# Patient Record
Sex: MALE | URBAN - METROPOLITAN AREA
[De-identification: names, ages, dates, MRNs, and addresses within clinical notes are randomized per-mention and may not be internally consistent; named-entity substitution may affect disease eponyms.]

---

## 2018-05-29 ENCOUNTER — APPOINTMENT (RX ONLY)
Dept: URBAN - METROPOLITAN AREA CLINIC 4 | Facility: CLINIC | Age: 73
Setting detail: DERMATOLOGY
End: 2018-05-29

## 2018-05-29 DIAGNOSIS — L57.0 ACTINIC KERATOSIS: ICD-10-CM

## 2018-05-29 DIAGNOSIS — L72.0 EPIDERMAL CYST: ICD-10-CM

## 2018-05-29 PROBLEM — E13.9 OTHER SPECIFIED DIABETES MELLITUS WITHOUT COMPLICATIONS: Status: ACTIVE | Noted: 2018-05-29

## 2018-05-29 PROBLEM — D48.5 NEOPLASM OF UNCERTAIN BEHAVIOR OF SKIN: Status: ACTIVE | Noted: 2018-05-29

## 2018-05-29 PROBLEM — I10 ESSENTIAL (PRIMARY) HYPERTENSION: Status: ACTIVE | Noted: 2018-05-29

## 2018-05-29 PROCEDURE — 17004 DESTROY PREMAL LESIONS 15/>: CPT

## 2018-05-29 PROCEDURE — ? LIQUID NITROGEN

## 2018-05-29 PROCEDURE — ? OBSERVATION AND MEASURE

## 2018-05-29 PROCEDURE — ? COUNSELING

## 2018-05-29 PROCEDURE — ? BIOPSY BY SHAVE METHOD

## 2018-05-29 PROCEDURE — 99202 OFFICE O/P NEW SF 15 MIN: CPT | Mod: 25

## 2018-05-29 PROCEDURE — 11100: CPT | Mod: 59

## 2018-05-29 ASSESSMENT — LOCATION ZONE DERM
LOCATION ZONE: EAR
LOCATION ZONE: NOSE
LOCATION ZONE: HAND
LOCATION ZONE: SCALP
LOCATION ZONE: ARM
LOCATION ZONE: FACE
LOCATION ZONE: NECK

## 2018-05-29 ASSESSMENT — LOCATION SIMPLE DESCRIPTION DERM
LOCATION SIMPLE: NECK
LOCATION SIMPLE: RIGHT EAR
LOCATION SIMPLE: RIGHT TEMPLE
LOCATION SIMPLE: SCALP
LOCATION SIMPLE: RIGHT UPPER ARM
LOCATION SIMPLE: LEFT HAND
LOCATION SIMPLE: RIGHT FOREHEAD
LOCATION SIMPLE: LEFT TEMPLE
LOCATION SIMPLE: LEFT FOREHEAD
LOCATION SIMPLE: LEFT EYEBROW
LOCATION SIMPLE: RIGHT FOREARM
LOCATION SIMPLE: LEFT FOREARM
LOCATION SIMPLE: RIGHT CHEEK
LOCATION SIMPLE: RIGHT EYEBROW
LOCATION SIMPLE: RIGHT HAND
LOCATION SIMPLE: NOSE

## 2018-05-29 ASSESSMENT — LOCATION DETAILED DESCRIPTION DERM
LOCATION DETAILED: LEFT LATERAL FOREHEAD
LOCATION DETAILED: RIGHT DISTAL RADIAL DORSAL FOREARM
LOCATION DETAILED: RIGHT CENTRAL LATERAL NECK
LOCATION DETAILED: LEFT FOREHEAD
LOCATION DETAILED: LEFT DISTAL DORSAL FOREARM
LOCATION DETAILED: RIGHT INFERIOR CENTRAL MALAR CHEEK
LOCATION DETAILED: RIGHT ULNAR DORSAL HAND
LOCATION DETAILED: RIGHT CENTRAL EYEBROW
LOCATION DETAILED: RIGHT SUPERIOR FRONTAL SCALP
LOCATION DETAILED: RIGHT RADIAL DORSAL HAND
LOCATION DETAILED: LEFT LATERAL EYEBROW
LOCATION DETAILED: RIGHT ANTITRAGUS
LOCATION DETAILED: LEFT SUPERIOR FOREHEAD
LOCATION DETAILED: LEFT RADIAL DORSAL HAND
LOCATION DETAILED: RIGHT SUPERIOR HELIX
LOCATION DETAILED: RIGHT SCAPHA
LOCATION DETAILED: RIGHT DISTAL LATERAL POSTERIOR UPPER ARM
LOCATION DETAILED: RIGHT MEDIAL TEMPLE
LOCATION DETAILED: NASAL DORSUM
LOCATION DETAILED: LEFT ULNAR DORSAL HAND
LOCATION DETAILED: RIGHT INFERIOR FOREHEAD
LOCATION DETAILED: LEFT CENTRAL EYEBROW
LOCATION DETAILED: LEFT CENTRAL TEMPLE
LOCATION DETAILED: RIGHT DISTAL DORSAL FOREARM
LOCATION DETAILED: RIGHT PROXIMAL DORSAL FOREARM
LOCATION DETAILED: RIGHT LATERAL EYEBROW
LOCATION DETAILED: RIGHT SUPERIOR LATERAL FOREHEAD
LOCATION DETAILED: LEFT PROXIMAL DORSAL FOREARM
LOCATION DETAILED: RIGHT INFERIOR MEDIAL FOREHEAD

## 2018-05-29 NOTE — PROCEDURE: LIQUID NITROGEN
Number Of Freeze-Thaw Cycles: 1 freeze-thaw cycle
Post-Care Instructions: I reviewed with the patient in detail post-care instructions. Patient is to wear sunprotection, and avoid picking at any of the treated lesions. Pt may apply Vaseline to crusted or scabbing areas.
Detail Level: Detailed
Duration Of Freeze Thaw-Cycle (Seconds): 5
Consent: The patient's consent was obtained including but not limited to risks of crusting, scabbing, blistering, scarring, darker or lighter pigmentary change, recurrence, incomplete removal and infection.
Render Post-Care Instructions In Note?: yes

## 2018-05-29 NOTE — PROCEDURE: BIOPSY BY SHAVE METHOD
Billing Type: Third-Party Bill
Body Location Override (Optional - Billing Will Still Be Based On Selected Body Map Location If Applicable): right nasal tip
Cryotherapy Text: The wound bed was treated with cryotherapy after the biopsy was performed.
Wound Care: Petrolatum
Biopsy Method: Personna blade
Additional Anesthesia Volume In Cc (Will Not Render If 0): 0
Anesthesia Type: 1% lidocaine with epinephrine and a 1:10 solution of 8.4% sodium bicarbonate
Electrodesiccation And Curettage Text: The wound bed was treated with electrodesiccation and curettage after the biopsy was performed.
X Size Of Lesion In Cm: 0.7
Consent: Written consent was obtained and risks were reviewed including but not limited to scarring, infection, bleeding, scabbing, incomplete removal, nerve damage and allergy to anesthesia.
Electrodesiccation Text: The wound bed was treated with electrodesiccation after the biopsy was performed.
Silver Nitrate Text: The wound bed was treated with silver nitrate after the biopsy was performed.
Detail Level: Detailed
Lab: [id number]
Bill [id number] For Specimen Handling/Conveyance To Laboratory?: no
Anesthesia Volume In Cc: 0.5
Lab Facility: 
Biopsy Type: H and E
Post-Care Instructions: I reviewed with the patient in detail post-care instructions. Patient is to keep the biopsy site dry overnight, and then apply bacitracin twice daily until healed. Patient may apply hydrogen peroxide soaks to remove any crusting.
Type Of Destruction Used: Curettage
Was A Bandage Applied: Yes
Notification Instructions: Patient will be notified of biopsy results. However, patient instructed to call the office if not contacted within 2 weeks.
Curettage Text: The wound bed was treated with curettage after the biopsy was performed.
Hemostasis: Aluminum Chloride
Dressing: Band-Aid

## 2018-07-27 ENCOUNTER — APPOINTMENT (RX ONLY)
Dept: URBAN - METROPOLITAN AREA CLINIC 4 | Facility: CLINIC | Age: 73
Setting detail: DERMATOLOGY
End: 2018-07-27

## 2018-07-27 ENCOUNTER — APPOINTMENT (RX ONLY)
Dept: URBAN - METROPOLITAN AREA CLINIC 36 | Facility: CLINIC | Age: 73
Setting detail: DERMATOLOGY
End: 2018-07-27

## 2018-07-27 PROBLEM — C44.91 BASAL CELL CARCINOMA OF SKIN, UNSPECIFIED: Status: ACTIVE | Noted: 2018-07-27

## 2018-07-27 PROCEDURE — ? MOHS SURGERY PHONE CONSULTATION

## 2018-07-27 NOTE — PROCEDURE: MOHS SURGERY PHONE CONSULTATION
Patient Reported Location: [place]
Detail Level: Detailed
Has The Pathology Report Been Received?: Yes
Does The Patient Have An Artificial Heart Valve?: No
Which Antibiotic Do They Take For Surgical Prophylaxis?: Amoxicillin (2 grams)
If Yes- What Blood Thinners Do They Take?: eliquis

## 2018-08-10 ENCOUNTER — APPOINTMENT (RX ONLY)
Dept: URBAN - METROPOLITAN AREA CLINIC 36 | Facility: CLINIC | Age: 73
Setting detail: DERMATOLOGY
End: 2018-08-10

## 2018-08-16 ENCOUNTER — APPOINTMENT (RX ONLY)
Dept: URBAN - METROPOLITAN AREA CLINIC 4 | Facility: CLINIC | Age: 73
Setting detail: DERMATOLOGY
End: 2018-08-16

## 2018-08-16 PROBLEM — C44.311 BASAL CELL CARCINOMA OF SKIN OF NOSE: Status: ACTIVE | Noted: 2018-08-16

## 2018-08-16 PROBLEM — Z85.828 PERSONAL HISTORY OF OTHER MALIGNANT NEOPLASM OF SKIN: Status: ACTIVE | Noted: 2018-08-16

## 2018-08-16 PROCEDURE — 17312 MOHS ADDL STAGE: CPT

## 2018-08-16 PROCEDURE — ? PRESCRIPTION

## 2018-08-16 PROCEDURE — ? MOHS SURGERY

## 2018-08-16 PROCEDURE — 17311 MOHS 1 STAGE H/N/HF/G: CPT

## 2018-08-16 PROCEDURE — 14060 TIS TRNFR E/N/E/L 10 SQ CM/<: CPT

## 2018-08-16 PROCEDURE — ? DIAGNOSIS COMMENT

## 2018-08-16 NOTE — PROCEDURE: MOHS SURGERY
Quadrant Reporting?: no
Quadrants Reporting?: 0
Mauc Instructions: By selecting yes to the question below the MAUC number will be added into the note.  This will be calculated automatically based on the diagnosis chosen, the size entered, the body zone selected (H,M,L) and the specific indications you chose. You will also have the option to override the Mohs AUC if you disagree with the automatically calculated number and this option is found in the Case Summary tab.
Bilateral Helical Rim Advancement Flap Text: The defect edges were debeveled with a #15 blade scalpel.  Given the location of the defect and the proximity to free margins (helical rim) a bilateral helical rim advancement flap was deemed most appropriate.  Using a sterile surgical marker, the appropriate advancement flaps were drawn incorporating the defect and placing the expected incisions between the helical rim and antihelix where possible.  The area thus outlined was incised through and through with a #15 scalpel blade.  With a skin hook and iris scissors, the flaps were gently and sharply undermined and freed up.
Consent (Marginal Mandibular)/Introductory Paragraph: The rationale for Mohs was explained to the patient and consent was obtained. The risks, benefits and alternatives to therapy were discussed in detail. Specifically, the risks of damage to the marginal mandibular branch of the facial nerve, infection, scarring, bleeding, prolonged wound healing, incomplete removal, allergy to anesthesia, and recurrence were addressed. Prior to the procedure, the treatment site was clearly identified and confirmed by the patient. All components of Universal Protocol/PAUSE Rule completed.
Referred To Plastics For Closure Text (Leave Blank If You Do Not Want): After obtaining clear surgical margins the patient was sent to plastics for surgical repair.  The patient understands they will receive post-surgical care and follow-up from the referring physician's office.
Mercedes Flap Text: The defect edges were debeveled with a #15 scalpel blade.  Given the location of the defect, shape of the defect and the proximity to free margins a Mercedes flap was deemed most appropriate.  Using a sterile surgical marker, an appropriate advancement flap was drawn incorporating the defect and placing the expected incisions within the relaxed skin tension lines where possible. The area thus outlined was incised deep to adipose tissue with a #15 scalpel blade.  The skin margins were undermined to an appropriate distance in all directions utilizing iris scissors.
Alternatives Discussed Intro (Do Not Add Period): I discussed alternative treatments to Mohs surgery and specifically discussed the risks and benefits of
W Plasty Text: The lesion was extirpated to the level of the fat with a #15 scalpel blade.  Given the location of the defect, shape of the defect and the proximity to free margins a W-plasty was deemed most appropriate for repair.  Using a sterile surgical marker, the appropriate transposition arms of the W-plasty were drawn incorporating the defect and placing the expected incisions within the relaxed skin tension lines where possible.    The area thus outlined was incised deep to adipose tissue with a #15 scalpel blade.  The skin margins were undermined to an appropriate distance in all directions utilizing iris scissors.  The opposing transposition arms were then transposed into place in opposite direction and anchored with interrupted buried subcutaneous sutures.
Consent (Nose)/Introductory Paragraph: The rationale for Mohs was explained to the patient and consent was obtained. The risks, benefits and alternatives to therapy were discussed in detail. Specifically, the risks of nasal deformity, changes in the flow of air through the nose, infection, scarring, bleeding, prolonged wound healing, incomplete removal, allergy to anesthesia, nerve injury and recurrence were addressed. Prior to the procedure, the treatment site was clearly identified and confirmed by the patient. All components of Universal Protocol/PAUSE Rule completed.
Donor Site Anesthesia Type: same as repair anesthesia
No Residual Tumor Seen Histology Text: There were no malignant cells seen in the sections examined.
Trilobed Flap Text: The defect edges were debeveled with a #15 scalpel blade.  Given the location of the defect and the proximity to free margins a trilobed flap was deemed most appropriate.  Using a sterile surgical marker, an appropriate trilobed flap drawn around the defect.    The area thus outlined was incised deep to adipose tissue with a #15 scalpel blade.  The skin margins were undermined to an appropriate distance in all directions utilizing iris scissors.
Alar Island Pedicle Flap Text: The defect edges were debeveled with a #15 scalpel blade.  Given the location of the defect, shape of the defect and the proximity to the alar rim an island pedicle advancement flap was deemed most appropriate.  Using a sterile surgical marker, an appropriate advancement flap was drawn incorporating the defect, outlining the appropriate donor tissue and placing the expected incisions within the nasal ala running parallel to the alar rim. The area thus outlined was incised with a #15 scalpel blade.  The skin margins were undermined minimally to an appropriate distance in all directions around the primary defect and laterally outward around the island pedicle utilizing iris scissors.  There was minimal undermining beneath the pedicle flap.
Show Surgical Defect Variables In The Stage Tabs: Yes
Posterior Auricular Interpolation Flap Text: A decision was made to reconstruct the defect utilizing an interpolation axial flap and a staged reconstruction.  A telfa template was made of the defect.  This telfa template was then used to outline the posterior auricular interpolation flap.  The donor area for the pedicle flap was then injected with anesthesia.  The flap was excised through the skin and subcutaneous tissue down to the layer of the underlying musculature.  The pedicle flap was carefully excised within this deep plane to maintain its blood supply.  The edges of the donor site were undermined.   The donor site was closed in a primary fashion.  The pedicle was then rotated into position and sutured.  Once the tube was sutured into place, adequate blood supply was confirmed with blanching and refill.  The pedicle was then wrapped with xeroform gauze and dressed appropriately with a telfa and gauze bandage to ensure continued blood supply and protect the attached pedicle.
Repair Type: Flap
Stage 4: Additional Anesthesia Type: 1% lidocaine with epinephrine
Partial Purse String (Intermediate) Text: Given the location of the defect and the characteristics of the surrounding skin an intermediate purse string closure was deemed most appropriate.  Undermining was performed circumfirentially around the surgical defect.  A purse string suture was then placed and tightened. Wound tension only allowed a partial closure of the circular defect.
Mohs Case Number: [id number]
Unna Boot Text: An Unna boot was placed to help immobilize the limb and facilitate more rapid healing.
Mohs Method Verbiage: An incision at a 45 degree angle following the standard Mohs approach was done and the specimen was harvested as a microscopic controlled layer.
Cheiloplasty (Complex) Text: A decision was made to reconstruct the defect with a  cheiloplasty.  The defect was undermined extensively.  Additional obicularis oris muscle was excised with a 15 blade scalpel.  The defect was converted into a full thickness wedge to facilite a better cosmetic result.  Small vessels were then tied off with 5-0 monocyrl. The obicularis oris, superficial fascia, adipose and dermis were then reapproximated.  After the deeper layers were approximated the epidermis was reapproximated with particular care given to realign the vermilion border.
Surgical Defect Length In Cm (Optional): 2.1
Referred To Otolaryngology For Closure Text (Leave Blank If You Do Not Want): After obtaining clear surgical margins the patient was sent to otolaryngology for surgical repair.  The patient understands they will receive post-surgical care and follow-up from the referring physician's office.
Graft Cartilage Fenestration Text: The cartilage was fenestrated with a 2mm punch biopsy to help facilitate graft survival and healing.
Crescentic Complex Repair Preamble Text (Leave Blank If You Do Not Want): Extensive wide undermining was performed.
H Plasty Text: Given the location of the defect, shape of the defect and the proximity to free margins a H-plasty was deemed most appropriate for repair.  Using a sterile surgical marker, the appropriate advancement arms of the H-plasty were drawn incorporating the defect and placing the expected incisions within the relaxed skin tension lines where possible. The area thus outlined was incised deep to adipose tissue with a #15 scalpel blade. The skin margins were undermined to an appropriate distance in all directions utilizing iris scissors.  The opposing advancement arms were then advanced into place in opposite direction and anchored with interrupted buried subcutaneous sutures.
Previous Accession (Optional): [id number]
A-T Advancement Flap Text: The defect edges were debeveled with a #15 scalpel blade.  Given the location of the defect, shape of the defect and the proximity to free margins an A-T advancement flap was deemed most appropriate.  Using a sterile surgical marker, an appropriate advancement flap was drawn incorporating the defect and placing the expected incisions within the relaxed skin tension lines where possible.    The area thus outlined was incised deep to adipose tissue with a #15 scalpel blade.  The skin margins were undermined to an appropriate distance in all directions utilizing iris scissors.
Mohs Histo Method Verbiage: Each section was then chromacoded and processed in the Mohs lab using the Mohs protocol and submitted for frozen section.
Ftsg Text: The defect edges were debeveled with a #15 scalpel blade.  Given the location of the defect, shape of the defect and the proximity to free margins a full thickness skin graft was deemed most appropriate.  Using a sterile surgical marker, the primary defect shape was transferred to the donor site. The area thus outlined was incised deep to adipose tissue with a #15 scalpel blade.  The harvested graft was then trimmed of adipose tissue until only dermis and epidermis was left.  The skin margins of the secondary defect were undermined to an appropriate distance in all directions utilizing iris scissors.  The secondary defect was closed with interrupted buried subcutaneous sutures.  The skin edges were then re-apposed with running  sutures.  The skin graft was then placed in the primary defect and oriented appropriately.
Bi-Rhombic Flap Text: The defect edges were debeveled with a #15 scalpel blade.  Given the location of the defect and the proximity to free margins a bi-rhombic flap was deemed most appropriate.  Using a sterile surgical marker, an appropriate rhombic flap was drawn incorporating the defect. The area thus outlined was incised deep to adipose tissue with a #15 scalpel blade.  The skin margins were undermined to an appropriate distance in all directions utilizing iris scissors.
Wound Check: 7 days
Cartilage Graft Text: The defect edges were debeveled with a #15 scalpel blade.  Given the location of the defect, shape of the defect, the fact the defect involved a full thickness cartilage defect a cartilage graft was deemed most appropriate.  An appropriate donor site was identified, cleansed, and anesthetized. The cartilage graft was then harvested and transferred to the recipient site, oriented appropriately and then sutured into place.  The secondary defect was then repaired using a primary closure.
Number Of Stages: 3
Hatchet Flap Text: The defect edges were debeveled with a #15 scalpel blade.  Given the location of the defect, shape of the defect and the proximity to free margins a hatchet flap was deemed most appropriate.  Using a sterile surgical marker, an appropriate hatchet flap was drawn incorporating the defect and placing the expected incisions within the relaxed skin tension lines where possible.    The area thus outlined was incised deep to adipose tissue with a #15 scalpel blade.  The skin margins were undermined to an appropriate distance in all directions utilizing iris scissors.
Consent (Near Eyelid Margin)/Introductory Paragraph: The rationale for Mohs was explained to the patient and consent was obtained. The risks, benefits and alternatives to therapy were discussed in detail. Specifically, the risks of ectropion or eyelid deformity, infection, scarring, bleeding, prolonged wound healing, incomplete removal, allergy to anesthesia, nerve injury and recurrence were addressed. Prior to the procedure, the treatment site was clearly identified and confirmed by the patient. All components of Universal Protocol/PAUSE Rule completed.
Surgical Defect Width In Cm (Optional): 1.4
Anesthesia Volume In Cc: 14.5
Epidermal Sutures: 5-0 Surgipro
Anesthesia Volume In Cc: 2.5
Secondary Defect Width In Cm (Required For Flaps): 1.1
Advancement Flap (Double) Text: The defect edges were debeveled with a #15 scalpel blade.  Given the location of the defect and the proximity to free margins a double advancement flap was deemed most appropriate.  Using a sterile surgical marker, the appropriate advancement flaps were drawn incorporating the defect and placing the expected incisions within the relaxed skin tension lines where possible.    The area thus outlined was incised deep to adipose tissue with a #15 scalpel blade.  The skin margins were undermined to an appropriate distance in all directions utilizing iris scissors.
Cheek Interpolation Flap Text: A decision was made to reconstruct the defect utilizing an interpolation axial flap and a staged reconstruction.  A telfa template was made of the defect.  This telfa template was then used to outline the Cheek Interpolation flap.  The donor area for the pedicle flap was then injected with anesthesia.  The flap was excised through the skin and subcutaneous tissue down to the layer of the underlying musculature.  The interpolation flap was carefully excised within this deep plane to maintain its blood supply.  The edges of the donor site were undermined.   The donor site was closed in a primary fashion.  The pedicle was then rotated into position and sutured.  Once the tube was sutured into place, adequate blood supply was confirmed with blanching and refill.  The pedicle was then wrapped with xeroform gauze and dressed appropriately with a telfa and gauze bandage to ensure continued blood supply and protect the attached pedicle.
Island Pedicle Flap Text: The defect edges were debeveled with a #15 scalpel blade.  Given the location of the defect, shape of the defect and the proximity to free margins an island pedicle advancement flap was deemed most appropriate.  Using a sterile surgical marker, an appropriate advancement flap was drawn incorporating the defect, outlining the appropriate donor tissue and placing the expected incisions within the relaxed skin tension lines where possible.    The area thus outlined was incised deep to adipose tissue with a #15 scalpel blade.  The skin margins were undermined to an appropriate distance in all directions around the primary defect and laterally outward around the island pedicle utilizing iris scissors.  There was minimal undermining beneath the pedicle flap.
Anesthesia Type: 1% lidocaine with epinephrine and a 1:10 solution of 8.4% sodium bicarbonate
No Repair - Repaired With Adjacent Surgical Defect Text (Leave Blank If You Do Not Want): After obtaining clear surgical margins the defect was repaired concurrently with another surgical defect which was in close approximation.
Melolabial Interpolation Flap Text: A decision was made to reconstruct the defect utilizing an interpolation axial flap and a staged reconstruction.  A telfa template was made of the defect.  This telfa template was then used to outline the melolabial interpolation flap.  The donor area for the pedicle flap was then injected with anesthesia.  The flap was excised through the skin and subcutaneous tissue down to the layer of the underlying musculature.  The pedicle flap was carefully excised within this deep plane to maintain its blood supply.  The edges of the donor site were undermined.   The donor site was closed in a primary fashion.  The pedicle was then rotated into position and sutured.  Once the tube was sutured into place, adequate blood supply was confirmed with blanching and refill.  The pedicle was then wrapped with xeroform gauze and dressed appropriately with a telfa and gauze bandage to ensure continued blood supply and protect the attached pedicle.
O-Z Plasty Text: The defect edges were debeveled with a #15 scalpel blade.  Given the location of the defect, shape of the defect and the proximity to free margins an O-Z plasty (double transposition flap) was deemed most appropriate.  Using a sterile surgical marker, the appropriate transposition flaps were drawn incorporating the defect and placing the expected incisions within the relaxed skin tension lines where possible.    The area thus outlined was incised deep to adipose tissue with a #15 scalpel blade.  The skin margins were undermined to an appropriate distance in all directions utilizing iris scissors.  Hemostasis was achieved with electrocautery.  The flaps were then transposed into place, one clockwise and the other counterclockwise, and anchored with interrupted buried subcutaneous sutures.
Tissue Cultured Epidermal Autograft Text: The defect edges were debeveled with a #15 scalpel blade.  Given the location of the defect, shape of the defect and the proximity to free margins a tissue cultured epidermal autograft was deemed most appropriate.  The graft was then trimmed to fit the size of the defect.  The graft was then placed in the primary defect and oriented appropriately.
Post-Care Instructions: I reviewed with the patient in detail post-care instructions. Patient is not to engage in any heavy lifting, exercise, or swimming for the next 14 days. Should the patient develop any fevers, chills, bleeding, severe pain patient will contact the office immediately.
Repair Anesthesia Method: local infiltration
Bilobed Transposition Flap Text: The defect edges were debeveled with a #15 scalpel blade.  Given the location of the defect and the proximity to free margins a bilobed transposition flap was deemed most appropriate.  Using a sterile surgical marker, an appropriate bilobe flap drawn around the defect.    The area thus outlined was incised deep to adipose tissue with a #15 scalpel blade.  The skin margins were undermined to an appropriate distance in all directions utilizing iris scissors.
Stage 3: Additional Anesthesia Volume In Cc: 6
Advancement Flap (Single) Text: The defect edges were debeveled with a #15 scalpel blade.  Given the location of the defect and the proximity to free margins a single advancement flap was deemed most appropriate.  Using a sterile surgical marker, an appropriate advancement flap was drawn incorporating the defect and placing the expected incisions within the relaxed skin tension lines where possible.    The area thus outlined was incised deep to adipose tissue with a #15 scalpel blade.  The skin margins were undermined to an appropriate distance in all directions utilizing iris scissors.
Stage 2: Number Of Blocks?: 1
Banner Transposition Flap Text: The defect edges were debeveled with a #15 scalpel blade.  Given the location of the defect and the proximity to free margins a Banner transposition flap was deemed most appropriate.  Using a sterile surgical marker, an appropriate flap drawn around the defect. The area thus outlined was incised deep to adipose tissue with a #15 scalpel blade.  The skin margins were undermined to an appropriate distance in all directions utilizing iris scissors.
Consent (Temporal Branch)/Introductory Paragraph: The rationale for Mohs was explained to the patient and consent was obtained. The risks, benefits and alternatives to therapy were discussed in detail. Specifically, the risks of damage to the temporal branch of the facial nerve, infection, scarring, bleeding, prolonged wound healing, incomplete removal, allergy to anesthesia, and recurrence were addressed. Prior to the procedure, the treatment site was clearly identified and confirmed by the patient. All components of Universal Protocol/PAUSE Rule completed.
Consent 2/Introductory Paragraph: Mohs surgery was explained to the patient and consent was obtained. The risks, benefits and alternatives to therapy were discussed in detail. Specifically, the risks of infection, scarring, bleeding, prolonged wound healing, incomplete removal, allergy to anesthesia, nerve injury and recurrence were addressed. Prior to the procedure, the treatment site was clearly identified and confirmed by the patient. All components of Universal Protocol/PAUSE Rule completed.
Mohs Rapid Report Verbiage: The area of clinically evident tumor was marked with skin marking ink and appropriately hatched.  The initial incision was made following the Mohs approach through the skin.  The specimen was taken to the lab, divided into the necessary number of pieces, chromacoded and processed according to the Mohs protocol.  This was repeated in successive stages until a tumor free defect was achieved.
Cheek-To-Nose Interpolation Flap Text: A decision was made to reconstruct the defect utilizing an interpolation axial flap and a staged reconstruction.  A telfa template was made of the defect.  This telfa template was then used to outline the Cheek-To-Nose Interpolation flap.  The donor area for the pedicle flap was then injected with anesthesia.  The flap was excised through the skin and subcutaneous tissue down to the layer of the underlying musculature.  The interpolation flap was carefully excised within this deep plane to maintain its blood supply.  The edges of the donor site were undermined.   The donor site was closed in a primary fashion.  The pedicle was then rotated into position and sutured.  Once the tube was sutured into place, adequate blood supply was confirmed with blanching and refill.  The pedicle was then wrapped with xeroform gauze and dressed appropriately with a telfa and gauze bandage to ensure continued blood supply and protect the attached pedicle.
Melolabial Transposition Flap Text: The defect edges were debeveled with a #15 scalpel blade.  Given the location of the defect and the proximity to free margins a melolabial flap was deemed most appropriate.  Using a sterile surgical marker, an appropriate melolabial transposition flap was drawn incorporating the defect.    The area thus outlined was incised deep to adipose tissue with a #15 scalpel blade.  The skin margins were undermined to an appropriate distance in all directions utilizing iris scissors.
Crescentic Intermediate Repair Preamble Text (Leave Blank If You Do Not Want): Undermining was performed with blunt dissection.
Keystone Flap Text: The defect edges were debeveled with a #15 scalpel blade.  Given the location of the defect, shape of the defect a keystone flap was deemed most appropriate.  Using a sterile surgical marker, an appropriate keystone flap was drawn incorporating the defect, outlining the appropriate donor tissue and placing the expected incisions within the relaxed skin tension lines where possible. The area thus outlined was incised deep to adipose tissue with a #15 scalpel blade.  The skin margins were undermined to an appropriate distance in all directions around the primary defect and laterally outward around the flap utilizing iris scissors.
Manual Repair Warning Statement: We plan on removing the manually selected variable below in favor of our much easier automatic structured text blocks found in the previous tab. We decided to do this to help make the flow better and give you the full power of structured data. Manual selection is never going to be ideal in our platform and I would encourage you to avoid using manual selection from this point on, especially since I will be sunsetting this feature. It is important that you do one of two things with the customized text below. First, you can save all of the text in a word file so you can have it for future reference. Second, transfer the text to the appropriate area in the Library tab. Lastly, if there is a flap or graft type which we do not have you need to let us know right away so I can add it in before the variable is hidden. No need to panic, we plan to give you roughly 6 months to make the change.
Referring Physician (Optional): Dr. Moya
Paramedian Forehead Flap Text: A decision was made to reconstruct the defect utilizing an interpolation axial flap and a staged reconstruction.  A telfa template was made of the defect.  This telfa template was then used to outline the paramedian forehead pedicle flap.  The donor area for the pedicle flap was then injected with anesthesia.  The flap was excised through the skin and subcutaneous tissue down to the layer of the underlying musculature.  The pedicle flap was carefully excised within this deep plane to maintain its blood supply.  The edges of the donor site were undermined.   The donor site was closed in a primary fashion.  The pedicle was then rotated into position and sutured.  Once the tube was sutured into place, adequate blood supply was confirmed with blanching and refill.  The pedicle was then wrapped with xeroform gauze and dressed appropriately with a telfa and gauze bandage to ensure continued blood supply and protect the attached pedicle.
Subsequent Stages Histo Method Verbiage: Using a similar technique to that described above, a thin layer of tissue was removed from all areas where tumor was visible on the previous stage.  The tissue was again oriented, mapped, dyed, and processed as above.
Detail Level: Detailed
Body Location Override (Optional - Billing Will Still Be Based On Selected Body Map Location If Applicable): right nasal tip
Full Thickness Lip Wedge Repair (Flap) Text: Given the location of the defect and the proximity to free margins a full thickness wedge repair was deemed most appropriate.  Using a sterile surgical marker, the appropriate repair was drawn incorporating the defect and placing the expected incisions perpendicular to the vermilion border.  The vermilion border was also meticulously outlined to ensure appropriate reapproximation during the repair.  The area thus outlined was incised through and through with a #15 scalpel blade.  The muscularis and dermis were reaproximated with deep sutures following hemostasis. Care was taken to realign the vermilion border before proceeding with the superficial closure.  Once the vermilion was realigned the superfical and mucosal closure was finished.
Epidermal Autograft Text: The defect edges were debeveled with a #15 scalpel blade.  Given the location of the defect, shape of the defect and the proximity to free margins an epidermal autograft was deemed most appropriate.  Using a sterile surgical marker, the primary defect shape was transferred to the donor site. The epidermal graft was then harvested.  The skin graft was then placed in the primary defect and oriented appropriately.
Area L Indication Text: Tumors in this location are included in Area L (trunk and extremities).  Mohs surgery is indicated for larger tumors, or tumors with aggressive histologic features, in these anatomic locations.
Surgeon Performing Repair (Optional): Colby Moya M.D.
Referred To Mid-Level For Closure Text (Leave Blank If You Do Not Want): After obtaining clear surgical margins the patient was sent to a mid-level provider for surgical repair.  The patient understands they will receive post-surgical care and follow-up from the mid-level provider.
Closure 2 Information: This tab is for additional flaps and grafts, including complex repair and grafts and complex repair and flaps. You can also specify a different location for the additional defect, if the location is the same you do not need to select a new one. We will insert the automated text for the repair you select below just as we do for solitary flaps and grafts. Please note that at this time if you select a location with a different insurance zone you will need to override the ICD10 and CPT if appropriate.
Dressing (No Sutures): dry sterile dressing
Spiral Flap Text: The defect edges were debeveled with a #15 scalpel blade.  Given the location of the defect, shape of the defect and the proximity to free margins a spiral flap was deemed most appropriate.  Using a sterile surgical marker, an appropriate rotation flap was drawn incorporating the defect and placing the expected incisions within the relaxed skin tension lines where possible. The area thus outlined was incised deep to adipose tissue with a #15 scalpel blade.  The skin margins were undermined to an appropriate distance in all directions utilizing iris scissors.
Transposition Flap Text: The defect edges were debeveled with a #15 scalpel blade.  Given the location of the defect and the proximity to free margins a transposition flap was deemed most appropriate.  Using a sterile surgical marker, an appropriate transposition flap was drawn incorporating the defect.    The area thus outlined was incised deep to adipose tissue with a #15 scalpel blade.  The skin margins were undermined to an appropriate distance in all directions utilizing iris scissors.
V-Y Flap Text: The defect edges were debeveled with a #15 scalpel blade.  Given the location of the defect, shape of the defect and the proximity to free margins a V-Y flap was deemed most appropriate.  Using a sterile surgical marker, an appropriate advancement flap was drawn incorporating the defect and placing the expected incisions within the relaxed skin tension lines where possible.    The area thus outlined was incised deep to adipose tissue with a #15 scalpel blade.  The skin margins were undermined to an appropriate distance in all directions utilizing iris scissors.
Double Island Pedicle Flap Text: The defect edges were debeveled with a #15 scalpel blade.  Given the location of the defect, shape of the defect and the proximity to free margins a double island pedicle advancement flap was deemed most appropriate.  Using a sterile surgical marker, an appropriate advancement flap was drawn incorporating the defect, outlining the appropriate donor tissue and placing the expected incisions within the relaxed skin tension lines where possible.    The area thus outlined was incised deep to adipose tissue with a #15 scalpel blade.  The skin margins were undermined to an appropriate distance in all directions around the primary defect and laterally outward around the island pedicle utilizing iris scissors.  There was minimal undermining beneath the pedicle flap.
Purse String (Simple) Text: Given the location of the defect and the characteristics of the surrounding skin a purse string closure was deemed most appropriate.  Undermining was performed circumfirentially around the surgical defect.  A purse string suture was then placed and tightened.
Burow's Advancement Flap Text: The defect edges were debeveled with a #15 scalpel blade.  Given the location of the defect and the proximity to free margins a Burow's advancement flap was deemed most appropriate.  Using a sterile surgical marker, the appropriate advancement flap was drawn incorporating the defect and placing the expected incisions within the relaxed skin tension lines where possible.    The area thus outlined was incised deep to adipose tissue with a #15 scalpel blade.  The skin margins were undermined to an appropriate distance in all directions utilizing iris scissors.
Medical Necessity Statement: Based on my medical judgement, Mohs surgery is the most appropriate treatment for this cancer compared to other treatments.
Additional Epidermal Closure (Optional): central half buried horizontal mattress
Helical Rim Advancement Flap Text: The defect edges were debeveled with a #15 blade scalpel.  Given the location of the defect and the proximity to free margins (helical rim) a double helical rim advancement flap was deemed most appropriate.  Using a sterile surgical marker, the appropriate advancement flaps were drawn incorporating the defect and placing the expected incisions between the helical rim and antihelix where possible.  The area thus outlined was incised through and through with a #15 scalpel blade.  With a skin hook and iris scissors, the flaps were gently and sharply undermined and freed up.
Surgical Defect Length In Cm (Optional): 1.8
Consent (Lip)/Introductory Paragraph: The rationale for Mohs was explained to the patient and consent was obtained. The risks, benefits and alternatives to therapy were discussed in detail. Specifically, the risks of lip deformity, changes in the oral aperture, infection, scarring, bleeding, prolonged wound healing, incomplete removal, allergy to anesthesia, nerve injury and recurrence were addressed. Prior to the procedure, the treatment site was clearly identified and confirmed by the patient. All components of Universal Protocol/PAUSE Rule completed.
Wound Care: Petrolatum
Epidermal Closure: running cuticular
Tarsorrhaphy Text: A tarsorrhaphy was performed using Frost sutures.
Surgical Defect Width In Cm (Optional): 1.7
Hemostasis: Electrocautery
Rotation Flap Text: The defect edges were debeveled with a #15 scalpel blade.  Given the location of the defect, shape of the defect and the proximity to free margins a rotation flap was deemed most appropriate.  Using a sterile surgical marker, an appropriate rotation flap was drawn incorporating the defect and placing the expected incisions within the relaxed skin tension lines where possible.    The area thus outlined was incised deep to adipose tissue with a #15 scalpel blade.  The skin margins were undermined to an appropriate distance in all directions utilizing iris scissors.
Bcc Infiltrative Histology Text: There were numerous aggregates of basaloid cells demonstrating an infiltrative pattern.
Referred To Asc For Closure Text (Leave Blank If You Do Not Want): After obtaining clear surgical margins the patient was sent to an ASC for surgical repair.  The patient understands they will receive post-surgical care and follow-up from the ASC physician.
Surgical Defect Width In Cm (Optional): 1.9
Split-Thickness Skin Graft Text: The defect edges were debeveled with a #15 scalpel blade.  Given the location of the defect, shape of the defect and the proximity to free margins a split thickness skin graft was deemed most appropriate.  Using a sterile surgical marker, the primary defect shape was transferred to the donor site. The split thickness graft was then harvested.  The skin graft was then placed in the primary defect and oriented appropriately.
Non-Graft Cartilage Fenestration Text: The cartilage was fenestrated with a 2mm punch biopsy to help facilitate healing.
Cheiloplasty (Less Than 50%) Text: A decision was made to reconstruct the defect with a  cheiloplasty.  The defect was undermined extensively.  Additional obicularis oris muscle was excised with a 15 blade scalpel.  The defect was converted into a full thickness wedge, of less than 50% of the vertical height of the lip, to facilite a better cosmetic result.  Small vessels were then tied off with 5-0 monocyrl. The obicularis oris, superficial fascia, adipose and dermis were then reapproximated.  After the deeper layers were approximated the epidermis was reapproximated with particular care given to realign the vermilion border.
Secondary Defect Length In Cm (Required For Flaps): 2
Epidermal Closure Graft Donor Site (Optional): simple interrupted
Referred To Oculoplastics For Closure Text (Leave Blank If You Do Not Want): After obtaining clear surgical margins the patient was sent to oculoplastics for surgical repair.  The patient understands they will receive post-surgical care and follow-up from the referring physician's office.
Skin Substitute Text: The defect edges were debeveled with a #15 scalpel blade.  Given the location of the defect, shape of the defect and the proximity to free margins a skin substitute graft was deemed most appropriate.  The graft material was trimmed to fit the size of the defect. The graft was then placed in the primary defect and oriented appropriately.
Inflammation Suggestive Of Cancer Camouflage Histology Text: There was a dense lymphocytic infiltrate which prevented adequate histologic evaluation of adjacent structures.
Dressing: pressure dressing with telfa
O-T Plasty Text: The defect edges were debeveled with a #15 scalpel blade.  Given the location of the defect, shape of the defect and the proximity to free margins an O-T plasty was deemed most appropriate.  Using a sterile surgical marker, an appropriate O-T plasty was drawn incorporating the defect and placing the expected incisions within the relaxed skin tension lines where possible.    The area thus outlined was incised deep to adipose tissue with a #15 scalpel blade.  The skin margins were undermined to an appropriate distance in all directions utilizing iris scissors.
Surgical Defect Length In Cm (Optional): 1.6
Crescentic Advancement Flap Text: The defect edges were debeveled with a #15 scalpel blade.  Given the location of the defect and the proximity to free margins a crescentic advancement flap was deemed most appropriate.  Using a sterile surgical marker, the appropriate advancement flap was drawn incorporating the defect and placing the expected incisions within the relaxed skin tension lines where possible.    The area thus outlined was incised deep to adipose tissue with a #15 scalpel blade.  The skin margins were undermined to an appropriate distance in all directions utilizing iris scissors.
Area H Indication Text: Tumors in this location are included in Area H (eyelids, eyebrows, nose, lips, chin, ear, pre-auricular, post-auricular, temple, genitalia, hands, feet, ankles and areola).  Tissue conservation is critical in these anatomic locations.
Partial Purse String (Simple) Text: Given the location of the defect and the characteristics of the surrounding skin a simple purse string closure was deemed most appropriate.  Undermining was performed circumfirentially around the surgical defect.  A purse string suture was then placed and tightened. Wound tension only allowed a partial closure of the circular defect.
Ear Star Wedge Flap Text: The defect edges were debeveled with a #15 blade scalpel.  Given the location of the defect and the proximity to free margins (helical rim) an ear star wedge flap was deemed most appropriate.  Using a sterile surgical marker, the appropriate flap was drawn incorporating the defect and placing the expected incisions between the helical rim and antihelix where possible.  The area thus outlined was incised through and through with a #15 scalpel blade.
S Plasty Text: Given the location and shape of the defect, and the orientation of relaxed skin tension lines, an S-plasty was deemed most appropriate for repair.  Using a sterile surgical marker, the appropriate outline of the S-plasty was drawn, incorporating the defect and placing the expected incisions within the relaxed skin tension lines where possible.  The area thus outlined was incised deep to adipose tissue with a #15 scalpel blade.  The skin margins were undermined to an appropriate distance in all directions utilizing iris scissors. The skin flaps were advanced over the defect.  The opposing margins were then approximated with interrupted buried subcutaneous sutures.
Postop Diagnosis: same
Same Histology In Subsequent Stages Text: The pattern and morphology of the tumor is as described in the first stage.
Mastoid Interpolation Flap Text: A decision was made to reconstruct the defect utilizing an interpolation axial flap and a staged reconstruction.  A telfa template was made of the defect.  This telfa template was then used to outline the mastoid interpolation flap.  The donor area for the pedicle flap was then injected with anesthesia.  The flap was excised through the skin and subcutaneous tissue down to the layer of the underlying musculature.  The pedicle flap was carefully excised within this deep plane to maintain its blood supply.  The edges of the donor site were undermined.   The donor site was closed in a primary fashion.  The pedicle was then rotated into position and sutured.  Once the tube was sutured into place, adequate blood supply was confirmed with blanching and refill.  The pedicle was then wrapped with xeroform gauze and dressed appropriately with a telfa and gauze bandage to ensure continued blood supply and protect the attached pedicle.
Z Plasty Text: The lesion was extirpated to the level of the fat with a #15 scalpel blade.  Given the location of the defect, shape of the defect and the proximity to free margins a Z-plasty was deemed most appropriate for repair.  Using a sterile surgical marker, the appropriate transposition arms of the Z-plasty were drawn incorporating the defect and placing the expected incisions within the relaxed skin tension lines where possible.    The area thus outlined was incised deep to adipose tissue with a #15 scalpel blade.  The skin margins were undermined to an appropriate distance in all directions utilizing iris scissors.  The opposing transposition arms were then transposed into place in opposite direction and anchored with interrupted buried subcutaneous sutures.
V-Y Plasty Text: The defect edges were debeveled with a #15 scalpel blade.  Given the location of the defect, shape of the defect and the proximity to free margins an V-Y advancement flap was deemed most appropriate.  Using a sterile surgical marker, an appropriate advancement flap was drawn incorporating the defect and placing the expected incisions within the relaxed skin tension lines where possible.    The area thus outlined was incised deep to adipose tissue with a #15 scalpel blade.  The skin margins were undermined to an appropriate distance in all directions utilizing iris scissors.
Complex Repair And Flap Additional Text (Will Appearing After The Standard Complex Repair Text): The complex repair was not sufficient to completely close the primary defect. The remaining additional defect was repaired with the flap mentioned below.
Consent 1/Introductory Paragraph: The rationale for Mohs was explained to the patient and consent was obtained. The risks, benefits and alternatives to therapy were discussed in detail. Specifically, the risks of infection, scarring, bleeding, prolonged wound healing, incomplete removal, allergy to anesthesia, nerve injury and recurrence were addressed. Prior to the procedure, the treatment site was clearly identified and confirmed by the patient. All components of Universal Protocol/PAUSE Rule completed.
Consent (Spinal Accessory)/Introductory Paragraph: The rationale for Mohs was explained to the patient and consent was obtained. The risks, benefits and alternatives to therapy were discussed in detail. Specifically, the risks of damage to the spinal accessory nerve, infection, scarring, bleeding, prolonged wound healing, incomplete removal, allergy to anesthesia, and recurrence were addressed. Prior to the procedure, the treatment site was clearly identified and confirmed by the patient. All components of Universal Protocol/PAUSE Rule completed.
Modified Advancement Flap Text: The defect edges were debeveled with a #15 scalpel blade.  Given the location of the defect, shape of the defect and the proximity to free margins a modified advancement flap was deemed most appropriate.  Using a sterile surgical marker, an appropriate advancement flap was drawn incorporating the defect and placing the expected incisions within the relaxed skin tension lines where possible.    The area thus outlined was incised deep to adipose tissue with a #15 scalpel blade.  The skin margins were undermined to an appropriate distance in all directions utilizing iris scissors.
Island Pedicle Flap With Canthal Suspension Text: The defect edges were debeveled with a #15 scalpel blade.  Given the location of the defect, shape of the defect and the proximity to free margins an island pedicle advancement flap was deemed most appropriate.  Using a sterile surgical marker, an appropriate advancement flap was drawn incorporating the defect, outlining the appropriate donor tissue and placing the expected incisions within the relaxed skin tension lines where possible. The area thus outlined was incised deep to adipose tissue with a #15 scalpel blade.  The skin margins were undermined to an appropriate distance in all directions around the primary defect and laterally outward around the island pedicle utilizing iris scissors.  There was minimal undermining beneath the pedicle flap. A suspension suture was placed in the canthal tendon to prevent tension and prevent ectropion.
Dorsal Nasal Flap Text: The defect edges were debeveled with a #15 scalpel blade.  Given the location of the defect and the proximity to free margins a dorsal nasal flap was deemed most appropriate.  Using a sterile surgical marker, an appropriate dorsal nasal flap was drawn around the defect.    The area thus outlined was incised deep to adipose tissue with a #15 scalpel blade.  The skin margins were undermined to an appropriate distance in all directions utilizing iris scissors.
Surgeon/Pathologist Verbiage (Will Incorporate Name Of Surgeon From Intro If Not Blank): operated in two distinct and integrated capacities as the surgeon and pathologist.
Mucosal Advancement Flap Text: Given the location of the defect, shape of the defect and the proximity to free margins a mucosal advancement flap was deemed most appropriate. Incisions were made with a 15 blade scalpel in the appropriate fashion along the cutaneous vermilion border and the mucosal lip. The remaining actinically damaged mucosal tissue was excised.  The mucosal advancement flap was then elevated to the gingival sulcus with care taken to preserve the neurovascular structures and advanced into the primary defect. Care was taken to ensure that precise realignment of the vermilion border was achieved.
O-T Advancement Flap Text: The defect edges were debeveled with a #15 scalpel blade.  Given the location of the defect, shape of the defect and the proximity to free margins an O-T advancement flap was deemed most appropriate.  Using a sterile surgical marker, an appropriate advancement flap was drawn incorporating the defect and placing the expected incisions within the relaxed skin tension lines where possible.    The area thus outlined was incised deep to adipose tissue with a #15 scalpel blade.  The skin margins were undermined to an appropriate distance in all directions utilizing iris scissors.
Home Suture Removal Text: Patient was provided instructions on removing sutures and will remove their sutures at home.  If they have any questions or difficulties they will call the office.
Rhombic Flap Text: The defect edges were debeveled with a #15 scalpel blade.  Given the location of the defect and the proximity to free margins a rhombic flap was deemed most appropriate.  Using a sterile surgical marker, an appropriate rhombic flap was drawn incorporating the defect.    The area thus outlined was incised deep to adipose tissue with a #15 scalpel blade.  The skin margins were undermined to an appropriate distance in all directions utilizing iris scissors.
Consent (Ear)/Introductory Paragraph: The rationale for Mohs was explained to the patient and consent was obtained. The risks, benefits and alternatives to therapy were discussed in detail. Specifically, the risks of ear deformity, infection, scarring, bleeding, prolonged wound healing, incomplete removal, allergy to anesthesia, nerve injury and recurrence were addressed. Prior to the procedure, the treatment site was clearly identified and confirmed by the patient. All components of Universal Protocol/PAUSE Rule completed.
Area M Indication Text: Tumors in this location are included in Area M (cheek, forehead, scalp, neck, jawline and pretibial skin).  Mohs surgery is indicated for tumors in these anatomic locations.
Repair Hemostasis (Optional): Portable Heat Cautery
Tumor Debulked?: curette
Closure 3 Information: This tab is for additional flaps and grafts above and beyond our usual structured repairs.  Please note if you enter information here it will not currently bill and you will need to add the billing information manually.
Consent Type: Consent 1 (Standard)
Estimated Blood Loss (Cc): minimal
Undermining Location (Optional): below the muscle
Star Wedge Flap Text: The defect edges were debeveled with a #15 scalpel blade.  Given the location of the defect, shape of the defect and the proximity to free margins a star wedge flap was deemed most appropriate.  Using a sterile surgical marker, an appropriate rotation flap was drawn incorporating the defect and placing the expected incisions within the relaxed skin tension lines where possible. The area thus outlined was incised deep to adipose tissue with a #15 scalpel blade.  The skin margins were undermined to an appropriate distance in all directions utilizing iris scissors.
Composite Graft Text: The defect edges were debeveled with a #15 scalpel blade.  Given the location of the defect, shape of the defect, the proximity to free margins and the fact the defect was full thickness a composite graft was deemed most appropriate.  The defect was outline and then transferred to the donor site.  A full thickness graft was then excised from the donor site. The graft was then placed in the primary defect, oriented appropriately and then sutured into place.  The secondary defect was then repaired using a primary closure.
Secondary Intention Text (Leave Blank If You Do Not Want): The defect will heal with secondary intention.
Interpolation Flap Text: A decision was made to reconstruct the defect utilizing an interpolation axial flap and a staged reconstruction.  A telfa template was made of the defect.  This telfa template was then used to outline the interpolation flap.  The donor area for the pedicle flap was then injected with anesthesia.  The flap was excised through the skin and subcutaneous tissue down to the layer of the underlying musculature.  The interpolation flap was carefully excised within this deep plane to maintain its blood supply.  The edges of the donor site were undermined.   The donor site was closed in a primary fashion.  The pedicle was then rotated into position and sutured.  Once the tube was sutured into place, adequate blood supply was confirmed with blanching and refill.  The pedicle was then wrapped with xeroform gauze and dressed appropriately with a telfa and gauze bandage to ensure continued blood supply and protect the attached pedicle.
Xenograft Text: The defect edges were debeveled with a #15 scalpel blade.  Given the location of the defect, shape of the defect and the proximity to free margins a xenograft was deemed most appropriate.  The graft was then trimmed to fit the size of the defect.  The graft was then placed in the primary defect and oriented appropriately.
Consent 3/Introductory Paragraph: I gave the patient a chance to ask questions they had about the procedure.  Following this I explained the Mohs procedure and consent was obtained. The risks, benefits and alternatives to therapy were discussed in detail. Specifically, the risks of infection, scarring, bleeding, prolonged wound healing, incomplete removal, allergy to anesthesia, nerve injury and recurrence were addressed. Prior to the procedure, the treatment site was clearly identified and confirmed by the patient. All components of Universal Protocol/PAUSE Rule completed.
Complex Repair And Graft Additional Text (Will Appearing After The Standard Complex Repair Text): The complex repair was not sufficient to completely close the primary defect. The remaining additional defect was repaired with the graft mentioned below.
Repair Performed By Another Provider Text (Leave Blank If You Do Not Want): After obtaining clear surgical margins the defect was repaired by another provider.
Bcc Histology Text: There were numerous aggregates of basaloid cells.
Bilobed Flap Text: The defect edges were debeveled with a #15 scalpel blade.  Given the location of the defect and the proximity to free margins a bilobe flap was deemed most appropriate.  Using a sterile surgical marker, an appropriate bilobe flap drawn around the defect.    The area thus outlined was incised deep to adipose tissue with a #15 scalpel blade.  The skin margins were undermined to an appropriate distance in all directions utilizing iris scissors.
Localized Dermabrasion With Wire Brush Text: The patient was draped in routine manner.  Localized dermabrasion using 3 x 17 mm wire brush was performed in routine manner to papillary dermis. This spot dermabrasion is being performed to complete skin cancer reconstruction. It also will eliminate the other sun damaged precancerous cells that are known to be part of the regional effect of a lifetime's worth of sun exposure. This localized dermabrasion is therapeutic and should not be considered cosmetic in any regard.
Purse String (Intermediate) Text: Given the location of the defect and the characteristics of the surrounding skin a purse string intermediate closure was deemed most appropriate.  Undermining was performed circumfirentially around the surgical defect.  A purse string suture was then placed and tightened.
Island Pedicle Flap-Requiring Vessel Identification Text: The defect edges were debeveled with a #15 scalpel blade.  Given the location of the defect, shape of the defect and the proximity to free margins an island pedicle advancement flap was deemed most appropriate.  Using a sterile surgical marker, an appropriate advancement flap was drawn, based on the axial vessel mentioned above, incorporating the defect, outlining the appropriate donor tissue and placing the expected incisions within the relaxed skin tension lines where possible.    The area thus outlined was incised deep to adipose tissue with a #15 scalpel blade.  The skin margins were undermined to an appropriate distance in all directions around the primary defect and laterally outward around the island pedicle utilizing iris scissors.  There was minimal undermining beneath the pedicle flap.
Eye Protection Verbiage: Before proceeding with the stage, a plastic scleral shield was inserted. The globe was anesthetized with a few drops of 1% lidocaine with 1:100,000 epinephrine. Then, an appropriate sized scleral shield was chosen and coated with lacrilube ointment. The shield was gently inserted and left in place for the duration of each stage. After the stage was completed, the shield was gently removed.
Dermal Autograft Text: The defect edges were debeveled with a #15 scalpel blade.  Given the location of the defect, shape of the defect and the proximity to free margins a dermal autograft was deemed most appropriate.  Using a sterile surgical marker, the primary defect shape was transferred to the donor site. The area thus outlined was incised deep to adipose tissue with a #15 scalpel blade.  The harvested graft was then trimmed of adipose and epidermal tissue until only dermis was left.  The skin graft was then placed in the primary defect and oriented appropriately.
Muscle Hinge Flap Text: The defect edges were debeveled with a #15 scalpel blade.  Given the size, depth and location of the defect and the proximity to free margins a muscle hinge flap was deemed most appropriate.  Using a sterile surgical marker, an appropriate hinge flap was drawn incorporating the defect. The area thus outlined was incised with a #15 scalpel blade.  The skin margins were undermined to an appropriate distance in all directions utilizing iris scissors.
Location Indication Override (Is Already Calculated Based On Selected Body Location): Area H
Ear Wedge Repair Text: A wedge excision was completed by carrying down an excision through the full thickness of the ear and cartilage with an inward facing Burow's triangle. The wound was then closed in a layered fashion.
Consent (Scalp)/Introductory Paragraph: The rationale for Mohs was explained to the patient and consent was obtained. The risks, benefits and alternatives to therapy were discussed in detail. Specifically, the risks of changes in hair growth pattern secondary to repair, infection, scarring, bleeding, prolonged wound healing, incomplete removal, allergy to anesthesia, nerve injury and recurrence were addressed. Prior to the procedure, the treatment site was clearly identified and confirmed by the patient. All components of Universal Protocol/PAUSE Rule completed.
O-L Flap Text: The defect edges were debeveled with a #15 scalpel blade.  Given the location of the defect, shape of the defect and the proximity to free margins an O-L flap was deemed most appropriate.  Using a sterile surgical marker, an appropriate advancement flap was drawn incorporating the defect and placing the expected incisions within the relaxed skin tension lines where possible.    The area thus outlined was incised deep to adipose tissue with a #15 scalpel blade.  The skin margins were undermined to an appropriate distance in all directions utilizing iris scissors.
Graft Donor Site Bandage (Optional-Leave Blank If You Don't Want In Note): Steri-strips and a pressure bandage were applied to the donor site.
Advancement-Rotation Flap Text: The defect edges were debeveled with a #15 scalpel blade.  Given the location of the defect, shape of the defect and the proximity to free margins an advancement-rotation flap was deemed most appropriate.  Using a sterile surgical marker, an appropriate flap was drawn incorporating the defect and placing the expected incisions within the relaxed skin tension lines where possible. The area thus outlined was incised deep to adipose tissue with a #15 scalpel blade.  The skin margins were undermined to an appropriate distance in all directions utilizing iris scissors.
Deep Sutures: 4-0 Maxon
Flap Type: Bilobed Transposition Flap

## 2018-08-17 RX ORDER — DOXYCYCLINE HYCLATE 100 MG/1
CAPSULE, GELATIN COATED ORAL BID
Qty: 14 | Refills: 0 | COMMUNITY
Start: 2018-08-17

## 2018-08-17 RX ADMIN — DOXYCYCLINE HYCLATE: 100 CAPSULE, GELATIN COATED ORAL at 00:00

## 2018-08-23 ENCOUNTER — APPOINTMENT (RX ONLY)
Dept: URBAN - METROPOLITAN AREA CLINIC 36 | Facility: CLINIC | Age: 73
Setting detail: DERMATOLOGY
End: 2018-08-23

## 2018-08-23 DIAGNOSIS — Z48.02 ENCOUNTER FOR REMOVAL OF SUTURES: ICD-10-CM

## 2018-08-23 DIAGNOSIS — Z48.817 ENCOUNTER FOR SURGICAL AFTERCARE FOLLOWING SURGERY ON THE SKIN AND SUBCUTANEOUS TISSUE: ICD-10-CM

## 2018-08-23 PROCEDURE — ? SUTURE REMOVAL (GLOBAL PERIOD)

## 2018-08-23 PROCEDURE — 99024 POSTOP FOLLOW-UP VISIT: CPT

## 2018-08-23 PROCEDURE — ? POST-OP WOUND EVALUATION

## 2018-08-23 ASSESSMENT — LOCATION ZONE DERM: LOCATION ZONE: NOSE

## 2018-08-23 ASSESSMENT — LOCATION SIMPLE DESCRIPTION DERM: LOCATION SIMPLE: NOSE

## 2018-08-23 ASSESSMENT — LOCATION DETAILED DESCRIPTION DERM
LOCATION DETAILED: NASAL DORSUM
LOCATION DETAILED: NASAL SUPRATIP

## 2018-08-23 NOTE — PROCEDURE: SUTURE REMOVAL (GLOBAL PERIOD)
Detail Level: Detailed
Add 21494 Cpt? (Important Note: In 2017 The Use Of 39154 Is Being Tracked By Cms To Determine Future Global Period Reimbursement For Global Periods): yes

## 2018-08-23 NOTE — PROCEDURE: POST-OP WOUND EVALUATION
Wound Evaluated By (Optional): Colby Moya MD
Wound Drainage?: serous drainage
Detail Level: Detailed
Sutures?: intact
Wound Crusting?: crusted
Add 94482 Cpt? (Important Note: In 2017 The Use Of 19412 Is Being Tracked By Cms To Determine Future Global Period Reimbursement For Global Periods): yes
Wound Color?: pink
Wound Bruising?: mild
Wound Discharge?: minimal discharge
Wound Diameter In Cm(Optional): 0

## 2018-08-28 ENCOUNTER — APPOINTMENT (RX ONLY)
Dept: URBAN - METROPOLITAN AREA CLINIC 4 | Facility: CLINIC | Age: 73
Setting detail: DERMATOLOGY
End: 2018-08-28

## 2018-08-28 DIAGNOSIS — Z48.02 ENCOUNTER FOR REMOVAL OF SUTURES: ICD-10-CM

## 2018-08-28 DIAGNOSIS — Z48.817 ENCOUNTER FOR SURGICAL AFTERCARE FOLLOWING SURGERY ON THE SKIN AND SUBCUTANEOUS TISSUE: ICD-10-CM

## 2018-08-28 PROCEDURE — 99024 POSTOP FOLLOW-UP VISIT: CPT

## 2018-08-28 PROCEDURE — ? SUTURE REMOVAL (GLOBAL PERIOD)

## 2018-08-28 PROCEDURE — ? POST-OP WOUND EVALUATION

## 2018-08-28 ASSESSMENT — LOCATION DETAILED DESCRIPTION DERM
LOCATION DETAILED: NASAL DORSUM
LOCATION DETAILED: NASAL SUPRATIP

## 2018-08-28 ASSESSMENT — LOCATION ZONE DERM: LOCATION ZONE: NOSE

## 2018-08-28 ASSESSMENT — LOCATION SIMPLE DESCRIPTION DERM: LOCATION SIMPLE: NOSE

## 2018-08-28 NOTE — PROCEDURE: SUTURE REMOVAL (GLOBAL PERIOD)
Add 87919 Cpt? (Important Note: In 2017 The Use Of 32951 Is Being Tracked By Cms To Determine Future Global Period Reimbursement For Global Periods): yes
Detail Level: Detailed

## 2018-08-28 NOTE — PROCEDURE: POST-OP WOUND EVALUATION
Wound Color?: pink
Wound Discharge?: minimal discharge
Wound Crusting?: crusted
Wound Drainage?: serous drainage
Body Location Override (Optional): right nasal dorsum
Patient To Follow-Up With?: our clinic
Sutures?: intact
Wound Diameter In Cm(Optional): 0
Wound Edema?: minimal
Detail Level: Detailed
Add 72979 Cpt? (Important Note: In 2017 The Use Of 48775 Is Being Tracked By Cms To Determine Future Global Period Reimbursement For Global Periods): yes
Wound Bruising?: mild
Follow Up Provider (Optional): Dr. Moya in one week
Wound Evaluated By (Optional): Colby Moya MD

## 2018-09-05 ENCOUNTER — APPOINTMENT (RX ONLY)
Dept: URBAN - METROPOLITAN AREA CLINIC 4 | Facility: CLINIC | Age: 73
Setting detail: DERMATOLOGY
End: 2018-09-05

## 2018-09-05 DIAGNOSIS — Z48.817 ENCOUNTER FOR SURGICAL AFTERCARE FOLLOWING SURGERY ON THE SKIN AND SUBCUTANEOUS TISSUE: ICD-10-CM

## 2018-09-05 DIAGNOSIS — L57.0 ACTINIC KERATOSIS: ICD-10-CM

## 2018-09-05 PROCEDURE — ? COUNSELING

## 2018-09-05 PROCEDURE — 99024 POSTOP FOLLOW-UP VISIT: CPT

## 2018-09-05 PROCEDURE — 17003 DESTRUCT PREMALG LES 2-14: CPT | Mod: 79

## 2018-09-05 PROCEDURE — ? POST-OP WOUND EVALUATION

## 2018-09-05 PROCEDURE — 17000 DESTRUCT PREMALG LESION: CPT | Mod: 79

## 2018-09-05 PROCEDURE — ? LIQUID NITROGEN

## 2018-09-05 ASSESSMENT — LOCATION SIMPLE DESCRIPTION DERM
LOCATION SIMPLE: LEFT FOREHEAD
LOCATION SIMPLE: NOSE

## 2018-09-05 ASSESSMENT — LOCATION DETAILED DESCRIPTION DERM
LOCATION DETAILED: LEFT SUPERIOR FOREHEAD
LOCATION DETAILED: NASAL SUPRATIP

## 2018-09-05 ASSESSMENT — LOCATION ZONE DERM
LOCATION ZONE: FACE
LOCATION ZONE: NOSE

## 2018-09-05 NOTE — PROCEDURE: LIQUID NITROGEN
Duration Of Freeze Thaw-Cycle (Seconds): 5
Number Of Freeze-Thaw Cycles: 1 freeze-thaw cycle
Consent: The patient's consent was obtained including but not limited to risks of crusting, scabbing, blistering, scarring, darker or lighter pigmentary change, recurrence, incomplete removal and infection.
Detail Level: Detailed
Post-Care Instructions: I reviewed with the patient in detail post-care instructions. Patient is to wear sunprotection, and avoid picking at any of the treated lesions. Pt may apply Vaseline to crusted or scabbing areas.
Render Post-Care Instructions In Note?: yes

## 2018-09-05 NOTE — PROCEDURE: POST-OP WOUND EVALUATION
Add 40497 Cpt? (Important Note: In 2017 The Use Of 53496 Is Being Tracked By Cms To Determine Future Global Period Reimbursement For Global Periods): yes
Follow Up Provider (Optional): Dr. Moya
Patient To Follow-Up With?: our clinic
Wound Diameter In Cm(Optional): 0
Wound Color?: pink
Follow Up Units (Optional): 2
Follow Up Time Frame (Optional): weeks
Wound Dehiscence?: dehiscence
Wound Evaluated By (Optional): Colby Moya MD
Lymphadenopathy?: absent
Wound Crusting?: crusted
Detail Level: Detailed
Wound Edema?: mild
Wound Granulation?: early
Body Location Override (Optional): right nasal tip

## 2018-09-27 ENCOUNTER — APPOINTMENT (RX ONLY)
Dept: URBAN - METROPOLITAN AREA CLINIC 36 | Facility: CLINIC | Age: 73
Setting detail: DERMATOLOGY
End: 2018-09-27

## 2018-09-27 DIAGNOSIS — L72.0 EPIDERMAL CYST: ICD-10-CM

## 2018-09-27 DIAGNOSIS — Z48.817 ENCOUNTER FOR SURGICAL AFTERCARE FOLLOWING SURGERY ON THE SKIN AND SUBCUTANEOUS TISSUE: ICD-10-CM

## 2018-09-27 PROBLEM — D48.5 NEOPLASM OF UNCERTAIN BEHAVIOR OF SKIN: Status: ACTIVE | Noted: 2018-09-27

## 2018-09-27 PROCEDURE — 11424 EXC H-F-NK-SP B9+MARG 3.1-4: CPT | Mod: 59,79

## 2018-09-27 PROCEDURE — ? POST-OP WOUND EVALUATION

## 2018-09-27 PROCEDURE — ? EXCISION

## 2018-09-27 PROCEDURE — 99024 POSTOP FOLLOW-UP VISIT: CPT

## 2018-09-27 PROCEDURE — 13132 CMPLX RPR F/C/C/M/N/AX/G/H/F: CPT | Mod: 79,59

## 2018-09-27 PROCEDURE — ? REPAIR NOTE

## 2018-09-27 ASSESSMENT — LOCATION DETAILED DESCRIPTION DERM
LOCATION DETAILED: NASAL TIP
LOCATION DETAILED: RIGHT CENTRAL LATERAL NECK

## 2018-09-27 ASSESSMENT — LOCATION ZONE DERM
LOCATION ZONE: NOSE
LOCATION ZONE: NECK

## 2018-09-27 ASSESSMENT — LOCATION SIMPLE DESCRIPTION DERM
LOCATION SIMPLE: NECK
LOCATION SIMPLE: NOSE

## 2018-09-27 NOTE — PROCEDURE: EXCISION
Epidermal Sutures: 5-0 Polypropylene
Billing Type: Third-Party Bill
Complex Repair And Z Plasty Text: The defect edges were debeveled with a #15 scalpel blade.  The primary defect was closed partially with a complex linear closure.  Given the location of the remaining defect, shape of the defect and the proximity to free margins a Z plasty was deemed most appropriate for complete closure of the defect.  Using a sterile surgical marker, an appropriate advancement flap was drawn incorporating the defect and placing the expected incisions within the relaxed skin tension lines where possible.    The area thus outlined was incised deep to adipose tissue with a #15 scalpel blade.  The skin margins were undermined to an appropriate distance in all directions utilizing iris scissors.
Transposition Flap Text: The defect edges were debeveled with a #15 scalpel blade.  Given the location of the defect and the proximity to free margins a transposition flap was deemed most appropriate.  Using a sterile surgical marker, an appropriate transposition flap was drawn incorporating the defect.    The area thus outlined was incised deep to adipose tissue with a #15 scalpel blade.  The skin margins were undermined to an appropriate distance in all directions utilizing iris scissors.
Show Curettage Variables: Yes
Z Plasty Text: The lesion was extirpated to the level of the fat with a #15 scalpel blade.  Given the location of the defect, shape of the defect and the proximity to free margins a Z-plasty was deemed most appropriate for repair.  Using a sterile surgical marker, the appropriate transposition arms of the Z-plasty were drawn incorporating the defect and placing the expected incisions within the relaxed skin tension lines where possible.    The area thus outlined was incised deep to adipose tissue with a #15 scalpel blade.  The skin margins were undermined to an appropriate distance in all directions utilizing iris scissors.  The opposing transposition arms were then transposed into place in opposite direction and anchored with interrupted buried subcutaneous sutures.
Size Of Lesion In Cm: 3
Perilesional Excision Additional Text (Leave Blank If You Do Not Want): The margin was drawn around the clinically apparent lesion. Incisions were then made along these lines to the appropriate tissue plane and the lesion was extirpated.
Scalpel Size: 15 blade
Complex Repair Preamble Text (Leave Blank If You Do Not Want): Extensive wide undermining was performed.
A-T Advancement Flap Text: The defect edges were debeveled with a #15 scalpel blade.  Given the location of the defect, shape of the defect and the proximity to free margins an A-T advancement flap was deemed most appropriate.  Using a sterile surgical marker, an appropriate advancement flap was drawn incorporating the defect and placing the expected incisions within the relaxed skin tension lines where possible.    The area thus outlined was incised deep to adipose tissue with a #15 scalpel blade.  The skin margins were undermined to an appropriate distance in all directions utilizing iris scissors.
Ftsg Text: The defect edges were debeveled with a #15 scalpel blade.  Given the location of the defect, shape of the defect and the proximity to free margins a full thickness skin graft was deemed most appropriate.  Using a sterile surgical marker, the primary defect shape was transferred to the donor site. The area thus outlined was incised deep to adipose tissue with a #15 scalpel blade.  The harvested graft was then trimmed of adipose tissue until only dermis and epidermis was left.  The skin margins of the secondary defect were undermined to an appropriate distance in all directions utilizing iris scissors.  The secondary defect was closed with interrupted buried subcutaneous sutures.  The skin edges were then re-apposed with running  sutures.  The skin graft was then placed in the primary defect and oriented appropriately.
Hemostasis: Electricator
Post-Care Instructions: I reviewed with the patient in detail post-care instructions. Patient is not to engage in any heavy lifting, exercise, or swimming for the next 14 days. Should the patient develop any fevers, chills, bleeding, severe pain patient will contact the office immediately.
Complex Repair And Ftsg Text: The defect edges were debeveled with a #15 scalpel blade.  The primary defect was closed partially with a complex linear closure.  Given the location of the defect, shape of the defect and the proximity to free margins a full thickness skin graft was deemed most appropriate to repair the remaining defect.  The graft was trimmed to fit the size of the remaining defect.  The graft was then placed in the primary defect, oriented appropriately, and sutured into place.
Mastoid Interpolation Flap Text: A decision was made to reconstruct the defect utilizing an interpolation axial flap and a staged reconstruction.  A telfa template was made of the defect.  This telfa template was then used to outline the mastoid interpolation flap.  The donor area for the pedicle flap was then injected with anesthesia.  The flap was excised through the skin and subcutaneous tissue down to the layer of the underlying musculature.  The pedicle flap was carefully excised within this deep plane to maintain its blood supply.  The edges of the donor site were undermined.   The donor site was closed in a primary fashion.  The pedicle was then rotated into position and sutured.  Once the tube was sutured into place, adequate blood supply was confirmed with blanching and refill.  The pedicle was then wrapped with xeroform gauze and dressed appropriately with a telfa and gauze bandage to ensure continued blood supply and protect the attached pedicle.
Graft Donor Site Will Heal By Secondary Intention: No
Double Island Pedicle Flap Text: The defect edges were debeveled with a #15 scalpel blade.  Given the location of the defect, shape of the defect and the proximity to free margins a double island pedicle advancement flap was deemed most appropriate.  Using a sterile surgical marker, an appropriate advancement flap was drawn incorporating the defect, outlining the appropriate donor tissue and placing the expected incisions within the relaxed skin tension lines where possible.    The area thus outlined was incised deep to adipose tissue with a #15 scalpel blade.  The skin margins were undermined to an appropriate distance in all directions around the primary defect and laterally outward around the island pedicle utilizing iris scissors.  There was minimal undermining beneath the pedicle flap.
Ear Star Wedge Flap Text: The defect edges were debeveled with a #15 blade scalpel.  Given the location of the defect and the proximity to free margins (helical rim) an ear star wedge flap was deemed most appropriate.  Using a sterile surgical marker, the appropriate flap was drawn incorporating the defect and placing the expected incisions between the helical rim and antihelix where possible.  The area thus outlined was incised through and through with a #15 scalpel blade.
Complex Repair And V-Y Plasty Text: The defect edges were debeveled with a #15 scalpel blade.  The primary defect was closed partially with a complex linear closure.  Given the location of the remaining defect, shape of the defect and the proximity to free margins a V-Y plasty was deemed most appropriate for complete closure of the defect.  Using a sterile surgical marker, an appropriate advancement flap was drawn incorporating the defect and placing the expected incisions within the relaxed skin tension lines where possible.    The area thus outlined was incised deep to adipose tissue with a #15 scalpel blade.  The skin margins were undermined to an appropriate distance in all directions utilizing iris scissors.
Surgeon (Optional): Griffin
Intermediate / Complex Repair - Final Wound Length In Cm: 5
Epidermal Autograft Text: The defect edges were debeveled with a #15 scalpel blade.  Given the location of the defect, shape of the defect and the proximity to free margins an epidermal autograft was deemed most appropriate.  Using a sterile surgical marker, the primary defect shape was transferred to the donor site. The epidermal graft was then harvested.  The skin graft was then placed in the primary defect and oriented appropriately.
Dorsal Nasal Flap Text: The defect edges were debeveled with a #15 scalpel blade.  Given the location of the defect and the proximity to free margins a dorsal nasal flap was deemed most appropriate.  Using a sterile surgical marker, an appropriate dorsal nasal flap was drawn around the defect.    The area thus outlined was incised deep to adipose tissue with a #15 scalpel blade.  The skin margins were undermined to an appropriate distance in all directions utilizing iris scissors.
X Size Of Lesion In Cm (Optional): 2.5
Complex Repair And Rotation Flap Text: The defect edges were debeveled with a #15 scalpel blade.  The primary defect was closed partially with a complex linear closure.  Given the location of the remaining defect, shape of the defect and the proximity to free margins a rotation flap was deemed most appropriate for complete closure of the defect.  Using a sterile surgical marker, an appropriate advancement flap was drawn incorporating the defect and placing the expected incisions within the relaxed skin tension lines where possible.    The area thus outlined was incised deep to adipose tissue with a #15 scalpel blade.  The skin margins were undermined to an appropriate distance in all directions utilizing iris scissors.
Complex Repair And Melolabial Flap Text: The defect edges were debeveled with a #15 scalpel blade.  The primary defect was closed partially with a complex linear closure.  Given the location of the remaining defect, shape of the defect and the proximity to free margins a melolabial flap was deemed most appropriate for complete closure of the defect.  Using a sterile surgical marker, an appropriate advancement flap was drawn incorporating the defect and placing the expected incisions within the relaxed skin tension lines where possible.    The area thus outlined was incised deep to adipose tissue with a #15 scalpel blade.  The skin margins were undermined to an appropriate distance in all directions utilizing iris scissors.
Complex Repair And Dorsal Nasal Flap Text: The defect edges were debeveled with a #15 scalpel blade.  The primary defect was closed partially with a complex linear closure.  Given the location of the remaining defect, shape of the defect and the proximity to free margins a dorsal nasal flap was deemed most appropriate for complete closure of the defect.  Using a sterile surgical marker, an appropriate flap was drawn incorporating the defect and placing the expected incisions within the relaxed skin tension lines where possible.    The area thus outlined was incised deep to adipose tissue with a #15 scalpel blade.  The skin margins were undermined to an appropriate distance in all directions utilizing iris scissors.
Complex Repair And Skin Substitute Graft Text: The defect edges were debeveled with a #15 scalpel blade.  The primary defect was closed partially with a complex linear closure.  Given the location of the remaining defect, shape of the defect and the proximity to free margins a skin substitute graft was deemed most appropriate to repair the remaining defect.  The graft was trimmed to fit the size of the remaining defect.  The graft was then placed in the primary defect, oriented appropriately, and sutured into place.
Purse String (Intermediate) Text: Given the location of the defect and the characteristics of the surrounding skin a purse string intermediate closure was deemed most appropriate.  Undermining was performed circumferentially around the surgical defect.  A purse string suture was then placed and tightened.
Undermining Location (Optional): in the deep fat
Medical Necessity Information: It is in your best interest to select a reason for this procedure from the list below. All of these items fulfill various CMS LCD requirements except lesion extends to a margin.
Bilateral Helical Rim Advancement Flap Text: The defect edges were debeveled with a #15 blade scalpel.  Given the location of the defect and the proximity to free margins (helical rim) a bilateral helical rim advancement flap was deemed most appropriate.  Using a sterile surgical marker, the appropriate advancement flaps were drawn incorporating the defect and placing the expected incisions between the helical rim and antihelix where possible.  The area thus outlined was incised through and through with a #15 scalpel blade.  With a skin hook and iris scissors, the flaps were gently and sharply undermined and freed up.
Mercedes Flap Text: The defect edges were debeveled with a #15 scalpel blade.  Given the location of the defect, shape of the defect and the proximity to free margins a Mercedes flap was deemed most appropriate.  Using a sterile surgical marker, an appropriate advancement flap was drawn incorporating the defect and placing the expected incisions within the relaxed skin tension lines where possible. The area thus outlined was incised deep to adipose tissue with a #15 scalpel blade.  The skin margins were undermined to an appropriate distance in all directions utilizing iris scissors.
Keystone Flap Text: The defect edges were debeveled with a #15 scalpel blade.  Given the location of the defect, shape of the defect a keystone flap was deemed most appropriate.  Using a sterile surgical marker, an appropriate keystone flap was drawn incorporating the defect, outlining the appropriate donor tissue and placing the expected incisions within the relaxed skin tension lines where possible. The area thus outlined was incised deep to adipose tissue with a #15 scalpel blade.  The skin margins were undermined to an appropriate distance in all directions around the primary defect and laterally outward around the flap utilizing iris scissors.
Detail Level: Detailed
Rotation Flap Text: The defect edges were debeveled with a #15 scalpel blade.  Given the location of the defect, shape of the defect and the proximity to free margins a rotation flap was deemed most appropriate.  Using a sterile surgical marker, an appropriate rotation flap was drawn incorporating the defect and placing the expected incisions within the relaxed skin tension lines where possible.    The area thus outlined was incised deep to adipose tissue with a #15 scalpel blade.  The skin margins were undermined to an appropriate distance in all directions utilizing iris scissors.
Cartilage Graft Text: The defect edges were debeveled with a #15 scalpel blade.  Given the location of the defect, shape of the defect, the fact the defect involved a full thickness cartilage defect a cartilage graft was deemed most appropriate.  An appropriate donor site was identified, cleansed, and anesthetized. The cartilage graft was then harvested and transferred to the recipient site, oriented appropriately and then sutured into place.  The secondary defect was then repaired using a primary closure.
Home Suture Removal Text: Patient was provided a home suture removal kit and will remove their sutures at home.  If they have any questions or difficulties they will call the office.
Muscle Hinge Flap Text: The defect edges were debeveled with a #15 scalpel blade.  Given the size, depth and location of the defect and the proximity to free margins a muscle hinge flap was deemed most appropriate.  Using a sterile surgical marker, an appropriate hinge flap was drawn incorporating the defect. The area thus outlined was incised with a #15 scalpel blade.  The skin margins were undermined to an appropriate distance in all directions utilizing iris scissors.
Slit Excision Additional Text (Leave Blank If You Do Not Want): A linear line was drawn on the skin overlying the lesion. An incision was made slowly until the lesion was visualized.  Once visualized, the lesion was removed with blunt dissection.
H Plasty Text: Given the location of the defect, shape of the defect and the proximity to free margins a H-plasty was deemed most appropriate for repair.  Using a sterile surgical marker, the appropriate advancement arms of the H-plasty were drawn incorporating the defect and placing the expected incisions within the relaxed skin tension lines where possible. The area thus outlined was incised deep to adipose tissue with a #15 scalpel blade. The skin margins were undermined to an appropriate distance in all directions utilizing iris scissors.  The opposing advancement arms were then advanced into place in opposite direction and anchored with interrupted buried subcutaneous sutures.
Xenograft Text: The defect edges were debeveled with a #15 scalpel blade.  Given the location of the defect, shape of the defect and the proximity to free margins a xenograft was deemed most appropriate.  The graft was then trimmed to fit the size of the defect.  The graft was then placed in the primary defect and oriented appropriately.
Consent was obtained from the patient. The risks and benefits to therapy were discussed in detail. Specifically, the risks of infection, scarring, bleeding, prolonged wound healing, incomplete removal, allergy to anesthesia, nerve injury and recurrence were addressed. Prior to the procedure, the treatment site was clearly identified and confirmed by the patient. All components of Universal Protocol/PAUSE Rule completed.
Crescentic Intermediate Repair Preamble Text (Leave Blank If You Do Not Want): Undermining was performed with blunt dissection.
Cheek-To-Nose Interpolation Flap Text: A decision was made to reconstruct the defect utilizing an interpolation axial flap and a staged reconstruction.  A telfa template was made of the defect.  This telfa template was then used to outline the Cheek-To-Nose Interpolation flap.  The donor area for the pedicle flap was then injected with anesthesia.  The flap was excised through the skin and subcutaneous tissue down to the layer of the underlying musculature.  The interpolation flap was carefully excised within this deep plane to maintain its blood supply.  The edges of the donor site were undermined.   The donor site was closed in a primary fashion.  The pedicle was then rotated into position and sutured.  Once the tube was sutured into place, adequate blood supply was confirmed with blanching and refill.  The pedicle was then wrapped with xeroform gauze and dressed appropriately with a telfa and gauze bandage to ensure continued blood supply and protect the attached pedicle.
Melolabial Interpolation Flap Text: A decision was made to reconstruct the defect utilizing an interpolation axial flap and a staged reconstruction.  A telfa template was made of the defect.  This telfa template was then used to outline the melolabial interpolation flap.  The donor area for the pedicle flap was then injected with anesthesia.  The flap was excised through the skin and subcutaneous tissue down to the layer of the underlying musculature.  The pedicle flap was carefully excised within this deep plane to maintain its blood supply.  The edges of the donor site were undermined.   The donor site was closed in a primary fashion.  The pedicle was then rotated into position and sutured.  Once the tube was sutured into place, adequate blood supply was confirmed with blanching and refill.  The pedicle was then wrapped with xeroform gauze and dressed appropriately with a telfa and gauze bandage to ensure continued blood supply and protect the attached pedicle.
Repair Performed By Another Provider Text (Leave Blank If You Do Not Want): After the tissue was excised the defect was repaired by another provider.
Helical Rim Advancement Flap Text: The defect edges were debeveled with a #15 blade scalpel.  Given the location of the defect and the proximity to free margins (helical rim) a double helical rim advancement flap was deemed most appropriate.  Using a sterile surgical marker, the appropriate advancement flaps were drawn incorporating the defect and placing the expected incisions between the helical rim and antihelix where possible.  The area thus outlined was incised through and through with a #15 scalpel blade.  With a skin hook and iris scissors, the flaps were gently and sharply undermined and freed up.
Split-Thickness Skin Graft Text: The defect edges were debeveled with a #15 scalpel blade.  Given the location of the defect, shape of the defect and the proximity to free margins a split thickness skin graft was deemed most appropriate.  Using a sterile surgical marker, the primary defect shape was transferred to the donor site. The split thickness graft was then harvested.  The skin graft was then placed in the primary defect and oriented appropriately.
Trilobed Flap Text: The defect edges were debeveled with a #15 scalpel blade.  Given the location of the defect and the proximity to free margins a trilobed flap was deemed most appropriate.  Using a sterile surgical marker, an appropriate trilobed flap drawn around the defect.    The area thus outlined was incised deep to adipose tissue with a #15 scalpel blade.  The skin margins were undermined to an appropriate distance in all directions utilizing iris scissors.
Bilobed Transposition Flap Text: The defect edges were debeveled with a #15 scalpel blade.  Given the location of the defect and the proximity to free margins a bilobed transposition flap was deemed most appropriate.  Using a sterile surgical marker, an appropriate bilobe flap drawn around the defect.    The area thus outlined was incised deep to adipose tissue with a #15 scalpel blade.  The skin margins were undermined to an appropriate distance in all directions utilizing iris scissors.
Spiral Flap Text: The defect edges were debeveled with a #15 scalpel blade.  Given the location of the defect, shape of the defect and the proximity to free margins a spiral flap was deemed most appropriate.  Using a sterile surgical marker, an appropriate rotation flap was drawn incorporating the defect and placing the expected incisions within the relaxed skin tension lines where possible. The area thus outlined was incised deep to adipose tissue with a #15 scalpel blade.  The skin margins were undermined to an appropriate distance in all directions utilizing iris scissors.
Complex Repair And Double Advancement Flap Text: The defect edges were debeveled with a #15 scalpel blade.  The primary defect was closed partially with a complex linear closure.  Given the location of the remaining defect, shape of the defect and the proximity to free margins a double advancement flap was deemed most appropriate for complete closure of the defect.  Using a sterile surgical marker, an appropriate advancement flap was drawn incorporating the defect and placing the expected incisions within the relaxed skin tension lines where possible.    The area thus outlined was incised deep to adipose tissue with a #15 scalpel blade.  The skin margins were undermined to an appropriate distance in all directions utilizing iris scissors.
Composite Graft Text: The defect edges were debeveled with a #15 scalpel blade.  Given the location of the defect, shape of the defect, the proximity to free margins and the fact the defect was full thickness a composite graft was deemed most appropriate.  The defect was outline and then transferred to the donor site.  A full thickness graft was then excised from the donor site. The graft was then placed in the primary defect, oriented appropriately and then sutured into place.  The secondary defect was then repaired using a primary closure.
Interpolation Flap Text: A decision was made to reconstruct the defect utilizing an interpolation axial flap and a staged reconstruction.  A telfa template was made of the defect.  This telfa template was then used to outline the interpolation flap.  The donor area for the pedicle flap was then injected with anesthesia.  The flap was excised through the skin and subcutaneous tissue down to the layer of the underlying musculature.  The interpolation flap was carefully excised within this deep plane to maintain its blood supply.  The edges of the donor site were undermined.   The donor site was closed in a primary fashion.  The pedicle was then rotated into position and sutured.  Once the tube was sutured into place, adequate blood supply was confirmed with blanching and refill.  The pedicle was then wrapped with xeroform gauze and dressed appropriately with a telfa and gauze bandage to ensure continued blood supply and protect the attached pedicle.
W Plasty Text: The lesion was extirpated to the level of the fat with a #15 scalpel blade.  Given the location of the defect, shape of the defect and the proximity to free margins a W-plasty was deemed most appropriate for repair.  Using a sterile surgical marker, the appropriate transposition arms of the W-plasty were drawn incorporating the defect and placing the expected incisions within the relaxed skin tension lines where possible.    The area thus outlined was incised deep to adipose tissue with a #15 scalpel blade.  The skin margins were undermined to an appropriate distance in all directions utilizing iris scissors.  The opposing transposition arms were then transposed into place in opposite direction and anchored with interrupted buried subcutaneous sutures.
Graft Donor Site Bandage (Optional-Leave Blank If You Don't Want In Note): Steri-strips and a pressure bandage were applied to the donor site.
Secondary Defect Width (In Cm): 0
Complex Repair And Rhombic Flap Text: The defect edges were debeveled with a #15 scalpel blade.  The primary defect was closed partially with a complex linear closure.  Given the location of the remaining defect, shape of the defect and the proximity to free margins a rhombic flap was deemed most appropriate for complete closure of the defect.  Using a sterile surgical marker, an appropriate advancement flap was drawn incorporating the defect and placing the expected incisions within the relaxed skin tension lines where possible.    The area thus outlined was incised deep to adipose tissue with a #15 scalpel blade.  The skin margins were undermined to an appropriate distance in all directions utilizing iris scissors.
O-T Plasty Text: The defect edges were debeveled with a #15 scalpel blade.  Given the location of the defect, shape of the defect and the proximity to free margins an O-T plasty was deemed most appropriate.  Using a sterile surgical marker, an appropriate O-T plasty was drawn incorporating the defect and placing the expected incisions within the relaxed skin tension lines where possible.    The area thus outlined was incised deep to adipose tissue with a #15 scalpel blade.  The skin margins were undermined to an appropriate distance in all directions utilizing iris scissors.
Island Pedicle Flap-Requiring Vessel Identification Text: The defect edges were debeveled with a #15 scalpel blade.  Given the location of the defect, shape of the defect and the proximity to free margins an island pedicle advancement flap was deemed most appropriate.  Using a sterile surgical marker, an appropriate advancement flap was drawn, based on the axial vessel mentioned above, incorporating the defect, outlining the appropriate donor tissue and placing the expected incisions within the relaxed skin tension lines where possible.    The area thus outlined was incised deep to adipose tissue with a #15 scalpel blade.  The skin margins were undermined to an appropriate distance in all directions around the primary defect and laterally outward around the island pedicle utilizing iris scissors.  There was minimal undermining beneath the pedicle flap.
Modified Advancement Flap Text: The defect edges were debeveled with a #15 scalpel blade.  Given the location of the defect, shape of the defect and the proximity to free margins a modified advancement flap was deemed most appropriate.  Using a sterile surgical marker, an appropriate advancement flap was drawn incorporating the defect and placing the expected incisions within the relaxed skin tension lines where possible.    The area thus outlined was incised deep to adipose tissue with a #15 scalpel blade.  The skin margins were undermined to an appropriate distance in all directions utilizing iris scissors.
V-Y Flap Text: The defect edges were debeveled with a #15 scalpel blade.  Given the location of the defect, shape of the defect and the proximity to free margins a V-Y flap was deemed most appropriate.  Using a sterile surgical marker, an appropriate advancement flap was drawn incorporating the defect and placing the expected incisions within the relaxed skin tension lines where possible.    The area thus outlined was incised deep to adipose tissue with a #15 scalpel blade.  The skin margins were undermined to an appropriate distance in all directions utilizing iris scissors.
O-Z Plasty Text: The defect edges were debeveled with a #15 scalpel blade.  Given the location of the defect, shape of the defect and the proximity to free margins an O-Z plasty (double transposition flap) was deemed most appropriate.  Using a sterile surgical marker, the appropriate transposition flaps were drawn incorporating the defect and placing the expected incisions within the relaxed skin tension lines where possible.    The area thus outlined was incised deep to adipose tissue with a #15 scalpel blade.  The skin margins were undermined to an appropriate distance in all directions utilizing iris scissors.  Hemostasis was achieved with electrocautery.  The flaps were then transposed into place, one clockwise and the other counterclockwise, and anchored with interrupted buried subcutaneous sutures.
Complex Repair And W Plasty Text: The defect edges were debeveled with a #15 scalpel blade.  The primary defect was closed partially with a complex linear closure.  Given the location of the remaining defect, shape of the defect and the proximity to free margins a W plasty was deemed most appropriate for complete closure of the defect.  Using a sterile surgical marker, an appropriate advancement flap was drawn incorporating the defect and placing the expected incisions within the relaxed skin tension lines where possible.    The area thus outlined was incised deep to adipose tissue with a #15 scalpel blade.  The skin margins were undermined to an appropriate distance in all directions utilizing iris scissors.
Medical Necessity Clause: This procedure was medically necessary because the lesion that was treated was:
Paramedian Forehead Flap Text: A decision was made to reconstruct the defect utilizing an interpolation axial flap and a staged reconstruction.  A telfa template was made of the defect.  This telfa template was then used to outline the paramedian forehead pedicle flap.  The donor area for the pedicle flap was then injected with anesthesia.  The flap was excised through the skin and subcutaneous tissue down to the layer of the underlying musculature.  The pedicle flap was carefully excised within this deep plane to maintain its blood supply.  The edges of the donor site were undermined.   The donor site was closed in a primary fashion.  The pedicle was then rotated into position and sutured.  Once the tube was sutured into place, adequate blood supply was confirmed with blanching and refill.  The pedicle was then wrapped with xeroform gauze and dressed appropriately with a telfa and gauze bandage to ensure continued blood supply and protect the attached pedicle.
Complex Repair And Dermal Autograft Text: The defect edges were debeveled with a #15 scalpel blade.  The primary defect was closed partially with a complex linear closure.  Given the location of the defect, shape of the defect and the proximity to free margins an dermal autograft was deemed most appropriate to repair the remaining defect.  The graft was trimmed to fit the size of the remaining defect.  The graft was then placed in the primary defect, oriented appropriately, and sutured into place.
O-L Flap Text: The defect edges were debeveled with a #15 scalpel blade.  Given the location of the defect, shape of the defect and the proximity to free margins an O-L flap was deemed most appropriate.  Using a sterile surgical marker, an appropriate advancement flap was drawn incorporating the defect and placing the expected incisions within the relaxed skin tension lines where possible.    The area thus outlined was incised deep to adipose tissue with a #15 scalpel blade.  The skin margins were undermined to an appropriate distance in all directions utilizing iris scissors.
Advancement Flap (Single) Text: The defect edges were debeveled with a #15 scalpel blade.  Given the location of the defect and the proximity to free margins a single advancement flap was deemed most appropriate.  Using a sterile surgical marker, an appropriate advancement flap was drawn incorporating the defect and placing the expected incisions within the relaxed skin tension lines where possible.    The area thus outlined was incised deep to adipose tissue with a #15 scalpel blade.  The skin margins were undermined to an appropriate distance in all directions utilizing iris scissors.
Rhombic Flap Text: The defect edges were debeveled with a #15 scalpel blade.  Given the location of the defect and the proximity to free margins a rhombic flap was deemed most appropriate.  Using a sterile surgical marker, an appropriate rhombic flap was drawn incorporating the defect.    The area thus outlined was incised deep to adipose tissue with a #15 scalpel blade.  The skin margins were undermined to an appropriate distance in all directions utilizing iris scissors.
Suture Removal: 14 days
Complex Repair And Epidermal Autograft Text: The defect edges were debeveled with a #15 scalpel blade.  The primary defect was closed partially with a complex linear closure.  Given the location of the defect, shape of the defect and the proximity to free margins an epidermal autograft was deemed most appropriate to repair the remaining defect.  The graft was trimmed to fit the size of the remaining defect.  The graft was then placed in the primary defect, oriented appropriately, and sutured into place.
Complex Repair And Modified Advancement Flap Text: The defect edges were debeveled with a #15 scalpel blade.  The primary defect was closed partially with a complex linear closure.  Given the location of the remaining defect, shape of the defect and the proximity to free margins a modified advancement flap was deemed most appropriate for complete closure of the defect.  Using a sterile surgical marker, an appropriate advancement flap was drawn incorporating the defect and placing the expected incisions within the relaxed skin tension lines where possible.    The area thus outlined was incised deep to adipose tissue with a #15 scalpel blade.  The skin margins were undermined to an appropriate distance in all directions utilizing iris scissors.
Anesthesia Type: 1% lidocaine with 1:100,000 epinephrine and a 1:12 solution of 8.4% sodium bicarbonate
Dermal Autograft Text: The defect edges were debeveled with a #15 scalpel blade.  Given the location of the defect, shape of the defect and the proximity to free margins a dermal autograft was deemed most appropriate.  Using a sterile surgical marker, the primary defect shape was transferred to the donor site. The area thus outlined was incised deep to adipose tissue with a #15 scalpel blade.  The harvested graft was then trimmed of adipose and epidermal tissue until only dermis was left.  The skin graft was then placed in the primary defect and oriented appropriately.
Excision Method: Excisional Biopsy
Island Pedicle Flap Text: The defect edges were debeveled with a #15 scalpel blade.  Given the location of the defect, shape of the defect and the proximity to free margins an island pedicle advancement flap was deemed most appropriate.  Using a sterile surgical marker, an appropriate advancement flap was drawn incorporating the defect, outlining the appropriate donor tissue and placing the expected incisions within the relaxed skin tension lines where possible.    The area thus outlined was incised deep to adipose tissue with a #15 scalpel blade.  The skin margins were undermined to an appropriate distance in all directions around the primary defect and laterally outward around the island pedicle utilizing iris scissors.  There was minimal undermining beneath the pedicle flap.
Dermal Closure: with plication
Advancement Flap (Double) Text: The defect edges were debeveled with a #15 scalpel blade.  Given the location of the defect and the proximity to free margins a double advancement flap was deemed most appropriate.  Using a sterile surgical marker, the appropriate advancement flaps were drawn incorporating the defect and placing the expected incisions within the relaxed skin tension lines where possible.    The area thus outlined was incised deep to adipose tissue with a #15 scalpel blade.  The skin margins were undermined to an appropriate distance in all directions utilizing iris scissors.
No Repair - Repaired With Adjacent Surgical Defect Text (Leave Blank If You Do Not Want): After the excision the defect was repaired concurrently with another surgical defect which was in close approximation.
S Plasty Text: Given the location and shape of the defect, and the orientation of relaxed skin tension lines, an S-plasty was deemed most appropriate for repair.  Using a sterile surgical marker, the appropriate outline of the S-plasty was drawn, incorporating the defect and placing the expected incisions within the relaxed skin tension lines where possible.  The area thus outlined was incised deep to adipose tissue with a #15 scalpel blade.  The skin margins were undermined to an appropriate distance in all directions utilizing iris scissors. The skin flaps were advanced over the defect.  The opposing margins were then approximated with interrupted buried subcutaneous sutures.
Bi-Rhombic Flap Text: The defect edges were debeveled with a #15 scalpel blade.  Given the location of the defect and the proximity to free margins a bi-rhombic flap was deemed most appropriate.  Using a sterile surgical marker, an appropriate rhombic flap was drawn incorporating the defect. The area thus outlined was incised deep to adipose tissue with a #15 scalpel blade.  The skin margins were undermined to an appropriate distance in all directions utilizing iris scissors.
Burow's Advancement Flap Text: The defect edges were debeveled with a #15 scalpel blade.  Given the location of the defect and the proximity to free margins a Burow's advancement flap was deemed most appropriate.  Using a sterile surgical marker, the appropriate advancement flap was drawn incorporating the defect and placing the expected incisions within the relaxed skin tension lines where possible.    The area thus outlined was incised deep to adipose tissue with a #15 scalpel blade.  The skin margins were undermined to an appropriate distance in all directions utilizing iris scissors.
Dressing: pressure dressing with telfa
Complex Repair And Transposition Flap Text: The defect edges were debeveled with a #15 scalpel blade.  The primary defect was closed partially with a complex linear closure.  Given the location of the remaining defect, shape of the defect and the proximity to free margins a transposition flap was deemed most appropriate for complete closure of the defect.  Using a sterile surgical marker, an appropriate advancement flap was drawn incorporating the defect and placing the expected incisions within the relaxed skin tension lines where possible.    The area thus outlined was incised deep to adipose tissue with a #15 scalpel blade.  The skin margins were undermined to an appropriate distance in all directions utilizing iris scissors.
V-Y Plasty Text: The defect edges were debeveled with a #15 scalpel blade.  Given the location of the defect, shape of the defect and the proximity to free margins an V-Y advancement flap was deemed most appropriate.  Using a sterile surgical marker, an appropriate advancement flap was drawn incorporating the defect and placing the expected incisions within the relaxed skin tension lines where possible.    The area thus outlined was incised deep to adipose tissue with a #15 scalpel blade.  The skin margins were undermined to an appropriate distance in all directions utilizing iris scissors.
Epidermal Closure Graft Donor Site (Optional): simple interrupted
Mucosal Advancement Flap Text: Given the location of the defect, shape of the defect and the proximity to free margins a mucosal advancement flap was deemed most appropriate. Incisions were made with a 15 blade scalpel in the appropriate fashion along the cutaneous vermillion border and the mucosal lip. The remaining actinically damaged mucosal tissue was excised.  The mucosal advancement flap was then elevated to the gingival sulcus with care taken to preserve the neurovascular structures and advanced into the primary defect. Care was taken to ensure that precise realignment of the vermilion border was achieved.
Melolabial Transposition Flap Text: The defect edges were debeveled with a #15 scalpel blade.  Given the location of the defect and the proximity to free margins a melolabial flap was deemed most appropriate.  Using a sterile surgical marker, an appropriate melolabial transposition flap was drawn incorporating the defect.    The area thus outlined was incised deep to adipose tissue with a #15 scalpel blade.  The skin margins were undermined to an appropriate distance in all directions utilizing iris scissors.
Epidermal Closure: running cuticular
Saucerization Excision Additional Text (Leave Blank If You Do Not Want): The margin was drawn around the clinically apparent lesion.  Incisions were then made along these lines, in a tangential fashion, to the appropriate tissue plane and the lesion was extirpated.
Bilobed Flap Text: The defect edges were debeveled with a #15 scalpel blade.  Given the location of the defect and the proximity to free margins a bilobe flap was deemed most appropriate.  Using a sterile surgical marker, an appropriate bilobe flap drawn around the defect.    The area thus outlined was incised deep to adipose tissue with a #15 scalpel blade.  The skin margins were undermined to an appropriate distance in all directions utilizing iris scissors.
Anesthesia Volume In Cc: 6
Path Notes (To The Dermatopathologist): Please confirm diagnosis and check margins.
Complex Repair And Tissue Cultured Epidermal Autograft Text: The defect edges were debeveled with a #15 scalpel blade.  The primary defect was closed partially with a complex linear closure.  Given the location of the defect, shape of the defect and the proximity to free margins an tissue cultured epidermal autograft was deemed most appropriate to repair the remaining defect.  The graft was trimmed to fit the size of the remaining defect.  The graft was then placed in the primary defect, oriented appropriately, and sutured into place.
Complex Repair And O-T Advancement Flap Text: The defect edges were debeveled with a #15 scalpel blade.  The primary defect was closed partially with a complex linear closure.  Given the location of the remaining defect, shape of the defect and the proximity to free margins an O-T advancement flap was deemed most appropriate for complete closure of the defect.  Using a sterile surgical marker, an appropriate advancement flap was drawn incorporating the defect and placing the expected incisions within the relaxed skin tension lines where possible.    The area thus outlined was incised deep to adipose tissue with a #15 scalpel blade.  The skin margins were undermined to an appropriate distance in all directions utilizing iris scissors.
Posterior Auricular Interpolation Flap Text: A decision was made to reconstruct the defect utilizing an interpolation axial flap and a staged reconstruction.  A telfa template was made of the defect.  This telfa template was then used to outline the posterior auricular interpolation flap.  The donor area for the pedicle flap was then injected with anesthesia.  The flap was excised through the skin and subcutaneous tissue down to the layer of the underlying musculature.  The pedicle flap was carefully excised within this deep plane to maintain its blood supply.  The edges of the donor site were undermined.   The donor site was closed in a primary fashion.  The pedicle was then rotated into position and sutured.  Once the tube was sutured into place, adequate blood supply was confirmed with blanching and refill.  The pedicle was then wrapped with xeroform gauze and dressed appropriately with a telfa and gauze bandage to ensure continued blood supply and protect the attached pedicle.
Complex Repair And A-T Advancement Flap Text: The defect edges were debeveled with a #15 scalpel blade.  The primary defect was closed partially with a complex linear closure.  Given the location of the remaining defect, shape of the defect and the proximity to free margins an A-T advancement flap was deemed most appropriate for complete closure of the defect.  Using a sterile surgical marker, an appropriate advancement flap was drawn incorporating the defect and placing the expected incisions within the relaxed skin tension lines where possible.    The area thus outlined was incised deep to adipose tissue with a #15 scalpel blade.  The skin margins were undermined to an appropriate distance in all directions utilizing iris scissors.
Complex Repair And Single Advancement Flap Text: The defect edges were debeveled with a #15 scalpel blade.  The primary defect was closed partially with a complex linear closure.  Given the location of the remaining defect, shape of the defect and the proximity to free margins a single advancement flap was deemed most appropriate for complete closure of the defect.  Using a sterile surgical marker, an appropriate advancement flap was drawn incorporating the defect and placing the expected incisions within the relaxed skin tension lines where possible.    The area thus outlined was incised deep to adipose tissue with a #15 scalpel blade.  The skin margins were undermined to an appropriate distance in all directions utilizing iris scissors.
Complex Repair And M Plasty Text: The defect edges were debeveled with a #15 scalpel blade.  The primary defect was closed partially with a complex linear closure.  Given the location of the remaining defect, shape of the defect and the proximity to free margins an M plasty was deemed most appropriate for complete closure of the defect.  Using a sterile surgical marker, an appropriate advancement flap was drawn incorporating the defect and placing the expected incisions within the relaxed skin tension lines where possible.    The area thus outlined was incised deep to adipose tissue with a #15 scalpel blade.  The skin margins were undermined to an appropriate distance in all directions utilizing iris scissors.
Lip Wedge Excision Repair Text: Given the location of the defect and the proximity to free margins a full thickness wedge repair was deemed most appropriate.  Using a sterile surgical marker, the appropriate repair was drawn incorporating the defect and placing the expected incisions perpendicular to the vermilion border.  The vermilion border was also meticulously outlined to ensure appropriate reapproximation during the repair.  The area thus outlined was incised through and through with a #15 scalpel blade.  The muscularis and dermis were reaproximated with deep sutures following hemostasis. Care was taken to realign the vermilion border before proceeding with the superficial closure.  Once the vermilion was realigned the superfical and mucosal closure was finished.
Complex Repair And Double M Plasty Text: The defect edges were debeveled with a #15 scalpel blade.  The primary defect was closed partially with a complex linear closure.  Given the location of the remaining defect, shape of the defect and the proximity to free margins a double M plasty was deemed most appropriate for complete closure of the defect.  Using a sterile surgical marker, an appropriate advancement flap was drawn incorporating the defect and placing the expected incisions within the relaxed skin tension lines where possible.    The area thus outlined was incised deep to adipose tissue with a #15 scalpel blade.  The skin margins were undermined to an appropriate distance in all directions utilizing iris scissors.
Banner Transposition Flap Text: The defect edges were debeveled with a #15 scalpel blade.  Given the location of the defect and the proximity to free margins a Banner transposition flap was deemed most appropriate.  Using a sterile surgical marker, an appropriate flap drawn around the defect. The area thus outlined was incised deep to adipose tissue with a #15 scalpel blade.  The skin margins were undermined to an appropriate distance in all directions utilizing iris scissors.
Lab: [id number]
Skin Substitute Text: The defect edges were debeveled with a #15 scalpel blade.  Given the location of the defect, shape of the defect and the proximity to free margins a skin substitute graft was deemed most appropriate.  The graft material was trimmed to fit the size of the defect. The graft was then placed in the primary defect and oriented appropriately.
Crescentic Advancement Flap Text: The defect edges were debeveled with a #15 scalpel blade.  Given the location of the defect and the proximity to free margins a crescentic advancement flap was deemed most appropriate.  Using a sterile surgical marker, the appropriate advancement flap was drawn incorporating the defect and placing the expected incisions within the relaxed skin tension lines where possible.    The area thus outlined was incised deep to adipose tissue with a #15 scalpel blade.  The skin margins were undermined to an appropriate distance in all directions utilizing iris scissors.
Deep Sutures: 4-0 Polysorb
Excision Depth: adipose tissue
Island Pedicle Flap With Canthal Suspension Text: The defect edges were debeveled with a #15 scalpel blade.  Given the location of the defect, shape of the defect and the proximity to free margins an island pedicle advancement flap was deemed most appropriate.  Using a sterile surgical marker, an appropriate advancement flap was drawn incorporating the defect, outlining the appropriate donor tissue and placing the expected incisions within the relaxed skin tension lines where possible. The area thus outlined was incised deep to adipose tissue with a #15 scalpel blade.  The skin margins were undermined to an appropriate distance in all directions around the primary defect and laterally outward around the island pedicle utilizing iris scissors.  There was minimal undermining beneath the pedicle flap. A suspension suture was placed in the canthal tendon to prevent tension and prevent ectropion.
Size Of Margin In Cm: 0.2
Wound Care: Petrolatum
Star Wedge Flap Text: The defect edges were debeveled with a #15 scalpel blade.  Given the location of the defect, shape of the defect and the proximity to free margins a star wedge flap was deemed most appropriate.  Using a sterile surgical marker, an appropriate rotation flap was drawn incorporating the defect and placing the expected incisions within the relaxed skin tension lines where possible. The area thus outlined was incised deep to adipose tissue with a #15 scalpel blade.  The skin margins were undermined to an appropriate distance in all directions utilizing iris scissors.
Eliptical Excision Additional Text (Leave Blank If You Do Not Want): The margin was drawn around the clinically apparent lesion.  An elliptical shape was then drawn on the skin incorporating the lesion and margins.  Incisions were then made along these lines to the appropriate tissue plane and the lesion was extirpated.
O-T Advancement Flap Text: The defect edges were debeveled with a #15 scalpel blade.  Given the location of the defect, shape of the defect and the proximity to free margins an O-T advancement flap was deemed most appropriate.  Using a sterile surgical marker, an appropriate advancement flap was drawn incorporating the defect and placing the expected incisions within the relaxed skin tension lines where possible.    The area thus outlined was incised deep to adipose tissue with a #15 scalpel blade.  The skin margins were undermined to an appropriate distance in all directions utilizing iris scissors.
Fusiform Excision Additional Text (Leave Blank If You Do Not Want): The margin was drawn around the clinically apparent lesion.  A fusiform shape was then drawn on the skin incorporating the lesion and margins.  Incisions were then made along these lines to the appropriate tissue plane and the lesion was extirpated.
Repair Type: Complex
Hatchet Flap Text: The defect edges were debeveled with a #15 scalpel blade.  Given the location of the defect, shape of the defect and the proximity to free margins a hatchet flap was deemed most appropriate.  Using a sterile surgical marker, an appropriate hatchet flap was drawn incorporating the defect and placing the expected incisions within the relaxed skin tension lines where possible.    The area thus outlined was incised deep to adipose tissue with a #15 scalpel blade.  The skin margins were undermined to an appropriate distance in all directions utilizing iris scissors.
Tissue Cultured Epidermal Autograft Text: The defect edges were debeveled with a #15 scalpel blade.  Given the location of the defect, shape of the defect and the proximity to free margins a tissue cultured epidermal autograft was deemed most appropriate.  The graft was then trimmed to fit the size of the defect.  The graft was then placed in the primary defect and oriented appropriately.
Alar Island Pedicle Flap Text: The defect edges were debeveled with a #15 scalpel blade.  Given the location of the defect, shape of the defect and the proximity to the alar rim an island pedicle advancement flap was deemed most appropriate.  Using a sterile surgical marker, an appropriate advancement flap was drawn incorporating the defect, outlining the appropriate donor tissue and placing the expected incisions within the nasal ala running parallel to the alar rim. The area thus outlined was incised with a #15 scalpel blade.  The skin margins were undermined minimally to an appropriate distance in all directions around the primary defect and laterally outward around the island pedicle utilizing iris scissors.  There was minimal undermining beneath the pedicle flap.
Wound Check: 12 days
Purse String (Simple) Text: Given the location of the defect and the characteristics of the surrounding skin a purse string simple closure was deemed most appropriate.  Undermining was performed circumferentially around the surgical defect.  A purse string suture was then placed and tightened.
Estimated Blood Loss (Cc): minimal
Excisional Biopsy Additional Text (Leave Blank If You Do Not Want): The margin was drawn around the clinically apparent lesion. An elliptical shape was then drawn on the skin incorporating the lesion and margins.  Incisions were then made along these lines to the appropriate tissue plane and the lesion was extirpated.
Complex Repair And Xenograft Text: The defect edges were debeveled with a #15 scalpel blade.  The primary defect was closed partially with a complex linear closure.  Given the location of the defect, shape of the defect and the proximity to free margins a xenograft was deemed most appropriate to repair the remaining defect.  The graft was trimmed to fit the size of the remaining defect.  The graft was then placed in the primary defect, oriented appropriately, and sutured into place.
Cheek Interpolation Flap Text: A decision was made to reconstruct the defect utilizing an interpolation axial flap and a staged reconstruction.  A telfa template was made of the defect.  This telfa template was then used to outline the Cheek Interpolation flap.  The donor area for the pedicle flap was then injected with anesthesia.  The flap was excised through the skin and subcutaneous tissue down to the layer of the underlying musculature.  The interpolation flap was carefully excised within this deep plane to maintain its blood supply.  The edges of the donor site were undermined.   The donor site was closed in a primary fashion.  The pedicle was then rotated into position and sutured.  Once the tube was sutured into place, adequate blood supply was confirmed with blanching and refill.  The pedicle was then wrapped with xeroform gauze and dressed appropriately with a telfa and gauze bandage to ensure continued blood supply and protect the attached pedicle.
Lab Facility: [id number]
Complex Repair And O-L Flap Text: The defect edges were debeveled with a #15 scalpel blade.  The primary defect was closed partially with a complex linear closure.  Given the location of the remaining defect, shape of the defect and the proximity to free margins an O-L flap was deemed most appropriate for complete closure of the defect.  Using a sterile surgical marker, an appropriate flap was drawn incorporating the defect and placing the expected incisions within the relaxed skin tension lines where possible.    The area thus outlined was incised deep to adipose tissue with a #15 scalpel blade.  The skin margins were undermined to an appropriate distance in all directions utilizing iris scissors.
Complex Repair And Split-Thickness Skin Graft Text: The defect edges were debeveled with a #15 scalpel blade.  The primary defect was closed partially with a complex linear closure.  Given the location of the defect, shape of the defect and the proximity to free margins a split thickness skin graft was deemed most appropriate to repair the remaining defect.  The graft was trimmed to fit the size of the remaining defect.  The graft was then placed in the primary defect, oriented appropriately, and sutured into place.
Complex Repair And Bilobe Flap Text: The defect edges were debeveled with a #15 scalpel blade.  The primary defect was closed partially with a complex linear closure.  Given the location of the remaining defect, shape of the defect and the proximity to free margins a bilobe flap was deemed most appropriate for complete closure of the defect.  Using a sterile surgical marker, an appropriate advancement flap was drawn incorporating the defect and placing the expected incisions within the relaxed skin tension lines where possible.    The area thus outlined was incised deep to adipose tissue with a #15 scalpel blade.  The skin margins were undermined to an appropriate distance in all directions utilizing iris scissors.

## 2018-09-27 NOTE — PROCEDURE: POST-OP WOUND EVALUATION
Follow Up Time Frame (Optional): weeks
Detail Level: Detailed
Wound Diameter In Cm(Optional): 0
Wound Crusting?: crusted
Body Location Override (Optional): nasal tip
Wound Evaluated By (Optional): Colby Moya MD
Patient To Follow-Up With?: our clinic
Follow Up Provider (Optional): Griffin
Add 93744 Cpt? (Important Note: In 2017 The Use Of 62955 Is Being Tracked By Cms To Determine Future Global Period Reimbursement For Global Periods): yes
Follow Up Units (Optional): 1
Wound Dehiscence?: dehiscence

## 2018-09-27 NOTE — PROCEDURE: REPAIR NOTE
Melolabial Interpolation Flap Text: A decision was made to reconstruct the defect utilizing an interpolation axial flap and a staged reconstruction.  A telfa template was made of the defect.  This telfa template was then used to outline the melolabial interpolation flap.  The donor area for the pedicle flap was then injected with anesthesia.  The flap was excised through the skin and subcutaneous tissue down to the layer of the underlying musculature.  The pedicle flap was carefully excised within this deep plane to maintain its blood supply.  The edges of the donor site were undermined.   The donor site was closed in a primary fashion.  The pedicle was then rotated into position and sutured.  Once the tube was sutured into place, adequate blood supply was confirmed with blanching and refill.  The pedicle was then wrapped with xeroform gauze and dressed appropriately with a telfa and gauze bandage to ensure continued blood supply and protect the attached pedicle.
Spiral Flap Text: The defect edges were debeveled with a #15 scalpel blade.  Given the location of the defect, shape of the defect and the proximity to free margins a spiral flap was deemed most appropriate.  Using a sterile surgical marker, an appropriate rotation flap was drawn incorporating the defect and placing the expected incisions within the relaxed skin tension lines where possible. The area thus outlined was incised deep to adipose tissue with a #15 scalpel blade.  The skin margins were undermined to an appropriate distance in all directions utilizing iris scissors.
Undermining Location (Optional): in the deep fascial plane
Crescentic Intermediate Repair Preamble Text (Leave Blank If You Do Not Want): Undermining was performed with blunt dissection.
Skin Substitute Text: The defect edges were debeveled with a #15 scalpel blade.  Given the location of the defect, shape of the defect and the proximity to free margins a skin substitute graft was deemed most appropriate.  The graft material was trimmed to fit the size of the defect. The graft was then placed in the primary defect and oriented appropriately.
Repair Performed By Another Provider Text (Leave Blank If You Do Not Want): After obtaining clear surgical margins the defect was repaired by another provider.
Simple / Intermediate / Complex Repair - Final Wound Length In Cm: 0.4
Cartilage Fenestration Performed?: No
Post-Care Instructions: I reviewed with the patient in detail post-care instructions. Patient is not to engage in any heavy lifting, exercise, or swimming for the next 14 days. Should the patient develop any fevers, chills, bleeding, severe pain patient will contact the office immediately.
Cheek Interpolation Flap Division And Inset Text: Division and inset of the cheek interpolation flap was performed to achieve optimal aesthetic result, restore normal anatomic appearance and avoid distortion of normal anatomy, expedite and facilitate wound healing, achieve optimal functional result and because linear closure either not possible or would produce suboptimal result. The patient was prepped and draped in the usual manner. The pedicle was infiltrated with local anesthesia. The pedicle was sectioned with a #15 blade. The pedicle was de-bulked and trimmed to match the shape of the defect. Hemostasis was achieved. The flap donor site and free margin of the flap were secured with deep buried sutures and the wound edges were re-approximated.
Referred To Otolaryngology For Closure Text (Leave Blank If You Do Not Want): After obtaining clear surgical margins the patient was sent to otolaryngology for surgical repair.  The patient understands they will receive post-surgical care and follow-up from the referring physician's office.
X Size Of Lesion In Cm (Optional): 0
Xenograft Text: The defect edges were debeveled with a #15 scalpel blade.  Given the location of the defect, shape of the defect and the proximity to free margins a xenograft was deemed most appropriate.  The graft was then trimmed to fit the size of the defect.  The graft was then placed in the primary defect and oriented appropriately.
Complex Repair And Graft Additional Text (Will Appearing After The Standard Complex Repair Text): The complex repair was not sufficient to completely close the primary defect. The remaining additional defect was repaired with the graft mentioned below.
Complex Repair And Flap Additional Text (Will Appearing After The Standard Complex Repair Text): The complex repair was not sufficient to completely close the primary defect. The remaining additional defect was repaired with the flap mentioned below.
Estimated Blood Loss (Cc): minimal
Double Island Pedicle Flap Text: The defect edges were debeveled with a #15 scalpel blade.  Given the location of the defect, shape of the defect and the proximity to free margins a double island pedicle advancement flap was deemed most appropriate.  Using a sterile surgical marker, an appropriate advancement flap was drawn incorporating the defect, outlining the appropriate donor tissue and placing the expected incisions within the relaxed skin tension lines where possible.    The area thus outlined was incised deep to adipose tissue with a #15 scalpel blade.  The skin margins were undermined to an appropriate distance in all directions around the primary defect and laterally outward around the island pedicle utilizing iris scissors.  There was minimal undermining beneath the pedicle flap.
Non-Graft Cartilage Fenestration Text: The cartilage was fenestrated with a 2mm punch biopsy to help facilitate healing.
Rotation Flap Text: The defect edges were debeveled with a #15 scalpel blade.  Given the location of the defect, shape of the defect and the proximity to free margins a rotation flap was deemed most appropriate.  Using a sterile surgical marker, an appropriate rotation flap was drawn incorporating the defect and placing the expected incisions within the relaxed skin tension lines where possible.    The area thus outlined was incised deep to adipose tissue with a #15 scalpel blade.  The skin margins were undermined to an appropriate distance in all directions utilizing iris scissors.
Bilateral Helical Rim Advancement Flap Text: The defect edges were debeveled with a #15 blade scalpel.  Given the location of the defect and the proximity to free margins (helical rim) a bilateral helical rim advancement flap was deemed most appropriate.  Using a sterile surgical marker, the appropriate advancement flaps were drawn incorporating the defect and placing the expected incisions between the helical rim and antihelix where possible.  The area thus outlined was incised through and through with a #15 scalpel blade.  With a skin hook and iris scissors, the flaps were gently and sharply undermined and freed up.
S Plasty Text: Given the location and shape of the defect, and the orientation of relaxed skin tension lines, an S-plasty was deemed most appropriate for repair.  Using a sterile surgical marker, the appropriate outline of the S-plasty was drawn, incorporating the defect and placing the expected incisions within the relaxed skin tension lines where possible.  The area thus outlined was incised deep to adipose tissue with a #15 scalpel blade.  The skin margins were undermined to an appropriate distance in all directions utilizing iris scissors. The skin flaps were advanced over the defect.  The opposing margins were then approximated with interrupted buried subcutaneous sutures.
Star Wedge Flap Text: The defect edges were debeveled with a #15 scalpel blade.  Given the location of the defect, shape of the defect and the proximity to free margins a star wedge flap was deemed most appropriate.  Using a sterile surgical marker, an appropriate rotation flap was drawn incorporating the defect and placing the expected incisions within the relaxed skin tension lines where possible. The area thus outlined was incised deep to adipose tissue with a #15 scalpel blade.  The skin margins were undermined to an appropriate distance in all directions utilizing iris scissors.
Primary Defect Width (In Cm): 1.5
Z Plasty Text: The lesion was extirpated to the level of the fat with a #15 scalpel blade.  Given the location of the defect, shape of the defect and the proximity to free margins a Z-plasty was deemed most appropriate for repair.  Using a sterile surgical marker, the appropriate transposition arms of the Z-plasty were drawn incorporating the defect and placing the expected incisions within the relaxed skin tension lines where possible.    The area thus outlined was incised deep to adipose tissue with a #15 scalpel blade.  The skin margins were undermined to an appropriate distance in all directions utilizing iris scissors.  The opposing transposition arms were then transposed into place in opposite direction and anchored with interrupted buried subcutaneous sutures.
V-Y Flap Text: The defect edges were debeveled with a #15 scalpel blade.  Given the location of the defect, shape of the defect and the proximity to free margins a V-Y flap was deemed most appropriate.  Using a sterile surgical marker, an appropriate advancement flap was drawn incorporating the defect and placing the expected incisions within the relaxed skin tension lines where possible.    The area thus outlined was incised deep to adipose tissue with a #15 scalpel blade.  The skin margins were undermined to an appropriate distance in all directions utilizing iris scissors.
Epidermal Closure: simple interrupted
Cheiloplasty (Complex) Text: A decision was made to reconstruct the defect with a  cheiloplasty.  The defect was undermined extensively.  Additional obicularis oris muscle was excised with a 15 blade scalpel.  The defect was converted into a full thickness wedge to facilite a better cosmetic result.  Small vessels were then tied off with 5-0 monocyrl. The obicularis oris, superficial fascia, adipose and dermis were then reapproximated.  After the deeper layers were approximated the epidermis was reapproximated with particular care given to realign the vermilion border.
Island Pedicle Flap-Requiring Vessel Identification Text: The defect edges were debeveled with a #15 scalpel blade.  Given the location of the defect, shape of the defect and the proximity to free margins an island pedicle advancement flap was deemed most appropriate.  Using a sterile surgical marker, an appropriate advancement flap was drawn, based on the axial vessel mentioned above, incorporating the defect, outlining the appropriate donor tissue and placing the expected incisions within the relaxed skin tension lines where possible.    The area thus outlined was incised deep to adipose tissue with a #15 scalpel blade.  The skin margins were undermined to an appropriate distance in all directions around the primary defect and laterally outward around the island pedicle utilizing iris scissors.  There was minimal undermining beneath the pedicle flap.
Alar Island Pedicle Flap Text: The defect edges were debeveled with a #15 scalpel blade.  Given the location of the defect, shape of the defect and the proximity to the alar rim an island pedicle advancement flap was deemed most appropriate.  Using a sterile surgical marker, an appropriate advancement flap was drawn incorporating the defect, outlining the appropriate donor tissue and placing the expected incisions within the nasal ala running parallel to the alar rim. The area thus outlined was incised with a #15 scalpel blade.  The skin margins were undermined minimally to an appropriate distance in all directions around the primary defect and laterally outward around the island pedicle utilizing iris scissors.  There was minimal undermining beneath the pedicle flap.
Ear Wedge Repair Text: A wedge excision was completed by carrying down an excision through the full thickness of the ear and cartilage with an inward facing Burow's triangle. The wound was then closed in a layered fashion.
Dermal Autograft Text: The defect edges were debeveled with a #15 scalpel blade.  Given the location of the defect, shape of the defect and the proximity to free margins a dermal autograft was deemed most appropriate.  Using a sterile surgical marker, the primary defect shape was transferred to the donor site. The area thus outlined was incised deep to adipose tissue with a #15 scalpel blade.  The harvested graft was then trimmed of adipose and epidermal tissue until only dermis was left.  The skin graft was then placed in the primary defect and oriented appropriately.
Epidermal Sutures: 5-0 Surgipro
Dorsal Nasal Flap Text: The defect edges were debeveled with a #15 scalpel blade.  Given the location of the defect and the proximity to free margins a dorsal nasal flap was deemed most appropriate.  Using a sterile surgical marker, an appropriate dorsal nasal flap was drawn around the defect.    The area thus outlined was incised deep to adipose tissue with a #15 scalpel blade.  The skin margins were undermined to an appropriate distance in all directions utilizing iris scissors.
Tarsorrhaphy Text: A tarsorrhaphy was performed using Frost sutures.
Lazy S Complex Repair Preamble Text (Leave Blank If You Do Not Want): Extensive wide undermining was performed.
Crescentic Advancement Flap Text: The defect edges were debeveled with a #15 scalpel blade.  Given the location of the defect and the proximity to free margins a crescentic advancement flap was deemed most appropriate.  Using a sterile surgical marker, the appropriate advancement flap was drawn incorporating the defect and placing the expected incisions within the relaxed skin tension lines where possible.    The area thus outlined was incised deep to adipose tissue with a #15 scalpel blade.  The skin margins were undermined to an appropriate distance in all directions utilizing iris scissors.
A-T Advancement Flap Text: The defect edges were debeveled with a #15 scalpel blade.  Given the location of the defect, shape of the defect and the proximity to free margins an A-T advancement flap was deemed most appropriate.  Using a sterile surgical marker, an appropriate advancement flap was drawn incorporating the defect and placing the expected incisions within the relaxed skin tension lines where possible.    The area thus outlined was incised deep to adipose tissue with a #15 scalpel blade.  The skin margins were undermined to an appropriate distance in all directions utilizing iris scissors.
O-L Flap Text: The defect edges were debeveled with a #15 scalpel blade.  Given the location of the defect, shape of the defect and the proximity to free margins an O-L flap was deemed most appropriate.  Using a sterile surgical marker, an appropriate advancement flap was drawn incorporating the defect and placing the expected incisions within the relaxed skin tension lines where possible.    The area thus outlined was incised deep to adipose tissue with a #15 scalpel blade.  The skin margins were undermined to an appropriate distance in all directions utilizing iris scissors.
Type Of Previous Surgery (Optional- Ie Mohs Surgery): Mohs
Cartilage Graft Text: The defect edges were debeveled with a #15 scalpel blade.  Given the location of the defect, shape of the defect, the fact the defect involved a full thickness cartilage defect a cartilage graft was deemed most appropriate.  An appropriate donor site was identified, cleansed, and anesthetized. The cartilage graft was then harvested and transferred to the recipient site, oriented appropriately and then sutured into place.  The secondary defect was then repaired using a primary closure.
Graft Cartilage Fenestration Text: The cartilage was fenestrated with a 2mm punch biopsy to help facilitate graft survival and healing.
Primary Defect Length (In Cm): 2
O-T Advancement Flap Text: The defect edges were debeveled with a #15 scalpel blade.  Given the location of the defect, shape of the defect and the proximity to free margins an O-T advancement flap was deemed most appropriate.  Using a sterile surgical marker, an appropriate advancement flap was drawn incorporating the defect and placing the expected incisions within the relaxed skin tension lines where possible.    The area thus outlined was incised deep to adipose tissue with a #15 scalpel blade.  The skin margins were undermined to an appropriate distance in all directions utilizing iris scissors.
Closure 4 Information: This tab is for additional flaps and grafts above and beyond our usual structured repairs.  Please note if you enter information here it will not currently bill and you will need to add the billing information manually.
Consent: The rationale for the repair was explained to the patient and consent was obtained. The risks, benefits and alternatives to therapy were discussed in detail. Specifically, the risks of infection, scarring, bleeding, prolonged wound healing, incomplete removal, allergy to anesthesia, nerve injury and recurrence were addressed. Prior to the procedure, the treatment site was clearly identified and confirmed by the patient. All components of Universal Protocol/PAUSE Rule completed.
Suture Removal: 12 days
Purse String (Intermediate) Text: Given the location of the defect and the characteristics of the surrounding skin a purse string intermediate closure was deemed most appropriate.  Undermining was performed circumfirentially around the surgical defect.  A purse string suture was then placed and tightened.
Helical Rim Advancement Flap Text: The defect edges were debeveled with a #15 blade scalpel.  Given the location of the defect and the proximity to free margins (helical rim) a double helical rim advancement flap was deemed most appropriate.  Using a sterile surgical marker, the appropriate advancement flaps were drawn incorporating the defect and placing the expected incisions between the helical rim and antihelix where possible.  The area thus outlined was incised through and through with a #15 scalpel blade.  With a skin hook and iris scissors, the flaps were gently and sharply undermined and freed up.
Localized Dermabrasion With Wire Brush Text: The patient was draped in routine manner.  Localized dermabrasion using 3 x 17 mm wire brush was performed in routine manner to papillary dermis. This spot dermabrasion is being performed to complete skin cancer reconstruction. It also will eliminate the other sun damaged precancerous cells that are known to be part of the regional effect of a lifetime's worth of sun exposure. This localized dermabrasion is therapeutic and should not be considered cosmetic in any regard.
Mastoid Interpolation Flap Text: A decision was made to reconstruct the defect utilizing an interpolation axial flap and a staged reconstruction.  A telfa template was made of the defect.  This telfa template was then used to outline the mastoid interpolation flap.  The donor area for the pedicle flap was then injected with anesthesia.  The flap was excised through the skin and subcutaneous tissue down to the layer of the underlying musculature.  The pedicle flap was carefully excised within this deep plane to maintain its blood supply.  The edges of the donor site were undermined.   The donor site was closed in a primary fashion.  The pedicle was then rotated into position and sutured.  Once the tube was sutured into place, adequate blood supply was confirmed with blanching and refill.  The pedicle was then wrapped with xeroform gauze and dressed appropriately with a telfa and gauze bandage to ensure continued blood supply and protect the attached pedicle.
Ear Star Wedge Flap Text: The defect edges were debeveled with a #15 blade scalpel.  Given the location of the defect and the proximity to free margins (helical rim) an ear star wedge flap was deemed most appropriate.  Using a sterile surgical marker, the appropriate flap was drawn incorporating the defect and placing the expected incisions between the helical rim and antihelix where possible.  The area thus outlined was incised through and through with a #15 scalpel blade.
Mercedes Flap Text: The defect edges were debeveled with a #15 scalpel blade.  Given the location of the defect, shape of the defect and the proximity to free margins a Mercedes flap was deemed most appropriate.  Using a sterile surgical marker, an appropriate advancement flap was drawn incorporating the defect and placing the expected incisions within the relaxed skin tension lines where possible. The area thus outlined was incised deep to adipose tissue with a #15 scalpel blade.  The skin margins were undermined to an appropriate distance in all directions utilizing iris scissors.
H Plasty Text: Given the location of the defect, shape of the defect and the proximity to free margins a H-plasty was deemed most appropriate for repair.  Using a sterile surgical marker, the appropriate advancement arms of the H-plasty were drawn incorporating the defect and placing the expected incisions within the relaxed skin tension lines where possible. The area thus outlined was incised deep to adipose tissue with a #15 scalpel blade. The skin margins were undermined to an appropriate distance in all directions utilizing iris scissors.  The opposing advancement arms were then advanced into place in opposite direction and anchored with interrupted buried subcutaneous sutures.
Referred To Plastics For Closure Text (Leave Blank If You Do Not Want): After obtaining clear surgical margins the patient was sent to plastics for surgical repair.  The patient understands they will receive post-surgical care and follow-up from the referring physician's office.
Purse String (Simple) Text: Given the location of the defect and the characteristics of the surrounding skin a purse string closure was deemed most appropriate.  Undermining was performed circumfirentially around the surgical defect.  A purse string suture was then placed and tightened.
W Plasty Text: The lesion was extirpated to the level of the fat with a #15 scalpel blade.  Given the location of the defect, shape of the defect and the proximity to free margins a W-plasty was deemed most appropriate for repair.  Using a sterile surgical marker, the appropriate transposition arms of the W-plasty were drawn incorporating the defect and placing the expected incisions within the relaxed skin tension lines where possible.    The area thus outlined was incised deep to adipose tissue with a #15 scalpel blade.  The skin margins were undermined to an appropriate distance in all directions utilizing iris scissors.  The opposing transposition arms were then transposed into place in opposite direction and anchored with interrupted buried subcutaneous sutures.
Burow's Advancement Flap Text: The defect edges were debeveled with a #15 scalpel blade.  Given the location of the defect and the proximity to free margins a Burow's advancement flap was deemed most appropriate.  Using a sterile surgical marker, the appropriate advancement flap was drawn incorporating the defect and placing the expected incisions within the relaxed skin tension lines where possible.    The area thus outlined was incised deep to adipose tissue with a #15 scalpel blade.  The skin margins were undermined to an appropriate distance in all directions utilizing iris scissors.
Manual Repair Warning Statement: We plan on removing the manually selected variable below in favor of our much easier automatic structured text blocks found in the previous tab. We decided to do this to help make the flow better and give you the full power of structured data. Manual selection is never going to be ideal in our platform and I would encourage you to avoid using manual selection from this point on, especially since I will be sunsetting this feature. It is important that you do one of two things with the customized text below. First, you can save all of the text in a word file so you can have it for future reference. Second, transfer the text to the appropriate area in the Library tab. Lastly, if there is a flap or graft type which we do not have you need to let us know right away so I can add it in before the variable is hidden. No need to panic, we plan to give you roughly 6 months to make the change.
Advancement Flap (Double) Text: The defect edges were debeveled with a #15 scalpel blade.  Given the location of the defect and the proximity to free margins a double advancement flap was deemed most appropriate.  Using a sterile surgical marker, the appropriate advancement flaps were drawn incorporating the defect and placing the expected incisions within the relaxed skin tension lines where possible.    The area thus outlined was incised deep to adipose tissue with a #15 scalpel blade.  The skin margins were undermined to an appropriate distance in all directions utilizing iris scissors.
Paramedian Forehead Flap Division And Inset Text: Division and inset of the paramedian forehead flap was performed to achieve optimal aesthetic result, restore normal anatomic appearance and avoid distortion of normal anatomy, expedite and facilitate wound healing, achieve optimal functional result and because linear closure either not possible or would produce suboptimal result. The patient was prepped and draped in the usual manner. The pedicle was infiltrated with local anesthesia. The pedicle was sectioned with a #15 blade. The pedicle was de-bulked and trimmed to match the shape of the defect. Hemostasis was achieved. The flap donor site and free margin of the flap were secured with deep buried sutures and the wound edges were re-approximated.
Modified Advancement Flap Text: The defect edges were debeveled with a #15 scalpel blade.  Given the location of the defect, shape of the defect and the proximity to free margins a modified advancement flap was deemed most appropriate.  Using a sterile surgical marker, an appropriate advancement flap was drawn incorporating the defect and placing the expected incisions within the relaxed skin tension lines where possible.    The area thus outlined was incised deep to adipose tissue with a #15 scalpel blade.  The skin margins were undermined to an appropriate distance in all directions utilizing iris scissors.
Cheek Interpolation Flap Text: A decision was made to reconstruct the defect utilizing an interpolation axial flap and a staged reconstruction.  A telfa template was made of the defect.  This telfa template was then used to outline the Cheek Interpolation flap.  The donor area for the pedicle flap was then injected with anesthesia.  The flap was excised through the skin and subcutaneous tissue down to the layer of the underlying musculature.  The interpolation flap was carefully excised within this deep plane to maintain its blood supply.  The edges of the donor site were undermined.   The donor site was closed in a primary fashion.  The pedicle was then rotated into position and sutured.  Once the tube was sutured into place, adequate blood supply was confirmed with blanching and refill.  The pedicle was then wrapped with xeroform gauze and dressed appropriately with a telfa and gauze bandage to ensure continued blood supply and protect the attached pedicle.
Detail Level: Detailed
Referred To Asc For Closure Text (Leave Blank If You Do Not Want): After obtaining clear surgical margins the patient was sent to an ASC for surgical repair.  The patient understands they will receive post-surgical care and follow-up from the ASC physician.
O-Z Plasty Text: The defect edges were debeveled with a #15 scalpel blade.  Given the location of the defect, shape of the defect and the proximity to free margins an O-Z plasty (double transposition flap) was deemed most appropriate.  Using a sterile surgical marker, the appropriate transposition flaps were drawn incorporating the defect and placing the expected incisions within the relaxed skin tension lines where possible.    The area thus outlined was incised deep to adipose tissue with a #15 scalpel blade.  The skin margins were undermined to an appropriate distance in all directions utilizing iris scissors.  Hemostasis was achieved with electrocautery.  The flaps were then transposed into place, one clockwise and the other counterclockwise, and anchored with interrupted buried subcutaneous sutures.
Mucosal Advancement Flap Text: Given the location of the defect, shape of the defect and the proximity to free margins a mucosal advancement flap was deemed most appropriate. Incisions were made with a 15 blade scalpel in the appropriate fashion along the cutaneous vermilion border and the mucosal lip. The remaining actinically damaged mucosal tissue was excised.  The mucosal advancement flap was then elevated to the gingival sulcus with care taken to preserve the neurovascular structures and advanced into the primary defect. Care was taken to ensure that precise realignment of the vermilion border was achieved.
Hemostasis: Electricator
Bi-Rhombic Flap Text: The defect edges were debeveled with a #15 scalpel blade.  Given the location of the defect and the proximity to free margins a bi-rhombic flap was deemed most appropriate.  Using a sterile surgical marker, an appropriate rhombic flap was drawn incorporating the defect. The area thus outlined was incised deep to adipose tissue with a #15 scalpel blade.  The skin margins were undermined to an appropriate distance in all directions utilizing iris scissors.
Secondary Defect Length (In Cm): 0.2
Show Asc Variables: Yes
Anesthesia Volume In Cc: 6
Island Pedicle Flap Text: The defect edges were debeveled with a #15 scalpel blade.  Given the location of the defect, shape of the defect and the proximity to free margins an island pedicle advancement flap was deemed most appropriate.  Using a sterile surgical marker, an appropriate advancement flap was drawn incorporating the defect, outlining the appropriate donor tissue and placing the expected incisions within the relaxed skin tension lines where possible.    The area thus outlined was incised deep to adipose tissue with a #15 scalpel blade.  The skin margins were undermined to an appropriate distance in all directions around the primary defect and laterally outward around the island pedicle utilizing iris scissors.  There was minimal undermining beneath the pedicle flap.
Skin Substitute Injection Text: The defect edges were debeveled with a #15 scalpel blade.  Given the location of the defect, shape of the defect and the proximity to free margins a skin substitute micronized graft was deemed most appropriate.  The entire vial contents were admixed with 3.0ccs of sterile saline and then injected subcutaneously throughout the entire wound bed.
Mastoid Interpolation Flap Division And Inset Text: Division and inset of the mastoid interpolation flap was performed to achieve optimal aesthetic result, restore normal anatomic appearance and avoid distortion of normal anatomy, expedite and facilitate wound healing, achieve optimal functional result and because linear closure either not possible or would produce suboptimal result. The patient was prepped and draped in the usual manner. The pedicle was infiltrated with local anesthesia. The pedicle was sectioned with a #15 blade. The pedicle was de-bulked and trimmed to match the shape of the defect. Hemostasis was achieved. The flap donor site and free margin of the flap were secured with deep buried sutures and the wound edges were re-approximated.
Split-Thickness Skin Graft Text: The defect edges were debeveled with a #15 scalpel blade.  Given the location of the defect, shape of the defect and the proximity to free margins a split thickness skin graft was deemed most appropriate.  Using a sterile surgical marker, the primary defect shape was transferred to the donor site. The split thickness graft was then harvested.  The skin graft was then placed in the primary defect and oriented appropriately.
Posterior Auricular Interpolation Flap Text: A decision was made to reconstruct the defect utilizing an interpolation axial flap and a staged reconstruction.  A telfa template was made of the defect.  This telfa template was then used to outline the posterior auricular interpolation flap.  The donor area for the pedicle flap was then injected with anesthesia.  The flap was excised through the skin and subcutaneous tissue down to the layer of the underlying musculature.  The pedicle flap was carefully excised within this deep plane to maintain its blood supply.  The edges of the donor site were undermined.   The donor site was closed in a primary fashion.  The pedicle was then rotated into position and sutured.  Once the tube was sutured into place, adequate blood supply was confirmed with blanching and refill.  The pedicle was then wrapped with xeroform gauze and dressed appropriately with a telfa and gauze bandage to ensure continued blood supply and protect the attached pedicle.
V-Y Plasty Text: The defect edges were debeveled with a #15 scalpel blade.  Given the location of the defect, shape of the defect and the proximity to free margins an V-Y advancement flap was deemed most appropriate.  Using a sterile surgical marker, an appropriate advancement flap was drawn incorporating the defect and placing the expected incisions within the relaxed skin tension lines where possible.    The area thus outlined was incised deep to adipose tissue with a #15 scalpel blade.  The skin margins were undermined to an appropriate distance in all directions utilizing iris scissors.
Graft Donor Site Bandage (Optional-Leave Blank If You Don't Want In Note): Steri-strips and a pressure bandage were applied to the donor site.
O-T Plasty Text: The defect edges were debeveled with a #15 scalpel blade.  Given the location of the defect, shape of the defect and the proximity to free margins an O-T plasty was deemed most appropriate.  Using a sterile surgical marker, an appropriate O-T plasty was drawn incorporating the defect and placing the expected incisions within the relaxed skin tension lines where possible.    The area thus outlined was incised deep to adipose tissue with a #15 scalpel blade.  The skin margins were undermined to an appropriate distance in all directions utilizing iris scissors.
Posterior Auricular Interpolation Flap Division And Inset Text: Division and inset of the posterior auricular interpolation flap was performed to achieve optimal aesthetic result, restore normal anatomic appearance and avoid distortion of normal anatomy, expedite and facilitate wound healing, achieve optimal functional result and because linear closure either not possible or would produce suboptimal result. The patient was prepped and draped in the usual manner. The pedicle was infiltrated with local anesthesia. The pedicle was sectioned with a #15 blade. The pedicle was de-bulked and trimmed to match the shape of the defect. Hemostasis was achieved. The flap donor site and free margin of the flap were secured with deep buried sutures and the wound edges were re-approximated.
Referred To Mid-Level For Closure Text (Leave Blank If You Do Not Want): After obtaining clear surgical margins the patient was sent to a mid-level provider for surgical repair.  The patient understands they will receive post-surgical care and follow-up from the mid-level provider.
Banner Transposition Flap Text: The defect edges were debeveled with a #15 scalpel blade.  Given the location of the defect and the proximity to free margins a Banner transposition flap was deemed most appropriate.  Using a sterile surgical marker, an appropriate flap drawn around the defect. The area thus outlined was incised deep to adipose tissue with a #15 scalpel blade.  The skin margins were undermined to an appropriate distance in all directions utilizing iris scissors.
Transposition Flap Text: The defect edges were debeveled with a #15 scalpel blade.  Given the location of the defect and the proximity to free margins a transposition flap was deemed most appropriate.  Using a sterile surgical marker, an appropriate transposition flap was drawn incorporating the defect.    The area thus outlined was incised deep to adipose tissue with a #15 scalpel blade.  The skin margins were undermined to an appropriate distance in all directions utilizing iris scissors.
Flap Type: Transposition Flap
Partial Purse String (Intermediate) Text: Given the location of the defect and the characteristics of the surrounding skin an intermediate purse string closure was deemed most appropriate.  Undermining was performed circumfirentially around the surgical defect.  A purse string suture was then placed and tightened. Wound tension only allowed a partial closure of the circular defect.
Wound Care: Petrolatum
Secondary Intention Text (Leave Blank If You Do Not Want): The defect will heal with secondary intention.
Skin Substitute: EpiFix Micronized
Bilobed Flap Text: The defect edges were debeveled with a #15 scalpel blade.  Given the location of the defect and the proximity to free margins a bilobe flap was deemed most appropriate.  Using a sterile surgical marker, an appropriate bilobe flap drawn around the defect.    The area thus outlined was incised deep to adipose tissue with a #15 scalpel blade.  The skin margins were undermined to an appropriate distance in all directions utilizing iris scissors.
Composite Graft Text: The defect edges were debeveled with a #15 scalpel blade.  Given the location of the defect, shape of the defect, the proximity to free margins and the fact the defect was full thickness a composite graft was deemed most appropriate.  The defect was outline and then transferred to the donor site.  A full thickness graft was then excised from the donor site. The graft was then placed in the primary defect, oriented appropriately and then sutured into place.  The secondary defect was then repaired using a primary closure.
Interpolation Flap Text: A decision was made to reconstruct the defect utilizing an interpolation axial flap and a staged reconstruction.  A telfa template was made of the defect.  This telfa template was then used to outline the interpolation flap.  The donor area for the pedicle flap was then injected with anesthesia.  The flap was excised through the skin and subcutaneous tissue down to the layer of the underlying musculature.  The interpolation flap was carefully excised within this deep plane to maintain its blood supply.  The edges of the donor site were undermined.   The donor site was closed in a primary fashion.  The pedicle was then rotated into position and sutured.  Once the tube was sutured into place, adequate blood supply was confirmed with blanching and refill.  The pedicle was then wrapped with xeroform gauze and dressed appropriately with a telfa and gauze bandage to ensure continued blood supply and protect the attached pedicle.
Keystone Flap Text: The defect edges were debeveled with a #15 scalpel blade.  Given the location of the defect, shape of the defect a keystone flap was deemed most appropriate.  Using a sterile surgical marker, an appropriate keystone flap was drawn incorporating the defect, outlining the appropriate donor tissue and placing the expected incisions within the relaxed skin tension lines where possible. The area thus outlined was incised deep to adipose tissue with a #15 scalpel blade.  The skin margins were undermined to an appropriate distance in all directions around the primary defect and laterally outward around the flap utilizing iris scissors.
Secondary Defect Width (In Cm): 0.5
Rhombic Flap Text: The defect edges were debeveled with a #15 scalpel blade.  Given the location of the defect and the proximity to free margins a rhombic flap was deemed most appropriate.  Using a sterile surgical marker, an appropriate rhombic flap was drawn incorporating the defect.    The area thus outlined was incised deep to adipose tissue with a #15 scalpel blade.  The skin margins were undermined to an appropriate distance in all directions utilizing iris scissors.
Melolabial Interpolation Flap Division And Inset Text: Division and inset of the melolabial interpolation flap was performed to achieve optimal aesthetic result, restore normal anatomic appearance and avoid distortion of normal anatomy, expedite and facilitate wound healing, achieve optimal functional result and because linear closure either not possible or would produce suboptimal result. The patient was prepped and draped in the usual manner. The pedicle was infiltrated with local anesthesia. The pedicle was sectioned with a #15 blade. The pedicle was de-bulked and trimmed to match the shape of the defect. Hemostasis was achieved. The flap donor site and free margin of the flap were secured with deep buried sutures and the wound edges were re-approximated.
Cheek To Nose Interpolation Flap Division And Inset Text: Division and inset of the cheek to nose interpolation flap was performed to achieve optimal aesthetic result, restore normal anatomic appearance and avoid distortion of normal anatomy, expedite and facilitate wound healing, achieve optimal functional result and because linear closure either not possible or would produce suboptimal result. The patient was prepped and draped in the usual manner. The pedicle was infiltrated with local anesthesia. The pedicle was sectioned with a #15 blade. The pedicle was de-bulked and trimmed to match the shape of the defect. Hemostasis was achieved. The flap donor site and free margin of the flap were secured with deep buried sutures and the wound edges were re-approximated.
Advancement Flap (Single) Text: The defect edges were debeveled with a #15 scalpel blade.  Given the location of the defect and the proximity to free margins a single advancement flap was deemed most appropriate.  Using a sterile surgical marker, an appropriate advancement flap was drawn incorporating the defect and placing the expected incisions within the relaxed skin tension lines where possible.    The area thus outlined was incised deep to adipose tissue with a #15 scalpel blade.  The skin margins were undermined to an appropriate distance in all directions utilizing iris scissors.
Closure 2 Information: This tab is for additional flaps and grafts, including complex repair and grafts and complex repair and flaps. You can also specify a different location for the additional defect, if the location is the same you do not need to select a new one. We will insert the automated text for the repair you select below just as we do for solitary flaps and grafts. Please note that at this time if you select a location with a different insurance zone you will need to override the ICD10 and CPT if appropriate.
Referred To Oculoplastics For Closure Text (Leave Blank If You Do Not Want): After obtaining clear surgical margins the patient was sent to oculoplastics for surgical repair.  The patient understands they will receive post-surgical care and follow-up from the referring physician's office.
Advancement-Rotation Flap Text: The defect edges were debeveled with a #15 scalpel blade.  Given the location of the defect, shape of the defect and the proximity to free margins an advancement-rotation flap was deemed most appropriate.  Using a sterile surgical marker, an appropriate flap was drawn incorporating the defect and placing the expected incisions within the relaxed skin tension lines where possible. The area thus outlined was incised deep to adipose tissue with a #15 scalpel blade.  The skin margins were undermined to an appropriate distance in all directions utilizing iris scissors.
Muscle Hinge Flap Text: The defect edges were debeveled with a #15 scalpel blade.  Given the size, depth and location of the defect and the proximity to free margins a muscle hinge flap was deemed most appropriate.  Using a sterile surgical marker, an appropriate hinge flap was drawn incorporating the defect. The area thus outlined was incised with a #15 scalpel blade.  The skin margins were undermined to an appropriate distance in all directions utilizing iris scissors.
Dressing: pressure dressing with telfa
Tissue Cultured Epidermal Autograft Text: The defect edges were debeveled with a #15 scalpel blade.  Given the location of the defect, shape of the defect and the proximity to free margins a tissue cultured epidermal autograft was deemed most appropriate.  The graft was then trimmed to fit the size of the defect.  The graft was then placed in the primary defect and oriented appropriately.
Bilobed Transposition Flap Text: The defect edges were debeveled with a #15 scalpel blade.  Given the location of the defect and the proximity to free margins a bilobed transposition flap was deemed most appropriate.  Using a sterile surgical marker, an appropriate bilobe flap drawn around the defect.    The area thus outlined was incised deep to adipose tissue with a #15 scalpel blade.  The skin margins were undermined to an appropriate distance in all directions utilizing iris scissors.
Partial Purse String (Simple) Text: Given the location of the defect and the characteristics of the surrounding skin a simple purse string closure was deemed most appropriate.  Undermining was performed circumfirentially around the surgical defect.  A purse string suture was then placed and tightened. Wound tension only allowed a partial closure of the circular defect.
Hatchet Flap Text: The defect edges were debeveled with a #15 scalpel blade.  Given the location of the defect, shape of the defect and the proximity to free margins a hatchet flap was deemed most appropriate.  Using a sterile surgical marker, an appropriate hatchet flap was drawn incorporating the defect and placing the expected incisions within the relaxed skin tension lines where possible.    The area thus outlined was incised deep to adipose tissue with a #15 scalpel blade.  The skin margins were undermined to an appropriate distance in all directions utilizing iris scissors.
Anesthesia Type: 1% lidocaine with epinephrine and a 1:10 solution of 8.4% sodium bicarbonate
Full Thickness Lip Wedge Repair (Flap) Text: Given the location of the defect and the proximity to free margins a full thickness wedge repair was deemed most appropriate.  Using a sterile surgical marker, the appropriate repair was drawn incorporating the defect and placing the expected incisions perpendicular to the vermilion border.  The vermilion border was also meticulously outlined to ensure appropriate reapproximation during the repair.  The area thus outlined was incised through and through with a #15 scalpel blade.  The muscularis and dermis were reaproximated with deep sutures following hemostasis. Care was taken to realign the vermilion border before proceeding with the superficial closure.  Once the vermilion was realigned the superfical and mucosal closure was finished.
Island Pedicle Flap With Canthal Suspension Text: The defect edges were debeveled with a #15 scalpel blade.  Given the location of the defect, shape of the defect and the proximity to free margins an island pedicle advancement flap was deemed most appropriate.  Using a sterile surgical marker, an appropriate advancement flap was drawn incorporating the defect, outlining the appropriate donor tissue and placing the expected incisions within the relaxed skin tension lines where possible. The area thus outlined was incised deep to adipose tissue with a #15 scalpel blade.  The skin margins were undermined to an appropriate distance in all directions around the primary defect and laterally outward around the island pedicle utilizing iris scissors.  There was minimal undermining beneath the pedicle flap. A suspension suture was placed in the canthal tendon to prevent tension and prevent ectropion.
Skin Substitute Paste Text: The defect edges were debeveled with a #15 scalpel blade.  Given the location of the defect, shape of the defect and the proximity to free margins a skin substitute micronized graft was deemed most appropriate.  The entire vial contents were admixed with 0.5ccs of sterile saline, formed into a paste and then evenly spread over the entire wound bed.
Cheek-To-Nose Interpolation Flap Text: A decision was made to reconstruct the defect utilizing an interpolation axial flap and a staged reconstruction.  A telfa template was made of the defect.  This telfa template was then used to outline the Cheek-To-Nose Interpolation flap.  The donor area for the pedicle flap was then injected with anesthesia.  The flap was excised through the skin and subcutaneous tissue down to the layer of the underlying musculature.  The interpolation flap was carefully excised within this deep plane to maintain its blood supply.  The edges of the donor site were undermined.   The donor site was closed in a primary fashion.  The pedicle was then rotated into position and sutured.  Once the tube was sutured into place, adequate blood supply was confirmed with blanching and refill.  The pedicle was then wrapped with xeroform gauze and dressed appropriately with a telfa and gauze bandage to ensure continued blood supply and protect the attached pedicle.
Melolabial Transposition Flap Text: The defect edges were debeveled with a #15 scalpel blade.  Given the location of the defect and the proximity to free margins a melolabial flap was deemed most appropriate.  Using a sterile surgical marker, an appropriate melolabial transposition flap was drawn incorporating the defect.    The area thus outlined was incised deep to adipose tissue with a #15 scalpel blade.  The skin margins were undermined to an appropriate distance in all directions utilizing iris scissors.
Unna Boot Text: An Unna boot was placed to help immobilize the limb and facilitate more rapid healing.
Cheiloplasty (Less Than 50%) Text: A decision was made to reconstruct the defect with a  cheiloplasty.  The defect was undermined extensively.  Additional obicularis oris muscle was excised with a 15 blade scalpel.  The defect was converted into a full thickness wedge, of less than 50% of the vertical height of the lip, to facilite a better cosmetic result.  Small vessels were then tied off with 5-0 monocyrl. The obicularis oris, superficial fascia, adipose and dermis were then reapproximated.  After the deeper layers were approximated the epidermis was reapproximated with particular care given to realign the vermilion border.
Paramedian Forehead Flap Text: A decision was made to reconstruct the defect utilizing an interpolation axial flap and a staged reconstruction.  A telfa template was made of the defect.  This telfa template was then used to outline the paramedian forehead pedicle flap.  The donor area for the pedicle flap was then injected with anesthesia.  The flap was excised through the skin and subcutaneous tissue down to the layer of the underlying musculature.  The pedicle flap was carefully excised within this deep plane to maintain its blood supply.  The edges of the donor site were undermined.   The donor site was closed in a primary fashion.  The pedicle was then rotated into position and sutured.  Once the tube was sutured into place, adequate blood supply was confirmed with blanching and refill.  The pedicle was then wrapped with xeroform gauze and dressed appropriately with a telfa and gauze bandage to ensure continued blood supply and protect the attached pedicle.
No Repair - Repaired With Adjacent Surgical Defect Text (Leave Blank If You Do Not Want): After obtaining clear surgical margins the defect was repaired concurrently with another surgical defect which was in close approximation.
Trilobed Flap Text: The defect edges were debeveled with a #15 scalpel blade.  Given the location of the defect and the proximity to free margins a trilobed flap was deemed most appropriate.  Using a sterile surgical marker, an appropriate trilobed flap drawn around the defect.    The area thus outlined was incised deep to adipose tissue with a #15 scalpel blade.  The skin margins were undermined to an appropriate distance in all directions utilizing iris scissors.
Ftsg Text: The defect edges were debeveled with a #15 scalpel blade.  Given the location of the defect, shape of the defect and the proximity to free margins a full thickness skin graft was deemed most appropriate.  Using a sterile surgical marker, the primary defect shape was transferred to the donor site. The area thus outlined was incised deep to adipose tissue with a #15 scalpel blade.  The harvested graft was then trimmed of adipose tissue until only dermis and epidermis was left.  The skin margins of the secondary defect were undermined to an appropriate distance in all directions utilizing iris scissors.  The secondary defect was closed with interrupted buried subcutaneous sutures.  The skin edges were then re-apposed with running  sutures.  The skin graft was then placed in the primary defect and oriented appropriately.
Repair Type: Flap
Epidermal Autograft Text: The defect edges were debeveled with a #15 scalpel blade.  Given the location of the defect, shape of the defect and the proximity to free margins an epidermal autograft was deemed most appropriate.  Using a sterile surgical marker, the primary defect shape was transferred to the donor site. The epidermal graft was then harvested.  The skin graft was then placed in the primary defect and oriented appropriately.

## 2018-10-09 ENCOUNTER — APPOINTMENT (RX ONLY)
Dept: URBAN - METROPOLITAN AREA CLINIC 22 | Facility: CLINIC | Age: 73
Setting detail: DERMATOLOGY
End: 2018-10-09

## 2018-10-09 DIAGNOSIS — Z48.817 ENCOUNTER FOR SURGICAL AFTERCARE FOLLOWING SURGERY ON THE SKIN AND SUBCUTANEOUS TISSUE: ICD-10-CM

## 2018-10-09 DIAGNOSIS — L57.0 ACTINIC KERATOSIS: ICD-10-CM

## 2018-10-09 DIAGNOSIS — Z48.02 ENCOUNTER FOR REMOVAL OF SUTURES: ICD-10-CM

## 2018-10-09 PROCEDURE — ? POST-OP WOUND EVALUATION

## 2018-10-09 PROCEDURE — 17000 DESTRUCT PREMALG LESION: CPT | Mod: 79

## 2018-10-09 PROCEDURE — ? LIQUID NITROGEN

## 2018-10-09 PROCEDURE — ? SUTURE REMOVAL (GLOBAL PERIOD)

## 2018-10-09 PROCEDURE — 99024 POSTOP FOLLOW-UP VISIT: CPT

## 2018-10-09 PROCEDURE — 17003 DESTRUCT PREMALG LES 2-14: CPT | Mod: 79

## 2018-10-09 ASSESSMENT — LOCATION DETAILED DESCRIPTION DERM
LOCATION DETAILED: RIGHT SUPERIOR LATERAL NECK
LOCATION DETAILED: LEFT MEDIAL MALAR CHEEK
LOCATION DETAILED: NASAL TIP
LOCATION DETAILED: NASAL INFRATIP

## 2018-10-09 ASSESSMENT — LOCATION ZONE DERM
LOCATION ZONE: FACE
LOCATION ZONE: NOSE
LOCATION ZONE: NECK

## 2018-10-09 ASSESSMENT — LOCATION SIMPLE DESCRIPTION DERM
LOCATION SIMPLE: LEFT CHEEK
LOCATION SIMPLE: NECK
LOCATION SIMPLE: NOSE

## 2018-10-09 NOTE — PROCEDURE: LIQUID NITROGEN
Detail Level: Detailed
Duration Of Freeze Thaw-Cycle (Seconds): 5
Number Of Freeze-Thaw Cycles: 1 freeze-thaw cycle
Consent: The patient's consent was obtained including but not limited to risks of crusting, scabbing, blistering, scarring, darker or lighter pigmentary change, recurrence, incomplete removal and infection.
Render Post-Care Instructions In Note?: yes
Post-Care Instructions: I reviewed with the patient in detail post-care instructions. Patient is to wear sunprotection, and avoid picking at any of the treated lesions. Pt may apply Vaseline to crusted or scabbing areas.

## 2018-10-09 NOTE — PROCEDURE: SUTURE REMOVAL (GLOBAL PERIOD)
Body Location Override (Optional - Billing Will Still Be Based On Selected Body Map Location If Applicable): right lateral neck
Detail Level: Detailed
Add 84617 Cpt? (Important Note: In 2017 The Use Of 58602 Is Being Tracked By Cms To Determine Future Global Period Reimbursement For Global Periods): yes
Body Location Override (Optional - Billing Will Still Be Based On Selected Body Map Location If Applicable): nasal tip

## 2018-10-09 NOTE — PROCEDURE: POST-OP WOUND EVALUATION
Wound Crusting?: crusted
Sutures?: intact
Wound Edema?: mild
Wound Granulation?: early
Body Location Override (Optional): nasal tip
Follow Up Time Frame (Optional): weeks
Follow Up Units (Optional): 2
Wound Diameter In Cm(Optional): 0
Add 10444 Cpt? (Important Note: In 2017 The Use Of 90724 Is Being Tracked By Cms To Determine Future Global Period Reimbursement For Global Periods): yes
Wound Dehiscence?: dehiscence
Patient To Follow-Up With?: our clinic
Lymphadenopathy?: absent
Wound Color?: red
Wound Evaluated By (Optional): Colby Moya MD
Follow Up Provider (Optional): Griffin
Wound Discharge?: minimal discharge
Detail Level: Detailed

## 2018-10-23 ENCOUNTER — APPOINTMENT (RX ONLY)
Dept: URBAN - METROPOLITAN AREA CLINIC 22 | Facility: CLINIC | Age: 73
Setting detail: DERMATOLOGY
End: 2018-10-23

## 2018-10-23 DIAGNOSIS — Z48.817 ENCOUNTER FOR SURGICAL AFTERCARE FOLLOWING SURGERY ON THE SKIN AND SUBCUTANEOUS TISSUE: ICD-10-CM

## 2018-10-23 PROCEDURE — ? POST-OP WOUND EVALUATION

## 2018-10-23 PROCEDURE — 99024 POSTOP FOLLOW-UP VISIT: CPT

## 2018-10-23 ASSESSMENT — LOCATION DETAILED DESCRIPTION DERM: LOCATION DETAILED: NASAL TIP

## 2018-10-23 ASSESSMENT — LOCATION SIMPLE DESCRIPTION DERM: LOCATION SIMPLE: NOSE

## 2018-10-23 ASSESSMENT — LOCATION ZONE DERM: LOCATION ZONE: NOSE

## 2018-10-23 NOTE — PROCEDURE: POST-OP WOUND EVALUATION
Wound Dehiscence?: dehiscence
Wound Granulation?: early
Wound Evaluated By (Optional): Colby Moya MD
Wound Color?: red
Lymphadenopathy?: absent
Add 60364 Cpt? (Important Note: In 2017 The Use Of 05018 Is Being Tracked By Cms To Determine Future Global Period Reimbursement For Global Periods): yes
Wound Diameter In Cm(Optional): 0
Detail Level: Detailed
Wound Crusting?: crusted
Body Location Override (Optional): nasal tip

## 2019-08-05 ENCOUNTER — APPOINTMENT (OUTPATIENT)
Dept: RADIOLOGY | Facility: MEDICAL CENTER | Age: 74
DRG: 629 | End: 2019-08-05
Attending: INTERNAL MEDICINE
Payer: MEDICARE

## 2019-08-05 ENCOUNTER — APPOINTMENT (OUTPATIENT)
Dept: RADIOLOGY | Facility: MEDICAL CENTER | Age: 74
DRG: 629 | End: 2019-08-05
Attending: EMERGENCY MEDICINE
Payer: MEDICARE

## 2019-08-05 ENCOUNTER — HOSPITAL ENCOUNTER (INPATIENT)
Facility: MEDICAL CENTER | Age: 74
LOS: 12 days | DRG: 629 | End: 2019-08-17
Attending: EMERGENCY MEDICINE | Admitting: HOSPITALIST
Payer: MEDICARE

## 2019-08-05 DIAGNOSIS — I73.9 PERIPHERAL VASCULAR DISEASE OF LOWER EXTREMITY WITH ULCERATION (HCC): ICD-10-CM

## 2019-08-05 DIAGNOSIS — N17.9 ACUTE RENAL FAILURE, UNSPECIFIED ACUTE RENAL FAILURE TYPE (HCC): ICD-10-CM

## 2019-08-05 DIAGNOSIS — L97.909 PERIPHERAL VASCULAR DISEASE OF LOWER EXTREMITY WITH ULCERATION (HCC): ICD-10-CM

## 2019-08-05 DIAGNOSIS — M62.82 NON-TRAUMATIC RHABDOMYOLYSIS: ICD-10-CM

## 2019-08-05 DIAGNOSIS — E10.10 DIABETIC KETOACIDOSIS WITHOUT COMA ASSOCIATED WITH TYPE 1 DIABETES MELLITUS (HCC): ICD-10-CM

## 2019-08-05 DIAGNOSIS — R53.81 PHYSICAL DEBILITY: ICD-10-CM

## 2019-08-05 DIAGNOSIS — E86.0 DEHYDRATION: ICD-10-CM

## 2019-08-05 PROBLEM — R59.0 LYMPHADENOPATHY, SUBMANDIBULAR: Status: ACTIVE | Noted: 2019-08-05

## 2019-08-05 PROBLEM — M25.552 PAIN OF LEFT HIP JOINT: Status: ACTIVE | Noted: 2019-08-05

## 2019-08-05 PROBLEM — R21 RASH: Status: ACTIVE | Noted: 2019-08-05

## 2019-08-05 PROBLEM — R79.89 ELEVATED TROPONIN: Status: ACTIVE | Noted: 2019-08-05

## 2019-08-05 PROBLEM — E83.39 HYPERPHOSPHATEMIA: Status: ACTIVE | Noted: 2019-08-05

## 2019-08-05 PROBLEM — E66.01 CLASS 3 SEVERE OBESITY DUE TO EXCESS CALORIES IN ADULT (HCC): Status: ACTIVE | Noted: 2019-08-05

## 2019-08-05 PROBLEM — I48.19 PERSISTENT ATRIAL FIBRILLATION (HCC): Status: ACTIVE | Noted: 2019-08-05

## 2019-08-05 PROBLEM — S09.90XA HEAD TRAUMA: Status: ACTIVE | Noted: 2019-08-05

## 2019-08-05 PROBLEM — J44.9 COPD (CHRONIC OBSTRUCTIVE PULMONARY DISEASE) (HCC): Status: ACTIVE | Noted: 2019-08-05

## 2019-08-05 LAB
ALBUMIN SERPL BCP-MCNC: 2.5 G/DL (ref 3.2–4.9)
ALBUMIN SERPL BCP-MCNC: 2.9 G/DL (ref 3.2–4.9)
ALBUMIN/GLOB SERPL: 0.9 G/DL
ALBUMIN/GLOB SERPL: 0.9 G/DL
ALP SERPL-CCNC: 48 U/L (ref 30–99)
ALP SERPL-CCNC: 56 U/L (ref 30–99)
ALT SERPL-CCNC: 40 U/L (ref 2–50)
ALT SERPL-CCNC: 48 U/L (ref 2–50)
ANION GAP SERPL CALC-SCNC: 12 MMOL/L (ref 0–11.9)
ANION GAP SERPL CALC-SCNC: 12 MMOL/L (ref 0–11.9)
ANION GAP SERPL CALC-SCNC: 20 MMOL/L (ref 0–11.9)
AST SERPL-CCNC: 46 U/L (ref 12–45)
AST SERPL-CCNC: 49 U/L (ref 12–45)
B-OH-BUTYR SERPL-MCNC: 2.42 MMOL/L (ref 0.02–0.27)
BASOPHILS # BLD AUTO: 0.7 % (ref 0–1.8)
BASOPHILS # BLD AUTO: 0.8 % (ref 0–1.8)
BASOPHILS # BLD: 0.05 K/UL (ref 0–0.12)
BASOPHILS # BLD: 0.06 K/UL (ref 0–0.12)
BILIRUB SERPL-MCNC: 0.8 MG/DL (ref 0.1–1.5)
BILIRUB SERPL-MCNC: 1 MG/DL (ref 0.1–1.5)
BUN SERPL-MCNC: 115 MG/DL (ref 8–22)
BUN SERPL-MCNC: 120 MG/DL (ref 8–22)
BUN SERPL-MCNC: 120 MG/DL (ref 8–22)
CALCIUM SERPL-MCNC: 7.5 MG/DL (ref 8.5–10.5)
CALCIUM SERPL-MCNC: 7.8 MG/DL (ref 8.5–10.5)
CALCIUM SERPL-MCNC: 8.3 MG/DL (ref 8.5–10.5)
CFT BLD TEG: 2.5 MIN (ref 5–10)
CHLORIDE SERPL-SCNC: 104 MMOL/L (ref 96–112)
CHLORIDE SERPL-SCNC: 107 MMOL/L (ref 96–112)
CHLORIDE SERPL-SCNC: 107 MMOL/L (ref 96–112)
CK SERPL-CCNC: 963 U/L (ref 0–154)
CLOT ANGLE BLD TEG: 71.6 DEGREES (ref 53–72)
CLOT LYSIS 30M P MA LENFR BLD TEG: 0 % (ref 0–8)
CO2 SERPL-SCNC: 15 MMOL/L (ref 20–33)
CO2 SERPL-SCNC: 18 MMOL/L (ref 20–33)
CO2 SERPL-SCNC: 18 MMOL/L (ref 20–33)
COMMENT 1642: NORMAL
CREAT SERPL-MCNC: 3.38 MG/DL (ref 0.5–1.4)
CREAT SERPL-MCNC: 3.56 MG/DL (ref 0.5–1.4)
CREAT SERPL-MCNC: 3.92 MG/DL (ref 0.5–1.4)
CT.EXTRINSIC BLD ROTEM: 1.3 MIN (ref 1–3)
EKG IMPRESSION: NORMAL
EOSINOPHIL # BLD AUTO: 0.04 K/UL (ref 0–0.51)
EOSINOPHIL # BLD AUTO: 0.04 K/UL (ref 0–0.51)
EOSINOPHIL NFR BLD: 0.5 % (ref 0–6.9)
EOSINOPHIL NFR BLD: 0.6 % (ref 0–6.9)
ERYTHROCYTE [DISTWIDTH] IN BLOOD BY AUTOMATED COUNT: 44.9 FL (ref 35.9–50)
ERYTHROCYTE [DISTWIDTH] IN BLOOD BY AUTOMATED COUNT: 45.2 FL (ref 35.9–50)
ERYTHROCYTE [SEDIMENTATION RATE] IN BLOOD BY WESTERGREN METHOD: 45 MM/HOUR (ref 0–20)
ETHANOL BLD-MCNC: 0 G/DL
GLOBULIN SER CALC-MCNC: 2.9 G/DL (ref 1.9–3.5)
GLOBULIN SER CALC-MCNC: 3.4 G/DL (ref 1.9–3.5)
GLUCOSE SERPL-MCNC: 369 MG/DL (ref 65–99)
GLUCOSE SERPL-MCNC: 507 MG/DL (ref 65–99)
GLUCOSE SERPL-MCNC: 588 MG/DL (ref 65–99)
HCT VFR BLD AUTO: 43.2 % (ref 42–52)
HCT VFR BLD AUTO: 49.6 % (ref 42–52)
HGB BLD-MCNC: 14.2 G/DL (ref 14–18)
HGB BLD-MCNC: 16.1 G/DL (ref 14–18)
IMM GRANULOCYTES # BLD AUTO: 0.04 K/UL (ref 0–0.11)
IMM GRANULOCYTES # BLD AUTO: 0.04 K/UL (ref 0–0.11)
IMM GRANULOCYTES NFR BLD AUTO: 0.5 % (ref 0–0.9)
IMM GRANULOCYTES NFR BLD AUTO: 0.6 % (ref 0–0.9)
LACTATE BLD-SCNC: 3.3 MMOL/L (ref 0.5–2)
LYMPHOCYTES # BLD AUTO: 0.36 K/UL (ref 1–4.8)
LYMPHOCYTES # BLD AUTO: 0.37 K/UL (ref 1–4.8)
LYMPHOCYTES NFR BLD: 4.8 % (ref 22–41)
LYMPHOCYTES NFR BLD: 5 % (ref 22–41)
MAGNESIUM SERPL-MCNC: 3.1 MG/DL (ref 1.5–2.5)
MCF BLD TEG: 67.4 MM (ref 50–70)
MCH RBC QN AUTO: 29.5 PG (ref 27–33)
MCH RBC QN AUTO: 29.8 PG (ref 27–33)
MCHC RBC AUTO-ENTMCNC: 32.5 G/DL (ref 33.7–35.3)
MCHC RBC AUTO-ENTMCNC: 32.9 G/DL (ref 33.7–35.3)
MCV RBC AUTO: 90.6 FL (ref 81.4–97.8)
MCV RBC AUTO: 91 FL (ref 81.4–97.8)
MONOCYTES # BLD AUTO: 0.57 K/UL (ref 0–0.85)
MONOCYTES # BLD AUTO: 0.58 K/UL (ref 0–0.85)
MONOCYTES NFR BLD AUTO: 7.4 % (ref 0–13.4)
MONOCYTES NFR BLD AUTO: 8.1 % (ref 0–13.4)
MORPHOLOGY BLD-IMP: NORMAL
NEUTROPHILS # BLD AUTO: 6.12 K/UL (ref 1.82–7.42)
NEUTROPHILS # BLD AUTO: 6.62 K/UL (ref 1.82–7.42)
NEUTROPHILS NFR BLD: 85 % (ref 44–72)
NEUTROPHILS NFR BLD: 86 % (ref 44–72)
NRBC # BLD AUTO: 0 K/UL
NRBC # BLD AUTO: 0 K/UL
NRBC BLD-RTO: 0 /100 WBC
NRBC BLD-RTO: 0 /100 WBC
PA AA BLD-ACNC: 14.1 %
PA ADP BLD-ACNC: 32.6 %
PHOSPHATE SERPL-MCNC: 5.1 MG/DL (ref 2.5–4.5)
PHOSPHATE SERPL-MCNC: 6.1 MG/DL (ref 2.5–4.5)
PLATELET # BLD AUTO: 113 K/UL (ref 164–446)
PLATELET # BLD AUTO: 156 K/UL (ref 164–446)
PLATELET BLD QL SMEAR: NORMAL
PMV BLD AUTO: 10.6 FL (ref 9–12.9)
PMV BLD AUTO: 12 FL (ref 9–12.9)
POTASSIUM SERPL-SCNC: 4.6 MMOL/L (ref 3.6–5.5)
POTASSIUM SERPL-SCNC: 4.6 MMOL/L (ref 3.6–5.5)
POTASSIUM SERPL-SCNC: 5.1 MMOL/L (ref 3.6–5.5)
PROT SERPL-MCNC: 5.4 G/DL (ref 6–8.2)
PROT SERPL-MCNC: 6.3 G/DL (ref 6–8.2)
RBC # BLD AUTO: 4.77 M/UL (ref 4.7–6.1)
RBC # BLD AUTO: 5.45 M/UL (ref 4.7–6.1)
RBC BLD AUTO: NORMAL
SODIUM SERPL-SCNC: 137 MMOL/L (ref 135–145)
SODIUM SERPL-SCNC: 137 MMOL/L (ref 135–145)
SODIUM SERPL-SCNC: 139 MMOL/L (ref 135–145)
TEG ALGORITHM TGALG: ABNORMAL
TROPONIN T SERPL-MCNC: 36 NG/L (ref 6–19)
WBC # BLD AUTO: 7.2 K/UL (ref 4.8–10.8)
WBC # BLD AUTO: 7.7 K/UL (ref 4.8–10.8)

## 2019-08-05 PROCEDURE — 80307 DRUG TEST PRSMV CHEM ANLYZR: CPT

## 2019-08-05 PROCEDURE — A9270 NON-COVERED ITEM OR SERVICE: HCPCS | Performed by: INTERNAL MEDICINE

## 2019-08-05 PROCEDURE — 700102 HCHG RX REV CODE 250 W/ 637 OVERRIDE(OP): Performed by: INTERNAL MEDICINE

## 2019-08-05 PROCEDURE — 71045 X-RAY EXAM CHEST 1 VIEW: CPT

## 2019-08-05 PROCEDURE — 85025 COMPLETE CBC W/AUTO DIFF WBC: CPT

## 2019-08-05 PROCEDURE — 82550 ASSAY OF CK (CPK): CPT

## 2019-08-05 PROCEDURE — 99221 1ST HOSP IP/OBS SF/LOW 40: CPT | Performed by: HOSPITALIST

## 2019-08-05 PROCEDURE — 85347 COAGULATION TIME ACTIVATED: CPT

## 2019-08-05 PROCEDURE — 700105 HCHG RX REV CODE 258: Performed by: HOSPITALIST

## 2019-08-05 PROCEDURE — 93005 ELECTROCARDIOGRAM TRACING: CPT | Performed by: EMERGENCY MEDICINE

## 2019-08-05 PROCEDURE — 85576 BLOOD PLATELET AGGREGATION: CPT

## 2019-08-05 PROCEDURE — 85384 FIBRINOGEN ACTIVITY: CPT

## 2019-08-05 PROCEDURE — 87077 CULTURE AEROBIC IDENTIFY: CPT

## 2019-08-05 PROCEDURE — 99291 CRITICAL CARE FIRST HOUR: CPT

## 2019-08-05 PROCEDURE — 70450 CT HEAD/BRAIN W/O DYE: CPT

## 2019-08-05 PROCEDURE — 87186 SC STD MICRODIL/AGAR DIL: CPT

## 2019-08-05 PROCEDURE — 76775 US EXAM ABDO BACK WALL LIM: CPT

## 2019-08-05 PROCEDURE — 36415 COLL VENOUS BLD VENIPUNCTURE: CPT

## 2019-08-05 PROCEDURE — 82010 KETONE BODYS QUAN: CPT

## 2019-08-05 PROCEDURE — 700105 HCHG RX REV CODE 258: Performed by: EMERGENCY MEDICINE

## 2019-08-05 PROCEDURE — 96372 THER/PROPH/DIAG INJ SC/IM: CPT

## 2019-08-05 PROCEDURE — 85652 RBC SED RATE AUTOMATED: CPT

## 2019-08-05 PROCEDURE — 93010 ELECTROCARDIOGRAM REPORT: CPT | Performed by: INTERNAL MEDICINE

## 2019-08-05 PROCEDURE — 700105 HCHG RX REV CODE 258: Performed by: INTERNAL MEDICINE

## 2019-08-05 PROCEDURE — 84100 ASSAY OF PHOSPHORUS: CPT | Mod: 91

## 2019-08-05 PROCEDURE — 80048 BASIC METABOLIC PNL TOTAL CA: CPT

## 2019-08-05 PROCEDURE — 700102 HCHG RX REV CODE 250 W/ 637 OVERRIDE(OP): Performed by: HOSPITALIST

## 2019-08-05 PROCEDURE — 770022 HCHG ROOM/CARE - ICU (200)

## 2019-08-05 PROCEDURE — 87205 SMEAR GRAM STAIN: CPT

## 2019-08-05 PROCEDURE — 84484 ASSAY OF TROPONIN QUANT: CPT

## 2019-08-05 PROCEDURE — 87070 CULTURE OTHR SPECIMN AEROBIC: CPT

## 2019-08-05 PROCEDURE — 99291 CRITICAL CARE FIRST HOUR: CPT | Performed by: INTERNAL MEDICINE

## 2019-08-05 PROCEDURE — 700111 HCHG RX REV CODE 636 W/ 250 OVERRIDE (IP): Performed by: HOSPITALIST

## 2019-08-05 PROCEDURE — 83735 ASSAY OF MAGNESIUM: CPT

## 2019-08-05 PROCEDURE — 99292 CRITICAL CARE ADDL 30 MIN: CPT | Performed by: INTERNAL MEDICINE

## 2019-08-05 PROCEDURE — 700102 HCHG RX REV CODE 250 W/ 637 OVERRIDE(OP): Performed by: EMERGENCY MEDICINE

## 2019-08-05 PROCEDURE — 80053 COMPREHEN METABOLIC PANEL: CPT | Mod: 91

## 2019-08-05 PROCEDURE — 83605 ASSAY OF LACTIC ACID: CPT

## 2019-08-05 PROCEDURE — 82962 GLUCOSE BLOOD TEST: CPT | Mod: 91

## 2019-08-05 RX ORDER — SODIUM CHLORIDE 9 MG/ML
INJECTION, SOLUTION INTRAVENOUS CONTINUOUS
Status: DISCONTINUED | OUTPATIENT
Start: 2019-08-05 | End: 2019-08-05

## 2019-08-05 RX ORDER — MAGNESIUM SULFATE HEPTAHYDRATE 40 MG/ML
2 INJECTION, SOLUTION INTRAVENOUS
Status: DISCONTINUED | OUTPATIENT
Start: 2019-08-05 | End: 2019-08-06

## 2019-08-05 RX ORDER — POTASSIUM CHLORIDE 7.45 MG/ML
10 INJECTION INTRAVENOUS ONCE
Status: COMPLETED | OUTPATIENT
Start: 2019-08-05 | End: 2019-08-05

## 2019-08-05 RX ORDER — DEXTROSE AND SODIUM CHLORIDE 5; .9 G/100ML; G/100ML
INJECTION, SOLUTION INTRAVENOUS CONTINUOUS
Status: DISCONTINUED | OUTPATIENT
Start: 2019-08-05 | End: 2019-08-06

## 2019-08-05 RX ORDER — SODIUM CHLORIDE 9 MG/ML
1000 INJECTION, SOLUTION INTRAVENOUS CONTINUOUS
Status: DISCONTINUED | OUTPATIENT
Start: 2019-08-05 | End: 2019-08-05

## 2019-08-05 RX ORDER — ONDANSETRON 4 MG/1
4 TABLET, ORALLY DISINTEGRATING ORAL EVERY 4 HOURS PRN
Status: DISCONTINUED | OUTPATIENT
Start: 2019-08-05 | End: 2019-08-17 | Stop reason: HOSPADM

## 2019-08-05 RX ORDER — LABETALOL HYDROCHLORIDE 5 MG/ML
10 INJECTION, SOLUTION INTRAVENOUS EVERY 4 HOURS PRN
Status: DISCONTINUED | OUTPATIENT
Start: 2019-08-05 | End: 2019-08-17 | Stop reason: HOSPADM

## 2019-08-05 RX ORDER — BISACODYL 10 MG
10 SUPPOSITORY, RECTAL RECTAL
Status: DISCONTINUED | OUTPATIENT
Start: 2019-08-05 | End: 2019-08-17 | Stop reason: HOSPADM

## 2019-08-05 RX ORDER — POTASSIUM CHLORIDE 7.45 MG/ML
10 INJECTION INTRAVENOUS ONCE
Status: COMPLETED | OUTPATIENT
Start: 2019-08-06 | End: 2019-08-06

## 2019-08-05 RX ORDER — POLYETHYLENE GLYCOL 3350 17 G/17G
1 POWDER, FOR SOLUTION ORAL
Status: DISCONTINUED | OUTPATIENT
Start: 2019-08-05 | End: 2019-08-17 | Stop reason: HOSPADM

## 2019-08-05 RX ORDER — SODIUM CHLORIDE 9 MG/ML
1000 INJECTION, SOLUTION INTRAVENOUS CONTINUOUS
Status: ACTIVE | OUTPATIENT
Start: 2019-08-05 | End: 2019-08-05

## 2019-08-05 RX ORDER — HEPARIN SODIUM 5000 [USP'U]/ML
5000 INJECTION, SOLUTION INTRAVENOUS; SUBCUTANEOUS EVERY 8 HOURS
Status: DISCONTINUED | OUTPATIENT
Start: 2019-08-05 | End: 2019-08-09

## 2019-08-05 RX ORDER — SODIUM CHLORIDE, SODIUM LACTATE, POTASSIUM CHLORIDE, CALCIUM CHLORIDE 600; 310; 30; 20 MG/100ML; MG/100ML; MG/100ML; MG/100ML
INJECTION, SOLUTION INTRAVENOUS CONTINUOUS
Status: DISCONTINUED | OUTPATIENT
Start: 2019-08-05 | End: 2019-08-06

## 2019-08-05 RX ORDER — ONDANSETRON 2 MG/ML
4 INJECTION INTRAMUSCULAR; INTRAVENOUS EVERY 4 HOURS PRN
Status: DISCONTINUED | OUTPATIENT
Start: 2019-08-05 | End: 2019-08-17 | Stop reason: HOSPADM

## 2019-08-05 RX ORDER — ACETAMINOPHEN 500 MG
1000 TABLET ORAL EVERY 6 HOURS PRN
Status: DISCONTINUED | OUTPATIENT
Start: 2019-08-05 | End: 2019-08-17 | Stop reason: HOSPADM

## 2019-08-05 RX ORDER — MAGNESIUM SULFATE HEPTAHYDRATE 40 MG/ML
4 INJECTION, SOLUTION INTRAVENOUS
Status: DISCONTINUED | OUTPATIENT
Start: 2019-08-05 | End: 2019-08-06

## 2019-08-05 RX ORDER — AMOXICILLIN 250 MG
2 CAPSULE ORAL 2 TIMES DAILY
Status: DISCONTINUED | OUTPATIENT
Start: 2019-08-05 | End: 2019-08-17 | Stop reason: HOSPADM

## 2019-08-05 RX ORDER — SODIUM CHLORIDE, SODIUM LACTATE, POTASSIUM CHLORIDE, AND CALCIUM CHLORIDE .6; .31; .03; .02 G/100ML; G/100ML; G/100ML; G/100ML
2000 INJECTION, SOLUTION INTRAVENOUS ONCE
Status: COMPLETED | OUTPATIENT
Start: 2019-08-05 | End: 2019-08-05

## 2019-08-05 RX ORDER — OXYCODONE HYDROCHLORIDE 5 MG/1
5 TABLET ORAL EVERY 4 HOURS PRN
Status: DISCONTINUED | OUTPATIENT
Start: 2019-08-05 | End: 2019-08-17 | Stop reason: HOSPADM

## 2019-08-05 RX ORDER — DEXTROSE AND SODIUM CHLORIDE 10; .45 G/100ML; G/100ML
INJECTION, SOLUTION INTRAVENOUS CONTINUOUS
Status: DISCONTINUED | OUTPATIENT
Start: 2019-08-05 | End: 2019-08-06

## 2019-08-05 RX ORDER — SODIUM CHLORIDE 9 MG/ML
2000 INJECTION, SOLUTION INTRAVENOUS ONCE
Status: DISCONTINUED | OUTPATIENT
Start: 2019-08-05 | End: 2019-08-05

## 2019-08-05 RX ADMIN — INSULIN HUMAN 10 UNITS: 100 INJECTION, SOLUTION PARENTERAL at 15:21

## 2019-08-05 RX ADMIN — SODIUM CHLORIDE 7 UNITS/HR: 9 INJECTION, SOLUTION INTRAVENOUS at 19:01

## 2019-08-05 RX ADMIN — HEPARIN SODIUM 5000 UNITS: 5000 INJECTION, SOLUTION INTRAVENOUS; SUBCUTANEOUS at 22:00

## 2019-08-05 RX ADMIN — POTASSIUM CHLORIDE 10 MEQ: 7.46 INJECTION, SOLUTION INTRAVENOUS at 20:19

## 2019-08-05 RX ADMIN — ACETAMINOPHEN 500 MG: 500 TABLET ORAL at 15:47

## 2019-08-05 RX ADMIN — ACETAMINOPHEN 1000 MG: 500 TABLET ORAL at 23:35

## 2019-08-05 RX ADMIN — SODIUM CHLORIDE, POTASSIUM CHLORIDE, SODIUM LACTATE AND CALCIUM CHLORIDE 2000 ML: 600; 310; 30; 20 INJECTION, SOLUTION INTRAVENOUS at 16:09

## 2019-08-05 RX ADMIN — SODIUM CHLORIDE 1000 ML: 9 INJECTION, SOLUTION INTRAVENOUS at 12:58

## 2019-08-05 RX ADMIN — OXYCODONE HYDROCHLORIDE 5 MG: 5 TABLET ORAL at 15:46

## 2019-08-05 RX ADMIN — SODIUM CHLORIDE, POTASSIUM CHLORIDE, SODIUM LACTATE AND CALCIUM CHLORIDE: 600; 310; 30; 20 INJECTION, SOLUTION INTRAVENOUS at 19:04

## 2019-08-05 SDOH — HEALTH STABILITY: MENTAL HEALTH: HOW OFTEN DO YOU HAVE A DRINK CONTAINING ALCOHOL?: NEVER

## 2019-08-05 ASSESSMENT — ENCOUNTER SYMPTOMS
WHEEZING: 0
DIZZINESS: 0
HEMOPTYSIS: 0
CLAUDICATION: 0
PALPITATIONS: 0
BLOOD IN STOOL: 0
TREMORS: 0
STRIDOR: 0
MYALGIAS: 1
TINGLING: 0
FEVER: 0
VOMITING: 0
SEIZURES: 0
CHILLS: 0
SORE THROAT: 1
SPEECH CHANGE: 0
FALLS: 1
WEAKNESS: 0
SENSORY CHANGE: 0
NAUSEA: 0
DIARRHEA: 0
HALLUCINATIONS: 0
COUGH: 0
CHILLS: 1
SHORTNESS OF BREATH: 0
EYE PAIN: 0
ORTHOPNEA: 0
DOUBLE VISION: 0
ABDOMINAL PAIN: 0
HEADACHES: 0
NERVOUS/ANXIOUS: 0
FEVER: 1
DEPRESSION: 0
SPUTUM PRODUCTION: 0
BACK PAIN: 1
CONSTIPATION: 0
FOCAL WEAKNESS: 0

## 2019-08-05 NOTE — CONSULTS
Critical Care Consultation    Date of consult: 8/5/2019    Referring Physician  Dr Nuñez    Reason for Consultation  DKA and MARIZOL    History of Presenting Illness  74 y.o. male who presented 8/5/2019 with afib, basal cell cancer, COPD, dm, hypertension, s/p pace maker, that was carrying laundry and fell down wedging himself into corner of wall hitting his head. He does take eliquis for afib has left eyebrow bruising, doesn't know how long he was stuck their, complains of rib pain with coughing and left hip pain worse with movement. He oc trouble getting around or frequent falls. Patient was found by grandson and fall was likely on Saturday brought in by EMS. Was found in ER to have MARIZOL, DKA and mild rhabdomyolysis. Patient admitted to ICU for further management Vitals HR 70's, on room air, -130's. He has had weeping wound to his lower legs for years per patient. Oc shortness or breath or sputum production. Oc fever or chills, abdominal pain or black or bloody stool or vomiting. He is thirsty oc confusion or focal weakness but limited by pain with movement. He has had swelling under his jaw for some time. [facility]. Recently sick with a stomach flu per emiliano late last week.     Bedside Echo with good BiV function, IVC full at 2.38cm with no respiratory changes    Code Status  DNAR/DNI    Review of Systems  Review of Systems   Constitutional: Positive for chills and fever. Negative for malaise/fatigue.   HENT: Negative for hearing loss.    Eyes: Negative for double vision and pain.   Respiratory: Negative for cough, hemoptysis, sputum production, shortness of breath and wheezing.    Cardiovascular: Positive for chest pain and leg swelling. Negative for palpitations, orthopnea and claudication.   Gastrointestinal: Negative for abdominal pain, blood in stool, constipation, diarrhea, melena, nausea and vomiting.   Genitourinary: Negative for dysuria, frequency and urgency.    Musculoskeletal: Positive for falls, joint pain and myalgias.   Skin: Positive for rash.   Neurological: Negative for dizziness, tingling, tremors, sensory change, speech change, focal weakness, seizures, weakness and headaches.   Psychiatric/Behavioral: Negative for depression and hallucinations.       Past Medical History   has a past medical history of Atrial fibrillation (HCC), Basal cell carcinoma, Cancer (HCC), Chronic obstructive pulmonary disease (HCC), Diabetes (HCC), Hypertension, and Renal disorder.    Surgical History   has a past surgical history that includes pacemaker insertion and knee replacement, total.    Family History  family history is not on file.    Social History   reports that he quit smoking about 44 years ago. His smoking use included cigarettes. He has never used smokeless tobacco. He reports that he does not drink alcohol or use drugs.    Medications  Home Medications    **Home medications have not yet been reviewed for this encounter**       Current Facility-Administered Medications   Medication Dose Route Frequency Provider Last Rate Last Dose   • dextrose 10% and 0.45% NaCl infusion   Intravenous Continuous Price Elizabeth D.O.       • senna-docusate (PERICOLACE or SENOKOT S) 8.6-50 MG per tablet 2 Tab  2 Tab Oral BID Price Elizabeth D.O.        And   • polyethylene glycol/lytes (MIRALAX) PACKET 1 Packet  1 Packet Oral QDAY PRN Price Elizabeth D.O.        And   • magnesium hydroxide (MILK OF MAGNESIA) suspension 30 mL  30 mL Oral QDAY PRN Price Elizabeth D.O.        And   • bisacodyl (DULCOLAX) suppository 10 mg  10 mg Rectal QDAY PRN Price Elizabeth D.O.       • MD ALERT-PHARMACY TO CONSULT FOR DKA MONITORING 1 Each  1 Each Other PRN Price Elizabeth D.O.       • Adult DKA potassium(K+) replacement scale  1 Each Intravenous Q4HRS Price Elizabeth D.O.       • magnesium sulfate IVPB premix 2 g  2 g Intravenous Once PRN Price Elizabeth D.O.        Or   • magnesium sulfate IVPB premix  4 g  4 g Intravenous Once PRN Price Elizabeth D.O.       • potassium phosphates 30 mmol in  mL ivpb  30 mmol Intravenous Once PRN Price Elizabeth D.O.        Or   • sodium phosphate 30 mmol in 1/2  mL ivpb  30 mmol Intravenous Once PRN NILSON WoodO.       • D5 NS infusion   Intravenous Continuous Price Elizabeth D.O.       • heparin injection 5,000 Units  5,000 Units Subcutaneous Q8HRS Price Elizabeth D.O.       • labetalol (NORMODYNE,TRANDATE) injection 10 mg  10 mg Intravenous Q4HRS PRN NILSON WoodO.       • ondansetron (ZOFRAN) syringe/vial injection 4 mg  4 mg Intravenous Q4HRS PRN NILSON WoodO.       • ondansetron (ZOFRAN ODT) dispertab 4 mg  4 mg Oral Q4HRS PRN NILSON WoodO.       • insulin regular human (HUMULIN/NOVOLIN R) 62.5 Units in  mL Infusion for DKA  7 Units/hr Intravenous Continuous NILSON WoodO.       • oxyCODONE immediate-release (ROXICODONE) tablet 5 mg  5 mg Oral Q4HRS PRN Roland Sauceda M.D.   5 mg at 08/05/19 1546   • acetaminophen (TYLENOL) tablet 1,000 mg  1,000 mg Oral Q6HRS PRN Roland Sauceda M.D.   1,000 mg at 08/05/19 1547       Allergies  Not on File    Vital Signs last 24 hours  Temp:  [36.6 °C (97.9 °F)] 36.6 °C (97.9 °F)  Pulse:  [70-73] 70  Resp:  [18] 18  BP: (110-128)/(69-73) 126/73  SpO2:  [93 %-98 %] 93 %    Physical Exam  Physical Exam   Constitutional: He is oriented to person, place, and time. He appears well-developed. He appears distressed.   HENT:   Head: Normocephalic.   Dry mucous membranes, Left orbit with contusion no crepitus   Eyes: EOM are normal. Right eye exhibits no discharge. Left eye exhibits no discharge.   Neck: Normal range of motion. No tracheal deviation present. No thyromegaly present.   Large right lower submandibular mass and left submandibular adenopathy/mass.    Cardiovascular: Normal rate and regular rhythm. Exam reveals no friction rub.   No murmur heard.  Pulmonary/Chest: Effort normal and  breath sounds normal. No respiratory distress. He has no wheezes. He has no rales.   Abdominal: He exhibits no distension. There is no tenderness. There is no rebound.   Musculoskeletal: He exhibits edema and tenderness.   Left hip pain and tenderness   Lymphadenopathy:     He has cervical adenopathy.   Neurological: He is alert and oriented to person, place, and time. No cranial nerve deficit. Coordination normal.   5/5 upper and lower extremity strength, normal sensation, AOx4 answers appropriately.    Skin: Skin is warm. Rash noted. He is not diaphoretic. No erythema.   Vasculitic rash to bilateral lower extremities.    Psychiatric: He has a normal mood and affect.       Fluids  No intake or output data in the 24 hours ending 08/05/19 1653    Laboratory  Recent Results (from the past 48 hour(s))   CBC WITH DIFFERENTIAL    Collection Time: 08/05/19 12:30 PM   Result Value Ref Range    WBC 7.7 4.8 - 10.8 K/uL    RBC 5.45 4.70 - 6.10 M/uL    Hemoglobin 16.1 14.0 - 18.0 g/dL    Hematocrit 49.6 42.0 - 52.0 %    MCV 91.0 81.4 - 97.8 fL    MCH 29.5 27.0 - 33.0 pg    MCHC 32.5 (L) 33.7 - 35.3 g/dL    RDW 45.2 35.9 - 50.0 fL    Platelet Count 156 (L) 164 - 446 K/uL    MPV 10.6 9.0 - 12.9 fL    Neutrophils-Polys 86.00 (H) 44.00 - 72.00 %    Lymphocytes 4.80 (L) 22.00 - 41.00 %    Monocytes 7.40 0.00 - 13.40 %    Eosinophils 0.50 0.00 - 6.90 %    Basophils 0.80 0.00 - 1.80 %    Immature Granulocytes 0.50 0.00 - 0.90 %    Nucleated RBC 0.00 /100 WBC    Neutrophils (Absolute) 6.62 1.82 - 7.42 K/uL    Lymphs (Absolute) 0.37 (L) 1.00 - 4.80 K/uL    Monos (Absolute) 0.57 0.00 - 0.85 K/uL    Eos (Absolute) 0.04 0.00 - 0.51 K/uL    Baso (Absolute) 0.06 0.00 - 0.12 K/uL    Immature Granulocytes (abs) 0.04 0.00 - 0.11 K/uL    NRBC (Absolute) 0.00 K/uL   COMP METABOLIC PANEL    Collection Time: 08/05/19 12:30 PM   Result Value Ref Range    Sodium 139 135 - 145 mmol/L    Potassium 4.6 3.6 - 5.5 mmol/L    Chloride 104 96 - 112 mmol/L     Co2 15 (L) 20 - 33 mmol/L    Anion Gap 20.0 (H) 0.0 - 11.9    Glucose 588 (HH) 65 - 99 mg/dL    Bun 120 (HH) 8 - 22 mg/dL    Creatinine 3.92 (H) 0.50 - 1.40 mg/dL    Calcium 8.3 (L) 8.5 - 10.5 mg/dL    AST(SGOT) 49 (H) 12 - 45 U/L    ALT(SGPT) 48 2 - 50 U/L    Alkaline Phosphatase 56 30 - 99 U/L    Total Bilirubin 1.0 0.1 - 1.5 mg/dL    Albumin 2.9 (L) 3.2 - 4.9 g/dL    Total Protein 6.3 6.0 - 8.2 g/dL    Globulin 3.4 1.9 - 3.5 g/dL    A-G Ratio 0.9 g/dL   TROPONIN    Collection Time: 08/05/19 12:30 PM   Result Value Ref Range    Troponin T 36 (H) 6 - 19 ng/L   LACTIC ACID    Collection Time: 08/05/19 12:30 PM   Result Value Ref Range    Lactic Acid 3.3 (H) 0.5 - 2.0 mmol/L   CREATINE KINASE    Collection Time: 08/05/19 12:30 PM   Result Value Ref Range    CPK Total 963 (H) 0 - 154 U/L   DIAGNOSTIC ALCOHOL    Collection Time: 08/05/19 12:30 PM   Result Value Ref Range    Diagnostic Alcohol 0.00 0.00 g/dL   ESTIMATED GFR    Collection Time: 08/05/19 12:30 PM   Result Value Ref Range    GFR If  18 (A) >60 mL/min/1.73 m 2    GFR If Non African American 15 (A) >60 mL/min/1.73 m 2   PERIPHERAL SMEAR REVIEW    Collection Time: 08/05/19 12:30 PM   Result Value Ref Range    Peripheral Smear Review see below    PLATELET ESTIMATE    Collection Time: 08/05/19 12:30 PM   Result Value Ref Range    Plt Estimation Decreased    MORPHOLOGY    Collection Time: 08/05/19 12:30 PM   Result Value Ref Range    RBC Morphology Normal    DIFFERENTIAL COMMENT    Collection Time: 08/05/19 12:30 PM   Result Value Ref Range    Comments-Diff see below    BETA-HYDROXYBUTYRIC ACID    Collection Time: 08/05/19 12:30 PM   Result Value Ref Range    beta-Hydroxybutyric Acid 2.42 (H) 0.02 - 0.27 mmol/L   MAGNESIUM    Collection Time: 08/05/19 12:30 PM   Result Value Ref Range    Magnesium 3.1 (H) 1.5 - 2.5 mg/dL   PHOSPHORUS    Collection Time: 08/05/19 12:30 PM   Result Value Ref Range    Phosphorus 6.1 (H) 2.5 - 4.5 mg/dL        Imaging  DX-CHEST-PORTABLE (1 VIEW)   Final Result      1.  Enlarged cardiac mediastinal silhouette.   2.  Cardiac pacer.   3.  No acute abnormality.      CT-HEAD W/O    (Results Pending)   US-RENAL    (Results Pending)   DX-HIP-UNILATERAL-WITH PELVIS-1 VIEW LEFT    (Results Pending)       Assessment/Plan  * Diabetic ketoacidosis without coma associated with type 1 diabetes mellitus (HCC)  Assessment & Plan  Reason for DKA: unable to get to medication from fall  Follow Q 1 hour accuchecks, serial chemistry and aggressive replace K, PO4, Mg  Check serum osm, ketones, hgA1c, vbg/abg  Fluid resus LR        MARIZOL (acute kidney injury) ([facility])  Assessment & Plan  Maintain euvolemia and monitor fluid responsiveness (avoid NaCL and renal congestion)  MAP > 65 uses pressors or inotropic trial  Monitor urine output and I&O's  Avoid and review nephrotoxin medication  Rule out post obstruction  Consider renal U/S  U/a and CPK    Likely prerenal and rhabdo related  Place barroso fluid resus    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 check CT noncontrast get TEG  Limit narcotics and sedation serial monitor neurologic exam    Persistent atrial fibrillation ([facility])  Assessment & Plan  Hx of tachy alison syndrome s/p pacemaker  Monitor need for further rate control  Optimize electrolytes and fluid status being careful with replacement in renal injury    COPD (chronic obstructive pulmonary disease) ([facility])  Assessment & Plan  Hx of not in acute exacerbation  nebulizers prn    Hyperphosphatemia  Assessment & Plan  Continue to monitor need for phosphate binders    Lymphadenopathy, submandibular  Assessment & Plan  Mainly right submandible and also left node submandible  Will likely need further workup and Bx highly concerning for malignancy    Rash  Assessment & Plan  Lower extremity rash chronic vasculitic in nature. ESR monitor.   Wound care.     Pain of left hip joint  Assessment & Plan  Follow up left hip Xray  and monitor pain and need for thin cut CT for potential occult fracture.     Rhabdomyolysis  Assessment & Plan  Serial monitor cpk, resus with fluids and monitor renal function      Discussed patient condition and risk of morbidity and/or mortality with Hospitalist, Family, RN, Pharmacy and Patient.    The patient remains critically ill from DKA on insulin gtt with active titration and incomplete workup in ER requiring significant attention and mangement.  Critical care time = 85 minutes in directly providing and coordinating critical care and extensive data review.  No time overlap and excludes procedures.

## 2019-08-05 NOTE — ASSESSMENT & PLAN NOTE
Hx of tachy alison syndrome s/p pacemaker  Monitor need for further rate control  Optimize electrolytes and fluid status being careful with replacement in renal injury

## 2019-08-05 NOTE — ASSESSMENT & PLAN NOTE
Follow up left hip Xray and monitor pain and need for thin cut CT for potential occult fracture.     Degenerative changes no fracture seen

## 2019-08-05 NOTE — ASSESSMENT & PLAN NOTE
Mainly right submandible and also left node submandible  Will likely need further workup and Bx highly concerning for malignancy  Plan for U/S today 8/7 when renal function improves then consider IV contrast CT     Plan for likely Bx pending U/S findings

## 2019-08-05 NOTE — ED PROVIDER NOTES
ED Provider Note    CHIEF COMPLAINT  Chief Complaint   Patient presents with   • Fall       HPI  Jamar Valdez is a 74 y.o. male who presents to the emergency room today brought in by ambulance.  Patient found by his grandson on the floor unable to get up he had been there for 48 hours after doing laundry 2 days ago he fell and could not get off the floor.  Has a history of atrial fibrillation on Eliquis.  He has some pain to his legs but feels that nothing is broken at this time the pain is over his abrasions bilateral shins worse with movement palpation or standing.  Patient is insulin dependent diabetic per family.  He has not been taking his insulin for 2 days appears tachycardic and dehydrated and in severe distress.    REVIEW OF SYSTEMS  See HPI for further details. All other systems are negative.     PAST MEDICAL HISTORY  Past Medical History:   Diagnosis Date   • Atrial fibrillation (HCC)    • Basal cell carcinoma    • Cancer (HCC)    • Chronic obstructive pulmonary disease (HCC)    • Diabetes (HCC)    • Hypertension    • Renal disorder        FAMILY HISTORY  [unfilled]    SOCIAL HISTORY  Social History     Socioeconomic History   • Marital status: Not on file     Spouse name: Not on file   • Number of children: Not on file   • Years of education: Not on file   • Highest education level: Not on file   Occupational History   • Not on file   Social Needs   • Financial resource strain: Not on file   • Food insecurity:     Worry: Not on file     Inability: Not on file   • Transportation needs:     Medical: Not on file     Non-medical: Not on file   Tobacco Use   • Smoking status: Former Smoker     Types: Cigarettes     Last attempt to quit:      Years since quittin.6   • Smokeless tobacco: Never Used   Substance and Sexual Activity   • Alcohol use: Never     Frequency: Never   • Drug use: Never   • Sexual activity: Not on file   Lifestyle   • Physical activity:     Days per week: Not on file     Minutes  "per session: Not on file   • Stress: Not on file   Relationships   • Social connections:     Talks on phone: Not on file     Gets together: Not on file     Attends [employer] service: Not on file     Active member of club or organization: Not on file     Attends meetings of clubs or organizations: Not on file     Relationship status: Not on file   • Intimate partner violence:     Fear of current or ex partner: Not on file     Emotionally abused: Not on file     Physically abused: Not on file     Forced sexual activity: Not on file   Other Topics Concern   • Not on file   Social History Narrative   • Not on file       SURGICAL HISTORY  Past Surgical History:   Procedure Laterality Date   • KNEE REPLACEMENT, TOTAL     • PACEMAKER INSERTION         CURRENT MEDICATIONS  Home Medications    **Home medications have not yet been reviewed for this encounter**         ALLERGIES  Not on File    PHYSICAL EXAM  VITAL SIGNS: /69   Pulse 73   Temp 36.6 °C (97.9 °F) (Temporal)   Resp 18   Ht 1.778 m (5' 10\")   Wt (!) 140.6 kg (310 lb)   BMI 44.48 kg/m²  Room air O2: 95    Constitutional: Severe distress, Non-toxic appearance.   HENT: Normocephalic, purpura over the right parietal area, Bilateral external ears normal, Oropharynx dry, No oral exudates, Nose normal.   Eyes: PERRLA, EOMI, Conjunctiva normal, No discharge.   Neck: Normal range of motion, No tenderness, Supple, No stridor.   Lymphatic: No lymphadenopathy noted.   Cardiovascular: Elevated heart rate, Normal rhythm, No murmurs, No rubs, No gallops.   Thorax & Lungs: Normal breath sounds, No respiratory distress, No wheezing, No chest tenderness.   Abdomen: Bowel sounds normal, Soft, No tenderness, No masses, No pulsatile masses.   Skin: Warm, Dry, excoriation over the tibial areas bilateral legs increased on the right with purpura noted on bilateral lower extremities most likely secondary to being on blood thinner/Eliquis.  Patient has early stage I decubitus " ulceration perirectal area.  Back: No tenderness, No CVA tenderness.   Genitalia: External genitalia appear normal, No masses or lesions. No discharge.   Rectal: Normal appearance.   Extremities: Intact distal pulses, No edema, No tenderness, No cyanosis, No clubbing.   Musculoskeletal: Patient is unable to ambulate  Neurologic: Alert & oriented x 3, Normal motor function, Normal sensory function, No focal deficits noted.   Psychiatric: Affect normal, Judgment normal, Mood normal.         RADIOLOGY/PROCEDURES  DX-CHEST-PORTABLE (1 VIEW)   Final Result      1.  Enlarged cardiac mediastinal silhouette.   2.  Cardiac pacer.   3.  No acute abnormality.            COURSE & MEDICAL DECISION MAKING  Pertinent Labs & Imaging studies reviewed. (See chart for details)  Patient is severely dehydrated given IV fluids as lactic acid was greater than 3.3 he was given 3 L IV piggyback blood sugar over 500 start on insulin Humulin R.  Patient had elevated CPK most likely early rhabdomyolysis and is in diabetic ketoacidosis.  CO2 of 15.  White blood cell count surprisingly normalized he is in acute renal failure with  creatinine above 3 there may be dehydration component to this.  He will be admitted to the intensive care unit in severe distress secondary to DKA, acute renal failure and severe dehydration.    FINAL IMPRESSION  1.  Acute DKA  2.  Acute renal failure  3.  Dehydration  4.  Rhabdomyolysis  5.  Critical care time of 46 minutes; this includes multiple discussions with patient's grandson at bedside, multiple discussions with hospitalist, review records discussion treatment plan, interventions as discussed above.  This does not including procedures.         Electronically signed by: Yovani Nuñez, 8/5/2019 2:35 PM

## 2019-08-05 NOTE — ASSESSMENT & PLAN NOTE
Maintain euvolemia and monitor fluid responsiveness (avoid NaCL and renal congestion)  MAP > 65 uses pressors or inotropic trial  Monitor urine output and I&O's  Avoid and review nephrotoxin medication  Rule out post obstruction  Consider renal U/S  U/a and CPK    Likely prerenal and rhabdo related  Place barroso fluid resus  Improved will stop IVF and monitor urine output and renal function continue to monitor closely

## 2019-08-05 NOTE — ASSESSMENT & PLAN NOTE
Reason for DKA: unable to get to medication from fall  Follow Q 1 hour accuchecks, serial chemistry and aggressive replace K, PO4, Mg  Check serum osm, ketones, hgA1c, vbg/abg  Fluid resus LR    Transition off insulin gtt 8/6 and transition to long acting lantus 10 -> 30units

## 2019-08-05 NOTE — ASSESSMENT & PLAN NOTE
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

## 2019-08-05 NOTE — ED TRIAGE NOTES
Pt arrived via EMS. Pt tripped and fell on Saturday and was unable to get up. Patient has been laying on the floor since Saturday 8/3/19. Patient is alert and oriented x3. Redness, seeping, and dried blood noted to bilateral lower extremities. Pt states this is chronic, but has worsened since since laying on the ground. Patients mouth is dry, with skin slough on tongue. EMS administered 1L normal saline bolus.   Patient states he did hit his head during fall, denies LOC. Patient takes eliquis and has a pacemaker. Pt is diabetic, and has HTN and has been unable to take his medications x3 days.     Patient was found by grandson today.

## 2019-08-05 NOTE — ASSESSMENT & PLAN NOTE
Lower extremity rash chronic vasculitic in nature. ESR monitor.   Wound care.    Took unroof wound culture sent 8/6 -> MRSA  Start antibiotics with Augmentin x 7days -> Zyvox

## 2019-08-06 ENCOUNTER — APPOINTMENT (OUTPATIENT)
Dept: RADIOLOGY | Facility: MEDICAL CENTER | Age: 74
DRG: 629 | End: 2019-08-06
Attending: INTERNAL MEDICINE
Payer: MEDICARE

## 2019-08-06 LAB
ANION GAP SERPL CALC-SCNC: 11 MMOL/L (ref 0–11.9)
ANION GAP SERPL CALC-SCNC: 11 MMOL/L (ref 0–11.9)
ANION GAP SERPL CALC-SCNC: 12 MMOL/L (ref 0–11.9)
BUN SERPL-MCNC: 108 MG/DL (ref 8–22)
BUN SERPL-MCNC: 113 MG/DL (ref 8–22)
BUN SERPL-MCNC: 113 MG/DL (ref 8–22)
CALCIUM SERPL-MCNC: 7.9 MG/DL (ref 8.5–10.5)
CALCIUM SERPL-MCNC: 7.9 MG/DL (ref 8.5–10.5)
CALCIUM SERPL-MCNC: 8 MG/DL (ref 8.5–10.5)
CHLORIDE SERPL-SCNC: 110 MMOL/L (ref 96–112)
CHLORIDE SERPL-SCNC: 111 MMOL/L (ref 96–112)
CHLORIDE SERPL-SCNC: 112 MMOL/L (ref 96–112)
CK SERPL-CCNC: 232 U/L (ref 0–154)
CK SERPL-CCNC: 357 U/L (ref 0–154)
CK SERPL-CCNC: 358 U/L (ref 0–154)
CK SERPL-CCNC: 469 U/L (ref 0–154)
CO2 SERPL-SCNC: 18 MMOL/L (ref 20–33)
CO2 SERPL-SCNC: 19 MMOL/L (ref 20–33)
CO2 SERPL-SCNC: 19 MMOL/L (ref 20–33)
CREAT SERPL-MCNC: 3.19 MG/DL (ref 0.5–1.4)
CREAT SERPL-MCNC: 3.25 MG/DL (ref 0.5–1.4)
CREAT SERPL-MCNC: 3.37 MG/DL (ref 0.5–1.4)
ERYTHROCYTE [DISTWIDTH] IN BLOOD BY AUTOMATED COUNT: 44.6 FL (ref 35.9–50)
GLUCOSE BLD-MCNC: 116 MG/DL (ref 65–99)
GLUCOSE BLD-MCNC: 119 MG/DL (ref 65–99)
GLUCOSE BLD-MCNC: 122 MG/DL (ref 65–99)
GLUCOSE BLD-MCNC: 140 MG/DL (ref 65–99)
GLUCOSE BLD-MCNC: 187 MG/DL (ref 65–99)
GLUCOSE BLD-MCNC: 200 MG/DL (ref 65–99)
GLUCOSE BLD-MCNC: 289 MG/DL (ref 65–99)
GLUCOSE BLD-MCNC: 302 MG/DL (ref 65–99)
GLUCOSE BLD-MCNC: 335 MG/DL (ref 65–99)
GLUCOSE BLD-MCNC: 339 MG/DL (ref 65–99)
GLUCOSE BLD-MCNC: 366 MG/DL (ref 65–99)
GLUCOSE BLD-MCNC: 401 MG/DL (ref 65–99)
GLUCOSE BLD-MCNC: 427 MG/DL (ref 65–99)
GLUCOSE BLD-MCNC: 462 MG/DL (ref 65–99)
GLUCOSE BLD-MCNC: 78 MG/DL (ref 65–99)
GLUCOSE BLD-MCNC: 84 MG/DL (ref 65–99)
GLUCOSE BLD-MCNC: 87 MG/DL (ref 65–99)
GLUCOSE SERPL-MCNC: 139 MG/DL (ref 65–99)
GLUCOSE SERPL-MCNC: 223 MG/DL (ref 65–99)
GLUCOSE SERPL-MCNC: 76 MG/DL (ref 65–99)
GRAM STN SPEC: NORMAL
HCT VFR BLD AUTO: 44.1 % (ref 42–52)
HGB BLD-MCNC: 14.8 G/DL (ref 14–18)
MAGNESIUM SERPL-MCNC: 2.7 MG/DL (ref 1.5–2.5)
MCH RBC QN AUTO: 30.6 PG (ref 27–33)
MCHC RBC AUTO-ENTMCNC: 33.6 G/DL (ref 33.7–35.3)
MCV RBC AUTO: 91.1 FL (ref 81.4–97.8)
PHOSPHATE SERPL-MCNC: 3.6 MG/DL (ref 2.5–4.5)
PLATELET # BLD AUTO: 164 K/UL (ref 164–446)
PMV BLD AUTO: 10.4 FL (ref 9–12.9)
POTASSIUM SERPL-SCNC: 4 MMOL/L (ref 3.6–5.5)
POTASSIUM SERPL-SCNC: 4.4 MMOL/L (ref 3.6–5.5)
POTASSIUM SERPL-SCNC: 4.5 MMOL/L (ref 3.6–5.5)
RBC # BLD AUTO: 4.84 M/UL (ref 4.7–6.1)
SIGNIFICANT IND 70042: NORMAL
SITE SITE: NORMAL
SODIUM SERPL-SCNC: 141 MMOL/L (ref 135–145)
SOURCE SOURCE: NORMAL
WBC # BLD AUTO: 9.5 K/UL (ref 4.8–10.8)

## 2019-08-06 PROCEDURE — 770022 HCHG ROOM/CARE - ICU (200)

## 2019-08-06 PROCEDURE — 700111 HCHG RX REV CODE 636 W/ 250 OVERRIDE (IP): Performed by: HOSPITALIST

## 2019-08-06 PROCEDURE — 80048 BASIC METABOLIC PNL TOTAL CA: CPT | Mod: 91

## 2019-08-06 PROCEDURE — 700105 HCHG RX REV CODE 258: Performed by: HOSPITALIST

## 2019-08-06 PROCEDURE — 700102 HCHG RX REV CODE 250 W/ 637 OVERRIDE(OP): Performed by: HOSPITALIST

## 2019-08-06 PROCEDURE — 99291 CRITICAL CARE FIRST HOUR: CPT | Performed by: INTERNAL MEDICINE

## 2019-08-06 PROCEDURE — 700102 HCHG RX REV CODE 250 W/ 637 OVERRIDE(OP): Performed by: INTERNAL MEDICINE

## 2019-08-06 PROCEDURE — 83735 ASSAY OF MAGNESIUM: CPT

## 2019-08-06 PROCEDURE — 92610 EVALUATE SWALLOWING FUNCTION: CPT

## 2019-08-06 PROCEDURE — 82550 ASSAY OF CK (CPK): CPT | Mod: 91

## 2019-08-06 PROCEDURE — A9270 NON-COVERED ITEM OR SERVICE: HCPCS | Performed by: INTERNAL MEDICINE

## 2019-08-06 PROCEDURE — 700105 HCHG RX REV CODE 258: Performed by: INTERNAL MEDICINE

## 2019-08-06 PROCEDURE — 82962 GLUCOSE BLOOD TEST: CPT | Mod: 91

## 2019-08-06 PROCEDURE — 84100 ASSAY OF PHOSPHORUS: CPT

## 2019-08-06 PROCEDURE — 99233 SBSQ HOSP IP/OBS HIGH 50: CPT | Performed by: HOSPITALIST

## 2019-08-06 PROCEDURE — 85027 COMPLETE CBC AUTOMATED: CPT

## 2019-08-06 PROCEDURE — 87070 CULTURE OTHR SPECIMN AEROBIC: CPT

## 2019-08-06 RX ORDER — INSULIN GLARGINE 100 [IU]/ML
10 INJECTION, SOLUTION SUBCUTANEOUS EVERY EVENING
Status: DISCONTINUED | OUTPATIENT
Start: 2019-08-06 | End: 2019-08-07

## 2019-08-06 RX ORDER — AMOXICILLIN AND CLAVULANATE POTASSIUM 500; 125 MG/1; MG/1
1 TABLET, FILM COATED ORAL EVERY 12 HOURS
Status: DISCONTINUED | OUTPATIENT
Start: 2019-08-06 | End: 2019-08-07

## 2019-08-06 RX ORDER — SODIUM CHLORIDE, SODIUM LACTATE, POTASSIUM CHLORIDE, AND CALCIUM CHLORIDE .6; .31; .03; .02 G/100ML; G/100ML; G/100ML; G/100ML
1000 INJECTION, SOLUTION INTRAVENOUS ONCE
Status: COMPLETED | OUTPATIENT
Start: 2019-08-06 | End: 2019-08-07

## 2019-08-06 RX ORDER — SODIUM CHLORIDE, SODIUM LACTATE, POTASSIUM CHLORIDE, CALCIUM CHLORIDE 600; 310; 30; 20 MG/100ML; MG/100ML; MG/100ML; MG/100ML
INJECTION, SOLUTION INTRAVENOUS CONTINUOUS
Status: DISCONTINUED | OUTPATIENT
Start: 2019-08-06 | End: 2019-08-07

## 2019-08-06 RX ADMIN — POTASSIUM CHLORIDE 10 MEQ: 7.46 INJECTION, SOLUTION INTRAVENOUS at 00:25

## 2019-08-06 RX ADMIN — OXYCODONE HYDROCHLORIDE 5 MG: 5 TABLET ORAL at 23:47

## 2019-08-06 RX ADMIN — ACETAMINOPHEN 1000 MG: 500 TABLET ORAL at 22:33

## 2019-08-06 RX ADMIN — DEXTROSE AND SODIUM CHLORIDE: 5; 900 INJECTION, SOLUTION INTRAVENOUS at 00:25

## 2019-08-06 RX ADMIN — HEPARIN SODIUM 5000 UNITS: 5000 INJECTION, SOLUTION INTRAVENOUS; SUBCUTANEOUS at 05:10

## 2019-08-06 RX ADMIN — SODIUM CHLORIDE, POTASSIUM CHLORIDE, SODIUM LACTATE AND CALCIUM CHLORIDE: 600; 310; 30; 20 INJECTION, SOLUTION INTRAVENOUS at 22:53

## 2019-08-06 RX ADMIN — INSULIN HUMAN 12 UNITS: 100 INJECTION, SOLUTION PARENTERAL at 22:27

## 2019-08-06 RX ADMIN — SODIUM CHLORIDE, POTASSIUM CHLORIDE, SODIUM LACTATE AND CALCIUM CHLORIDE: 600; 310; 30; 20 INJECTION, SOLUTION INTRAVENOUS at 17:28

## 2019-08-06 RX ADMIN — INSULIN GLARGINE 10 UNITS: 100 INJECTION, SOLUTION SUBCUTANEOUS at 17:49

## 2019-08-06 RX ADMIN — SODIUM CHLORIDE, POTASSIUM CHLORIDE, SODIUM LACTATE AND CALCIUM CHLORIDE: 600; 310; 30; 20 INJECTION, SOLUTION INTRAVENOUS at 11:25

## 2019-08-06 RX ADMIN — INSULIN HUMAN 7 UNITS: 100 INJECTION, SOLUTION PARENTERAL at 11:51

## 2019-08-06 RX ADMIN — AMOXICILLIN AND CLAVULANATE POTASSIUM 1 TABLET: 500; 125 TABLET, FILM COATED ORAL at 17:29

## 2019-08-06 RX ADMIN — ACETAMINOPHEN 1000 MG: 500 TABLET ORAL at 08:02

## 2019-08-06 RX ADMIN — INSULIN HUMAN 10 UNITS: 100 INJECTION, SOLUTION PARENTERAL at 17:33

## 2019-08-06 RX ADMIN — SODIUM CHLORIDE, POTASSIUM CHLORIDE, SODIUM LACTATE AND CALCIUM CHLORIDE: 600; 310; 30; 20 INJECTION, SOLUTION INTRAVENOUS at 03:38

## 2019-08-06 RX ADMIN — HEPARIN SODIUM 5000 UNITS: 5000 INJECTION, SOLUTION INTRAVENOUS; SUBCUTANEOUS at 22:33

## 2019-08-06 RX ADMIN — DEXTROSE AND SODIUM CHLORIDE: 10; .45 INJECTION, SOLUTION INTRAVENOUS at 02:13

## 2019-08-06 RX ADMIN — OXYCODONE HYDROCHLORIDE 5 MG: 5 TABLET ORAL at 17:29

## 2019-08-06 RX ADMIN — SODIUM CHLORIDE 6 UNITS/HR: 9 INJECTION, SOLUTION INTRAVENOUS at 03:38

## 2019-08-06 RX ADMIN — AMOXICILLIN AND CLAVULANATE POTASSIUM 1 TABLET: 500; 125 TABLET, FILM COATED ORAL at 10:30

## 2019-08-06 RX ADMIN — SODIUM CHLORIDE, POTASSIUM CHLORIDE, SODIUM LACTATE AND CALCIUM CHLORIDE 1000 ML: 600; 310; 30; 20 INJECTION, SOLUTION INTRAVENOUS at 08:27

## 2019-08-06 RX ADMIN — HEPARIN SODIUM 5000 UNITS: 5000 INJECTION, SOLUTION INTRAVENOUS; SUBCUTANEOUS at 15:46

## 2019-08-06 RX ADMIN — SODIUM CHLORIDE 11 UNITS/HR: 9 INJECTION, SOLUTION INTRAVENOUS at 00:28

## 2019-08-06 ASSESSMENT — ENCOUNTER SYMPTOMS
HEADACHES: 0
MEMORY LOSS: 1
CONSTIPATION: 0
VOMITING: 0
NAUSEA: 0
GASTROINTESTINAL NEGATIVE: 1
CHILLS: 0
DEPRESSION: 0
BACK PAIN: 1
PALPITATIONS: 0
PHOTOPHOBIA: 0
MYALGIAS: 1
NEUROLOGICAL NEGATIVE: 1
FALLS: 1
DOUBLE VISION: 0
CARDIOVASCULAR NEGATIVE: 1
WEAKNESS: 0
FEVER: 1
FOCAL WEAKNESS: 0
SENSORY CHANGE: 0
DIARRHEA: 0
SPEECH CHANGE: 0
HEMOPTYSIS: 0
EYES NEGATIVE: 1
RESPIRATORY NEGATIVE: 1
CLAUDICATION: 0
ABDOMINAL PAIN: 0
NECK PAIN: 1
COUGH: 0
TINGLING: 0
SHORTNESS OF BREATH: 0
SPUTUM PRODUCTION: 0
LOSS OF CONSCIOUSNESS: 0
SEIZURES: 0
TREMORS: 0
ORTHOPNEA: 0
NERVOUS/ANXIOUS: 1

## 2019-08-06 ASSESSMENT — LIFESTYLE VARIABLES: SUBSTANCE_ABUSE: 0

## 2019-08-06 NOTE — PROGRESS NOTES
Critical Care Progress Note    Date of admission  8/5/2019    Chief Complaint  74 y.o. male who presented 8/5/2019 with afib, basal cell cancer, COPD, dm, hypertension, s/p pace maker, that was carrying laundry and fell down wedging himself into corner of wall hitting his head. He does take eliquis for afib has left eyebrow bruising, doesn't know how long he was stuck their, complains of rib pain with coughing and left hip pain worse with movement. He oc trouble getting around or frequent falls. Patient was found by emiliano and fall was likely on Saturday brought in by EMS. Was found in ER to have MARIZOL, DKA and mild rhabdomyolysis. Patient admitted to ICU for further management Vitals HR 70's, on room air, -130's. He has had weeping wound to his lower legs for years per patient. Oc shortness or breath or sputum production. Oc fever or chills, abdominal pain or black or bloody stool or vomiting. He is thirsty oc confusion or focal weakness but limited by pain with movement. He has had swelling under his jaw for some time. [facility]. Recently sick with a stomach flu per emiliano late last week.    Hospital Course  Admitted to ICU 8/5    Interval Problem Update  Reviewed last 24 hour events:  Neuro: aox3 off date, generalized pain   HR: 70-80's  SBP: 130's  Tmax: 38.6  GI: BM prior to discharge, diabetic dysphagia  UOP: 950ml  Lines: 3 peripherals  Resp: room air   Vte: heparin vte  PPI/H2:none  Antibx: none  Insulin gtt off since 1 am    LR bolus 1L, inc LR to 175ml/hr    Unroof wound send  augmentin   Doing well increase urine output    Review of Systems  Review of Systems   Constitutional: Positive for fever. Negative for chills and malaise/fatigue.   HENT: Negative for congestion and nosebleeds.    Eyes: Negative for double vision and photophobia.   Respiratory: Negative for cough, hemoptysis, sputum production and shortness of breath.    Cardiovascular: Positive for leg swelling.  Negative for chest pain, palpitations, orthopnea and claudication.   Gastrointestinal: Negative for abdominal pain, constipation, diarrhea, nausea and vomiting.   Genitourinary: Negative for dysuria, frequency, hematuria and urgency.   Musculoskeletal: Positive for back pain, falls and joint pain.   Neurological: Negative for tingling, tremors, sensory change, speech change, seizures, loss of consciousness, weakness and headaches.   Psychiatric/Behavioral: Negative for depression, substance abuse and suicidal ideas.   All other systems reviewed and are negative.       Vital Signs for last 24 hours   Temp:  [36.7 °C (98.1 °F)-38.6 °C (101.5 °F)] 38.6 °C (101.5 °F)  Pulse:  [65-76] 70  Resp:  [14-31] 28  BP: ()/(55-72) 109/57  SpO2:  [89 %-97 %] 92 %    Hemodynamic parameters for last 24 hours       Respiratory Information for the last 24 hours       Physical Exam   Physical Exam   Constitutional: He is oriented to person, place, and time. He appears well-developed and well-nourished. No distress.   HENT:   Head: Normocephalic.   Left orbit small hematoma   Eyes: Pupils are equal, round, and reactive to light. EOM are normal.   Neck: Normal range of motion. No JVD present. No thyromegaly present.   Large right submandibular mass, and left submandibular adenopathy   Cardiovascular: Normal rate and regular rhythm.   No murmur heard.  Pulmonary/Chest: No respiratory distress. He has no wheezes. He has no rales.   Decrease breath sounds bilateral   Abdominal: He exhibits no distension. There is no tenderness. There is no rebound and no guarding.   Musculoskeletal: Normal range of motion. He exhibits edema and tenderness.   Neurological: He is alert and oriented to person, place, and time. No cranial nerve deficit. Coordination normal.   Answers appropriately and quickly, moves all extremities joking with staff   Skin: Skin is warm. He is not diaphoretic. There is erythema.   Pustules to right > left with purulent  material and vasculitic lower extremity changes.    Psychiatric: He has a normal mood and affect.       Medications  Current Facility-Administered Medications   Medication Dose Route Frequency Provider Last Rate Last Dose   • lactated ringers infusion   Intravenous Continuous Roland Sauceda M.D. 175 mL/hr at 08/06/19 1125     • insulin regular (HUMULIN R) injection 3-14 Units  3-14 Units Subcutaneous 4X/DAY ACHS Roland Sauceda M.D.   7 Units at 08/06/19 1151    And   • glucose 4 g chewable tablet 16 g  16 g Oral Q15 MIN PRN Roland Sauceda M.D.        And   • DEXTROSE 10% BOLUS 250 mL  250 mL Intravenous Q15 MIN PRN Roland Sauceda M.D.       • amoxicillin-clavulanate (AUGMENTIN) 500-125 MG per tablet 1 Tab  1 Tab Oral Q12HRS Roland Sauceda M.D.   1 Tab at 08/06/19 1030   • insulin glargine (LANTUS) injection 10 Units  10 Units Subcutaneous Q EVENING Roland Sauceda M.D.       • senna-docusate (PERICOLACE or SENOKOT S) 8.6-50 MG per tablet 2 Tab  2 Tab Oral BID Price Elizabeth D.O.   Stopped at 08/06/19 0600    And   • polyethylene glycol/lytes (MIRALAX) PACKET 1 Packet  1 Packet Oral QDAY PRN Price Elizabeth D.O.        And   • magnesium hydroxide (MILK OF MAGNESIA) suspension 30 mL  30 mL Oral QDAY PRN Price Elizabeth D.O.        And   • bisacodyl (DULCOLAX) suppository 10 mg  10 mg Rectal QDAY PRN NILSON WoodO.       • heparin injection 5,000 Units  5,000 Units Subcutaneous Q8HRS NILSON WoodO.   5,000 Units at 08/06/19 0510   • labetalol (NORMODYNE,TRANDATE) injection 10 mg  10 mg Intravenous Q4HRS PRN NILSON WoodO.       • ondansetron (ZOFRAN) syringe/vial injection 4 mg  4 mg Intravenous Q4HRS PRN NILSON WoodO.       • ondansetron (ZOFRAN ODT) dispertab 4 mg  4 mg Oral Q4HRS PRN Price Elizabeth D.O.       • oxyCODONE immediate-release (ROXICODONE) tablet 5 mg  5 mg Oral Q4HRS PRN Roland Sauceda M.D.   5 mg at 08/05/19 1546   • acetaminophen (TYLENOL) tablet 1,000 mg   1,000 mg Oral Q6HRS PRN Roland Sauceda M.D.   1,000 mg at 08/06/19 0802       Fluids    Intake/Output Summary (Last 24 hours) at 8/6/2019 1521  Last data filed at 8/6/2019 1200  Gross per 24 hour   Intake 7426.94 ml   Output 2805 ml   Net 4621.94 ml       Laboratory      Recent Labs     08/06/19  0015 08/06/19  0500 08/06/19  0920   CPKTOTAL 469* 358* 232*     Recent Labs     08/05/19  1230 08/05/19  1908 08/06/19  0015 08/06/19  0200 08/06/19  0500   SODIUM 139 137   < > 141 141 141   POTASSIUM 4.6 4.6   < > 4.0 4.5 4.4   CHLORIDE 104 107   < > 110 112 111   CO2 15* 18*   < > 19* 18* 19*   * 120*   < > 113* 113* 108*   CREATININE 3.92* 3.56*   < > 3.19* 3.25* 3.37*   MAGNESIUM 3.1*  --   --  2.7*  --   --    PHOSPHORUS 6.1* 5.1*  --  3.6  --   --    CALCIUM 8.3* 7.5*   < > 8.0* 7.9* 7.9*    < > = values in this interval not displayed.     Recent Labs     08/05/19 1230 08/05/19 1908 08/06/19  0015 08/06/19  0200 08/06/19  0500   ALTSGPT 48 40  --   --   --   --    ASTSGOT 49* 46*  --   --   --   --    ALKPHOSPHAT 56 48  --   --   --   --    TBILIRUBIN 1.0 0.8  --   --   --   --    GLUCOSE 588* 507*   < > 223* 139* 76    < > = values in this interval not displayed.     Recent Labs     08/05/19 1230 08/05/19  1801 08/05/19 1908 08/06/19  0500   WBC 7.7 7.2  --  9.5   NEUTSPOLYS 86.00* 85.00*  --   --    LYMPHOCYTES 4.80* 5.00*  --   --    MONOCYTES 7.40 8.10  --   --    EOSINOPHILS 0.50 0.60  --   --    BASOPHILS 0.80 0.70  --   --    ASTSGOT 49*  --  46*  --    ALTSGPT 48  --  40  --    ALKPHOSPHAT 56  --  48  --    TBILIRUBIN 1.0  --  0.8  --      Recent Labs     08/05/19  1230 08/05/19  1801 08/06/19  0500   RBC 5.45 4.77 4.84   HEMOGLOBIN 16.1 14.2 14.8   HEMATOCRIT 49.6 43.2 44.1   PLATELETCT 156* 113* 164       Imaging  X-Ray:  I have personally reviewed the images and compared with prior images. and No film today  CT:    Reviewed    Assessment/Plan  * Diabetic ketoacidosis without coma  associated with type 1 diabetes mellitus (HCC)  Assessment & Plan  Reason for DKA: unable to get to medication from fall  Follow Q 1 hour accuchecks, serial chemistry and aggressive replace K, PO4, Mg  Check serum osm, ketones, hgA1c, vbg/abg  Fluid resus LR    Transition of insulin gtt today and transition to long acting lantus 10 and high sliding scale continue to actively adjust as renal function improves        MARIZOL (acute kidney injury) ([facility])  Assessment & Plan  Maintain euvolemia and monitor fluid responsiveness (avoid NaCL and renal congestion)  MAP > 65 uses pressors or inotropic trial  Monitor urine output and I&O's  Avoid and review nephrotoxin medication  Rule out post obstruction  Consider renal U/S  U/a and CPK    Likely prerenal and rhabdo related  Place barroso fluid resus  Increase fluid bolus and infusion rate with improved renal response.     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 check CT noncontrast -> negative  Limit narcotics and sedation serial monitor neurologic exam    Persistent atrial fibrillation ([facility])  Assessment & Plan  Hx of tachy alison syndrome s/p pacemaker  Monitor need for further rate control  Optimize electrolytes and fluid status being careful with replacement in renal injury    COPD (chronic obstructive pulmonary disease) ([facility])  Assessment & Plan  Hx of not in acute exacerbation  nebulizers prn    Hyperphosphatemia  Assessment & Plan  Continue to monitor need for phosphate binders    Lymphadenopathy, submandibular  Assessment & Plan  Mainly right submandible and also left node submandible  Will likely need further workup and Bx highly concerning for malignancy    Rash  Assessment & Plan  Lower extremity rash chronic vasculitic in nature. ESR monitor.   Wound care.    Took unroof wound culture sent 8/6  Start antibiotics with Augmentin x 7days     Pain of left hip joint  Assessment & Plan  Follow up left hip Xray and monitor pain and need for thin cut CT  for potential occult fracture.     Still pending    Rhabdomyolysis  Assessment & Plan  Serial monitor cpk, resus with fluids and monitor renal function    CPK improved stop checking       VTE:  Heparin  Ulcer: Not Indicated  Lines: Webster Catheter  Ongoing indication addressed    I have performed a physical exam and reviewed and updated ROS and Plan today (8/6/2019). In review of yesterday's note (8/5/2019), there are no changes except as documented above.     Discussed patient condition and risk of morbidity and/or mortality with Hospitalist, Family, RN, RT, Pharmacy, Charge nurse / hot rounds and Patient     The patient remains critically ill with renal injury and dka on insulin drip with active titration and transition to long acting insulin today.  Critical care time = 42 minutes in directly providing and coordinating critical care and extensive data review.  No time overlap and excludes procedures.

## 2019-08-06 NOTE — THERAPY
"Speech Language Therapy Clinical Swallow Evaluation completed.    Functional Status: Patient was seen on this date for a clinical swallow evaluation. Patient AAO to self and year only. Patient was a poor historian and had poor attention to task requiring intermittent redirection and elimination of distractions. Patient reported odynophagia characterized by \"soreness\" during swallow but was unable to specify when these symptoms began. Oral motor examination revealed no gross asymmetry or weakness of structures. Moderate xerostomia noted that was reduced following PO. Edema of anterior neck, bulging bilaterally, and grimacing with minimal pressure to palpation. PO trials were administered and consisted of single ice chips, NTL, puree, pudding, thin liquids, and soft solids. Swallow trigger achieved within 2-3 seconds. No overt s/sx of aspiration with NTL, puree, and pudding. Immediate and delayed throat clearing occurred following trials of thin liquids and soft solids concerning for penetration/aspiration. Mastication was not functional for solids allthough patient stated he consumes a regular diet at home. No globus sensation or odynophagia reported today during pre-feeding trials.     Recommendations - Diet: At this time, patient appears at the level to initiate a D1/NTL diet given initial direct supervision. Please STOP PO with any concerns for aspiration. SLP following.                             Strategies: Direct supervision during meals, No Straws and Head of Bed at 90 Degrees                            Medication Administration: Whole with NTL wash     Plan of Care: Will benefit from Speech Therapy 3 times per week    Post-Acute Therapy: Recommend inpatient transitional care services for continued speech therapy services.      See \"Rehab Therapy-Acute\" Patient Summary Report for complete documentation. Thank you for the consult.         "

## 2019-08-06 NOTE — PROGRESS NOTES
2 RN skin assessment completed with Rhianna CASILLAS RN.   - Pt has redness to coccyx that is non-blanchable   - Pt has abrasions to R shoulder area  - Pt has non-blanchable redness to L shoulder area  - Pt has open, non-healing wounds to BLE   - Pt has abrasion above L eye  - Pt has bruising to L hip     Wound consult has been placed. Mepilex applied to coccyx. Dressing applied to RLE. Pt placed on waffle cushion, Q2 hour turns.

## 2019-08-06 NOTE — ASSESSMENT & PLAN NOTE
Patient has MRSA in the wound.  The patient's wound is nonhealing.  Vascular surgery plans to do angioplasty with angiography tomorrow.  N.p.o. midnight

## 2019-08-06 NOTE — CARE PLAN
Problem: Fluid Volume:  Goal: Will maintain balanced intake and output  Outcome: PROGRESSING AS EXPECTED  Pt receiving fluid replacement (see MAR)  Webster inserted for strict I/Os     Problem: Pain Management  Goal: Pain level will decrease to patient's comfort goal  Outcome: PROGRESSING SLOWER THAN EXPECTED   Oxy and tylenol ordered prn (see MAR)

## 2019-08-06 NOTE — ASSESSMENT & PLAN NOTE
-Diabetic ketoacidosis has resolved with treatment.  -accus with sliding scale coverage continued, most recent blood sugars are   -diabetic diet continued  -diabetic education provided  -follow glycohemoglobin levels long term, most recent hemoglobin A1c is 8.0  -continue with oral antihyperglycemics  -monitor for hypoglycemic episodes and adjust control if he should get low

## 2019-08-06 NOTE — ASSESSMENT & PLAN NOTE
Renal functions at this point has stabilized kidney injury has resolved.  Creatinine is down to 1.44 with a BUN of 16

## 2019-08-06 NOTE — H&P
Hospital Medicine History & Physical Note    Date of Service  8/5/2019    Primary Care Physician  No primary care provider on file.    Consultants  Critical care    Code Status  DNR/DNI  Bloodless    Chief Complaint  Was down at home for 3 days and could not get up off the floor.    History of Presenting Illness  This is a morbidly obese 74 y.o. male with a history of atrial fibrillation on Eliquis, basal cell cancer to the nose, chronic obstructive pulmonary disease, diabetes mellitus, chronic wounds to bilateral legs, essential hypertension who presented 8/5/2019 with being found down on his bathroom floor since Saturday, 8/3/2019 and inability to get up.  He states he had tripped and fell was unable to get up.  His grandson had found him and called paramedics to get him here to the hospital.  As he was down the floor he was without any of his medications for 3 days and found to be rhabdomyolysis, acute renal failure, and diabetic ketoacidosis.  He did state hitting his head but denies any headache or any loss of movement of his arms or legs.  Patient denies any numbness.  Patient does have complaint of hip pain.  He does have chronic wounds in his legs and he has had some worsening of the wound since he has been down on the ground.    Review of Systems  Review of Systems   Constitutional: Positive for malaise/fatigue. Negative for chills and fever.   HENT: Positive for sore throat. Negative for congestion.    Respiratory: Negative for cough, shortness of breath and stridor.    Cardiovascular: Positive for leg swelling. Negative for chest pain and palpitations.   Gastrointestinal: Negative for abdominal pain, diarrhea, nausea and vomiting.   Genitourinary: Negative for dysuria.   Musculoskeletal: Positive for back pain, falls and joint pain.   Neurological: Negative for dizziness, sensory change, focal weakness and headaches.   Psychiatric/Behavioral: The patient is not nervous/anxious.        Past Medical  History   has a past medical history of Atrial fibrillation (HCC), Basal cell carcinoma, Cancer (HCC), Chronic obstructive pulmonary disease (HCC), Diabetes (HCC), Hypertension, and Renal disorder.    Surgical History   has a past surgical history that includes pacemaker insertion and knee replacement, total.     Family History  family history is not on file.     Social History   reports that he quit smoking about 44 years ago. His smoking use included cigarettes. He has never used smokeless tobacco. He reports that he does not drink alcohol or use drugs.    Allergies  Not on File    Medications  None       Physical Exam  Temp:  [36.6 °C (97.9 °F)] 36.6 °C (97.9 °F)  Pulse:  [66-73] 70  Resp:  [18-28] 23  BP: (110-141)/(69-73) 127/72  SpO2:  [92 %-98 %] 92 %    Physical Exam   Constitutional: He is oriented to person, place, and time. No distress.   Morbidly obese, disheveled   HENT:   Head: Normocephalic.   Nose: Nose normal.   Mouth/Throat: Oropharyngeal exudate (Sloughing skin from the tongue) present.   Left frontal head with mild swelling (goose egg) without erythema or bruising.   Eyes: Conjunctivae and EOM are normal. Right eye exhibits no discharge. Left eye exhibits no discharge. No scleral icterus.   Neck: No tracheal deviation present.   Cardiovascular: Normal rate, regular rhythm and normal heart sounds.   No murmur heard.  Pulmonary/Chest: Effort normal and breath sounds normal. No stridor. No respiratory distress. He has no wheezes. He has no rales.   Abdominal: Bowel sounds are normal. He exhibits no distension. There is no tenderness.   Musculoskeletal: He exhibits edema.   Chronic wounds bilateral legs.  Right lateral posterior calf with wound.  Crusting of wounds.  Purpura present bilateral legs.   Lymphadenopathy:     He has cervical adenopathy (right submandibular).   Neurological: He is alert and oriented to person, place, and time. No cranial nerve deficit.   Skin: Skin is warm. He is not  diaphoretic.   Psychiatric: He has a normal mood and affect. His behavior is normal.   Vitals reviewed.      Laboratory:  Recent Labs     08/05/19  1230 08/05/19  1801   WBC 7.7 7.2   RBC 5.45 4.77   HEMOGLOBIN 16.1 14.2   HEMATOCRIT 49.6 43.2   MCV 91.0 90.6   MCH 29.5 29.8   MCHC 32.5* 32.9*   RDW 45.2 44.9   PLATELETCT 156* 113*   MPV 10.6 12.0     Recent Labs     08/05/19  1230   SODIUM 139   POTASSIUM 4.6   CHLORIDE 104   CO2 15*   GLUCOSE 588*   *   CREATININE 3.92*   CALCIUM 8.3*     Recent Labs     08/05/19  1230   ALTSGPT 48   ASTSGOT 49*   ALKPHOSPHAT 56   TBILIRUBIN 1.0   GLUCOSE 588*         No results for input(s): NTPROBNP in the last 72 hours.      Recent Labs     08/05/19  1230   TROPONINT 36*       Urinalysis:    No results found     Imaging:  US-RENAL   Final Result      1.  No evidence of solid renal mass or hydronephrosis.      2.  Bilateral renal echogenic foci likely representing small nephroliths.      CT-HEAD W/O   Final Result      1.  No evidence of acute intracranial process.      2.  Cerebral atrophy as well as periventricular chronic small vessel ischemic change.      DX-CHEST-PORTABLE (1 VIEW)   Final Result      1.  Enlarged cardiac mediastinal silhouette.   2.  Cardiac pacer.   3.  No acute abnormality.      DX-HIP-UNILATERAL-WITH PELVIS-1 VIEW LEFT    (Results Pending)         Assessment/Plan:  I anticipate this patient will require at least two midnights for appropriate medical management, necessitating inpatient admission.    * Diabetic ketoacidosis without coma associated with type 1 diabetes mellitus (HCC)  Assessment & Plan  DKA protocol insulin drip frequent BMPs  Weight slow resolve of anion gap and acidosis prior to reinitiating diet.  Glycohemoglobin to be ordered    MARIZOL (acute kidney injury) (HCC)  Assessment & Plan  Giving IV fluids monitoring strict I's and O's and labs  Likely secondary to mild component of rhabdomyolysis and dehydration after being on the floor  for 3 days    COPD (chronic obstructive pulmonary disease) (HCC)  Assessment & Plan  No acute exacerbation at this point time continue with her straight protocol and supplemental oxygen as needed bronchodilators.    Physical debility  Assessment & Plan  PT/OT    Elevated troponin  Assessment & Plan  Follow serial troponins and monitor on telemetry    Persistent atrial fibrillation (HCC)  Assessment & Plan  Monitor on telemetry  IV fluids for concerns of dehydration    Hyperphosphatemia  Assessment & Plan  Monitor labs  IV fluid    Lymphadenopathy, submandibular  Assessment & Plan  Right submandibular large adenopathy.  Once he is out of his acute critical issues would recommend needle biopsy and consideration of soft tissue CT neck    Rash  Assessment & Plan  Wound care to bilateral lower extremities    Pain of left hip joint  Assessment & Plan  Plain films to be obtained  PT/OT  Patient previous using walker    Head trauma  Assessment & Plan  Head CT without any acute findings.    Patient is able to move all the extremities    Rhabdomyolysis  Assessment & Plan  Patient was down for 3 days.  Giving IV fluids and monitoring CPK levels      VTE prophylaxis: heparin

## 2019-08-06 NOTE — CARE PLAN
Problem: Safety  Goal: Will remain free from falls  Outcome: PROGRESSING AS EXPECTED     Problem: Infection  Goal: Will remain free from infection  Outcome: PROGRESSING AS EXPECTED     Problem: Venous Thromboembolism (VTW)/Deep Vein Thrombosis (DVT) Prevention:  Goal: Patient will participate in Venous Thrombosis (VTE)/Deep Vein Thrombosis (DVT)Prevention Measures  Outcome: PROGRESSING AS EXPECTED

## 2019-08-06 NOTE — PROGRESS NOTES
Dr. Sauceda at bedside to assess patient. Orders for head CT received. R hip is bruised and painful, xray ordered. Orders for barroso catheter placement and renal ultrasound also received.

## 2019-08-06 NOTE — WOUND TEAM
Wound Team saw patient's legs today. Dressing Care orders written. Wound Team olive assess sacrum tomorrow as patient refused today.

## 2019-08-06 NOTE — PROGRESS NOTES
Hospital Medicine Daily Progress Note    Date of Service  8/6/2019    Chief Complaint  74 y.o. male admitted 8/5/2019 with collapse at home, has a history of atrial fibrillation, basal cell cancer, chronic lower extremity cellulitic changes, diabetes type 2, hypertension, COPD, history of pacemaker placement, was apparently found hours after several days after a ground-level fall, unable to get up, found by her grandson.  Patient was found with acute kidney injury, DKA, rhabdomyolysis, the patient is usually is seen at the [facility].    Hospital Course    Admitted after ground-level fall with DKA, MARIZOL, rhabdomyolysis      Interval Problem Update  Patient seen and examined today. ICU Care  Care and plan discussed in IDT/Hot rounds.  Lines and assistive devices reviewed.    Patient tolerating treatment and therapies.  All Data, Medication data reviewed.  Case discussed with nursing as available.  Plan of Care reviewed with patient and notified of changes.  8/6 the patient overall better, alert and oriented x3, complains of generalized pain, heart rate in the 70s to 80s, blood pressure normotensive, afebrile, on room air, lower extremity blisters being cultured, empiric antibiotics, improved urine output, bilateral submandibular tenderness per patient is fairly new.  Has not been evaluated.  Consultants/Specialty  Pulmonary critical care    Code Status  DNR/DNI    Disposition  ICU with possible transition to medical unit    Review of Systems  Review of Systems   Constitutional: Positive for malaise/fatigue.   HENT: Negative.    Eyes: Negative.    Respiratory: Negative.    Cardiovascular: Negative.    Gastrointestinal: Negative.    Genitourinary: Negative.    Musculoskeletal: Positive for back pain, falls, joint pain, myalgias and neck pain.   Skin: Positive for rash.   Neurological: Negative.  Negative for focal weakness.   Endo/Heme/Allergies: Negative.    Psychiatric/Behavioral: Positive for memory loss. The  patient is nervous/anxious.    All other systems reviewed and are negative.       Physical Exam  Temp:  [36.7 °C (98.1 °F)-38.6 °C (101.5 °F)] 38.6 °C (101.5 °F)  Pulse:  [65-76] 70  Resp:  [14-31] 28  BP: ()/(55-72) 109/57  SpO2:  [89 %-97 %] 92 %    Physical Exam   Constitutional: He is oriented to person, place, and time. He appears well-developed and well-nourished.   Morbidly overweight   HENT:   Head: Normocephalic.   Bilateral submandibular swelling, mass   Eyes: Pupils are equal, round, and reactive to light.   Neck: Normal range of motion.   Cardiovascular: Normal rate, regular rhythm, normal heart sounds and intact distal pulses.   Capillary refill <3 secs   Pulmonary/Chest: Effort normal.   Abdominal: Soft. Bowel sounds are normal.   Genitourinary: Rectum normal and penis normal.   Musculoskeletal: Normal range of motion.   Neurological: He is alert and oriented to person, place, and time.   Skin: Skin is warm and dry. Rash noted. There is erythema. No cyanosis.   Lower extremity pustules  Erythematous patches   Psychiatric: He has a normal mood and affect.   Nursing note and vitals reviewed.      Fluids    Intake/Output Summary (Last 24 hours) at 8/6/2019 1545  Last data filed at 8/6/2019 1200  Gross per 24 hour   Intake 7426.94 ml   Output 2805 ml   Net 4621.94 ml       Laboratory  Recent Labs     08/05/19  1230 08/05/19  1801 08/06/19  0500   WBC 7.7 7.2 9.5   RBC 5.45 4.77 4.84   HEMOGLOBIN 16.1 14.2 14.8   HEMATOCRIT 49.6 43.2 44.1   MCV 91.0 90.6 91.1   MCH 29.5 29.8 30.6   MCHC 32.5* 32.9* 33.6*   RDW 45.2 44.9 44.6   PLATELETCT 156* 113* 164   MPV 10.6 12.0 10.4     Recent Labs     08/06/19  0015 08/06/19  0200 08/06/19  0500   SODIUM 141 141 141   POTASSIUM 4.0 4.5 4.4   CHLORIDE 110 112 111   CO2 19* 18* 19*   GLUCOSE 223* 139* 76   * 113* 108*   CREATININE 3.19* 3.25* 3.37*   CALCIUM 8.0* 7.9* 7.9*                   Imaging  US-RENAL   Final Result      1.  No evidence of solid  renal mass or hydronephrosis.      2.  Bilateral renal echogenic foci likely representing small nephroliths.      CT-HEAD W/O   Final Result      1.  No evidence of acute intracranial process.      2.  Cerebral atrophy as well as periventricular chronic small vessel ischemic change.      DX-CHEST-PORTABLE (1 VIEW)   Final Result      1.  Enlarged cardiac mediastinal silhouette.   2.  Cardiac pacer.   3.  No acute abnormality.      DX-HIP-UNILATERAL-WITH PELVIS-1 VIEW LEFT    (Results Pending)        Assessment/Plan  * Diabetic ketoacidosis without coma associated with type 1 diabetes mellitus ([facility])  Assessment & Plan  DKA protocol, status post  Reinstitute home regimen once her oral intake improved  Sliding scale    MARIZOL (acute kidney injury) ([facility])  Assessment & Plan  Giving IV fluids monitoring strict I's and O's and labs  Likely secondary to mild component of rhabdomyolysis and dehydration after being on the floor for 3 days  Unclear baseline  Continue to monitor, avoid nephrotoxins    Physical debility  Assessment & Plan  PT/OT    Class 3 severe obesity due to excess calories in adult ([facility])  Assessment & Plan  Likely chronic    Elevated troponin  Assessment & Plan  Follow serial troponins and monitor on telemetry    Persistent atrial fibrillation ([facility])  Assessment & Plan  Monitor on telemetry  IV fluids for concerns of dehydration    COPD (chronic obstructive pulmonary disease) ([facility])  Assessment & Plan  Respiratory protocol, currently no wheezing    Hyperphosphatemia  Assessment & Plan  Monitor labs  IV fluid    Lymphadenopathy, submandibular  Assessment & Plan  Right submandibular large adenopathy.  Once he is out of his acute critical issues would recommend needle biopsy and consideration of soft tissue CT neck    Rash  Assessment & Plan  Wound care to bilateral lower extremities    Pain of left hip joint  Assessment & Plan  Plain films to be obtained  PT/OT  Patient previous using walker    Head  trauma  Assessment & Plan  Head CT without any acute findings.    Patient is able to move all the extremities    Rhabdomyolysis  Assessment & Plan  Patient was down for 3 days.  Mildly elevated levels, has improved     Plan  Transition off insulin drip,  Insulin regimen close to home regimen monitor closely  Wound care to lower extremities  Empiric antibiotics and culture pustule  Consider CT neck in the next days to evaluate bilateral submandibular mass  Patient would require additional services/SNF  Wound care  Follow labs closely  Medically complex high risk  See orders  VTE prophylaxis: Heparin    I have performed a physical exam and reviewed and updated ROS and Plan today . In review of yesterday's note , there are no changes except as documented above.

## 2019-08-06 NOTE — PROGRESS NOTES
Received report from Salima FISHER. Transferred pt to T608. Updated Dr. Sauceda on patients arrival. Pt is alert and oriented, however, he is forgetful at times. Pt has a pacemaker and is paced at 70s. Discussed DKA with Amanda nashD, will give 2L LR bolus and begin insulin infusion once bolus has infused.

## 2019-08-06 NOTE — PROGRESS NOTES
ST at bedside for swallow eval. Tylenol administered by RN during evaluation. Pt tolerated pills.

## 2019-08-07 ENCOUNTER — APPOINTMENT (OUTPATIENT)
Dept: RADIOLOGY | Facility: MEDICAL CENTER | Age: 74
DRG: 629 | End: 2019-08-07
Attending: HOSPITALIST
Payer: MEDICARE

## 2019-08-07 ENCOUNTER — APPOINTMENT (OUTPATIENT)
Dept: RADIOLOGY | Facility: MEDICAL CENTER | Age: 74
DRG: 629 | End: 2019-08-07
Attending: INTERNAL MEDICINE
Payer: MEDICARE

## 2019-08-07 LAB
ALBUMIN SERPL BCP-MCNC: 2.3 G/DL (ref 3.2–4.9)
ALBUMIN/GLOB SERPL: 0.7 G/DL
ALP SERPL-CCNC: 51 U/L (ref 30–99)
ALT SERPL-CCNC: 32 U/L (ref 2–50)
ANION GAP SERPL CALC-SCNC: 10 MMOL/L (ref 0–11.9)
AST SERPL-CCNC: 22 U/L (ref 12–45)
BACTERIA WND AEROBE CULT: ABNORMAL
BACTERIA WND AEROBE CULT: ABNORMAL
BASOPHILS # BLD AUTO: 0.8 % (ref 0–1.8)
BASOPHILS # BLD: 0.06 K/UL (ref 0–0.12)
BILIRUB SERPL-MCNC: 0.8 MG/DL (ref 0.1–1.5)
BUN SERPL-MCNC: 86 MG/DL (ref 8–22)
CALCIUM SERPL-MCNC: 8 MG/DL (ref 8.5–10.5)
CHLORIDE SERPL-SCNC: 112 MMOL/L (ref 96–112)
CO2 SERPL-SCNC: 22 MMOL/L (ref 20–33)
CREAT SERPL-MCNC: 2.42 MG/DL (ref 0.5–1.4)
EOSINOPHIL # BLD AUTO: 0.14 K/UL (ref 0–0.51)
EOSINOPHIL NFR BLD: 1.8 % (ref 0–6.9)
ERYTHROCYTE [DISTWIDTH] IN BLOOD BY AUTOMATED COUNT: 45.2 FL (ref 35.9–50)
GLOBULIN SER CALC-MCNC: 3.2 G/DL (ref 1.9–3.5)
GLUCOSE BLD-MCNC: 284 MG/DL (ref 65–99)
GLUCOSE BLD-MCNC: 305 MG/DL (ref 65–99)
GLUCOSE BLD-MCNC: 330 MG/DL (ref 65–99)
GLUCOSE BLD-MCNC: 334 MG/DL (ref 65–99)
GLUCOSE SERPL-MCNC: 375 MG/DL (ref 65–99)
GRAM STN SPEC: ABNORMAL
HCT VFR BLD AUTO: 43.3 % (ref 42–52)
HGB BLD-MCNC: 14.5 G/DL (ref 14–18)
IMM GRANULOCYTES # BLD AUTO: 0.03 K/UL (ref 0–0.11)
IMM GRANULOCYTES NFR BLD AUTO: 0.4 % (ref 0–0.9)
LYMPHOCYTES # BLD AUTO: 0.41 K/UL (ref 1–4.8)
LYMPHOCYTES NFR BLD: 5.3 % (ref 22–41)
MAGNESIUM SERPL-MCNC: 2.5 MG/DL (ref 1.5–2.5)
MCH RBC QN AUTO: 30.8 PG (ref 27–33)
MCHC RBC AUTO-ENTMCNC: 33.5 G/DL (ref 33.7–35.3)
MCV RBC AUTO: 91.9 FL (ref 81.4–97.8)
MONOCYTES # BLD AUTO: 0.55 K/UL (ref 0–0.85)
MONOCYTES NFR BLD AUTO: 7.1 % (ref 0–13.4)
NEUTROPHILS # BLD AUTO: 6.56 K/UL (ref 1.82–7.42)
NEUTROPHILS NFR BLD: 84.6 % (ref 44–72)
NRBC # BLD AUTO: 0 K/UL
NRBC BLD-RTO: 0 /100 WBC
NT-PROBNP SERPL IA-MCNC: 5704 PG/ML (ref 0–125)
PHOSPHATE SERPL-MCNC: 3.3 MG/DL (ref 2.5–4.5)
PLATELET # BLD AUTO: 141 K/UL (ref 164–446)
PMV BLD AUTO: 9.9 FL (ref 9–12.9)
POTASSIUM SERPL-SCNC: 4 MMOL/L (ref 3.6–5.5)
PROT SERPL-MCNC: 5.5 G/DL (ref 6–8.2)
RBC # BLD AUTO: 4.71 M/UL (ref 4.7–6.1)
SIGNIFICANT IND 70042: ABNORMAL
SITE SITE: ABNORMAL
SODIUM SERPL-SCNC: 144 MMOL/L (ref 135–145)
SOURCE SOURCE: ABNORMAL
WBC # BLD AUTO: 7.8 K/UL (ref 4.8–10.8)

## 2019-08-07 PROCEDURE — 84100 ASSAY OF PHOSPHORUS: CPT

## 2019-08-07 PROCEDURE — 99233 SBSQ HOSP IP/OBS HIGH 50: CPT | Performed by: HOSPITALIST

## 2019-08-07 PROCEDURE — 82962 GLUCOSE BLOOD TEST: CPT | Mod: 91

## 2019-08-07 PROCEDURE — 73501 X-RAY EXAM HIP UNI 1 VIEW: CPT | Mod: LT

## 2019-08-07 PROCEDURE — 700102 HCHG RX REV CODE 250 W/ 637 OVERRIDE(OP): Performed by: HOSPITALIST

## 2019-08-07 PROCEDURE — 99232 SBSQ HOSP IP/OBS MODERATE 35: CPT | Performed by: INTERNAL MEDICINE

## 2019-08-07 PROCEDURE — 700105 HCHG RX REV CODE 258: Performed by: INTERNAL MEDICINE

## 2019-08-07 PROCEDURE — 76536 US EXAM OF HEAD AND NECK: CPT

## 2019-08-07 PROCEDURE — 770001 HCHG ROOM/CARE - MED/SURG/GYN PRIV*

## 2019-08-07 PROCEDURE — 700102 HCHG RX REV CODE 250 W/ 637 OVERRIDE(OP): Performed by: INTERNAL MEDICINE

## 2019-08-07 PROCEDURE — 83735 ASSAY OF MAGNESIUM: CPT

## 2019-08-07 PROCEDURE — 92526 ORAL FUNCTION THERAPY: CPT

## 2019-08-07 PROCEDURE — A9270 NON-COVERED ITEM OR SERVICE: HCPCS | Performed by: INTERNAL MEDICINE

## 2019-08-07 PROCEDURE — 83880 ASSAY OF NATRIURETIC PEPTIDE: CPT

## 2019-08-07 PROCEDURE — 71045 X-RAY EXAM CHEST 1 VIEW: CPT

## 2019-08-07 PROCEDURE — 85025 COMPLETE CBC W/AUTO DIFF WBC: CPT

## 2019-08-07 PROCEDURE — 80053 COMPREHEN METABOLIC PANEL: CPT

## 2019-08-07 PROCEDURE — 700111 HCHG RX REV CODE 636 W/ 250 OVERRIDE (IP): Performed by: HOSPITALIST

## 2019-08-07 RX ORDER — INSULIN GLARGINE 100 [IU]/ML
30 INJECTION, SOLUTION SUBCUTANEOUS EVERY EVENING
Status: DISCONTINUED | OUTPATIENT
Start: 2019-08-07 | End: 2019-08-08

## 2019-08-07 RX ORDER — LINEZOLID 600 MG/1
600 TABLET, FILM COATED ORAL EVERY 12 HOURS
Status: DISCONTINUED | OUTPATIENT
Start: 2019-08-07 | End: 2019-08-13

## 2019-08-07 RX ADMIN — HEPARIN SODIUM 5000 UNITS: 5000 INJECTION, SOLUTION INTRAVENOUS; SUBCUTANEOUS at 14:45

## 2019-08-07 RX ADMIN — LINEZOLID 600 MG: 600 TABLET, FILM COATED ORAL at 17:54

## 2019-08-07 RX ADMIN — INSULIN GLARGINE 30 UNITS: 100 INJECTION, SOLUTION SUBCUTANEOUS at 17:55

## 2019-08-07 RX ADMIN — OXYCODONE HYDROCHLORIDE 5 MG: 5 TABLET ORAL at 20:05

## 2019-08-07 RX ADMIN — AMOXICILLIN AND CLAVULANATE POTASSIUM 1 TABLET: 500; 125 TABLET, FILM COATED ORAL at 04:15

## 2019-08-07 RX ADMIN — INSULIN HUMAN 10 UNITS: 100 INJECTION, SOLUTION PARENTERAL at 20:04

## 2019-08-07 RX ADMIN — HEPARIN SODIUM 5000 UNITS: 5000 INJECTION, SOLUTION INTRAVENOUS; SUBCUTANEOUS at 20:04

## 2019-08-07 RX ADMIN — OXYCODONE HYDROCHLORIDE 5 MG: 5 TABLET ORAL at 12:50

## 2019-08-07 RX ADMIN — INSULIN HUMAN 10 UNITS: 100 INJECTION, SOLUTION PARENTERAL at 17:54

## 2019-08-07 RX ADMIN — HEPARIN SODIUM 5000 UNITS: 5000 INJECTION, SOLUTION INTRAVENOUS; SUBCUTANEOUS at 04:15

## 2019-08-07 RX ADMIN — SODIUM CHLORIDE, POTASSIUM CHLORIDE, SODIUM LACTATE AND CALCIUM CHLORIDE: 600; 310; 30; 20 INJECTION, SOLUTION INTRAVENOUS at 04:14

## 2019-08-07 RX ADMIN — LINEZOLID 600 MG: 600 TABLET, FILM COATED ORAL at 12:50

## 2019-08-07 RX ADMIN — INSULIN HUMAN 7 UNITS: 100 INJECTION, SOLUTION PARENTERAL at 07:55

## 2019-08-07 RX ADMIN — INSULIN HUMAN 10 UNITS: 100 INJECTION, SOLUTION PARENTERAL at 12:50

## 2019-08-07 ASSESSMENT — ENCOUNTER SYMPTOMS
EYES NEGATIVE: 1
CLAUDICATION: 0
DIARRHEA: 0
GASTROINTESTINAL NEGATIVE: 1
NERVOUS/ANXIOUS: 1
TINGLING: 0
SPEECH CHANGE: 0
FALLS: 1
HEMOPTYSIS: 0
SENSORY CHANGE: 0
BACK PAIN: 1
CHILLS: 0
COUGH: 0
RESPIRATORY NEGATIVE: 1
WEAKNESS: 0
FOCAL WEAKNESS: 0
ORTHOPNEA: 0
SPUTUM PRODUCTION: 0
MEMORY LOSS: 1
TREMORS: 0
LOSS OF CONSCIOUSNESS: 0
PALPITATIONS: 0
CARDIOVASCULAR NEGATIVE: 1
NEUROLOGICAL NEGATIVE: 1
DEPRESSION: 0
NAUSEA: 0
SEIZURES: 0
CONSTIPATION: 0
NECK PAIN: 1
PHOTOPHOBIA: 0
MYALGIAS: 1
DOUBLE VISION: 0
FEVER: 0
ABDOMINAL PAIN: 0
SHORTNESS OF BREATH: 0
HEADACHES: 0
VOMITING: 0

## 2019-08-07 ASSESSMENT — COGNITIVE AND FUNCTIONAL STATUS - GENERAL
SUGGESTED CMS G CODE MODIFIER DAILY ACTIVITY: CH
SUGGESTED CMS G CODE MODIFIER MOBILITY: CH
DAILY ACTIVITIY SCORE: 24
MOBILITY SCORE: 24

## 2019-08-07 ASSESSMENT — LIFESTYLE VARIABLES: SUBSTANCE_ABUSE: 0

## 2019-08-07 NOTE — PROGRESS NOTES
Hospital Medicine Daily Progress Note    Date of Service  8/7/2019    Chief Complaint  74 y.o. male admitted 8/5/2019 with collapse at home, has a history of atrial fibrillation, basal cell cancer, chronic lower extremity cellulitic changes, diabetes type 2, hypertension, COPD, history of pacemaker placement, was apparently found hours after several days after a ground-level fall, unable to get up, found by her grandson.  Patient was found with acute kidney injury, DKA, rhabdomyolysis, the patient is usually is seen at the [facility].    Hospital Course    Admitted after ground-level fall with DKA, MARIZOL, rhabdomyolysis      Interval Problem Update  Patient seen and examined today. ICU Care  Care and plan discussed in IDT/Hot rounds.  Lines and assistive devices reviewed.    Patient tolerating treatment and therapies.  All Data, Medication data reviewed.  Case discussed with nursing as available.  Plan of Care reviewed with patient and notified of changes.  8/6 the patient overall better, alert and oriented x3, complains of generalized pain, heart rate in the 70s to 80s, blood pressure normotensive, afebrile, on room air, lower extremity blisters being cultured, empiric antibiotics, improved urine output, bilateral submandibular tenderness per patient is fairly new.  Has not been evaluated.  8/7 the patient overall improved, alert and oriented x4, paced rhythm, normotensive, afebrile, tolerating diabetic nectar thick diet, improved urine output, 2 L nasal cannula oxygen, lower extremity wound culture positive for MRSA, elevated blood glucose with need for increased glycemic control, bilateral tender neck/submandibular swelling  Consultants/Specialty  Pulmonary critical care    Code Status  DNR/DNI    Disposition  ICU, transition to medical unit  Review of Systems  Review of Systems   Constitutional: Positive for malaise/fatigue.   HENT: Negative.    Eyes: Negative.    Respiratory: Negative.    Cardiovascular:  Negative.    Gastrointestinal: Negative.    Genitourinary: Negative.    Musculoskeletal: Positive for back pain, falls, joint pain, myalgias and neck pain.   Skin: Positive for rash.   Neurological: Negative.  Negative for focal weakness.   Endo/Heme/Allergies: Negative.    Psychiatric/Behavioral: Positive for memory loss. The patient is nervous/anxious.    All other systems reviewed and are negative.       Physical Exam  Pulse:  [65-76] 70  Resp:  [10-28] 16  BP: (109-154)/(54-78) 126/69  SpO2:  [90 %-100 %] 98 %    Physical Exam   Constitutional: He is oriented to person, place, and time. He appears well-developed and well-nourished.   Morbidly overweight   HENT:   Head: Normocephalic.   Bilateral submandibular swelling, mass   Eyes: Pupils are equal, round, and reactive to light.   Neck: Normal range of motion.   Cardiovascular: Normal rate, regular rhythm, normal heart sounds and intact distal pulses.   Capillary refill <3 secs   Pulmonary/Chest: Effort normal.   Abdominal: Soft. Bowel sounds are normal.   Genitourinary: Rectum normal and penis normal.   Musculoskeletal: Normal range of motion.   Neurological: He is alert and oriented to person, place, and time.   Skin: Skin is warm and dry. Rash noted. There is erythema. No cyanosis.   Lower extremity pustules  Erythematous patches   Psychiatric: He has a normal mood and affect.   Nursing note and vitals reviewed.      Fluids    Intake/Output Summary (Last 24 hours) at 8/7/2019 0757  Last data filed at 8/7/2019 0629  Gross per 24 hour   Intake 5338.33 ml   Output 3475 ml   Net 1863.33 ml       Laboratory  Recent Labs     08/05/19  1801 08/06/19  0500 08/07/19  0410   WBC 7.2 9.5 7.8   RBC 4.77 4.84 4.71   HEMOGLOBIN 14.2 14.8 14.5   HEMATOCRIT 43.2 44.1 43.3   MCV 90.6 91.1 91.9   MCH 29.8 30.6 30.8   MCHC 32.9* 33.6* 33.5*   RDW 44.9 44.6 45.2   PLATELETCT 113* 164 141*   MPV 12.0 10.4 9.9     Recent Labs     08/06/19  0200 08/06/19  0500 08/07/19  0410    SODIUM 141 141 144   POTASSIUM 4.5 4.4 4.0   CHLORIDE 112 111 112   CO2 18* 19* 22   GLUCOSE 139* 76 375*   * 108* 86*   CREATININE 3.25* 3.37* 2.42*   CALCIUM 7.9* 7.9* 8.0*                   Imaging  US-RENAL   Final Result      1.  No evidence of solid renal mass or hydronephrosis.      2.  Bilateral renal echogenic foci likely representing small nephroliths.      CT-HEAD W/O   Final Result      1.  No evidence of acute intracranial process.      2.  Cerebral atrophy as well as periventricular chronic small vessel ischemic change.      DX-CHEST-PORTABLE (1 VIEW)   Final Result      1.  Enlarged cardiac mediastinal silhouette.   2.  Cardiac pacer.   3.  No acute abnormality.      DX-HIP-UNILATERAL-WITH PELVIS-1 VIEW LEFT    (Results Pending)   DX-CHEST-PORTABLE (1 VIEW)    (Results Pending)        Assessment/Plan  * Diabetic ketoacidosis without coma associated with type 1 diabetes mellitus (HCC)- (present on admission)  Assessment & Plan  DKA protocol, status post  Reinstitute home regimen once her oral intake improved  Sliding scale, adjust Lantus upwards, continue to monitor    MARIZOL (acute kidney injury) ([facility])- (present on admission)  Assessment & Plan  Giving IV fluids monitoring strict I's and O's and labs  Likely secondary to mild component of rhabdomyolysis and dehydration after being on the floor for 3 days  Unclear baseline  Continue to monitor, avoid nephrotoxins    Physical debility- (present on admission)  Assessment & Plan  PT/OT    Class 3 severe obesity due to excess calories in adult ([facility])- (present on admission)  Assessment & Plan  Likely chronic    Elevated troponin  Assessment & Plan  Likely type II injury    Persistent atrial fibrillation ([facility])- (present on admission)  Assessment & Plan  Monitor on telemetry  IV fluids for concerns of dehydration    COPD (chronic obstructive pulmonary disease) ([facility])- (present on admission)  Assessment & Plan  Respiratory protocol, currently no  wheezing    Hyperphosphatemia- (present on admission)  Assessment & Plan  Monitor labs  IV fluid    Lymphadenopathy, submandibular- (present on admission)  Assessment & Plan  Right submandibular large adenopathy.  Bilateral sub-and tubular swelling  Get ultrasound preliminary  Likely will need biopsy and/or further imaging once renal function is improved    Rash- (present on admission)  Assessment & Plan  Wound care to bilateral lower extremities  [facility]  Blisters positive for MRSA  Antibiotic course    Pain of left hip joint- (present on admission)  Assessment & Plan    PT/OT  Patient previous using walker    Head trauma- (present on admission)  Assessment & Plan  Head CT without any acute findings.    Patient is able to move all the extremities    Rhabdomyolysis- (present on admission)  Assessment & Plan  Patient was down for 3 days.  Mildly elevated levels, has improved     Plan  Adjust insulin  Antibiotics changed to Zyvox  Wound care to lower extremities  Ongoing monitoring for renal impairment, hopefully to improve further  Ultrasound of bilateral neck to evaluate for masses versus fluid  Patient would require additional services/SNF  Wound care  Follow labs closely  Medically complex high risk  See orders  VTE prophylaxis: Heparin    I have performed a physical exam and reviewed and updated ROS and Plan today . In review of yesterday's note , there are no changes except as documented above.

## 2019-08-07 NOTE — THERAPY
"Speech Language Therapy dysphagia treatment completed.     Functional Status: Patient was seen on this date for dysphagia treatment with a D1/NTL diet. Patient with improved mentation, AAOx3 with confusion regarding day, and maintaining adequate attention for appropriate conversation with SLP. Patient consumed 6 bites of D1 textures and ~6 oz of NTL via single sips from the cup. No overt s/sx of aspiration appreciated this session. Vocal quality remained clear. Oral prep/mastication of purees prolonged but appeared functional with no oral residue appreciated. Pt reported texture being too thick but would prefer to stay on current diet since he did well with his dinner last night and to have the option to eat mashed potatoes.     Recommendations: At this time, recommend continue D1/NTL diet given assistance with tray set up and intermittent supervision. SLP following.     Plan of Care: Will benefit from Speech Therapy 3 times per week    Post-Acute Therapy: Recommend inpatient transitional care services for continued speech therapy services.      See \"Rehab Therapy-Acute\" Patient Summary Report for complete documentation.     "

## 2019-08-07 NOTE — CARE PLAN
Problem: Infection  Goal: Will remain free from infection  Outcome: PROGRESSING SLOWER THAN EXPECTED     Problem: Pain Management  Goal: Pain level will decrease to patient's comfort goal  Outcome: PROGRESSING SLOWER THAN EXPECTED     Patient still with frequent pain complaints - bilateral legs, hips, back.  Using PO pain meds, patient at this time unable to mobilize legs very far off the bed without being uncomfortable.    Problem: Mobility  Goal: Risk for activity intolerance will decrease  Outcome: PROGRESSING SLOWER THAN EXPECTED     Patient not able to mobilize.  At this time severely weak and refusing to attempt mobilizing/move BLE.     Problem: Psychosocial Needs:  Goal: Level of anxiety will decrease  Outcome: PROGRESSING SLOWER THAN EXPECTED     Problem: Skin Integrity  Goal: Risk for impaired skin integrity will decrease  Outcome: PROGRESSING SLOWER THAN EXPECTED     Problem: Communication  Goal: The ability to communicate needs accurately and effectively will improve  Outcome: PROGRESSING AS EXPECTED     Problem: Safety  Goal: Will remain free from injury  Outcome: PROGRESSING AS EXPECTED  Goal: Will remain free from falls  Outcome: PROGRESSING AS EXPECTED     Problem: Respiratory:  Goal: Respiratory status will improve  Outcome: PROGRESSING AS EXPECTED

## 2019-08-07 NOTE — DIETARY
NUTRITION SERVICES: BMI - Pt with BMI >40 (=Body mass index is 40.17 kg/m².), morbid obesity. Weight loss counseling not appropriate in acute care setting. RECOMMEND - Referral to outpatient nutrition services for weight management after D/C.

## 2019-08-07 NOTE — CARE PLAN
Problem: Safety  Goal: Will remain free from injury  Outcome: PROGRESSING AS EXPECTED     Problem: Bowel/Gastric:  Goal: Normal bowel function is maintained or improved  Outcome: PROGRESSING AS EXPECTED     Problem: Pain Management  Goal: Pain level will decrease to patient's comfort goal  Outcome: PROGRESSING AS EXPECTED     Problem: Fluid Volume:  Goal: Will maintain balanced intake and output  Outcome: PROGRESSING AS EXPECTED

## 2019-08-07 NOTE — DISCHARGE PLANNING
Anticipated Discharge Disposition: TBD     Action: Discussed pt's case in MDT rounds. Pt is a 74 y.o. Man admitted to [facility] after being found down for approx. 3 days. Pt was apparently carrying his laundry and fell. LSW met with pt, dtr Nicky #[phone] (lives in [place]) and emiliano Angel #[phone] (lives locally in [place], [place]). Dtr states she just flew in today from [place]. Nicky states she was just here visiting pt about a week ago. Pt lives a lone, was IPTA with ADL's/IADL's, still drives, does not use any DME at home but has a cane. Pt is a  [facility] Martinez and medications. Pt states his wife is currently a long term resident at [facility] (has been there since November 2018). Pt's grandson Stanley lives locally in [place] and is supportive. Discussed post acute services (SNF, Acute Rehab, HHC) and possible options depending on how pt progresses in the hospital.     Barriers to Discharge: N/A.     Plan: As above, pt remains in CICU and will continue to be medically treated and evaluated. Pt's d/c needs undetermined at this time, will need to see how pt progresses. PT/OT evals pending, possible SNF vs Home with HHC.       Care Transition Team Assessment    Information Source  Orientation : Oriented x 4  Information Given By: Patient, Relative  Informant's Name: Jamar and dtr Nicky  Who is responsible for making decisions for patient? : Patient    Readmission Evaluation  Is this a readmission?: No    Elopement Risk  Legal Hold: No  Ambulatory or Self Mobile in Wheelchair: No-Not an Elopement Risk  Elopement Risk: Not at Risk for Elopement    Interdisciplinary Discharge Planning  Primary Care Physician: Dr. Martinez at [facility]  Durable Medical Equipment: Other - Specify(cane)  DME Provider / Phone: ([facility])    Discharge Preparedness  What is your plan after discharge?: Uncertain - pending medical team collaboration  What are your discharge supports?: Child, Other  (comment)(emiliano Angel)  Prior Functional Level: Ambulatory, Drives Self, Independent with Activities of Daily Living, Independent with Medication Management  Difficulity with ADLs: Walking  Difficulty with ADLs Comment: debilitated  Difficulity with IADLs: Driving    Functional Assesment  Prior Functional Level: Ambulatory, Drives Self, Independent with Activities of Daily Living, Independent with Medication Management    Finances  Financial Barriers to Discharge: No  Prescription Coverage: Yes([facility])    Vision / Hearing Impairment  Right Eye Vision: Wears Glasses  Left Eye Vision: Wears Glasses              Domestic Abuse  Have you ever been the victim of abuse or violence?: No         Discharge Risks or Barriers  Discharge risks or barriers?: Complex medical needs  Patient risk factors: Complex medical needs    Anticipated Discharge Information  Anticipated discharge disposition: Acute rehab, HHC, SNF

## 2019-08-07 NOTE — PROGRESS NOTES
Critical Care Progress Note    Date of admission  8/5/2019    Chief Complaint  74 y.o. male who presented 8/5/2019 with afib, basal cell cancer, COPD, dm, hypertension, s/p pace maker, that was carrying laundry and fell down wedging himself into corner of wall hitting his head. He does take eliquis for afib has left eyebrow bruising, doesn't know how long he was stuck their, complains of rib pain with coughing and left hip pain worse with movement. He oc trouble getting around or frequent falls. Patient was found by emiliano and fall was likely on Saturday brought in by EMS. Was found in ER to have MARIZOL, DKA and mild rhabdomyolysis. Patient admitted to ICU for further management Vitals HR 70's, on room air, -130's. He has had weeping wound to his lower legs for years per patient. Oc shortness or breath or sputum production. Oc fever or chills, abdominal pain or black or bloody stool or vomiting. He is thirsty oc confusion or focal weakness but limited by pain with movement. He has had swelling under his jaw for some time. [facility] Recently sick with a stomach flu per emiliano late last week.    Hospital Course  Admitted to ICU 8/5    Interval Problem Update  Reviewed last 24 hour events:  Neuro: aox4  HR: 70 paced  SBP: 120-130  Tmax: afebrile  GI: diabetic thick diet  UOP: 1780 ON  Lines: 2 peripheral IV  Resp: room air 2l n/c at night   Vte: heparin sq  PPI/H2:none  Antibx: augmentin 2/7 staph wound 8/5 -> MRSA switch to Zyvox    Stop IV fluids  Lantus 30  floor      Review of Systems  Review of Systems   Constitutional: Negative for chills, fever and malaise/fatigue.   HENT: Negative for congestion and nosebleeds.    Eyes: Negative for double vision and photophobia.   Respiratory: Negative for cough, hemoptysis, sputum production and shortness of breath.    Cardiovascular: Positive for leg swelling. Negative for chest pain, palpitations, orthopnea and claudication.    Gastrointestinal: Negative for abdominal pain, constipation, diarrhea, nausea and vomiting.   Genitourinary: Negative for dysuria, frequency, hematuria and urgency.   Musculoskeletal: Positive for back pain, falls and joint pain.   Neurological: Negative for tingling, tremors, sensory change, speech change, seizures, loss of consciousness, weakness and headaches.   Psychiatric/Behavioral: Negative for depression, substance abuse and suicidal ideas.   All other systems reviewed and are negative.       Vital Signs for last 24 hours   Temp:  [37.7 °C (99.9 °F)] 37.7 °C (99.9 °F)  Pulse:  [70-71] 70  Resp:  [10-21] 18  BP: (118-153)/(61-78) 132/71  SpO2:  [90 %-100 %] 94 %    Hemodynamic parameters for last 24 hours       Respiratory Information for the last 24 hours       Physical Exam   Physical Exam   Constitutional: He is oriented to person, place, and time. He appears well-developed and well-nourished. No distress.   HENT:   Head: Normocephalic.   Left orbit small hematoma -> resovled   Eyes: Pupils are equal, round, and reactive to light. EOM are normal.   Neck: Normal range of motion. No JVD present. No thyromegaly present.   Large right submandibular mass, and left submandibular adenopathy   Cardiovascular: Normal rate and regular rhythm.   No murmur heard.  Pulmonary/Chest: No respiratory distress. He has no wheezes. He has no rales.   Decrease breath sounds bilateral   Abdominal: He exhibits no distension. There is no tenderness. There is no rebound and no guarding.   Musculoskeletal: Normal range of motion. He exhibits edema and tenderness.   Neurological: He is alert and oriented to person, place, and time. No cranial nerve deficit. Coordination normal.   Answers appropriately and quickly, moves all extremities joking with staff   Skin: Skin is warm. He is not diaphoretic. There is erythema.   Pustules to right > left with purulent material and vasculitic lower extremity changes.    Psychiatric: He has a  normal mood and affect.       Medications  Current Facility-Administered Medications   Medication Dose Route Frequency Provider Last Rate Last Dose   • insulin glargine (LANTUS) injection 30 Units  30 Units Subcutaneous Q EVENING George Garibay M.D.       • linezolid (ZYVOX) tablet 600 mg  600 mg Oral Q12HRS Roland Sauceda M.D.   600 mg at 08/07/19 1250   • insulin regular (HUMULIN R) injection 3-14 Units  3-14 Units Subcutaneous 4X/DAY ACHSRAVANTHI Sauceda M.D.   10 Units at 08/07/19 1250    And   • glucose 4 g chewable tablet 16 g  16 g Oral Q15 MIN PRN Roland Sauceda M.D.        And   • DEXTROSE 10% BOLUS 250 mL  250 mL Intravenous Q15 MIN PRN Roland Sauceda M.D.       • senna-docusate (PERICOLACE or SENOKOT S) 8.6-50 MG per tablet 2 Tab  2 Tab Oral BID Price Elizabeth D.O.   Stopped at 08/06/19 0600    And   • polyethylene glycol/lytes (MIRALAX) PACKET 1 Packet  1 Packet Oral QDAY PRN Price Elizabeth D.O.        And   • magnesium hydroxide (MILK OF MAGNESIA) suspension 30 mL  30 mL Oral QDAY PRN NILSON WoodO.        And   • bisacodyl (DULCOLAX) suppository 10 mg  10 mg Rectal QDAY PRN ALEKSANDRA Wood.O.       • heparin injection 5,000 Units  5,000 Units Subcutaneous Q8HRS ALEKSANDRA Wood.OEfra   5,000 Units at 08/07/19 1445   • labetalol (NORMODYNE,TRANDATE) injection 10 mg  10 mg Intravenous Q4HRS PRN NILSON WoodO.       • ondansetron (ZOFRAN) syringe/vial injection 4 mg  4 mg Intravenous Q4HRS PRN ALEKSANDRA Wood.O.       • ondansetron (ZOFRAN ODT) dispertab 4 mg  4 mg Oral Q4HRS PRN NILSON WoodO.       • oxyCODONE immediate-release (ROXICODONE) tablet 5 mg  5 mg Oral Q4HRS PRCRUZ Sauceda M.D.   5 mg at 08/07/19 1250   • acetaminophen (TYLENOL) tablet 1,000 mg  1,000 mg Oral Q6HRS MCKENZIE Sauceda M.D.   1,000 mg at 08/06/19 2233       Fluids    Intake/Output Summary (Last 24 hours) at 8/7/2019 1519  Last data filed at 8/7/2019 1000  Gross per 24 hour    Intake 3204.58 ml   Output 3520 ml   Net -315.42 ml       Laboratory      Recent Labs     08/06/19  0500 08/06/19  0920 08/06/19  1748   CPKTOTAL 358* 232* 357*     Recent Labs     08/05/19  1230 08/05/19 1908 08/06/19  0015 08/06/19  0200 08/06/19  0500 08/07/19  0410   SODIUM 139 137   < > 141 141 141 144   POTASSIUM 4.6 4.6   < > 4.0 4.5 4.4 4.0   CHLORIDE 104 107   < > 110 112 111 112   CO2 15* 18*   < > 19* 18* 19* 22   * 120*   < > 113* 113* 108* 86*   CREATININE 3.92* 3.56*   < > 3.19* 3.25* 3.37* 2.42*   MAGNESIUM 3.1*  --   --  2.7*  --   --  2.5   PHOSPHORUS 6.1* 5.1*  --  3.6  --   --  3.3   CALCIUM 8.3* 7.5*   < > 8.0* 7.9* 7.9* 8.0*    < > = values in this interval not displayed.     Recent Labs     08/05/19  1230 08/05/19 1908 08/06/19  0200 08/06/19  0500 08/07/19  0410   ALTSGPT 48 40  --   --   --  32   ASTSGOT 49* 46*  --   --   --  22   ALKPHOSPHAT 56 48  --   --   --  51   TBILIRUBIN 1.0 0.8  --   --   --  0.8   GLUCOSE 588* 507*   < > 139* 76 375*    < > = values in this interval not displayed.     Recent Labs     08/05/19  1230 08/05/19  1801 08/05/19 1908 08/06/19  0500 08/07/19  0410   WBC 7.7 7.2  --  9.5 7.8   NEUTSPOLYS 86.00* 85.00*  --   --  84.60*   LYMPHOCYTES 4.80* 5.00*  --   --  5.30*   MONOCYTES 7.40 8.10  --   --  7.10   EOSINOPHILS 0.50 0.60  --   --  1.80   BASOPHILS 0.80 0.70  --   --  0.80   ASTSGOT 49*  --  46*  --  22   ALTSGPT 48  --  40  --  32   ALKPHOSPHAT 56  --  48  --  51   TBILIRUBIN 1.0  --  0.8  --  0.8     Recent Labs     08/05/19  1801 08/06/19  0500 08/07/19  0410   RBC 4.77 4.84 4.71   HEMOGLOBIN 14.2 14.8 14.5   HEMATOCRIT 43.2 44.1 43.3   PLATELETCT 113* 164 141*       Imaging  X-Ray:  I have personally reviewed the images and compared with prior images. and No film today  CT:    Reviewed   Plan to get soft tissue neck U/S       Assessment/Plan  * Diabetic ketoacidosis without coma associated with type 1 diabetes mellitus (HCC)  Assessment &  Plan  Reason for DKA: unable to get to medication from fall  Follow Q 1 hour accuchecks, serial chemistry and aggressive replace K, PO4, Mg  Check serum osm, ketones, hgA1c, vbg/abg  Fluid resus LR    Transition off insulin gtt 8/6 and transition to long acting lantus 10 -> 30units    MARIZOL (acute kidney injury) ([facility])  Assessment & Plan  Maintain euvolemia and monitor fluid responsiveness (avoid NaCL and renal congestion)  MAP > 65 uses pressors or inotropic trial  Monitor urine output and I&O's  Avoid and review nephrotoxin medication  Rule out post obstruction  Consider renal U/S  U/a and CPK    Likely prerenal and rhabdo related  Place barroso fluid resus  Improved will stop IVF and monitor urine output and renal function continue to monitor closely    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 check CT noncontrast -> negative  Limit narcotics and sedation serial monitor neurologic exam    Class 3 severe obesity due to excess calories in adult ([facility])  Assessment & Plan  Recommend weight loss as outpatient and diet modification    Persistent atrial fibrillation ([facility])  Assessment & Plan  Hx of tachy alison syndrome s/p pacemaker  Monitor need for further rate control  Optimize electrolytes and fluid status being careful with replacement in renal injury    COPD (chronic obstructive pulmonary disease) ([facility])  Assessment & Plan  Hx of not in acute exacerbation  nebulizers prn    Hyperphosphatemia  Assessment & Plan  Continue to monitor need for phosphate binders    Lymphadenopathy, submandibular  Assessment & Plan  Mainly right submandible and also left node submandible  Will likely need further workup and Bx highly concerning for malignancy  Plan for U/S today 8/7 when renal function improves then consider IV contrast CT     Plan for likely Bx pending U/S findings    Rash  Assessment & Plan  Lower extremity rash chronic vasculitic in nature. ESR monitor.   Wound care.    Took unroof wound culture sent  8/6 -> MRSA  Start antibiotics with Augmentin x 7days -> Zyvox    Pain of left hip joint  Assessment & Plan  Follow up left hip Xray and monitor pain and need for thin cut CT for potential occult fracture.     Degenerative changes no fracture seen    Rhabdomyolysis  Assessment & Plan  Serial monitor cpk, resus with fluids and monitor renal function    CPK improved stop checking       VTE:  Heparin  Ulcer: Not Indicated  Lines: Webster Catheter  Ongoing indication addressed    I have performed a physical exam and reviewed and updated ROS and Plan today (8/7/2019). In review of yesterday's note (8/6/2019), there are no changes except as documented above.     Discussed patient condition and risk of morbidity and/or mortality with Hospitalist, RN, RT, Pharmacy, Charge nurse / hot rounds and Patient     Stable for floor

## 2019-08-07 NOTE — THERAPY
Occupational Therapy Contact Note  OT consult rec'd. Per RN, hold for xray of possible L hip fx. Will re-attempt as appropriate/able.  Shari CERDA, OTR/L    Pager #[phone]

## 2019-08-08 LAB
ANION GAP SERPL CALC-SCNC: 9 MMOL/L (ref 0–11.9)
BACTERIA WND AEROBE CULT: NORMAL
BUN SERPL-MCNC: 60 MG/DL (ref 8–22)
CALCIUM SERPL-MCNC: 8.2 MG/DL (ref 8.5–10.5)
CHLORIDE SERPL-SCNC: 114 MMOL/L (ref 96–112)
CO2 SERPL-SCNC: 22 MMOL/L (ref 20–33)
CREAT SERPL-MCNC: 2.06 MG/DL (ref 0.5–1.4)
ERYTHROCYTE [DISTWIDTH] IN BLOOD BY AUTOMATED COUNT: 45.7 FL (ref 35.9–50)
GLUCOSE BLD-MCNC: 255 MG/DL (ref 65–99)
GLUCOSE BLD-MCNC: 268 MG/DL (ref 65–99)
GLUCOSE BLD-MCNC: 279 MG/DL (ref 65–99)
GLUCOSE BLD-MCNC: 315 MG/DL (ref 65–99)
GLUCOSE SERPL-MCNC: 317 MG/DL (ref 65–99)
HCT VFR BLD AUTO: 42.7 % (ref 42–52)
HGB BLD-MCNC: 14.3 G/DL (ref 14–18)
MAGNESIUM SERPL-MCNC: 2.2 MG/DL (ref 1.5–2.5)
MCH RBC QN AUTO: 30.6 PG (ref 27–33)
MCHC RBC AUTO-ENTMCNC: 33.5 G/DL (ref 33.7–35.3)
MCV RBC AUTO: 91.4 FL (ref 81.4–97.8)
PHOSPHATE SERPL-MCNC: 2.8 MG/DL (ref 2.5–4.5)
PLATELET # BLD AUTO: 150 K/UL (ref 164–446)
PMV BLD AUTO: 9.9 FL (ref 9–12.9)
POTASSIUM SERPL-SCNC: 3.8 MMOL/L (ref 3.6–5.5)
RBC # BLD AUTO: 4.67 M/UL (ref 4.7–6.1)
SIGNIFICANT IND 70042: NORMAL
SITE SITE: NORMAL
SODIUM SERPL-SCNC: 145 MMOL/L (ref 135–145)
SOURCE SOURCE: NORMAL
WBC # BLD AUTO: 7.9 K/UL (ref 4.8–10.8)

## 2019-08-08 PROCEDURE — 85027 COMPLETE CBC AUTOMATED: CPT

## 2019-08-08 PROCEDURE — A9270 NON-COVERED ITEM OR SERVICE: HCPCS | Performed by: INTERNAL MEDICINE

## 2019-08-08 PROCEDURE — 82962 GLUCOSE BLOOD TEST: CPT | Mod: 91

## 2019-08-08 PROCEDURE — 84100 ASSAY OF PHOSPHORUS: CPT

## 2019-08-08 PROCEDURE — 99232 SBSQ HOSP IP/OBS MODERATE 35: CPT | Performed by: HOSPITALIST

## 2019-08-08 PROCEDURE — 770006 HCHG ROOM/CARE - MED/SURG/GYN SEMI*

## 2019-08-08 PROCEDURE — 80048 BASIC METABOLIC PNL TOTAL CA: CPT

## 2019-08-08 PROCEDURE — 700102 HCHG RX REV CODE 250 W/ 637 OVERRIDE(OP): Performed by: INTERNAL MEDICINE

## 2019-08-08 PROCEDURE — 770021 HCHG ROOM/CARE - ISO PRIVATE

## 2019-08-08 PROCEDURE — 700111 HCHG RX REV CODE 636 W/ 250 OVERRIDE (IP): Performed by: HOSPITALIST

## 2019-08-08 PROCEDURE — 83735 ASSAY OF MAGNESIUM: CPT

## 2019-08-08 PROCEDURE — 700102 HCHG RX REV CODE 250 W/ 637 OVERRIDE(OP): Performed by: HOSPITALIST

## 2019-08-08 RX ORDER — MORPHINE SULFATE 4 MG/ML
2-4 INJECTION, SOLUTION INTRAMUSCULAR; INTRAVENOUS
Status: DISCONTINUED | OUTPATIENT
Start: 2019-08-08 | End: 2019-08-17 | Stop reason: HOSPADM

## 2019-08-08 RX ORDER — INSULIN GLARGINE 100 [IU]/ML
40 INJECTION, SOLUTION SUBCUTANEOUS EVERY EVENING
Status: DISCONTINUED | OUTPATIENT
Start: 2019-08-08 | End: 2019-08-09

## 2019-08-08 RX ADMIN — ACETAMINOPHEN 1000 MG: 500 TABLET ORAL at 21:48

## 2019-08-08 RX ADMIN — INSULIN GLARGINE 40 UNITS: 100 INJECTION, SOLUTION SUBCUTANEOUS at 19:59

## 2019-08-08 RX ADMIN — OXYCODONE HYDROCHLORIDE 5 MG: 5 TABLET ORAL at 16:55

## 2019-08-08 RX ADMIN — INSULIN HUMAN 7 UNITS: 100 INJECTION, SOLUTION PARENTERAL at 08:22

## 2019-08-08 RX ADMIN — INSULIN HUMAN 7 UNITS: 100 INJECTION, SOLUTION PARENTERAL at 11:27

## 2019-08-08 RX ADMIN — OXYCODONE HYDROCHLORIDE 5 MG: 5 TABLET ORAL at 21:48

## 2019-08-08 RX ADMIN — LINEZOLID 600 MG: 600 TABLET, FILM COATED ORAL at 04:02

## 2019-08-08 RX ADMIN — OXYCODONE HYDROCHLORIDE 5 MG: 5 TABLET ORAL at 10:07

## 2019-08-08 RX ADMIN — HEPARIN SODIUM 5000 UNITS: 5000 INJECTION, SOLUTION INTRAVENOUS; SUBCUTANEOUS at 20:07

## 2019-08-08 RX ADMIN — LINEZOLID 600 MG: 600 TABLET, FILM COATED ORAL at 16:55

## 2019-08-08 RX ADMIN — HEPARIN SODIUM 5000 UNITS: 5000 INJECTION, SOLUTION INTRAVENOUS; SUBCUTANEOUS at 14:57

## 2019-08-08 RX ADMIN — HEPARIN SODIUM 5000 UNITS: 5000 INJECTION, SOLUTION INTRAVENOUS; SUBCUTANEOUS at 04:01

## 2019-08-08 RX ADMIN — INSULIN HUMAN 10 UNITS: 100 INJECTION, SOLUTION PARENTERAL at 16:58

## 2019-08-08 RX ADMIN — INSULIN HUMAN 7 UNITS: 100 INJECTION, SOLUTION PARENTERAL at 19:57

## 2019-08-08 ASSESSMENT — ENCOUNTER SYMPTOMS
GASTROINTESTINAL NEGATIVE: 1
NEUROLOGICAL NEGATIVE: 1
FALLS: 1
NERVOUS/ANXIOUS: 1
NECK PAIN: 1
EYES NEGATIVE: 1
BACK PAIN: 1
CARDIOVASCULAR NEGATIVE: 1
MEMORY LOSS: 1
MYALGIAS: 1
RESPIRATORY NEGATIVE: 1
FOCAL WEAKNESS: 0

## 2019-08-08 NOTE — CARE PLAN
Problem: Communication  Goal: The ability to communicate needs accurately and effectively will improve  Outcome: PROGRESSING AS EXPECTED  Intervention: Educate patient and significant other/support system about the plan of care, procedures, treatments, medications and allow for questions  Flowsheets (Taken 8/7/2019 1840)  Pt & Family Have Been Educated on Methods Available to Report Concerns Related to Care, Treatment, Services, and Patient Safety Issues: Yes     Problem: Skin Integrity  Goal: Risk for impaired skin integrity will decrease  Outcome: PROGRESSING SLOWER THAN EXPECTED  Intervention: Assess risk factors for impaired skin integrity and/or pressure ulcers  Note:   Assessed patient for signs and symptoms of skin breakdown frequently. Provided Q2H turns, waffle cushion in place, heels floated on pillows.

## 2019-08-08 NOTE — CARE PLAN
Problem: Infection  Goal: Will remain free from infection  Outcome: PROGRESSING SLOWER THAN EXPECTED     Problem: Bowel/Gastric:  Goal: Normal bowel function is maintained or improved  Outcome: PROGRESSING SLOWER THAN EXPECTED     Problem: Knowledge Deficit  Goal: Knowledge of disease process/condition, treatment plan, diagnostic tests, and medications will improve  Outcome: PROGRESSING SLOWER THAN EXPECTED  Goal: Knowledge of the prescribed therapeutic regimen will improve  Outcome: PROGRESSING SLOWER THAN EXPECTED     Problem: Pain Management  Goal: Pain level will decrease to patient's comfort goal  Outcome: PROGRESSING SLOWER THAN EXPECTED     Problem: Mobility  Goal: Risk for activity intolerance will decrease  Outcome: PROGRESSING SLOWER THAN EXPECTED     Problem: Skin Integrity  Goal: Risk for impaired skin integrity will decrease  Outcome: PROGRESSING SLOWER THAN EXPECTED     Problem: Communication  Goal: The ability to communicate needs accurately and effectively will improve  Outcome: PROGRESSING AS EXPECTED     Problem: Safety  Goal: Will remain free from injury  Outcome: PROGRESSING AS EXPECTED  Goal: Will remain free from falls  Outcome: PROGRESSING AS EXPECTED     Problem: Venous Thromboembolism (VTW)/Deep Vein Thrombosis (DVT) Prevention:  Goal: Patient will participate in Venous Thrombosis (VTE)/Deep Vein Thrombosis (DVT)Prevention Measures  Outcome: PROGRESSING AS EXPECTED     Problem: Bowel/Gastric:  Goal: Will not experience complications related to bowel motility  Outcome: PROGRESSING AS EXPECTED     Problem: Discharge Barriers/Planning  Goal: Patient's continuum of care needs will be met  Outcome: PROGRESSING AS EXPECTED     Problem: Fluid Volume:  Goal: Will maintain balanced intake and output  Outcome: PROGRESSING AS EXPECTED     Problem: Psychosocial Needs:  Goal: Level of anxiety will decrease  Outcome: PROGRESSING AS EXPECTED     Problem: Respiratory:  Goal: Respiratory status will  improve  Outcome: PROGRESSING AS EXPECTED

## 2019-08-08 NOTE — WOUND TEAM
"[facility]  Inpatient Services  Initial Wound and Skin Care Evaluation    Admission Date: 8/5/2019     Consult Date: 08/06/2019   HPI, PMH, SH: Reviewed    Unit where seen by Wound Team: [id number]     WOUND CONSULT RELATED TO:  BLE, head, sacrum, arms     SUBJECTIVE:  \"\"Why do we have to do it again? You just want to torture me.\"      Self Report / Pain Level:  Yelling when right leg lifted and when removing dressing, with gentle touch       OBJECTIVE:  Grouchy. Nursing had placed Adaptic, abdominal pads, and roll gauze per Wound Team instruction to RLE.    WOUND TYPE, LOCATION, CHARACTERISTICS (Pressure Injuries: location, stage, POA or date identified)       Wound 08/05/19 Venous Ulcer Leg from knee to ankle right (Active)   Wound Image        Site Assessment Pink;Red;Fragile;Epithelialization;Drainage;Bleeding;Swelling;Painful    Ella-wound Assessment Pink;Red;Swelling;Fragile;Painful    Margins Undefined edges    Drainage Amount Small    Drainage Description Sanguineous;Serosanguineous;Purulent    Non-staged Wound Description Partial thickness    Treatments Cleansed;Site care    Cleansing Normal Saline Irrigation    Periwound Protectant Not Applicable    Dressing Options Absorbent Abdominal Pad;Honey Colloid;Roll Gauze    Dressing Cleansing/Solutions Not Applicable    Dressing Changed New    Dressing Status Clean;Dry;Intact    Dressing Change Frequency Every 72 hrs    NEXT Dressing Change  08/10/19    NEXT Weekly Photo (Inpatient Only) 08/14/19    WOUND NURSE ONLY - Odor Mild    WOUND NURSE ONLY - Exposed Structures None    WOUND NURSE ONLY - Tissue Type and Percentage 40% red, 60% brown/tan/yellow        Wound 08/05/19 Venous Ulcer Leg venous stasis with partial thickness wounds to calf (Active)   Wound Image       Site Assessment NOEMÍ    Ella-wound Assessment NOEMÍ    Margins Undefined edges    Wound Length (cm) 3 cm    Wound Width (cm) 1 cm    Wound Depth (cm) 0.1 cm    Wound Surface Area (cm^2) 3 " cm^2    Tunneling 0 cm    Undermining 0 cm    Closure None    Drainage Amount NOEMÍ    Drainage Description Serous    Non-staged Wound Description Partial thickness    Treatments Site care    Cleansing Normal Saline Irrigation    Periwound Protectant Not Applicable    Dressing Options Roll Gauze    Dressing Cleansing/Solutions Not Applicable    Dressing Changed New    Dressing Status Clean;Dry;Intact    Dressing Change Frequency Daily    NEXT Dressing Change  08/08/19    NEXT Weekly Photo (Inpatient Only) 08/14/19    WOUND NURSE ONLY - Odor None    WOUND NURSE ONLY - Exposed Structures None    WOUND NURSE ONLY - Tissue Type and Percentage 100% red             Vascular:    Dorsal Pedal pulses:  1+  Posterior tib pulses:   not palpable, pain    RUBIO:      NA    Lab Values:    WBC:     @LABLAST(WBC)@  A1C:    No results found for: HBA1C        Culture:   Obtained yes, by RN, Results Methicillin Resistant Staphylococcus aureus   Heavy growth   This isolate is presumed to be clindamycin resistant based on   detection of inducible resistance.  Clindamycin may still   be effective in some patients.     INTERVENTIONS BY WOUND TEAM:  Removed dressing from RLE. Very painful for patient. Venous stasis skin changes to BLE from knees to ankles, worse on right. Purpura to BLE from knees to toes, worse on the right. Partial to full thickness wounds with crusted exudate on right, patient unable to tolerate cleaning or any debridement. Applied dressing. Left lower leg open to air, but notices 2 partial thickness wounds to calf so dressed those. Patient refused assessment of sacrum. Face, forehead with redness, abrasions, slight swelling to eye, nothing that needs advanced wound care. Bilateral arms with diffuse ecchymosis and abrasions, nothing that needs advanced wound care. Suspect that the purpura to BLE might be vasculitis.    Dressing selection:  RLE: honey hydrocolloid, abdominal pads, roll gauze. LLE: Adaptic over wounds, 1  abdominal pad to calf, roll gauze.         Interdisciplinary consultation: Patient, Bedside RN, patient's daughter    EVALUATION: Honey hydrocolloid provides topical antimicrobial treatment and encourages autolytic debridement of crusted exudate and necrotic tissue. Adapatic to prevent wounds from sticking to cover dressing. Also helps maintain adequate moisture needed fot wound healing.    Factors affecting wound healing: age, venous stasis, diabetes, MRSA infection to RLE  Goals: Steady decrease in wound area and depth weekly.    NURSING PLAN OF CARE ORDERS (X):    Dressing changes: See Dressing Care orders: X  Skin care: See Skin Care orders:   Rectal tube care: See Rectal Tube Care orders:   Other orders:    RSKIN: CURRENT (X) ORDERED (O):   Q shift Deejay:  X  Q shift pressure point assessments:  X  Pressure redistribution mattress            ANTONIO        X ICU bed  Bariatric ANTONIO         Bariatric foam           Heel float boots          Float Heels off Bed with Pillows   X            Barrier wipes         Barrier Cream         Barrier paste          Sacral silicone dressing         Silicone O2 tubing  X       Anchorfast         Cannula fixation Device (Tender )          Gray Foam Ear protectors           Trach with Optifoam split foam                 Waffle cushion        Waffle Overlay         Rectal tube or BMS         Antifungal tx      Interdry          Reposition q 2 hours  X      Up to chair        Ambulate      PT/OT        Dietician        Diabetes Education      PO  X   TF     TPN     NPO   # days   Other        WOUND TEAM PLAN OF CARE (X):   NPWT change 3 x week:        Dressing changes by wound team:       Follow up as needed:     X for sacrum  Other (explain):     Anticipated discharge plans (X):   SNF:           Home Care:           Outpatient Wound Center:            Self Care:            Other:    To be determined

## 2019-08-08 NOTE — PROGRESS NOTES
2 RN skin check complete with PHILLIP Denney.   Devices in place none.  Skin assessed under devices NA.    New potential pressure ulcers noted on sacrum. Wound consult placed: no; wound team aware and following.    Discoloration to BUE and BLE, scabs to bilat elbows, above eye, toes on bilat feet. Dry calloused skin and blanchable redness to bilat heels. Scattered abrasions and scratches to all extremities. Open sore to R elbow, mepilex placed. Open wounds to bilat calves, dressings in place per wound team. Two open areas to sacral area, one to R inner gluteal cleft, other between cheeks,  mepilex in place. Swelling and slow to eliezer redness to scrotum.     The following interventions in place repositioned q2 hours, barrier cream, mepilex, waffle mattress

## 2019-08-08 NOTE — PROGRESS NOTES
Hospital Medicine Daily Progress Note    Date of Service  8/8/2019    Chief Complaint  74 y.o. male admitted 8/5/2019 with collapse at home, has a history of atrial fibrillation, basal cell cancer, chronic lower extremity cellulitic changes, diabetes type 2, hypertension, COPD, history of pacemaker placement, was apparently found hours after several days after a ground-level fall, unable to get up, found by her grandson.  Patient was found with acute kidney injury, DKA, rhabdomyolysis, the patient is usually is seen at the [facility].    Hospital Course    Admitted after ground-level fall with DKA, MARIZOL, rhabdomyolysis      Interval Problem Update  Patient seen and examined today. ICU Care  Care and plan discussed in IDT/Hot rounds.  Lines and assistive devices reviewed.    Patient tolerating treatment and therapies.  All Data, Medication data reviewed.  Case discussed with nursing as available.  Plan of Care reviewed with patient and notified of changes.  8/6 the patient overall better, alert and oriented x3, complains of generalized pain, heart rate in the 70s to 80s, blood pressure normotensive, afebrile, on room air, lower extremity blisters being cultured, empiric antibiotics, improved urine output, bilateral submandibular tenderness per patient is fairly new.  Has not been evaluated.  8/7 the patient overall improved, alert and oriented x4, paced rhythm, normotensive, afebrile, tolerating diabetic nectar thick diet, improved urine output, 2 L nasal cannula oxygen, lower extremity wound culture positive for MRSA, elevated blood glucose with need for increased glycemic control, bilateral tender neck/submandibular swelling  8/8 patient feels better, 1 L nasal cannula, alert and oriented x4, paced rhythm, normotensive, afebrile, tolerating diet, neck swelling still present, slightly less, ultrasound nondiagnostic, renal function further improved,  Consultants/Specialty  Pulmonary critical care    Code  Status  DNR/DNI    Disposition  ICU, transition to medical unit  Review of Systems  Review of Systems   Constitutional: Positive for malaise/fatigue.   HENT: Negative.    Eyes: Negative.    Respiratory: Negative.    Cardiovascular: Negative.    Gastrointestinal: Negative.    Genitourinary: Negative.    Musculoskeletal: Positive for back pain, falls, joint pain, myalgias and neck pain.   Skin: Positive for rash.   Neurological: Negative.  Negative for focal weakness.   Endo/Heme/Allergies: Negative.    Psychiatric/Behavioral: Positive for memory loss. The patient is nervous/anxious.    All other systems reviewed and are negative.       Physical Exam  Temp:  [37.6 °C (99.6 °F)-37.7 °C (99.9 °F)] 37.6 °C (99.6 °F)  Pulse:  [70-72] 70  Resp:  [16-20] 20  BP: (132-142)/(61-75) 142/74  SpO2:  [83 %-98 %] 98 %    Physical Exam   Constitutional: He is oriented to person, place, and time. He appears well-developed and well-nourished.   Morbidly overweight   HENT:   Head: Normocephalic.   Bilateral submandibular swelling, mass   Eyes: Pupils are equal, round, and reactive to light.   Neck: Normal range of motion.   Cardiovascular: Normal rate, regular rhythm, normal heart sounds and intact distal pulses.   Capillary refill <3 secs   Pulmonary/Chest: Effort normal.   Abdominal: Soft. Bowel sounds are normal.   Genitourinary: Rectum normal and penis normal.   Musculoskeletal: Normal range of motion.   Neurological: He is alert and oriented to person, place, and time.   Skin: Skin is warm and dry. Rash noted. There is erythema. No cyanosis.   Lower extremity pustules  Erythematous patches   Psychiatric: He has a normal mood and affect.   Nursing note and vitals reviewed.      Fluids    Intake/Output Summary (Last 24 hours) at 8/8/2019 0738  Last data filed at 8/8/2019 0600  Gross per 24 hour   Intake 980 ml   Output 3525 ml   Net -2545 ml       Laboratory  Recent Labs     08/06/19  0500 08/07/19  0410 08/08/19  0415   WBC 9.5  7.8 7.9   RBC 4.84 4.71 4.67*   HEMOGLOBIN 14.8 14.5 14.3   HEMATOCRIT 44.1 43.3 42.7   MCV 91.1 91.9 91.4   MCH 30.6 30.8 30.6   MCHC 33.6* 33.5* 33.5*   RDW 44.6 45.2 45.7   PLATELETCT 164 141* 150*   MPV 10.4 9.9 9.9     Recent Labs     08/06/19  0500 08/07/19  0410 08/08/19  0415   SODIUM 141 144 145   POTASSIUM 4.4 4.0 3.8   CHLORIDE 111 112 114*   CO2 19* 22 22   GLUCOSE 76 375* 317*   * 86* 60*   CREATININE 3.37* 2.42* 2.06*   CALCIUM 7.9* 8.0* 8.2*                   Imaging  US-SOFT TISSUES OF HEAD - NECK   Final Result      Diffusely edematous neck soft tissues. As with any palpable abnormality, follow-up on clinical grounds is advised.      ACR TI-RADS Recommendations         Recommendations based on the American College of Rad[employer] Thyroid imaging, reporting and Data System (TI-RADS) 2017.      DX-HIP-UNILATERAL-WITH PELVIS-1 VIEW LEFT   Final Result      Degenerative changes without acute osseous abnormality.      DX-CHEST-PORTABLE (1 VIEW)   Final Result      Stable enlargement of the cardiomediastinal silhouette, improving minimal diffuse interstitial pulmonary edema and bilateral lower lobe atelectasis and/or consolidation.      US-RENAL   Final Result      1.  No evidence of solid renal mass or hydronephrosis.      2.  Bilateral renal echogenic foci likely representing small nephroliths.      CT-HEAD W/O   Final Result      1.  No evidence of acute intracranial process.      2.  Cerebral atrophy as well as periventricular chronic small vessel ischemic change.      DX-CHEST-PORTABLE (1 VIEW)   Final Result      1.  Enlarged cardiac mediastinal silhouette.   2.  Cardiac pacer.   3.  No acute abnormality.           Assessment/Plan  * Diabetic ketoacidosis without coma associated with type 1 diabetes mellitus (HCC)- (present on admission)  Assessment & Plan  DKA protocol, status post  Reinstitute home regimen once her oral intake improved  Sliding scale, adjust Lantus upwards, continue to  monitor    Physical debility- (present on admission)  Assessment & Plan  PT/OT    Class 3 severe obesity due to excess calories in adult ([facility])- (present on admission)  Assessment & Plan  Likely chronic    Elevated troponin  Assessment & Plan  Likely type II injury    Persistent atrial fibrillation ([facility])- (present on admission)  Assessment & Plan  Monitor on telemetry  IV fluids for concerns of dehydration    COPD (chronic obstructive pulmonary disease) ([facility])- (present on admission)  Assessment & Plan  Respiratory protocol, currently no wheezing    Hyperphosphatemia- (present on admission)  Assessment & Plan  Monitor labs  IV fluid    Lymphadenopathy, submandibular- (present on admission)  Assessment & Plan  Right submandibular large adenopathy.  Bilateral sub-and tubular swelling  Get ultrasound preliminary  Likely will need biopsy and/or further imaging once renal function is improved    Rash- (present on admission)  Assessment & Plan  Wound care to bilateral lower extremities  [facility]  Blisters positive for MRSA  Antibiotic course    MARIZOL (acute kidney injury) ([facility])- (present on admission)  Assessment & Plan  Giving IV fluids monitoring strict I's and O's and labs  Likely secondary to mild component of rhabdomyolysis and dehydration after being on the floor for 3 days  Unclear baseline  Continue to monitor, avoid nephrotoxins  Improving steadily    Pain of left hip joint- (present on admission)  Assessment & Plan    PT/OT  Patient previous using walker    Head trauma- (present on admission)  Assessment & Plan  Head CT without any acute findings.    Patient is able to move all the extremities    Rhabdomyolysis- (present on admission)  Assessment & Plan  Patient was down for 3 days.  Mildly elevated levels, has improved     Plan  Adjust insulin further reports, need to clarify home regimen  Antibiotics changed to Zyvox  Wound care to lower  extremities  Ongoing monitoring for renal impairment, hopefully to improve further  Ultrasound of bilateral neck nondiagnostic, suggest CT of the neck once the patient's renal function allows  Patient would require additional services/SNF  Wound care  Follow labs closely  Medically complex high risk  See orders  VTE prophylaxis: Heparin    I have performed a physical exam and reviewed and updated ROS and Plan today . In review of yesterday's note , there are no changes except as documented above.

## 2019-08-08 NOTE — PROGRESS NOTES
2 RN Skin Check    All previous wounds assess and noted. Skin tear and dark non-blanching spot noted in sacral fold, covered with mepilex, picture taken and uploaded.

## 2019-08-08 NOTE — PROGRESS NOTES
Transfer orders received for medical, report given to receiving RN, transferred to T333 at 1040, daughter at bedside and updated on POC. Darin d/c'ed prior to transfer d/t no longer being necessary. All questions answered. Belongings with patient and family.

## 2019-08-08 NOTE — CARE PLAN
Problem: Communication  Goal: The ability to communicate needs accurately and effectively will improve  Outcome: PROGRESSING AS EXPECTED     Problem: Safety  Goal: Will remain free from falls  Outcome: PROGRESSING AS EXPECTED

## 2019-08-09 ENCOUNTER — APPOINTMENT (OUTPATIENT)
Dept: RADIOLOGY | Facility: MEDICAL CENTER | Age: 74
DRG: 629 | End: 2019-08-09
Attending: NURSE PRACTITIONER
Payer: MEDICARE

## 2019-08-09 PROBLEM — L89.309 PRESSURE ULCER OF BUTTOCK: Status: ACTIVE | Noted: 2019-08-09

## 2019-08-09 LAB
ANION GAP SERPL CALC-SCNC: 11 MMOL/L (ref 0–11.9)
BUN SERPL-MCNC: 48 MG/DL (ref 8–22)
CALCIUM SERPL-MCNC: 8.1 MG/DL (ref 8.5–10.5)
CHLORIDE SERPL-SCNC: 112 MMOL/L (ref 96–112)
CO2 SERPL-SCNC: 21 MMOL/L (ref 20–33)
CREAT SERPL-MCNC: 1.88 MG/DL (ref 0.5–1.4)
ERYTHROCYTE [DISTWIDTH] IN BLOOD BY AUTOMATED COUNT: 46.4 FL (ref 35.9–50)
GLUCOSE BLD-MCNC: 222 MG/DL (ref 65–99)
GLUCOSE BLD-MCNC: 229 MG/DL (ref 65–99)
GLUCOSE BLD-MCNC: 233 MG/DL (ref 65–99)
GLUCOSE BLD-MCNC: 318 MG/DL (ref 65–99)
GLUCOSE SERPL-MCNC: 272 MG/DL (ref 65–99)
HCT VFR BLD AUTO: 43.9 % (ref 42–52)
HGB BLD-MCNC: 14.1 G/DL (ref 14–18)
MAGNESIUM SERPL-MCNC: 2 MG/DL (ref 1.5–2.5)
MCH RBC QN AUTO: 29.8 PG (ref 27–33)
MCHC RBC AUTO-ENTMCNC: 32.1 G/DL (ref 33.7–35.3)
MCV RBC AUTO: 92.8 FL (ref 81.4–97.8)
PHOSPHATE SERPL-MCNC: 2.6 MG/DL (ref 2.5–4.5)
PLATELET # BLD AUTO: 150 K/UL (ref 164–446)
PMV BLD AUTO: 10 FL (ref 9–12.9)
POTASSIUM SERPL-SCNC: 3.5 MMOL/L (ref 3.6–5.5)
RBC # BLD AUTO: 4.73 M/UL (ref 4.7–6.1)
SODIUM SERPL-SCNC: 144 MMOL/L (ref 135–145)
WBC # BLD AUTO: 6.2 K/UL (ref 4.8–10.8)

## 2019-08-09 PROCEDURE — 84100 ASSAY OF PHOSPHORUS: CPT

## 2019-08-09 PROCEDURE — A9270 NON-COVERED ITEM OR SERVICE: HCPCS | Performed by: NURSE PRACTITIONER

## 2019-08-09 PROCEDURE — 85027 COMPLETE CBC AUTOMATED: CPT

## 2019-08-09 PROCEDURE — 82962 GLUCOSE BLOOD TEST: CPT | Mod: 91

## 2019-08-09 PROCEDURE — 83735 ASSAY OF MAGNESIUM: CPT

## 2019-08-09 PROCEDURE — A9270 NON-COVERED ITEM OR SERVICE: HCPCS | Performed by: INTERNAL MEDICINE

## 2019-08-09 PROCEDURE — 80048 BASIC METABOLIC PNL TOTAL CA: CPT

## 2019-08-09 PROCEDURE — 36415 COLL VENOUS BLD VENIPUNCTURE: CPT

## 2019-08-09 PROCEDURE — 70491 CT SOFT TISSUE NECK W/DYE: CPT

## 2019-08-09 PROCEDURE — A9270 NON-COVERED ITEM OR SERVICE: HCPCS | Performed by: HOSPITALIST

## 2019-08-09 PROCEDURE — 700102 HCHG RX REV CODE 250 W/ 637 OVERRIDE(OP): Performed by: HOSPITALIST

## 2019-08-09 PROCEDURE — 99232 SBSQ HOSP IP/OBS MODERATE 35: CPT | Performed by: HOSPITALIST

## 2019-08-09 PROCEDURE — 700117 HCHG RX CONTRAST REV CODE 255: Performed by: NURSE PRACTITIONER

## 2019-08-09 PROCEDURE — 700102 HCHG RX REV CODE 250 W/ 637 OVERRIDE(OP): Performed by: INTERNAL MEDICINE

## 2019-08-09 PROCEDURE — 97166 OT EVAL MOD COMPLEX 45 MIN: CPT

## 2019-08-09 PROCEDURE — 770021 HCHG ROOM/CARE - ISO PRIVATE

## 2019-08-09 PROCEDURE — 97535 SELF CARE MNGMENT TRAINING: CPT

## 2019-08-09 PROCEDURE — 700102 HCHG RX REV CODE 250 W/ 637 OVERRIDE(OP): Performed by: NURSE PRACTITIONER

## 2019-08-09 PROCEDURE — 97162 PT EVAL MOD COMPLEX 30 MIN: CPT

## 2019-08-09 PROCEDURE — 700111 HCHG RX REV CODE 636 W/ 250 OVERRIDE (IP): Performed by: HOSPITALIST

## 2019-08-09 RX ORDER — SODIUM CHLORIDE AND POTASSIUM CHLORIDE 300; 900 MG/100ML; MG/100ML
INJECTION, SOLUTION INTRAVENOUS CONTINUOUS
Status: DISCONTINUED | OUTPATIENT
Start: 2019-08-09 | End: 2019-08-10

## 2019-08-09 RX ORDER — ATORVASTATIN CALCIUM 20 MG/1
20 TABLET, FILM COATED ORAL NIGHTLY
Status: DISCONTINUED | OUTPATIENT
Start: 2019-08-09 | End: 2019-08-17 | Stop reason: HOSPADM

## 2019-08-09 RX ORDER — METOPROLOL SUCCINATE 25 MG/1
25 TABLET, EXTENDED RELEASE ORAL 2 TIMES DAILY
Status: DISCONTINUED | OUTPATIENT
Start: 2019-08-09 | End: 2019-08-17 | Stop reason: HOSPADM

## 2019-08-09 RX ORDER — LOSARTAN POTASSIUM 50 MG/1
75 TABLET ORAL DAILY
Status: ON HOLD | COMMUNITY
End: 2019-08-17

## 2019-08-09 RX ORDER — METOPROLOL SUCCINATE 25 MG/1
12.5 TABLET, EXTENDED RELEASE ORAL 2 TIMES DAILY
Status: ON HOLD | COMMUNITY
End: 2019-08-17

## 2019-08-09 RX ORDER — INSULIN GLARGINE 100 [IU]/ML
45 INJECTION, SOLUTION SUBCUTANEOUS EVERY EVENING
Status: DISCONTINUED | OUTPATIENT
Start: 2019-08-09 | End: 2019-08-10

## 2019-08-09 RX ORDER — POTASSIUM CHLORIDE 20 MEQ/1
40 TABLET, EXTENDED RELEASE ORAL ONCE
Status: COMPLETED | OUTPATIENT
Start: 2019-08-09 | End: 2019-08-09

## 2019-08-09 RX ORDER — ATORVASTATIN CALCIUM 20 MG/1
20 TABLET, FILM COATED ORAL NIGHTLY
Status: ON HOLD | COMMUNITY
End: 2019-08-17

## 2019-08-09 RX ADMIN — INSULIN GLARGINE 45 UNITS: 100 INJECTION, SOLUTION SUBCUTANEOUS at 17:33

## 2019-08-09 RX ADMIN — OXYCODONE HYDROCHLORIDE 5 MG: 5 TABLET ORAL at 01:51

## 2019-08-09 RX ADMIN — INSULIN HUMAN 4 UNITS: 100 INJECTION, SOLUTION PARENTERAL at 21:42

## 2019-08-09 RX ADMIN — INSULIN HUMAN 4 UNITS: 100 INJECTION, SOLUTION PARENTERAL at 06:07

## 2019-08-09 RX ADMIN — OXYCODONE HYDROCHLORIDE 5 MG: 5 TABLET ORAL at 16:03

## 2019-08-09 RX ADMIN — ACETAMINOPHEN 1000 MG: 500 TABLET ORAL at 04:36

## 2019-08-09 RX ADMIN — METOPROLOL SUCCINATE 25 MG: 25 TABLET, EXTENDED RELEASE ORAL at 21:27

## 2019-08-09 RX ADMIN — HEPARIN SODIUM 5000 UNITS: 5000 INJECTION, SOLUTION INTRAVENOUS; SUBCUTANEOUS at 04:36

## 2019-08-09 RX ADMIN — OXYCODONE HYDROCHLORIDE 5 MG: 5 TABLET ORAL at 11:08

## 2019-08-09 RX ADMIN — IOHEXOL 80 ML: 350 INJECTION, SOLUTION INTRAVENOUS at 18:23

## 2019-08-09 RX ADMIN — LINEZOLID 600 MG: 600 TABLET, FILM COATED ORAL at 16:03

## 2019-08-09 RX ADMIN — OXYCODONE HYDROCHLORIDE 5 MG: 5 TABLET ORAL at 21:28

## 2019-08-09 RX ADMIN — LINEZOLID 600 MG: 600 TABLET, FILM COATED ORAL at 04:36

## 2019-08-09 RX ADMIN — ATORVASTATIN CALCIUM 20 MG: 20 TABLET, FILM COATED ORAL at 21:27

## 2019-08-09 RX ADMIN — INSULIN HUMAN 10 UNITS: 100 INJECTION, SOLUTION PARENTERAL at 11:11

## 2019-08-09 RX ADMIN — POTASSIUM CHLORIDE 40 MEQ: 20 TABLET, EXTENDED RELEASE ORAL at 16:03

## 2019-08-09 RX ADMIN — APIXABAN 5 MG: 5 TABLET, FILM COATED ORAL at 21:27

## 2019-08-09 RX ADMIN — INSULIN HUMAN 7 UNITS: 100 INJECTION, SOLUTION PARENTERAL at 16:10

## 2019-08-09 ASSESSMENT — COGNITIVE AND FUNCTIONAL STATUS - GENERAL
DAILY ACTIVITIY SCORE: 15
MOVING FROM LYING ON BACK TO SITTING ON SIDE OF FLAT BED: UNABLE
HELP NEEDED FOR BATHING: A LOT
CLIMB 3 TO 5 STEPS WITH RAILING: TOTAL
PERSONAL GROOMING: A LITTLE
MOVING TO AND FROM BED TO CHAIR: UNABLE
SUGGESTED CMS G CODE MODIFIER MOBILITY: CN
WALKING IN HOSPITAL ROOM: TOTAL
DRESSING REGULAR UPPER BODY CLOTHING: A LITTLE
TURNING FROM BACK TO SIDE WHILE IN FLAT BAD: UNABLE
EATING MEALS: A LITTLE
MOBILITY SCORE: 6
DRESSING REGULAR LOWER BODY CLOTHING: A LOT
TOILETING: A LOT
STANDING UP FROM CHAIR USING ARMS: TOTAL
SUGGESTED CMS G CODE MODIFIER DAILY ACTIVITY: CK

## 2019-08-09 ASSESSMENT — ENCOUNTER SYMPTOMS
CHILLS: 0
BACK PAIN: 1
GASTROINTESTINAL NEGATIVE: 1
FOCAL WEAKNESS: 0
NECK PAIN: 1
MYALGIAS: 1
NERVOUS/ANXIOUS: 0
FEVER: 0
FALLS: 1
EYES NEGATIVE: 1
RESPIRATORY NEGATIVE: 1
WEAKNESS: 1
DIAPHORESIS: 0

## 2019-08-09 ASSESSMENT — ACTIVITIES OF DAILY LIVING (ADL): TOILETING: INDEPENDENT

## 2019-08-09 ASSESSMENT — GAIT ASSESSMENTS: GAIT LEVEL OF ASSIST: UNABLE TO PARTICIPATE

## 2019-08-09 NOTE — CARE PLAN
Problem: Safety  Goal: Will remain free from falls  Outcome: MET  Patient on moderate risk for fall; Bed alarm utilized and educ about safety.      Problem: Venous Thromboembolism (VTW)/Deep Vein Thrombosis (DVT) Prevention:  Goal: Patient will participate in Venous Thrombosis (VTE)/Deep Vein Thrombosis (DVT)Prevention Measures  Continues on anticoagulant therapy for DVT prophylaxis     Problem: Pain Management  Goal: Pain level will decrease to patient's comfort goal  Outcome: PROGRESSING AS EXPECTED  Complained of pain to BLE; PRN Oxycodone and Tylenol given per MAR with + effect

## 2019-08-09 NOTE — RESPIRATORY CARE
COPD EDUCATION by COPD CLINICAL EDUCATOR  8/9/2019 at 7:38 AM by Leslie Moore     Patient's chart reviewed by COPD education team. Patient does not have an order for Respiratory Care Protocol, therefore the COPD education team cannot treat.

## 2019-08-09 NOTE — THERAPY
"Occupational Therapy Evaluation completed.   Functional Status:  ModA supine>sit with HOB elevated, MaxA LB dress, SPV seated grooming, declined attempt to stand 2/2 pain, SPV scooting, Leonie sit>supine.   Plan of Care: Will benefit from Occupational Therapy 3 times per week  Discharge Recommendations:  Equipment: Will Continue to Assess for Equipment Needs. Post-acute therapy Recommend post-acute placement for additional occupational therapy services prior to discharge home. Patient can tolerate post-acute therapies at a 5x/week frequency.    See \"Rehab Therapy-Acute\" Patient Summary Report for complete documentation.    Pt is previously independent 73yo male who fell at home and was unable to get up, found three days later with renal failure, rhadomyolysis and diabetic ketoacidosis. Pt presents to OT eval with large wounds on bilateral LEs, high pain, and high anxiety requiring extra time and throrough explanations prior to any movement or activity. Pt was able to move his legs toward EOB very slowly but without assist, provided ModA at shoulders to come to sit at EOB. Pt motivated to scoot himself in better position, accepted weight through bilat feet in order to scoot towards head of bed x10 small scoots. Pt returned to supine with Leonie to bring legs into bed. Acute OT to continue to work on activity tolerance for ADLs and functional transfers. Pt will require post-acute placement for continued inpatient therapies.   "

## 2019-08-09 NOTE — THERAPY
"Physical Therapy Evaluation completed.   Bed Mobility:  Supine to Sit: Moderate Assist  Transfers: Sit to Stand: Refused  Gait: Level Of Assist: Unable to Participate with No Equipment Needed       Plan of Care: Will benefit from Physical Therapy 3 times per week  Discharge Recommendations: Equipment: Will Continue to Assess for Equipment Needs. Post-acute therapy Discharge to a transitional care facility for continued skilled therapy services.    Pt admitted after being found down with multiple LE wounds. He presents most limited by discomfort to B LE and required a large amount of extra time for all tasks. During bed mobility, pt was able to initiate his B LE over bedside but required mod A for trunk to upright. Once seated, sustained at spv. Declined standing attempt due to discomfort. Pt reporting he has anxiety and prefers mobility to be performed at his own pace. While here, PT iwll follow to address LE weakness, impaired balance, and poor activity tolerance. Recommend placement.     See \"Rehab Therapy-Acute\" Patient Summary Report for complete documentation.     "

## 2019-08-10 PROBLEM — E83.39 HYPOPHOSPHATEMIA: Status: ACTIVE | Noted: 2019-08-10

## 2019-08-10 PROBLEM — S81.809A OPEN WOUND OF LEG: Status: ACTIVE | Noted: 2019-08-05

## 2019-08-10 PROBLEM — F45.8 FUNCTIONAL DYSPHAGIA: Status: ACTIVE | Noted: 2019-08-10

## 2019-08-10 LAB
ALBUMIN SERPL BCP-MCNC: 2.4 G/DL (ref 3.2–4.9)
ALBUMIN/GLOB SERPL: 0.8 G/DL
ALP SERPL-CCNC: 61 U/L (ref 30–99)
ALT SERPL-CCNC: 31 U/L (ref 2–50)
ANION GAP SERPL CALC-SCNC: 8 MMOL/L (ref 0–11.9)
AST SERPL-CCNC: 32 U/L (ref 12–45)
BASOPHILS # BLD AUTO: 0.5 % (ref 0–1.8)
BASOPHILS # BLD: 0.03 K/UL (ref 0–0.12)
BILIRUB SERPL-MCNC: 0.7 MG/DL (ref 0.1–1.5)
BUN SERPL-MCNC: 38 MG/DL (ref 8–22)
CALCIUM SERPL-MCNC: 8.2 MG/DL (ref 8.5–10.5)
CHLORIDE SERPL-SCNC: 114 MMOL/L (ref 96–112)
CO2 SERPL-SCNC: 24 MMOL/L (ref 20–33)
CREAT SERPL-MCNC: 1.49 MG/DL (ref 0.5–1.4)
EOSINOPHIL # BLD AUTO: 0.05 K/UL (ref 0–0.51)
EOSINOPHIL NFR BLD: 0.8 % (ref 0–6.9)
ERYTHROCYTE [DISTWIDTH] IN BLOOD BY AUTOMATED COUNT: 46.9 FL (ref 35.9–50)
GLOBULIN SER CALC-MCNC: 3.2 G/DL (ref 1.9–3.5)
GLUCOSE BLD-MCNC: 241 MG/DL (ref 65–99)
GLUCOSE BLD-MCNC: 254 MG/DL (ref 65–99)
GLUCOSE SERPL-MCNC: 208 MG/DL (ref 65–99)
HCT VFR BLD AUTO: 46.1 % (ref 42–52)
HGB BLD-MCNC: 14.8 G/DL (ref 14–18)
IMM GRANULOCYTES # BLD AUTO: 0.06 K/UL (ref 0–0.11)
IMM GRANULOCYTES NFR BLD AUTO: 1 % (ref 0–0.9)
LYMPHOCYTES # BLD AUTO: 0.51 K/UL (ref 1–4.8)
LYMPHOCYTES NFR BLD: 8.1 % (ref 22–41)
MAGNESIUM SERPL-MCNC: 1.8 MG/DL (ref 1.5–2.5)
MCH RBC QN AUTO: 30.3 PG (ref 27–33)
MCHC RBC AUTO-ENTMCNC: 32.1 G/DL (ref 33.7–35.3)
MCV RBC AUTO: 94.3 FL (ref 81.4–97.8)
MONOCYTES # BLD AUTO: 0.39 K/UL (ref 0–0.85)
MONOCYTES NFR BLD AUTO: 6.2 % (ref 0–13.4)
NEUTROPHILS # BLD AUTO: 5.25 K/UL (ref 1.82–7.42)
NEUTROPHILS NFR BLD: 83.4 % (ref 44–72)
NRBC # BLD AUTO: 0 K/UL
NRBC BLD-RTO: 0 /100 WBC
PHOSPHATE SERPL-MCNC: 2 MG/DL (ref 2.5–4.5)
PLATELET # BLD AUTO: 153 K/UL (ref 164–446)
PMV BLD AUTO: 9.8 FL (ref 9–12.9)
POTASSIUM SERPL-SCNC: 4 MMOL/L (ref 3.6–5.5)
PROT SERPL-MCNC: 5.6 G/DL (ref 6–8.2)
RBC # BLD AUTO: 4.89 M/UL (ref 4.7–6.1)
SODIUM SERPL-SCNC: 146 MMOL/L (ref 135–145)
WBC # BLD AUTO: 6.3 K/UL (ref 4.8–10.8)

## 2019-08-10 PROCEDURE — 84100 ASSAY OF PHOSPHORUS: CPT

## 2019-08-10 PROCEDURE — 700101 HCHG RX REV CODE 250: Performed by: NURSE PRACTITIONER

## 2019-08-10 PROCEDURE — 85025 COMPLETE CBC W/AUTO DIFF WBC: CPT

## 2019-08-10 PROCEDURE — 700101 HCHG RX REV CODE 250: Performed by: HOSPITALIST

## 2019-08-10 PROCEDURE — 83735 ASSAY OF MAGNESIUM: CPT

## 2019-08-10 PROCEDURE — 80053 COMPREHEN METABOLIC PANEL: CPT

## 2019-08-10 PROCEDURE — 82962 GLUCOSE BLOOD TEST: CPT

## 2019-08-10 PROCEDURE — A9270 NON-COVERED ITEM OR SERVICE: HCPCS | Performed by: NURSE PRACTITIONER

## 2019-08-10 PROCEDURE — 770021 HCHG ROOM/CARE - ISO PRIVATE

## 2019-08-10 PROCEDURE — 700102 HCHG RX REV CODE 250 W/ 637 OVERRIDE(OP): Performed by: HOSPITALIST

## 2019-08-10 PROCEDURE — A9270 NON-COVERED ITEM OR SERVICE: HCPCS | Performed by: INTERNAL MEDICINE

## 2019-08-10 PROCEDURE — 700105 HCHG RX REV CODE 258: Performed by: HOSPITALIST

## 2019-08-10 PROCEDURE — 36415 COLL VENOUS BLD VENIPUNCTURE: CPT

## 2019-08-10 PROCEDURE — 700102 HCHG RX REV CODE 250 W/ 637 OVERRIDE(OP): Performed by: NURSE PRACTITIONER

## 2019-08-10 PROCEDURE — 700102 HCHG RX REV CODE 250 W/ 637 OVERRIDE(OP): Performed by: INTERNAL MEDICINE

## 2019-08-10 PROCEDURE — 99233 SBSQ HOSP IP/OBS HIGH 50: CPT | Performed by: HOSPITALIST

## 2019-08-10 PROCEDURE — 700105 HCHG RX REV CODE 258: Performed by: NURSE PRACTITIONER

## 2019-08-10 RX ORDER — GABAPENTIN 400 MG/1
400 CAPSULE ORAL 3 TIMES DAILY
Status: ON HOLD | COMMUNITY
End: 2019-08-17

## 2019-08-10 RX ORDER — FINASTERIDE 5 MG/1
5 TABLET, FILM COATED ORAL DAILY
Status: ON HOLD | COMMUNITY
End: 2019-08-17

## 2019-08-10 RX ORDER — INSULIN GLARGINE 100 [IU]/ML
50 INJECTION, SOLUTION SUBCUTANEOUS EVERY EVENING
Status: DISCONTINUED | OUTPATIENT
Start: 2019-08-10 | End: 2019-08-11

## 2019-08-10 RX ORDER — CHLORAL HYDRATE 500 MG
1000 CAPSULE ORAL
Status: DISCONTINUED | OUTPATIENT
Start: 2019-08-10 | End: 2019-08-17 | Stop reason: HOSPADM

## 2019-08-10 RX ORDER — GABAPENTIN 400 MG/1
400 CAPSULE ORAL 2 TIMES DAILY
Status: DISCONTINUED | OUTPATIENT
Start: 2019-08-10 | End: 2019-08-12

## 2019-08-10 RX ORDER — FINASTERIDE 5 MG/1
5 TABLET, FILM COATED ORAL DAILY
Status: DISCONTINUED | OUTPATIENT
Start: 2019-08-10 | End: 2019-08-17 | Stop reason: HOSPADM

## 2019-08-10 RX ORDER — SODIUM CHLORIDE, SODIUM LACTATE, POTASSIUM CHLORIDE, CALCIUM CHLORIDE 600; 310; 30; 20 MG/100ML; MG/100ML; MG/100ML; MG/100ML
INJECTION, SOLUTION INTRAVENOUS CONTINUOUS
Status: DISCONTINUED | OUTPATIENT
Start: 2019-08-10 | End: 2019-08-11

## 2019-08-10 RX ORDER — CHLORAL HYDRATE 500 MG
1000 CAPSULE ORAL
Status: ON HOLD | COMMUNITY
End: 2019-08-17

## 2019-08-10 RX ADMIN — LINEZOLID 600 MG: 600 TABLET, FILM COATED ORAL at 18:15

## 2019-08-10 RX ADMIN — INSULIN HUMAN 4 UNITS: 100 INJECTION, SOLUTION PARENTERAL at 20:58

## 2019-08-10 RX ADMIN — OMEGA-3 FATTY ACIDS CAP 1000 MG 1000 MG: 1000 CAP at 18:04

## 2019-08-10 RX ADMIN — LINEZOLID 600 MG: 600 TABLET, FILM COATED ORAL at 04:49

## 2019-08-10 RX ADMIN — GABAPENTIN 400 MG: 400 CAPSULE ORAL at 18:03

## 2019-08-10 RX ADMIN — FINASTERIDE 5 MG: 5 TABLET, FILM COATED ORAL at 18:03

## 2019-08-10 RX ADMIN — METOPROLOL SUCCINATE 25 MG: 25 TABLET, EXTENDED RELEASE ORAL at 18:16

## 2019-08-10 RX ADMIN — VITAMIN D, TAB 1000IU (100/BT) 2000 UNITS: 25 TAB at 18:03

## 2019-08-10 RX ADMIN — SODIUM CHLORIDE, POTASSIUM CHLORIDE, SODIUM LACTATE AND CALCIUM CHLORIDE: 600; 310; 30; 20 INJECTION, SOLUTION INTRAVENOUS at 08:42

## 2019-08-10 RX ADMIN — OXYCODONE HYDROCHLORIDE 5 MG: 5 TABLET ORAL at 12:01

## 2019-08-10 RX ADMIN — INSULIN HUMAN 7 UNITS: 100 INJECTION, SOLUTION PARENTERAL at 17:00

## 2019-08-10 RX ADMIN — INSULIN HUMAN 4 UNITS: 100 INJECTION, SOLUTION PARENTERAL at 06:27

## 2019-08-10 RX ADMIN — APIXABAN 5 MG: 5 TABLET, FILM COATED ORAL at 18:04

## 2019-08-10 RX ADMIN — POTASSIUM CHLORIDE AND SODIUM CHLORIDE: 900; 300 INJECTION, SOLUTION INTRAVENOUS at 04:45

## 2019-08-10 RX ADMIN — INSULIN HUMAN 7 UNITS: 100 INJECTION, SOLUTION PARENTERAL at 12:26

## 2019-08-10 RX ADMIN — POTASSIUM PHOSPHATE, MONOBASIC AND POTASSIUM PHOSPHATE, DIBASIC 30 MMOL: 224; 236 INJECTION, SOLUTION, CONCENTRATE INTRAVENOUS at 12:45

## 2019-08-10 RX ADMIN — METOPROLOL SUCCINATE 25 MG: 25 TABLET, EXTENDED RELEASE ORAL at 04:48

## 2019-08-10 RX ADMIN — ATORVASTATIN CALCIUM 20 MG: 20 TABLET, FILM COATED ORAL at 21:00

## 2019-08-10 RX ADMIN — APIXABAN 5 MG: 5 TABLET, FILM COATED ORAL at 04:49

## 2019-08-10 RX ADMIN — OXYCODONE HYDROCHLORIDE 5 MG: 5 TABLET ORAL at 04:46

## 2019-08-10 RX ADMIN — INSULIN GLARGINE 50 UNITS: 100 INJECTION, SOLUTION SUBCUTANEOUS at 18:00

## 2019-08-10 ASSESSMENT — ENCOUNTER SYMPTOMS
MYALGIAS: 1
NERVOUS/ANXIOUS: 0
FEVER: 0
FOCAL WEAKNESS: 0
DIZZINESS: 0
EYES NEGATIVE: 1
RESPIRATORY NEGATIVE: 1
BACK PAIN: 0
WEAKNESS: 1
FALLS: 1
CHILLS: 0
GASTROINTESTINAL NEGATIVE: 1
HEADACHES: 0
DIAPHORESIS: 0
NECK PAIN: 1

## 2019-08-10 ASSESSMENT — CHA2DS2 SCORE
CHF OR LEFT VENTRICULAR DYSFUNCTION: NO
PRIOR STROKE OR TIA OR THROMBOEMBOLISM: NO
HYPERTENSION: YES
SEX: MALE
VASCULAR DISEASE: NO
CHA2DS2 VASC SCORE: 3
DIABETES: YES
AGE 75 OR GREATER: NO
AGE 65 TO 74: YES

## 2019-08-10 NOTE — WOUND TEAM
Wound team in to see pt for tissue damage around sacrum.  Observed MAD with caitie maceration.  Cleansed with damp cloth and applied barrier paste.  Pt has a deep crevice along the spine in the lower back that retains moisture.  Nursing orders written.     Attempted to see pt for leg wounds, pt states nurse just did dressing change and requested the dressing not be changed tonight.  WT will do next dressing change and assess for compression.    RUBIO ordered

## 2019-08-10 NOTE — PROGRESS NOTES
2 RN skin check complete with PHILLIP Denney.   Devices in place none.  Skin assessed under devices NA.     New potential pressure ulcers noted on sacrum. Wound consult placed: no; wound team aware and following.     Discoloration to BUE and BLE, scabs to bilat elbows, above eye, toes on bilat feet. Dry calloused skin and blanchable redness to bilat heels. Scattered abrasions and scratches to all extremities. Open sore to R elbow, mepilex placed. Blanchable redness to bilat elbows. Open wounds to bilat calves, dressings in place per wound team. Two open areas to sacral area, one to R inner gluteal cleft, other between cheeks. Swelling and slow to eliezer redness to scrotum.      The following interventions in place repositioned q2 hours, barrier cream, mepilex, waffle mattress

## 2019-08-10 NOTE — PROGRESS NOTES
Patients daughter brought in home medication bottles. Med Rec updated, Dr. Brennan informed and instructions received from Dr. Brennan. Medications given back to daughter to take home.

## 2019-08-10 NOTE — PROGRESS NOTES
Hospital Medicine Daily Progress Note    Date of Service  8/9/2019    Chief Complaint  ground-level fall with DKA, MARIZOL, rhabdomyolysis    Hospital Course    74 y.o. male admitted 8/5/2019 with a past medical history of atrial fibrillation, basal cell cancer, chronic lower extremity cellulitic changes, diabetes type 2, hypertension, COPD, and pacemaker placement. He suffered a ground level fall after tripping over bedding at home. He was unable to get up and was found after 3 days by his grandson.  Patient was found with MARIZOL, DKA, and rhabdomyolysis. The patient is usually seen at the [facility]. He was admitted to the ICU then transferred to the medical unit after three days. His bilateral leg wound cultures are positive for MRSA.      Interval Problem Update  8/9: The patient states he feels terrible, but overall improved since admission. He is alert and oriented x4. His right leg hurts the worst, but if he keeps it still it is tolerable. He is now on room air and continues to tolerate a diabetic nectar thick diet. His blood glucose remains elevated. Bilateral neck/submandibular swelling is very tender to palpation.    Consultants/Specialty  Pulmonary critical care    Code Status  DNR/DNI    Disposition  ICU, transition to medical unit  Review of Systems  Review of Systems   Constitutional: Positive for malaise/fatigue. Negative for chills, diaphoresis and fever.   HENT: Negative.    Eyes: Negative.    Respiratory: Negative.    Cardiovascular: Positive for leg swelling.   Gastrointestinal: Negative.    Genitourinary: Negative.    Musculoskeletal: Positive for back pain, falls, joint pain, myalgias and neck pain.   Skin: Positive for rash. Negative for itching.   Neurological: Positive for weakness. Negative for focal weakness.   Endo/Heme/Allergies: Negative.    Psychiatric/Behavioral: The patient is not nervous/anxious.    All other systems reviewed and are negative.       Physical Exam  Temp:  [36.4 °C  (97.6 °F)-37.2 °C (99 °F)] 37.2 °C (99 °F)  Pulse:  [69-71] 70  Resp:  [18-19] 18  BP: (141-152)/(69-85) 151/85  SpO2:  [94 %-95 %] 94 %    Physical Exam   Constitutional: He is oriented to person, place, and time. He appears well-developed and well-nourished. No distress.   Morbidly overweight   HENT:   Head: Normocephalic.   Bilateral submandibular swelling, mass   Eyes: Pupils are equal, round, and reactive to light. Conjunctivae and EOM are normal.   Neck: Normal range of motion. Muscular tenderness present. Carotid bruit is not present. Edema present.   Cardiovascular: Normal rate, regular rhythm, normal heart sounds and intact distal pulses. Exam reveals no gallop, no distant heart sounds and no friction rub.   No murmur heard.  Capillary refill <3 secs   Pulmonary/Chest: Effort normal and breath sounds normal.   Abdominal: Soft. Bowel sounds are normal. He exhibits no distension and no mass. There is no tenderness. There is no rebound and no guarding.   Difficult to assess given body habitus   Genitourinary: Rectum normal and penis normal. Right testis shows swelling. Left testis shows swelling.   Musculoskeletal: Normal range of motion.        Back:    Neurological: He is alert and oriented to person, place, and time.   Skin: Skin is warm and dry. Rash noted. He is not diaphoretic. There is erythema. No cyanosis.   Lower extremity pustules  Erythematous patches   Psychiatric: He has a normal mood and affect. His speech is normal and behavior is normal. Thought content normal.   Nursing note and vitals reviewed.      Fluids    Intake/Output Summary (Last 24 hours) at 8/9/2019 1934  Last data filed at 8/9/2019 1800  Gross per 24 hour   Intake 720 ml   Output 1100 ml   Net -380 ml       Laboratory  Recent Labs     08/07/19  0410 08/08/19  0415 08/09/19  0514   WBC 7.8 7.9 6.2   RBC 4.71 4.67* 4.73   HEMOGLOBIN 14.5 14.3 14.1   HEMATOCRIT 43.3 42.7 43.9   MCV 91.9 91.4 92.8   MCH 30.8 30.6 29.8   MCHC 33.5*  33.5* 32.1*   RDW 45.2 45.7 46.4   PLATELETCT 141* 150* 150*   MPV 9.9 9.9 10.0     Recent Labs     08/07/19  0410 08/08/19  0415 08/09/19  0514   SODIUM 144 145 144   POTASSIUM 4.0 3.8 3.5*   CHLORIDE 112 114* 112   CO2 22 22 21   GLUCOSE 375* 317* 272*   BUN 86* 60* 48*   CREATININE 2.42* 2.06* 1.88*   CALCIUM 8.0* 8.2* 8.1*                   Imaging  CT-SOFT TISSUE NECK WITH   Final Result      1.  There is no evidence of neck mass.   2.  There is mild edema of the parotid glands and submandibular glands with scattered parotid gland calcifications, probably postinflammatory.   3.  The airway is patent.   4.  There are no suspicious thyroid nodules.   5.  There is atherosclerosis of the aorta, coronary arteries and carotid arteries.   6.  There may be mild edema in the upper lung zones.      US-SOFT TISSUES OF HEAD - NECK   Final Result      Diffusely edematous neck soft tissues. As with any palpable abnormality, follow-up on clinical grounds is advised.      ACR TI-RADS Recommendations         Recommendations based on the American College [employer] Thyroid imaging, reporting and Data System (TI-RADS) 2017.      DX-HIP-UNILATERAL-WITH PELVIS-1 VIEW LEFT   Final Result      Degenerative changes without acute osseous abnormality.      DX-CHEST-PORTABLE (1 VIEW)   Final Result      Stable enlargement of the cardiomediastinal silhouette, improving minimal diffuse interstitial pulmonary edema and bilateral lower lobe atelectasis and/or consolidation.      US-RENAL   Final Result      1.  No evidence of solid renal mass or hydronephrosis.      2.  Bilateral renal echogenic foci likely representing small nephroliths.      CT-HEAD W/O   Final Result      1.  No evidence of acute intracranial process.      2.  Cerebral atrophy as well as periventricular chronic small vessel ischemic change.      DX-CHEST-PORTABLE (1 VIEW)   Final Result      1.  Enlarged cardiac mediastinal silhouette.   2.  Cardiac pacer.   3.  No  acute abnormality.           Assessment/Plan  * Diabetic ketoacidosis without coma associated with type 1 diabetes mellitus (HCC)- (present on admission)  Assessment & Plan  Continue to hydrate  DKA protocol, status post  Sliding scale, adjusted Lantus from 40 to 45 units, continue to monitor    Pressure ulcer of buttock  Assessment & Plan  Wound care following    Physical debility- (present on admission)  Assessment & Plan  PT/OT recommends placement    Class 3 severe obesity due to excess calories in adult ([facility])- (present on admission)  Assessment & Plan  Likely chronic    Elevated troponin  Assessment & Plan  Likely type II injury    Persistent atrial fibrillation ([facility])- (present on admission)  Assessment & Plan  IV fluids for concerns of dehydration    COPD (chronic obstructive pulmonary disease) ([facility])- (present on admission)  Assessment & Plan  Respiratory protocol, currently no wheezing    Hyperphosphatemia- (present on admission)  Assessment & Plan  Monitor labs  IV fluid  resolved    Lymphadenopathy, submandibular- (present on admission)  Assessment & Plan  Right submandibular large adenopathy.  Bilateral sub-and tubular swelling  Ultrasound nondiagnostic  CT showed mild edema of the parotid glands and submandibular glands with scattered parotid gland calcifications, probably postinflammatory  Will consult ENT    Rash- (present on admission)  Assessment & Plan  Wound care to bilateral lower extremities  [facility]  Blisters positive for MRSA  Antibiotic course: Zyvox  Consulted LPS for Right lower leg    MARIZOL (acute kidney injury) ([facility])- (present on admission)  Assessment & Plan  Giving IV fluids monitoring strict I's and O's and labs, added potassium  Likely secondary to mild component of rhabdomyolysis and dehydration after being on the floor for 3 days  Unclear baseline  Continue to monitor, avoid nephrotoxins  Improving  steadily    Pain of left hip joint- (present on admission)  Assessment & Plan    PT/OT recommend placement for continued inpatient therapies  Patient previous using walker    Head trauma- (present on admission)  Assessment & Plan  Head CT without any acute findings.    Patient is able to move all the extremities    Rhabdomyolysis- (present on admission)  Assessment & Plan  Patient was down for 3 days.  Mildly elevated levels, has improved     VTE prophylaxis: Heparin    SUSAN Pabon.

## 2019-08-10 NOTE — CARE PLAN
Problem: Pain Management  Goal: Pain level will decrease to patient's comfort goal  Outcome: PROGRESSING AS EXPECTED   Pain on right leg controlled with oxy  Problem: Skin Integrity  Goal: Risk for impaired skin integrity will decrease  Outcome: PROGRESSING AS EXPECTED  Q  2 hour turns enforced. Patient to be kept dry. Barrier cream applied.

## 2019-08-10 NOTE — PROGRESS NOTES
Hospital Medicine Daily Progress Note    Date of Service  8/10/2019    Chief Complaint  ground-level fall with DKA, MARIZOL, rhabdomyolysis    Hospital Course    74 y.o. male admitted 8/5/2019 with a past medical history of atrial fibrillation, basal cell cancer, chronic lower extremity cellulitic changes, diabetes type 2, hypertension, COPD, and pacemaker placement. He suffered a ground level fall after tripping over bedding at home. He was unable to get up and was found after 3 days by his grandson.  Patient was found with MARIZOL, DKA, and rhabdomyolysis. The patient is usually seen at the [facility]. He was admitted to the ICU then transferred to the medical unit after three days. His bilateral leg wound cultures are positive for MRSA.      Interval Problem Update  8/9: The patient states he feels terrible, but overall improved since admission. He is alert and oriented x4. His right leg hurts the worst, but if he keeps it still it is tolerable. He is now on room air and continues to tolerate a diabetic nectar thick diet. His blood glucose remains elevated. Bilateral neck/submandibular swelling is very tender to palpation.    Consultants/Specialty  Pulmonary critical care    Code Status  DNR/DNI    Disposition  ICU, transition to medical unit  Review of Systems  Review of Systems   Constitutional: Positive for malaise/fatigue. Negative for chills, diaphoresis and fever.   HENT: Negative.    Eyes: Negative.    Respiratory: Negative.    Cardiovascular: Positive for leg swelling.   Gastrointestinal: Negative.    Genitourinary: Negative.    Musculoskeletal: Positive for falls, joint pain, myalgias and neck pain. Negative for back pain.   Skin: Positive for rash. Negative for itching.   Neurological: Positive for weakness. Negative for dizziness, focal weakness and headaches.   Endo/Heme/Allergies: Negative.    Psychiatric/Behavioral: The patient is not nervous/anxious.    All other systems reviewed and are negative.        Physical Exam  Temp:  [36.7 °C (98 °F)-37.3 °C (99.1 °F)] 37.1 °C (98.7 °F)  Pulse:  [70-72] 70  Resp:  [17-20] 20  BP: (136-151)/(76-85) 136/76  SpO2:  [93 %-95 %] 95 %    Physical Exam   Constitutional: He is oriented to person, place, and time. He appears well-developed and well-nourished. No distress.   Morbidly overweight   HENT:   Head: Normocephalic.   Mouth/Throat: Mucous membranes are dry. Abnormal dentition.   Bilateral submandibular swelling, mass  Hoarse voice, difficulty swallowing   Eyes: Pupils are equal, round, and reactive to light. Conjunctivae and EOM are normal.   Neck: Normal range of motion. Muscular tenderness present. Carotid bruit is not present. Edema present.   Cardiovascular: Normal rate, regular rhythm and intact distal pulses. Exam reveals distant heart sounds. Exam reveals no gallop and no friction rub.   No murmur heard.      Capillary refill <3 secs   Pulmonary/Chest: Effort normal and breath sounds normal.   Abdominal: Soft. Bowel sounds are normal. He exhibits no distension and no mass. There is no tenderness. There is no rebound and no guarding.   Difficult to assess given body habitus   Genitourinary: Rectum normal and penis normal. Right testis shows swelling. Left testis shows swelling.   Musculoskeletal: Normal range of motion. He exhibits tenderness.        Back:    Neurological: He is alert and oriented to person, place, and time.   Skin: Skin is warm and dry. Rash noted. He is not diaphoretic. There is erythema. No cyanosis.   Lower extremity pustules  Erythematous patches   Psychiatric: He has a normal mood and affect. His speech is normal and behavior is normal. Thought content normal.   Nursing note and vitals reviewed.      Fluids    Intake/Output Summary (Last 24 hours) at 8/10/2019 1437  Last data filed at 8/10/2019 0600  Gross per 24 hour   Intake 360 ml   Output 750 ml   Net -390 ml       Laboratory  Recent Labs     08/08/19  0415 08/09/19  0514 08/10/19  0428    WBC 7.9 6.2 6.3   RBC 4.67* 4.73 4.89   HEMOGLOBIN 14.3 14.1 14.8   HEMATOCRIT 42.7 43.9 46.1   MCV 91.4 92.8 94.3   MCH 30.6 29.8 30.3   MCHC 33.5* 32.1* 32.1*   RDW 45.7 46.4 46.9   PLATELETCT 150* 150* 153*   MPV 9.9 10.0 9.8     Recent Labs     08/08/19  0415 08/09/19  0514 08/10/19  0421   SODIUM 145 144 146*   POTASSIUM 3.8 3.5* 4.0   CHLORIDE 114* 112 114*   CO2 22 21 24   GLUCOSE 317* 272* 208*   BUN 60* 48* 38*   CREATININE 2.06* 1.88* 1.49*   CALCIUM 8.2* 8.1* 8.2*                   Imaging  CT-SOFT TISSUE NECK WITH   Final Result      1.  There is no evidence of neck mass.   2.  There is mild edema of the parotid glands and submandibular glands with scattered parotid gland calcifications, probably postinflammatory.   3.  The airway is patent.   4.  There are no suspicious thyroid nodules.   5.  There is atherosclerosis of the aorta, coronary arteries and carotid arteries.   6.  There may be mild edema in the upper lung zones.      US-SOFT TISSUES OF HEAD - NECK   Final Result      Diffusely edematous neck soft tissues. As with any palpable abnormality, follow-up on clinical grounds is advised.      ACR TI-RADS Recommendations         Recommendations based on the American College of Radiology Thyroid imaging, reporting and Data System (TI-RADS) 2017.      DX-HIP-UNILATERAL-WITH PELVIS-1 VIEW LEFT   Final Result      Degenerative changes without acute osseous abnormality.      DX-CHEST-PORTABLE (1 VIEW)   Final Result      Stable enlargement of the cardiomediastinal silhouette, improving minimal diffuse interstitial pulmonary edema and bilateral lower lobe atelectasis and/or consolidation.      US-RENAL   Final Result      1.  No evidence of solid renal mass or hydronephrosis.      2.  Bilateral renal echogenic foci likely representing small nephroliths.      CT-HEAD W/O   Final Result      1.  No evidence of acute intracranial process.      2.  Cerebral atrophy as well as periventricular chronic small  vessel ischemic change.      DX-CHEST-PORTABLE (1 VIEW)   Final Result      1.  Enlarged cardiac mediastinal silhouette.   2.  Cardiac pacer.   3.  No acute abnormality.           Assessment/Plan  * Diabetic ketoacidosis without coma associated with type 1 diabetes mellitus (HCC)- (present on admission)  Assessment & Plan  Continue to hydrate  DKA protocol, status post  Sliding scale, adjusted Lantus from 45 to 50 units, continue to monitor  HgA1c ordered    Functional dysphagia  Assessment & Plan  Secondary to Acute sialadenitis. Thickened liquids. Continue to treat underlying cause.     Hypophosphatemia  Assessment & Plan  Encourage food intake    Pressure ulcer of buttock  Assessment & Plan  Wound care following    Physical debility- (present on admission)  Assessment & Plan  PT/OT recommends placement    Class 3 severe obesity due to excess calories in adult ([facility])- (present on admission)  Assessment & Plan  Likely chronic    Elevated troponin  Assessment & Plan  Likely type II injury    Persistent atrial fibrillation ([facility])- (present on admission)  Assessment & Plan  IV fluids for concerns of dehydration    COPD (chronic obstructive pulmonary disease) ([facility])- (present on admission)  Assessment & Plan  Respiratory protocol, currently no wheezing    Hyperphosphatemia- (present on admission)  Assessment & Plan  Monitor labs  IV fluid  resolved    Lymphadenopathy, submandibular- (present on admission)  Assessment & Plan  Right submandibular large adenopathy.  Bilateral sub-and tubular swelling  Ultrasound nondiagnostic  CT showed mild edema of the parotid glands and submandibular glands with scattered parotid gland calcifications, probably postinflammatory  Likely acute submandibular Sialadinitis, most commonly caused by s. Aureus. Zyvox should cover. IV fluids and encouraged oral intake.  If no improvement, will consult ENT    Open wound of leg- (present on admission)  Assessment & Plan  Wound care to bilateral lower  extremities  [facility]  Blisters positive for MRSA  Antibiotic course: Zyvox  Consulted LPS for Right lower leg    MARIZOL (acute kidney injury) (HCC)- (present on admission)  Assessment & Plan  Giving IV fluids monitoring strict I's and O's and labs, K is now 4.0, switched to LR IV fluids  Likely secondary to mild component of rhabdomyolysis and dehydration after being on the floor for 3 days  Unclear baseline  Continue to monitor, avoid nephrotoxins  Improving steadily    Pain of left hip joint- (present on admission)  Assessment & Plan    PT/OT recommend placement for continued inpatient therapies  Patient previous using walker    Head trauma- (present on admission)  Assessment & Plan  Head CT without any acute findings.    Patient is able to move all the extremities    Rhabdomyolysis- (present on admission)  Assessment & Plan  Patient was down for 3 days.  Mildly elevated levels, has improved     VTE prophylaxis: Heparin    SUSAN Pabon.

## 2019-08-10 NOTE — CARE PLAN
Problem: Communication  Goal: The ability to communicate needs accurately and effectively will improve  Outcome: PROGRESSING AS EXPECTED     Problem: Safety  Goal: Will remain free from injury  Outcome: PROGRESSING AS EXPECTED     Problem: Venous Thromboembolism (VTW)/Deep Vein Thrombosis (DVT) Prevention:  Goal: Patient will participate in Venous Thrombosis (VTE)/Deep Vein Thrombosis (DVT)Prevention Measures  Outcome: PROGRESSING AS EXPECTED     Problem: Knowledge Deficit  Goal: Knowledge of disease process/condition, treatment plan, diagnostic tests, and medications will improve  Outcome: PROGRESSING AS EXPECTED     Patient able to express needs. No falls while here, call light within reach, hourly rounding done. No s/s of DVT. Pain controlled w/ prn pain meds.

## 2019-08-11 ENCOUNTER — APPOINTMENT (OUTPATIENT)
Dept: RADIOLOGY | Facility: MEDICAL CENTER | Age: 74
DRG: 629 | End: 2019-08-11
Attending: HOSPITALIST
Payer: MEDICARE

## 2019-08-11 LAB
ALBUMIN SERPL BCP-MCNC: 2.4 G/DL (ref 3.2–4.9)
ALBUMIN/GLOB SERPL: 0.8 G/DL
ALP SERPL-CCNC: 70 U/L (ref 30–99)
ALT SERPL-CCNC: 40 U/L (ref 2–50)
ANION GAP SERPL CALC-SCNC: 7 MMOL/L (ref 0–11.9)
AST SERPL-CCNC: 46 U/L (ref 12–45)
BASOPHILS # BLD AUTO: 0.6 % (ref 0–1.8)
BASOPHILS # BLD: 0.04 K/UL (ref 0–0.12)
BILIRUB SERPL-MCNC: 0.7 MG/DL (ref 0.1–1.5)
BUN SERPL-MCNC: 31 MG/DL (ref 8–22)
CALCIUM SERPL-MCNC: 7.8 MG/DL (ref 8.5–10.5)
CHLORIDE SERPL-SCNC: 113 MMOL/L (ref 96–112)
CHLORIDE UR-SCNC: 51 MMOL/L
CO2 SERPL-SCNC: 23 MMOL/L (ref 20–33)
CREAT SERPL-MCNC: 1.62 MG/DL (ref 0.5–1.4)
CREAT UR-MCNC: 141.9 MG/DL
CREAT UR-MCNC: 146.2 MG/DL
EOSINOPHIL # BLD AUTO: 0.07 K/UL (ref 0–0.51)
EOSINOPHIL NFR BLD: 1.1 % (ref 0–6.9)
ERYTHROCYTE [DISTWIDTH] IN BLOOD BY AUTOMATED COUNT: 47.6 FL (ref 35.9–50)
EST. AVERAGE GLUCOSE BLD GHB EST-MCNC: 183 MG/DL
GLOBULIN SER CALC-MCNC: 2.9 G/DL (ref 1.9–3.5)
GLUCOSE BLD-MCNC: 198 MG/DL (ref 65–99)
GLUCOSE BLD-MCNC: 206 MG/DL (ref 65–99)
GLUCOSE BLD-MCNC: 221 MG/DL (ref 65–99)
GLUCOSE BLD-MCNC: 279 MG/DL (ref 65–99)
GLUCOSE SERPL-MCNC: 224 MG/DL (ref 65–99)
HBA1C MFR BLD: 8 % (ref 0–5.6)
HCT VFR BLD AUTO: 41.6 % (ref 42–52)
HGB BLD-MCNC: 13.4 G/DL (ref 14–18)
IMM GRANULOCYTES # BLD AUTO: 0.05 K/UL (ref 0–0.11)
IMM GRANULOCYTES NFR BLD AUTO: 0.8 % (ref 0–0.9)
LYMPHOCYTES # BLD AUTO: 0.68 K/UL (ref 1–4.8)
LYMPHOCYTES NFR BLD: 10.6 % (ref 22–41)
MCH RBC QN AUTO: 30.6 PG (ref 27–33)
MCHC RBC AUTO-ENTMCNC: 32.2 G/DL (ref 33.7–35.3)
MCV RBC AUTO: 95 FL (ref 81.4–97.8)
MICROALBUMIN UR-MCNC: 73.2 MG/DL
MICROALBUMIN/CREAT UR: 516 MG/G (ref 0–30)
MONOCYTES # BLD AUTO: 0.4 K/UL (ref 0–0.85)
MONOCYTES NFR BLD AUTO: 6.2 % (ref 0–13.4)
NEUTROPHILS # BLD AUTO: 5.19 K/UL (ref 1.82–7.42)
NEUTROPHILS NFR BLD: 80.7 % (ref 44–72)
NRBC # BLD AUTO: 0 K/UL
NRBC BLD-RTO: 0 /100 WBC
PLATELET # BLD AUTO: 133 K/UL (ref 164–446)
PMV BLD AUTO: 9.8 FL (ref 9–12.9)
POTASSIUM SERPL-SCNC: 4.3 MMOL/L (ref 3.6–5.5)
POTASSIUM UR-SCNC: 33.1 MMOL/L
PROT SERPL-MCNC: 5.3 G/DL (ref 6–8.2)
PROT UR-MCNC: 141.5 MG/DL (ref 0–15)
RBC # BLD AUTO: 4.38 M/UL (ref 4.7–6.1)
SODIUM SERPL-SCNC: 143 MMOL/L (ref 135–145)
SODIUM UR-SCNC: 35 MMOL/L
WBC # BLD AUTO: 6.4 K/UL (ref 4.8–10.8)

## 2019-08-11 PROCEDURE — 700111 HCHG RX REV CODE 636 W/ 250 OVERRIDE (IP): Performed by: HOSPITALIST

## 2019-08-11 PROCEDURE — 93922 UPR/L XTREMITY ART 2 LEVELS: CPT

## 2019-08-11 PROCEDURE — 93926 LOWER EXTREMITY STUDY: CPT | Mod: RT

## 2019-08-11 PROCEDURE — 99233 SBSQ HOSP IP/OBS HIGH 50: CPT | Performed by: HOSPITALIST

## 2019-08-11 PROCEDURE — A9270 NON-COVERED ITEM OR SERVICE: HCPCS | Performed by: NURSE PRACTITIONER

## 2019-08-11 PROCEDURE — 82570 ASSAY OF URINE CREATININE: CPT

## 2019-08-11 PROCEDURE — 82436 ASSAY OF URINE CHLORIDE: CPT

## 2019-08-11 PROCEDURE — 770021 HCHG ROOM/CARE - ISO PRIVATE

## 2019-08-11 PROCEDURE — 85025 COMPLETE CBC W/AUTO DIFF WBC: CPT

## 2019-08-11 PROCEDURE — 700102 HCHG RX REV CODE 250 W/ 637 OVERRIDE(OP): Performed by: NURSE PRACTITIONER

## 2019-08-11 PROCEDURE — 93926 LOWER EXTREMITY STUDY: CPT | Mod: 26 | Performed by: INTERNAL MEDICINE

## 2019-08-11 PROCEDURE — A9270 NON-COVERED ITEM OR SERVICE: HCPCS | Performed by: INTERNAL MEDICINE

## 2019-08-11 PROCEDURE — 82043 UR ALBUMIN QUANTITATIVE: CPT

## 2019-08-11 PROCEDURE — 93922 UPR/L XTREMITY ART 2 LEVELS: CPT | Mod: 26 | Performed by: INTERNAL MEDICINE

## 2019-08-11 PROCEDURE — 36415 COLL VENOUS BLD VENIPUNCTURE: CPT

## 2019-08-11 PROCEDURE — 84133 ASSAY OF URINE POTASSIUM: CPT

## 2019-08-11 PROCEDURE — 700105 HCHG RX REV CODE 258: Performed by: NURSE PRACTITIONER

## 2019-08-11 PROCEDURE — 82962 GLUCOSE BLOOD TEST: CPT | Mod: 91

## 2019-08-11 PROCEDURE — 84156 ASSAY OF PROTEIN URINE: CPT

## 2019-08-11 PROCEDURE — 51798 US URINE CAPACITY MEASURE: CPT

## 2019-08-11 PROCEDURE — 84300 ASSAY OF URINE SODIUM: CPT

## 2019-08-11 PROCEDURE — 80053 COMPREHEN METABOLIC PANEL: CPT

## 2019-08-11 PROCEDURE — 92526 ORAL FUNCTION THERAPY: CPT

## 2019-08-11 PROCEDURE — 83036 HEMOGLOBIN GLYCOSYLATED A1C: CPT

## 2019-08-11 PROCEDURE — 700102 HCHG RX REV CODE 250 W/ 637 OVERRIDE(OP): Performed by: INTERNAL MEDICINE

## 2019-08-11 RX ORDER — SODIUM CHLORIDE 9 MG/ML
INJECTION, SOLUTION INTRAVENOUS CONTINUOUS
Status: DISCONTINUED | OUTPATIENT
Start: 2019-08-11 | End: 2019-08-12

## 2019-08-11 RX ORDER — INSULIN GLARGINE 100 [IU]/ML
55 INJECTION, SOLUTION SUBCUTANEOUS EVERY EVENING
Status: DISCONTINUED | OUTPATIENT
Start: 2019-08-11 | End: 2019-08-17 | Stop reason: HOSPADM

## 2019-08-11 RX ADMIN — METOPROLOL SUCCINATE 25 MG: 25 TABLET, EXTENDED RELEASE ORAL at 06:00

## 2019-08-11 RX ADMIN — LINEZOLID 600 MG: 600 TABLET, FILM COATED ORAL at 06:00

## 2019-08-11 RX ADMIN — VITAMIN D, TAB 1000IU (100/BT) 2000 UNITS: 25 TAB at 05:59

## 2019-08-11 RX ADMIN — ATORVASTATIN CALCIUM 20 MG: 20 TABLET, FILM COATED ORAL at 20:15

## 2019-08-11 RX ADMIN — MORPHINE SULFATE 2 MG: 4 INJECTION INTRAVENOUS at 16:51

## 2019-08-11 RX ADMIN — INSULIN HUMAN 4 UNITS: 100 INJECTION, SOLUTION PARENTERAL at 10:58

## 2019-08-11 RX ADMIN — INSULIN HUMAN 4 UNITS: 100 INJECTION, SOLUTION PARENTERAL at 16:22

## 2019-08-11 RX ADMIN — LINEZOLID 600 MG: 600 TABLET, FILM COATED ORAL at 18:17

## 2019-08-11 RX ADMIN — GABAPENTIN 400 MG: 400 CAPSULE ORAL at 18:16

## 2019-08-11 RX ADMIN — OMEGA-3 FATTY ACIDS CAP 1000 MG 1000 MG: 1000 CAP at 10:25

## 2019-08-11 RX ADMIN — APIXABAN 5 MG: 5 TABLET, FILM COATED ORAL at 06:01

## 2019-08-11 RX ADMIN — ACETAMINOPHEN 1000 MG: 500 TABLET ORAL at 10:25

## 2019-08-11 RX ADMIN — SODIUM CHLORIDE: 9 INJECTION, SOLUTION INTRAVENOUS at 20:31

## 2019-08-11 RX ADMIN — METOPROLOL SUCCINATE 25 MG: 25 TABLET, EXTENDED RELEASE ORAL at 18:16

## 2019-08-11 RX ADMIN — SODIUM CHLORIDE, POTASSIUM CHLORIDE, SODIUM LACTATE AND CALCIUM CHLORIDE: 600; 310; 30; 20 INJECTION, SOLUTION INTRAVENOUS at 01:27

## 2019-08-11 RX ADMIN — INSULIN GLARGINE 55 UNITS: 100 INJECTION, SOLUTION SUBCUTANEOUS at 16:22

## 2019-08-11 RX ADMIN — OMEGA-3 FATTY ACIDS CAP 1000 MG 1000 MG: 1000 CAP at 18:16

## 2019-08-11 RX ADMIN — INSULIN HUMAN 3 UNITS: 100 INJECTION, SOLUTION PARENTERAL at 21:00

## 2019-08-11 RX ADMIN — SODIUM CHLORIDE, POTASSIUM CHLORIDE, SODIUM LACTATE AND CALCIUM CHLORIDE: 600; 310; 30; 20 INJECTION, SOLUTION INTRAVENOUS at 01:28

## 2019-08-11 RX ADMIN — SODIUM CHLORIDE: 9 INJECTION, SOLUTION INTRAVENOUS at 10:35

## 2019-08-11 RX ADMIN — INSULIN HUMAN 7 UNITS: 100 INJECTION, SOLUTION PARENTERAL at 06:46

## 2019-08-11 RX ADMIN — GABAPENTIN 400 MG: 400 CAPSULE ORAL at 06:00

## 2019-08-11 RX ADMIN — APIXABAN 5 MG: 5 TABLET, FILM COATED ORAL at 18:16

## 2019-08-11 ASSESSMENT — LIFESTYLE VARIABLES
EVER HAD A DRINK FIRST THING IN THE MORNING TO STEADY YOUR NERVES TO GET RID OF A HANGOVER: NO
EVER FELT BAD OR GUILTY ABOUT YOUR DRINKING: NO
TOTAL SCORE: 0
TOTAL SCORE: 0
HOW MANY TIMES IN THE PAST YEAR HAVE YOU HAD 5 OR MORE DRINKS IN A DAY: 0
CONSUMPTION TOTAL: NEGATIVE
ALCOHOL_USE: NO
DOES PATIENT WANT TO STOP DRINKING: NO
HAVE YOU EVER FELT YOU SHOULD CUT DOWN ON YOUR DRINKING: NO
CONSUMPTION TOTAL: INCOMPLETE
TOTAL SCORE: 0
TOTAL SCORE: 0
ALCOHOL_USE: NO
HAVE PEOPLE ANNOYED YOU BY CRITICIZING YOUR DRINKING: NO
HAVE YOU EVER FELT YOU SHOULD CUT DOWN ON YOUR DRINKING: NO
EVER FELT BAD OR GUILTY ABOUT YOUR DRINKING: NO
ON A TYPICAL DAY WHEN YOU DRINK ALCOHOL HOW MANY DRINKS DO YOU HAVE: 0
EVER HAD A DRINK FIRST THING IN THE MORNING TO STEADY YOUR NERVES TO GET RID OF A HANGOVER: NO
TOTAL SCORE: 0
TOTAL SCORE: 0
HAVE PEOPLE ANNOYED YOU BY CRITICIZING YOUR DRINKING: NO
AVERAGE NUMBER OF DAYS PER WEEK YOU HAVE A DRINK CONTAINING ALCOHOL: 0

## 2019-08-11 ASSESSMENT — ENCOUNTER SYMPTOMS
COUGH: 0
SINUS PAIN: 0
EYES NEGATIVE: 1
GASTROINTESTINAL NEGATIVE: 1
HEADACHES: 0
SORE THROAT: 0
BACK PAIN: 0
FALLS: 1
DIAPHORESIS: 0
CHILLS: 0
STRIDOR: 0
WHEEZING: 0
FOCAL WEAKNESS: 0
NERVOUS/ANXIOUS: 0
MYALGIAS: 1
DIZZINESS: 0
NECK PAIN: 1
WEAKNESS: 1
SHORTNESS OF BREATH: 0
FEVER: 0

## 2019-08-11 ASSESSMENT — PATIENT HEALTH QUESTIONNAIRE - PHQ9
SUM OF ALL RESPONSES TO PHQ9 QUESTIONS 1 AND 2: 0
2. FEELING DOWN, DEPRESSED, IRRITABLE, OR HOPELESS: NOT AT ALL
1. LITTLE INTEREST OR PLEASURE IN DOING THINGS: NOT AT ALL

## 2019-08-11 NOTE — PROGRESS NOTES
Hospital Medicine Daily Progress Note    Date of Service  8/11/2019    Chief Complaint  ground-level fall with DKA, MARIZOL, rhabdomyolysis    Hospital Course    74 y.o. male admitted 8/5/2019 with a past medical history of atrial fibrillation, basal cell cancer, chronic lower extremity cellulitic changes, diabetes type 2, hypertension, COPD, and pacemaker placement. He suffered a ground level fall after tripping over bedding at home. He was unable to get up and was found after 3 days by his grandson.  Patient was found with MARIZOL, DKA, and rhabdomyolysis. The patient is usually seen at the [facility]. He was admitted to the ICU then transferred to the medical unit after three days. His bilateral leg wound cultures are positive for MRSA.      Interval Problem Update  8/11: He is alert and oriented x4. His right leg hurts the worst, but if he keeps it still it is tolerable. He is now on room air and continues to tolerate a diabetic nectar thick diet. His blood glucose remains elevated. Bilateral neck/submandibular swelling is significantly improved. Voice hoarseness is improved.       Consultants/Specialty  Pulmonary critical care  Limb preservation service  Wound care    Code Status  DNR/DNI    Disposition  ICU, transition to medical unit  Review of Systems  Review of Systems   Constitutional: Negative for chills, diaphoresis, fever and malaise/fatigue.   HENT: Negative for congestion, nosebleeds, sinus pain and sore throat.    Eyes: Negative.    Respiratory: Negative for cough, shortness of breath, wheezing and stridor.    Cardiovascular: Positive for leg swelling.   Gastrointestinal: Negative.    Genitourinary: Negative.    Musculoskeletal: Positive for falls, joint pain, myalgias and neck pain. Negative for back pain.   Skin: Positive for rash. Negative for itching.   Neurological: Positive for weakness. Negative for dizziness, focal weakness and headaches.   Endo/Heme/Allergies: Negative.     Psychiatric/Behavioral: The patient is not nervous/anxious.    All other systems reviewed and are negative.       Physical Exam  Temp:  [36.4 °C (97.5 °F)-37.2 °C (98.9 °F)] 36.4 °C (97.5 °F)  Pulse:  [69-70] 70  Resp:  [16-20] 16  BP: (137-156)/(79-92) 152/81  SpO2:  [93 %-96 %] 95 %    Physical Exam   Constitutional: He is oriented to person, place, and time. He appears well-developed and well-nourished. He is cooperative. He does not appear ill. No distress.   Morbidly overweight   HENT:   Head: Normocephalic.   Mouth/Throat: Mucous membranes are dry. Abnormal dentition.   Bilateral submandibular swelling, mass  Hoarse voice, difficulty swallowing   Eyes: Pupils are equal, round, and reactive to light. Conjunctivae and EOM are normal.   Neck: Normal range of motion. Tracheal tenderness and muscular tenderness present. Carotid bruit is not present. Edema present.   Cardiovascular: Normal rate, regular rhythm and intact distal pulses. Exam reveals distant heart sounds. Exam reveals no gallop and no friction rub.   No murmur heard.      Capillary refill <3 secs   Pulmonary/Chest: Effort normal and breath sounds normal. No stridor. No respiratory distress. He has no wheezes. He has no rales. He exhibits no tenderness.   Abdominal: Soft. Bowel sounds are normal. He exhibits no distension and no mass. There is no tenderness. There is no rebound and no guarding.   Difficult to assess given body habitus   Genitourinary: Rectum normal and penis normal. Right testis shows swelling. Left testis shows swelling.   Genitourinary Comments: 1+ scrotal edema   Musculoskeletal: Normal range of motion. He exhibits tenderness.        Back:    Neurological: He is alert and oriented to person, place, and time.   Skin: Skin is warm and dry. Rash noted. He is not diaphoretic. There is erythema. No cyanosis.   Lower extremity pustules  Erythematous patches   Psychiatric: He has a normal mood and affect. His speech is normal and  behavior is normal. Thought content normal.   Nursing note and vitals reviewed.      Fluids    Intake/Output Summary (Last 24 hours) at 8/11/2019 1233  Last data filed at 8/11/2019 0740  Gross per 24 hour   Intake 240 ml   Output 1645 ml   Net -1405 ml       Laboratory  Recent Labs     08/09/19  0514 08/10/19  0421 08/11/19  0459   WBC 6.2 6.3 6.4   RBC 4.73 4.89 4.38*   HEMOGLOBIN 14.1 14.8 13.4*   HEMATOCRIT 43.9 46.1 41.6*   MCV 92.8 94.3 95.0   MCH 29.8 30.3 30.6   MCHC 32.1* 32.1* 32.2*   RDW 46.4 46.9 47.6   PLATELETCT 150* 153* 133*   MPV 10.0 9.8 9.8     Recent Labs     08/09/19  0514 08/10/19  0421 08/11/19  0459   SODIUM 144 146* 143   POTASSIUM 3.5* 4.0 4.3   CHLORIDE 112 114* 113*   CO2 21 24 23   GLUCOSE 272* 208* 224*   BUN 48* 38* 31*   CREATININE 1.88* 1.49* 1.62*   CALCIUM 8.1* 8.2* 7.8*                   Imaging  US-RUBIO SINGLE LEVEL BILAT   Final Result      US-EXTREMITY ARTERY LOWER UNILAT RIGHT         CT-SOFT TISSUE NECK WITH   Final Result      1.  There is no evidence of neck mass.   2.  There is mild edema of the parotid glands and submandibular glands with scattered parotid gland calcifications, probably postinflammatory.   3.  The airway is patent.   4.  There are no suspicious thyroid nodules.   5.  There is atherosclerosis of the aorta, coronary arteries and carotid arteries.   6.  There may be mild edema in the upper lung zones.      US-SOFT TISSUES OF HEAD - NECK   Final Result      Diffusely edematous neck soft tissues. As with any palpable abnormality, follow-up on clinical grounds is advised.      ACR TI-RADS Recommendations         Recommendations based on the American College of [employer] Thyroid imaging, reporting and Data System (TI-RADS) 2017.      DX-HIP-UNILATERAL-WITH PELVIS-1 VIEW LEFT   Final Result      Degenerative changes without acute osseous abnormality.      DX-CHEST-PORTABLE (1 VIEW)   Final Result      Stable enlargement of the cardiomediastinal silhouette,  improving minimal diffuse interstitial pulmonary edema and bilateral lower lobe atelectasis and/or consolidation.      US-RENAL   Final Result      1.  No evidence of solid renal mass or hydronephrosis.      2.  Bilateral renal echogenic foci likely representing small nephroliths.      CT-HEAD W/O   Final Result      1.  No evidence of acute intracranial process.      2.  Cerebral atrophy as well as periventricular chronic small vessel ischemic change.      DX-CHEST-PORTABLE (1 VIEW)   Final Result      1.  Enlarged cardiac mediastinal silhouette.   2.  Cardiac pacer.   3.  No acute abnormality.           Assessment/Plan  * Diabetic ketoacidosis without coma associated with type 1 diabetes mellitus (HCC)- (present on admission)  Assessment & Plan  Continue to hydrate  DKA protocol, status post  Sliding scale, adjusted Lantus from 50 to 55 units, continue to monitor  HgA1c ordered    Functional dysphagia  Assessment & Plan  Secondary to Acute sialadenitis. Thickened liquids. Continue to treat underlying cause.   SLP to reassess today    Hypophosphatemia  Assessment & Plan  Encourage food intake    Pressure ulcer of buttock  Assessment & Plan  Wound care following    Physical debility- (present on admission)  Assessment & Plan  PT/OT recommends placement    Class 3 severe obesity due to excess calories in adult (HCC)- (present on admission)  Assessment & Plan  Likely chronic    Elevated troponin  Assessment & Plan  Likely type II injury    Persistent atrial fibrillation (HCC)- (present on admission)  Assessment & Plan  IV fluids for concerns of dehydration    COPD (chronic obstructive pulmonary disease) (HCC)- (present on admission)  Assessment & Plan  Respiratory protocol, currently no wheezing    Hyperphosphatemia- (present on admission)  Assessment & Plan  Monitor labs  IV fluid  resolved    Lymphadenopathy, submandibular- (present on admission)  Assessment & Plan  Right submandibular large adenopathy.  Bilateral  sub-and tubular swelling  Ultrasound nondiagnostic  CT showed mild edema of the parotid glands and submandibular glands with scattered parotid gland calcifications, probably postinflammatory  Likely acute submandibular Sialadinitis, most commonly caused by s. Aureus. Zyvox should cover. IV fluids and encouraged oral intake.  Significantly improved from yesterday: less tender, edema resolving, voice improved    Open wound of leg- (present on admission)  Assessment & Plan  Wound care to bilateral lower extremities  [facility]  Blisters positive for MRSA  Antibiotic course: Zyvox  Consulted LPS for Right lower leg  RUBIO shows reduced perfusion in both legs, right leg is more reduced than left    MARIZOL (acute kidney injury) (HCC)- (present on admission)  Assessment & Plan  Giving IV fluids monitoring strict I's and O's and labs, K is now 4.3, switched to NS IV fluids  Likely secondary to mild component of rhabdomyolysis and dehydration after being on the floor for 3 days  Unclear baseline  Continue to monitor, avoid nephrotoxins    Pain of left hip joint- (present on admission)  Assessment & Plan    PT/OT recommend placement for continued inpatient therapies, Case management pursuing  Patient previous using walker    Head trauma- (present on admission)  Assessment & Plan  Head CT without any acute findings.    Patient is able to move all the extremities    Rhabdomyolysis- (present on admission)  Assessment & Plan  Patient was down for 3 days.  Mildly elevated levels, has improved     VTE prophylaxis: Heparin    YONY Pabon

## 2019-08-11 NOTE — DISCHARGE PLANNING
Hospital Care Management Discharge Planning       Anticipated Discharge Disposition:   · SNF     Action:   · This RN CM met with patient and daughter at bedside to obtain SNF choice.   · Patient would like to be at [facility] with his wife but would [facility].   · Choice form faxed to GERARDO Amaya.     Barriers to Discharge:   · SNF acceptance and bed availability.     Plan:   · F/U with [facility] and treatment team.   · Continue to provide support services and assistance with discharge planning as needed.       Thank you for allowing me the pleasure of participating in this patient's care coordination and discharge planning.

## 2019-08-11 NOTE — PROGRESS NOTES
Dressing change not performed per wound team note. Wound team will do dressing change later today.

## 2019-08-11 NOTE — PROGRESS NOTES
2 RN skin check performed with PHILILP Moctezuma.    Bruising and skin discoloration noted BUE and BLE. Scabs noted bilateral elbows, bilateral toes on feet, above eye. Mepilex applied to R elbow. Open wounds on bilateral calves; dressing changes per wound team. Known open areas to sacrum and buttocks. Swelling blanchable redness to scrotum.     Interventions: Reposition Q 2 hours; barrier cream application, mepilex, waffle mattress. Ordered low air-loss mattress.

## 2019-08-11 NOTE — CARE PLAN
Problem: Communication  Goal: The ability to communicate needs accurately and effectively will improve  Outcome: PROGRESSING AS EXPECTED  Note:   Pt utilizes call light appropriately and expresses needs.      Problem: Safety  Goal: Will remain free from injury  Outcome: PROGRESSING AS EXPECTED  Note:   Pt follows safety protocols and interventions.      Problem: Knowledge Deficit  Goal: Knowledge of disease process/condition, treatment plan, diagnostic tests, and medications will improve  Outcome: PROGRESSING AS EXPECTED  Note:   Pt is receptive to information presented regarding his care.

## 2019-08-11 NOTE — PROGRESS NOTES
Discussed POC with LEONID Ash at bedside.     Orders received to d/c hourly accuchecks.     Order received to d/c bladder scans, as pt just scanned for 200mL and has been voiding regularly with urinal.    Clarified if LPS consult has been placed; NP states she will place one today    .

## 2019-08-11 NOTE — THERAPY
"Speech Language Therapy dysphagia treatment completed.   Functional Status:  Pt seen on this date for dysphagia treatment. Pt's daughter present at bedside and pt eager to trial advanced textures. No subtle or overt s/sx of aspiration noted with purees and NTL via cup sip and straw sip. Consistent throat clearing noted in 100% of thin liquid and soft solid textures, which may be concerning for aspiration. Pt reporting that juice from peaches were causing him to throat clear. Pt swished thin and thick liquids in mouth prior to swallow and even with cues to complete a quick swallow throat clearing continued. Upon palpation, laryngeal elevation was weak and swallow trigger timely. Prolonged mastication (1-2 minutes) for small bites of soft solids. At this time, would recommend continuation of dysphagia I/NTL diet okay for straws. Dysphagia education and recommendations provided to pt and his daughter and they verbalized understanding. SLP to follow.    Recommendations: continuation of dysphagia I/NTL okay for straws   Plan of Care: Will benefit from Speech Therapy 3 times per week  Post-Acute Therapy: Recommend inpatient transitional care services for continued speech therapy services.        See \"Rehab Therapy-Acute\" Patient Summary Report for complete documentation.     "

## 2019-08-11 NOTE — PROGRESS NOTES
2 RN skin check performed with PHILLIP Kimball.    Devices in place: none.  Known moisture fissures with surrounding blanchable erythema to sacrum/coccyx.    Generalized scattered bruising, small abrasions, fragile skin. RUE skin tears x 2, bruising. Sacrum as noted above. Scrotum red, blanching. R knee red, blaching. BLE wounds with dressings; daily dressing changes ordered but pt refuses removal except by wound RN. Heels red, blanching, cracked. R heel slow to eliezer.    Interventions: Reposition Q 2 hours, pillows for support/positioning, zinc barrier cream application, mepilex to elbow (contradinicated to sacrum d/t incontinent loose stools), heels floated, switch to low air loss bed today.

## 2019-08-11 NOTE — DISCHARGE PLANNING
Anticipated Discharge Disposition: SNF    Action: Notified by APN pt will need SNF placement.   Met with pt at bedside to obtain SNF choice. Pt requested return visit at a later time due to having several visitors at the moment. Will re-attempt at another time.    Barriers to Discharge: SNF choice/acceptance    Plan: F/U with pt

## 2019-08-11 NOTE — PROGRESS NOTES
Reached Elaine in wound care.     Notified of most recent wound note stating wound team will do today's dressing change and pt preference that bedside RN allow wound team to do it.    Elaine states she will attempt dressing changes this afternoon.

## 2019-08-11 NOTE — DISCHARGE PLANNING
Received Choice form at 1245  Agency[facility]Facility [facility]  Referral sent per Choice form at 125

## 2019-08-11 NOTE — PROGRESS NOTES
Paged SLP per pt request to see if diet may be advanced.     Received call back from Karla, who states she will likely be able to see pt today.

## 2019-08-12 PROBLEM — N18.31 CHRONIC KIDNEY DISEASE (CKD) STAGE G3A/A3, MODERATELY DECREASED GLOMERULAR FILTRATION RATE (GFR) BETWEEN 45-59 ML/MIN/1.73 SQUARE METER AND ALBUMINURIA CREATININE RATIO GREATER THAN 300 MG/G: Status: ACTIVE | Noted: 2019-08-12

## 2019-08-12 PROBLEM — E11.10 DIABETIC KETOACIDOSIS WITHOUT COMA ASSOCIATED WITH TYPE 2 DIABETES MELLITUS (HCC): Status: ACTIVE | Noted: 2019-08-05

## 2019-08-12 LAB
ALBUMIN SERPL BCP-MCNC: 2.2 G/DL (ref 3.2–4.9)
ALBUMIN/GLOB SERPL: 0.8 G/DL
ALP SERPL-CCNC: 70 U/L (ref 30–99)
ALT SERPL-CCNC: 42 U/L (ref 2–50)
ANION GAP SERPL CALC-SCNC: 5 MMOL/L (ref 0–11.9)
AST SERPL-CCNC: 38 U/L (ref 12–45)
BASOPHILS # BLD AUTO: 1.1 % (ref 0–1.8)
BASOPHILS # BLD: 0.08 K/UL (ref 0–0.12)
BILIRUB SERPL-MCNC: 0.7 MG/DL (ref 0.1–1.5)
BUN SERPL-MCNC: 23 MG/DL (ref 8–22)
CALCIUM SERPL-MCNC: 7.5 MG/DL (ref 8.5–10.5)
CHLORIDE SERPL-SCNC: 113 MMOL/L (ref 96–112)
CO2 SERPL-SCNC: 26 MMOL/L (ref 20–33)
CREAT SERPL-MCNC: 1.47 MG/DL (ref 0.5–1.4)
EOSINOPHIL # BLD AUTO: 0.09 K/UL (ref 0–0.51)
EOSINOPHIL NFR BLD: 1.2 % (ref 0–6.9)
ERYTHROCYTE [DISTWIDTH] IN BLOOD BY AUTOMATED COUNT: 48.2 FL (ref 35.9–50)
GLOBULIN SER CALC-MCNC: 2.7 G/DL (ref 1.9–3.5)
GLUCOSE BLD-MCNC: 101 MG/DL (ref 65–99)
GLUCOSE BLD-MCNC: 126 MG/DL (ref 65–99)
GLUCOSE BLD-MCNC: 128 MG/DL (ref 65–99)
GLUCOSE BLD-MCNC: 143 MG/DL (ref 65–99)
GLUCOSE SERPL-MCNC: 127 MG/DL (ref 65–99)
HCT VFR BLD AUTO: 40.9 % (ref 42–52)
HGB BLD-MCNC: 12.5 G/DL (ref 14–18)
IMM GRANULOCYTES # BLD AUTO: 0.05 K/UL (ref 0–0.11)
IMM GRANULOCYTES NFR BLD AUTO: 0.7 % (ref 0–0.9)
LYMPHOCYTES # BLD AUTO: 0.71 K/UL (ref 1–4.8)
LYMPHOCYTES NFR BLD: 9.6 % (ref 22–41)
MCH RBC QN AUTO: 29.7 PG (ref 27–33)
MCHC RBC AUTO-ENTMCNC: 30.6 G/DL (ref 33.7–35.3)
MCV RBC AUTO: 97.1 FL (ref 81.4–97.8)
MONOCYTES # BLD AUTO: 0.51 K/UL (ref 0–0.85)
MONOCYTES NFR BLD AUTO: 6.9 % (ref 0–13.4)
NEUTROPHILS # BLD AUTO: 5.92 K/UL (ref 1.82–7.42)
NEUTROPHILS NFR BLD: 80.5 % (ref 44–72)
NRBC # BLD AUTO: 0 K/UL
NRBC BLD-RTO: 0 /100 WBC
PLATELET # BLD AUTO: 128 K/UL (ref 164–446)
PMV BLD AUTO: 9.3 FL (ref 9–12.9)
POTASSIUM SERPL-SCNC: 3.9 MMOL/L (ref 3.6–5.5)
PROT SERPL-MCNC: 4.9 G/DL (ref 6–8.2)
RBC # BLD AUTO: 4.21 M/UL (ref 4.7–6.1)
SODIUM SERPL-SCNC: 144 MMOL/L (ref 135–145)
WBC # BLD AUTO: 7.4 K/UL (ref 4.8–10.8)

## 2019-08-12 PROCEDURE — A6213 FOAM DRG >16<=48 SQ IN W/BDR: HCPCS | Performed by: HOSPITALIST

## 2019-08-12 PROCEDURE — 700111 HCHG RX REV CODE 636 W/ 250 OVERRIDE (IP): Performed by: HOSPITALIST

## 2019-08-12 PROCEDURE — 700102 HCHG RX REV CODE 250 W/ 637 OVERRIDE(OP): Performed by: NURSE PRACTITIONER

## 2019-08-12 PROCEDURE — A9270 NON-COVERED ITEM OR SERVICE: HCPCS | Performed by: INTERNAL MEDICINE

## 2019-08-12 PROCEDURE — 36415 COLL VENOUS BLD VENIPUNCTURE: CPT

## 2019-08-12 PROCEDURE — 770021 HCHG ROOM/CARE - ISO PRIVATE

## 2019-08-12 PROCEDURE — 80053 COMPREHEN METABOLIC PANEL: CPT

## 2019-08-12 PROCEDURE — 700102 HCHG RX REV CODE 250 W/ 637 OVERRIDE(OP): Performed by: HOSPITALIST

## 2019-08-12 PROCEDURE — 700105 HCHG RX REV CODE 258: Performed by: NURSE PRACTITIONER

## 2019-08-12 PROCEDURE — A9270 NON-COVERED ITEM OR SERVICE: HCPCS | Performed by: HOSPITALIST

## 2019-08-12 PROCEDURE — 99232 SBSQ HOSP IP/OBS MODERATE 35: CPT | Performed by: HOSPITALIST

## 2019-08-12 PROCEDURE — 85025 COMPLETE CBC W/AUTO DIFF WBC: CPT

## 2019-08-12 PROCEDURE — A9270 NON-COVERED ITEM OR SERVICE: HCPCS | Performed by: NURSE PRACTITIONER

## 2019-08-12 PROCEDURE — 700102 HCHG RX REV CODE 250 W/ 637 OVERRIDE(OP): Performed by: INTERNAL MEDICINE

## 2019-08-12 PROCEDURE — 82962 GLUCOSE BLOOD TEST: CPT

## 2019-08-12 PROCEDURE — 97535 SELF CARE MNGMENT TRAINING: CPT

## 2019-08-12 RX ORDER — GABAPENTIN 400 MG/1
400 CAPSULE ORAL 3 TIMES DAILY
Status: DISCONTINUED | OUTPATIENT
Start: 2019-08-12 | End: 2019-08-17 | Stop reason: HOSPADM

## 2019-08-12 RX ADMIN — OXYCODONE HYDROCHLORIDE 5 MG: 5 TABLET ORAL at 09:05

## 2019-08-12 RX ADMIN — APIXABAN 5 MG: 5 TABLET, FILM COATED ORAL at 18:45

## 2019-08-12 RX ADMIN — ACETAMINOPHEN 1000 MG: 500 TABLET ORAL at 11:46

## 2019-08-12 RX ADMIN — MORPHINE SULFATE 2 MG: 4 INJECTION INTRAVENOUS at 16:10

## 2019-08-12 RX ADMIN — APIXABAN 5 MG: 5 TABLET, FILM COATED ORAL at 06:11

## 2019-08-12 RX ADMIN — ATORVASTATIN CALCIUM 20 MG: 20 TABLET, FILM COATED ORAL at 20:14

## 2019-08-12 RX ADMIN — LINEZOLID 600 MG: 600 TABLET, FILM COATED ORAL at 06:11

## 2019-08-12 RX ADMIN — FINASTERIDE 5 MG: 5 TABLET, FILM COATED ORAL at 06:11

## 2019-08-12 RX ADMIN — OMEGA-3 FATTY ACIDS CAP 1000 MG 1000 MG: 1000 CAP at 11:46

## 2019-08-12 RX ADMIN — VITAMIN D, TAB 1000IU (100/BT) 2000 UNITS: 25 TAB at 06:11

## 2019-08-12 RX ADMIN — LINEZOLID 600 MG: 600 TABLET, FILM COATED ORAL at 18:44

## 2019-08-12 RX ADMIN — OMEGA-3 FATTY ACIDS CAP 1000 MG 1000 MG: 1000 CAP at 18:44

## 2019-08-12 RX ADMIN — SODIUM CHLORIDE: 9 INJECTION, SOLUTION INTRAVENOUS at 06:31

## 2019-08-12 RX ADMIN — METOPROLOL SUCCINATE 25 MG: 25 TABLET, EXTENDED RELEASE ORAL at 18:44

## 2019-08-12 RX ADMIN — ACETAMINOPHEN 1000 MG: 500 TABLET ORAL at 18:44

## 2019-08-12 RX ADMIN — METOPROLOL SUCCINATE 25 MG: 25 TABLET, EXTENDED RELEASE ORAL at 06:11

## 2019-08-12 RX ADMIN — GABAPENTIN 400 MG: 400 CAPSULE ORAL at 06:11

## 2019-08-12 RX ADMIN — OXYCODONE HYDROCHLORIDE 5 MG: 5 TABLET ORAL at 14:25

## 2019-08-12 RX ADMIN — GABAPENTIN 400 MG: 400 CAPSULE ORAL at 20:14

## 2019-08-12 RX ADMIN — INSULIN GLARGINE 55 UNITS: 100 INJECTION, SOLUTION SUBCUTANEOUS at 18:48

## 2019-08-12 RX ADMIN — OMEGA-3 FATTY ACIDS CAP 1000 MG 1000 MG: 1000 CAP at 09:05

## 2019-08-12 ASSESSMENT — ENCOUNTER SYMPTOMS
WHEEZING: 0
WEAKNESS: 1
EYES NEGATIVE: 1
BACK PAIN: 0
FEVER: 0
DIAPHORESIS: 0
HEADACHES: 0
FOCAL WEAKNESS: 0
ORTHOPNEA: 0
CHILLS: 0
SINUS PAIN: 0
COUGH: 0
DIZZINESS: 0
NECK PAIN: 1
DEPRESSION: 0
STRIDOR: 0
MYALGIAS: 1
SHORTNESS OF BREATH: 0
NERVOUS/ANXIOUS: 0
SPUTUM PRODUCTION: 0
PALPITATIONS: 0
SORE THROAT: 0
FALLS: 1
GASTROINTESTINAL NEGATIVE: 1

## 2019-08-12 ASSESSMENT — COGNITIVE AND FUNCTIONAL STATUS - GENERAL
EATING MEALS: A LITTLE
SUGGESTED CMS G CODE MODIFIER DAILY ACTIVITY: CK
DRESSING REGULAR LOWER BODY CLOTHING: A LOT
DRESSING REGULAR UPPER BODY CLOTHING: A LITTLE
DAILY ACTIVITIY SCORE: 15
TOILETING: A LOT
HELP NEEDED FOR BATHING: A LOT
PERSONAL GROOMING: A LITTLE

## 2019-08-12 NOTE — PROGRESS NOTES
Hospital Medicine Daily Progress Note    Date of Service  8/12/2019    Chief Complaint  ground-level fall with DKA, MARIZOL, rhabdomyolysis    Hospital Course    74 y.o. male admitted 8/5/2019 with a past medical history of atrial fibrillation, basal cell cancer, chronic lower extremity cellulitic changes, diabetes type 2, hypertension, COPD, and pacemaker placement. He suffered a ground level fall after tripping over bedding at home. He was unable to get up and was found after 3 days by his grandson.  Patient was found with MARIZOL, DKA, and rhabdomyolysis. The patient is usually seen at the [facility]. He was admitted to the ICU then transferred to the medical unit after three days. He is now on room air and continues to tolerate a diabetic nectar thick diet. His bilateral leg wound cultures are positive for MRSA. His right leg is the most painful and limits his mobility.      Interval Problem Update  8/12: He is alert and oriented x4. His blood glucose is now within normal limits. Bilateral neck/submandibular swelling is significantly improved. Voice hoarseness is improved. He expresses interest in mobilizing and strengthening, states the pain in his right leg is tolerable. Daughter at bedside.      Consultants/Specialty  Pulmonary critical care  Limb preservation service  Wound care  Vascular surgery    Code Status  DNR/DNI    Disposition  ICU, transition to medical unit  Review of Systems  Review of Systems   Constitutional: Negative for chills, diaphoresis, fever and malaise/fatigue.   HENT: Negative for congestion, nosebleeds, sinus pain and sore throat.    Eyes: Negative.    Respiratory: Negative for cough, sputum production, shortness of breath, wheezing and stridor.    Cardiovascular: Positive for leg swelling. Negative for chest pain, palpitations and orthopnea.   Gastrointestinal: Negative.    Genitourinary: Negative.    Musculoskeletal: Positive for falls, joint pain, myalgias and neck pain. Negative for  back pain.   Skin: Positive for rash. Negative for itching.   Neurological: Positive for weakness. Negative for dizziness, focal weakness and headaches.   Endo/Heme/Allergies: Negative.    Psychiatric/Behavioral: Negative for depression. The patient is not nervous/anxious.    All other systems reviewed and are negative.       Physical Exam  Temp:  [36.1 °C (96.9 °F)-36.8 °C (98.2 °F)] 36.6 °C (97.9 °F)  Pulse:  [70-71] 70  Resp:  [18-20] 18  BP: (136-148)/(69-89) 148/82  SpO2:  [92 %-95 %] 92 %    Physical Exam   Constitutional: He is oriented to person, place, and time. He appears well-developed and well-nourished. He is cooperative. He does not appear ill. No distress.   Morbidly overweight, slightly elevated blood pressure   HENT:   Head: Normocephalic.   Mouth/Throat: Oropharynx is clear and moist. Mucous membranes are dry. Abnormal dentition.   Bilateral submandibular swelling, mass  Hoarse voice, difficulty swallowing   Eyes: Pupils are equal, round, and reactive to light. Conjunctivae and EOM are normal. Right eye exhibits no discharge. Left eye exhibits no discharge.   Neck: Normal range of motion. Tracheal tenderness and muscular tenderness present. Carotid bruit is not present. Edema present.   Less tender than yesterday   Cardiovascular: Normal rate, regular rhythm and intact distal pulses. Exam reveals distant heart sounds. Exam reveals no gallop and no friction rub.   No murmur heard.      Capillary refill <3 secs   Pulmonary/Chest: Effort normal. No stridor. No respiratory distress. He has decreased breath sounds. He has no wheezes. He has no rales. He exhibits no tenderness.   Abdominal: Soft. Bowel sounds are normal. He exhibits no distension and no mass. There is no tenderness. There is no rebound and no guarding.   Difficult to assess given body habitus   Genitourinary: Rectum normal and penis normal. Right testis shows swelling. Left testis shows swelling.   Genitourinary Comments: 1+ scrotal  edema   Musculoskeletal: Normal range of motion. He exhibits tenderness.        Back:    Lymphadenopathy:        Head (right side): Submandibular adenopathy present.        Head (left side): Submandibular adenopathy present.   Neurological: He is alert and oriented to person, place, and time.   Skin: Skin is warm and dry. Rash noted. He is not diaphoretic. There is erythema. No cyanosis. No pallor.   Lower extremity pustules  Erythematous patches   Psychiatric: He has a normal mood and affect. His speech is normal and behavior is normal. Judgment and thought content normal.   Nursing note and vitals reviewed.      Fluids    Intake/Output Summary (Last 24 hours) at 8/12/2019 1449  Last data filed at 8/12/2019 0750  Gross per 24 hour   Intake --   Output 450 ml   Net -450 ml       Laboratory  Recent Labs     08/10/19  0421 08/11/19  0459 08/12/19  0402   WBC 6.3 6.4 7.4   RBC 4.89 4.38* 4.21*   HEMOGLOBIN 14.8 13.4* 12.5*   HEMATOCRIT 46.1 41.6* 40.9*   MCV 94.3 95.0 97.1   MCH 30.3 30.6 29.7   MCHC 32.1* 32.2* 30.6*   RDW 46.9 47.6 48.2   PLATELETCT 153* 133* 128*   MPV 9.8 9.8 9.3     Recent Labs     08/10/19  0421 08/11/19  0459 08/12/19  0402   SODIUM 146* 143 144   POTASSIUM 4.0 4.3 3.9   CHLORIDE 114* 113* 113*   CO2 24 23 26   GLUCOSE 208* 224* 127*   BUN 38* 31* 23*   CREATININE 1.49* 1.62* 1.47*   CALCIUM 8.2* 7.8* 7.5*                   Imaging  US-RUBIO SINGLE LEVEL BILAT   Final Result      US-EXTREMITY ARTERY LOWER UNILAT RIGHT   Final Result      CT-SOFT TISSUE NECK WITH   Final Result      1.  There is no evidence of neck mass.   2.  There is mild edema of the parotid glands and submandibular glands with scattered parotid gland calcifications, probably postinflammatory.   3.  The airway is patent.   4.  There are no suspicious thyroid nodules.   5.  There is atherosclerosis of the aorta, coronary arteries and carotid arteries.   6.  There may be mild edema in the upper lung zones.      US-SOFT TISSUES OF  HEAD - NECK   Final Result      Diffusely edematous neck soft tissues. As with any palpable abnormality, follow-up on clinical grounds is advised.      ACR TI-RADS Recommendations         Recommendations based on the American College of [employer] Thyroid imaging, reporting and Data System (TI-RADS) 2017.      DX-HIP-UNILATERAL-WITH PELVIS-1 VIEW LEFT   Final Result      Degenerative changes without acute osseous abnormality.      DX-CHEST-PORTABLE (1 VIEW)   Final Result      Stable enlargement of the cardiomediastinal silhouette, improving minimal diffuse interstitial pulmonary edema and bilateral lower lobe atelectasis and/or consolidation.      US-RENAL   Final Result      1.  No evidence of solid renal mass or hydronephrosis.      2.  Bilateral renal echogenic foci likely representing small nephroliths.      CT-HEAD W/O   Final Result      1.  No evidence of acute intracranial process.      2.  Cerebral atrophy as well as periventricular chronic small vessel ischemic change.      DX-CHEST-PORTABLE (1 VIEW)   Final Result      1.  Enlarged cardiac mediastinal silhouette.   2.  Cardiac pacer.   3.  No acute abnormality.           Assessment/Plan  * Diabetic ketoacidosis without coma associated with type 2 diabetes mellitus (HCC)- (present on admission)  Assessment & Plan  Continue to hydrate  DKA protocol, status post  Sliding scale, adjusted Lantus from 50 to 55 units, BG is 101 this am, continue at this dose  [id number]=9    Chronic kidney disease (CKD) stage G3a/A3, moderately decreased glomerular filtration rate (GFR) between 45-59 mL/min/1.73 square meter and albuminuria creatinine ratio greater than 300 mg/g (HCC)  Assessment & Plan  Avoid nephrotoxins, encourage PO intake of fluids. Follow cmp.    Functional dysphagia  Assessment & Plan  Secondary to Acute sialadenitis. Thickened liquids. Continue to treat underlying cause.   SLP re-evaluated and recommends continuing thickened liquids. Patient encouraged to  increase oral fluid intake to strengthen swallow.    Hypophosphatemia  Assessment & Plan  Encourage food intake    Pressure ulcer of buttock  Assessment & Plan  Wound care following    Physical debility- (present on admission)  Assessment & Plan  PT/OT recommends placement    Class 3 severe obesity due to excess calories in adult ([facility])- (present on admission)  Assessment & Plan  Likely chronic    Elevated troponin  Assessment & Plan  Likely type II injury    Persistent atrial fibrillation ([facility])- (present on admission)  Assessment & Plan  Encourage PO fluids for concerns of dehydration    COPD (chronic obstructive pulmonary disease) ([facility])- (present on admission)  Assessment & Plan  Respiratory protocol, currently no wheezing    Hyperphosphatemia- (present on admission)  Assessment & Plan  Monitor labs  resolved    Lymphadenopathy, submandibular- (present on admission)  Assessment & Plan  Right submandibular large adenopathy.  Bilateral sub-and tubular swelling  Ultrasound nondiagnostic  CT showed mild edema of the parotid glands and submandibular glands with scattered parotid gland calcifications, probably postinflammatory  Likely acute submandibular Sialadinitis, most commonly caused by s. Aureus. Zyvox should cover. Encouraged oral fluid  intake.  Significantly improved from yesterday: less tender, edema resolving, voice hoarseness resolving.    Open wound of leg- (present on admission)  Assessment & Plan  Wound care to bilateral lower extremities  [facility]  Blisters positive for MRSA  Antibiotic course: Zyvox  Consulted LPS for Right lower leg. Dr. Rios consulted by LPS.  RUBIO shows reduced perfusion in both legs, right leg is more reduced than left    MARIZOL (acute kidney injury) ([facility])- (present on admission)  Assessment & Plan  Giving IV fluids monitoring strict I's and O's and labs, electrolytes stable with NS IV fluids  Likely secondary  to mild component of rhabdomyolysis and dehydration after being on the floor for 3 days  Unclear baseline  Continue to monitor, avoid nephrotoxins    Pain of left hip joint- (present on admission)  Assessment & Plan    PT/OT recommend placement for continued inpatient therapies, Case management pursuing  Patient previous using walker    Head trauma- (present on admission)  Assessment & Plan  Head CT without any acute findings.    Patient is able to move all the extremities    Rhabdomyolysis- (present on admission)  Assessment & Plan  Patient was down for 3 days.  Mildly elevated levels, has improved     VTE prophylaxis: SUSAN Good.

## 2019-08-12 NOTE — PROGRESS NOTES
2 RN skin check completed with PHILLIP Moctezuma.   Devices in place none.  Skin assessed under devices N/A.    Some erythema to the Coccyx that is blanchable, applied Mepilex dressing. Generalized bruising to bilateral upper extremities and lower extremities. Skin tear noted to right elbow. Red spot to right knee that is blanchable. Dressings intact on BLE.     The following interventions in place, Low air loss mattress, Q2 hour turns, Heel float boots in place, and pillows in use for support and repositioning.

## 2019-08-12 NOTE — THERAPY
"Occupational Therapy Treatment completed with focus on ADLs, ADL transfers and patient education.  Functional Status:  Pt seen for OT tx. Pt agreeable to OOB activity. Limited by pain in B LEs during session. Min A supine > sit for seated scooting to EOB. Pt required an extensive amount of time to complete supine > sit d/t pain. Pt pleasant and cooperative. Continues to express motivation to regain strength, endurance and independence in ADLs and ADL transfers. Mod A seated scooting as pt c/o pain in B feet during WB. supv seated grooming tasks. Pt's daughter present throughout session. Min A to return to supine. Continues to be pleasant, cooperative and motivated to participate in therapies. Will continue to benefit from OT services while in-house to increase strength, endurance and independence in ADLs and ADL transfers.   Plan of Care: Will benefit from Occupational Therapy 3 times per week  Discharge Recommendations:  Equipment Will Continue to Assess for Equipment Needs.     See \"Rehab Therapy-Acute\" Patient Summary Report for complete documentation.   "

## 2019-08-12 NOTE — CARE PLAN
Problem: Urinary Elimination:  Goal: Ability to reestablish a normal urinary elimination pattern will improve  Outcome: PROGRESSING AS EXPECTED  Note:   Pt voiding into urinal with no issues. Q6h bladder scans discontinued by NP today. Provide fluids, urinal, continue to monitor I/Os     Problem: Bowel/Gastric:  Goal: Normal bowel function is maintained or improved  Outcome: PROGRESSING SLOWER THAN EXPECTED  Note:   Pt experiencing incontinence of loose BMs. Hold stool softeners, promote bed mobility and PO intake.     Problem: Mobility  Goal: Risk for activity intolerance will decrease  Outcome: PROGRESSING SLOWER THAN EXPECTED  Note:   Pt limited by pain in BLE. Assist with bed mobility. PT/OT consult.

## 2019-08-12 NOTE — DISCHARGE PLANNING
Pt gave verbal consent for a referral to be sent to [facility] as they are [facility].

## 2019-08-12 NOTE — WOUND TEAM
"[facility]  Inpatient Services  Wound and Skin Care Progress Note    Admission Date: 8/5/2019     Consult Date: 08/06/2019   HPI, PMH, SH: Reviewed    Unit where seen by Wound Team: [id number]     WOUND CONSULT RELATED TO:  BLE follow up    SUBJECTIVE:  \"\"The right one is worse.\"      Self Report / Pain Level:  Yelling when right leg lifted and when removing dressing, with gentle touch.  Medicated during.        OBJECTIVE:  RLE dressing had stuck to areas of wound.  Painful removal.     WOUND TYPE, LOCATION, CHARACTERISTICS (Pressure Injuries: location, stage, POA or date identified)      Wound 08/05/19 mixed arterial/Venous Ulcer Leg from knee to ankle right (Active)   Wound Image     8/6/2019  8:00 PM   Site Assessment Pink;Red;Yellow    Ella-wound Assessment Yellow-brown    Margins Attached edges    Wound Length (cm) 0 cm    Wound Width (cm) 0 cm    Wound Depth (cm) 0 cm    Wound Surface Area (cm^2) 0 cm^2    Tunneling 0 cm    Undermining 0 cm    Closure None    Drainage Amount Moderate    Drainage Description Serosanguineous    Non-staged Wound Description Full thickness    Treatments Cleansed;Site care    Cleansing Approved Wound Cleanser    Periwound Protectant Not Applicable    Dressing Options Nonadherent Contact Layer;Absorbent Abdominal Pad;Roll Gauze    Dressing Cleansing/Solutions Not Applicable    Dressing Changed New    Dressing Status Clean;Dry;Intact    Dressing Change Frequency Daily    NEXT Dressing Change  08/12/19    NEXT Weekly Photo (Inpatient Only) 08/14/19    WOUND NURSE ONLY - Odor Mild    WOUND NURSE ONLY - Pulses Not palpable    WOUND NURSE ONLY - Exposed Structures None    WOUND NURSE ONLY - Tissue Type and Percentage 100% red/pink yellow    WOUND NURSE ONLY - Time Spent with Patient (mins) 60        Wound 08/05/19 Venous Ulcer Leg venous stasis with partial thickness wounds to calf (Active)   Wound Image    08/06/19   Site Assessment Pink    Ella-wound Assessment Yellow-brown "    Margins Attached edges    Wound Length (cm) 0 cm    Wound Width (cm) 0 cm    Wound Depth (cm) 0 cm    Wound Surface Area (cm^2) 0 cm^2    Tunneling 0 cm    Undermining 0 cm    Closure None    Drainage Amount Scant    Drainage Description Serosanguineous    Non-staged Wound Description Partial thickness    Treatments Cleansed;Site care    Cleansing Approved Wound Cleanser    Periwound Protectant Not Applicable    Dressing Options Adhesive Foam    Dressing Cleansing/Solutions Not Applicable    Dressing Changed New    Dressing Status Clean;Dry;Intact    Dressing Change Frequency Daily    NEXT Dressing Change  08/12/19    NEXT Weekly Photo (Inpatient Only) 08/14/19    WOUND NURSE ONLY - Odor None    WOUND NURSE ONLY - Exposed Structures None    WOUND NURSE ONLY - Tissue Type and Percentage 100% pink           Vascular:     RIGHT  Toe pressure: 66 mmHg, TBI: 0.43   Waveform            Systolic BPs (mmHg)                              146           Brachial   Triphasic                                Common Femoral   Monophasic                               Posterior Tibial   Monophasic                               Dorsalis Pedis                                            Peroneal                                            RUBIO                                            TBI                          LEFT  Toe pressure: 117 mmHg, TBI: 0.77   Waveform        Systolic BPs (mmHg)                              152           Brachial   Triphasic                                Common Femoral   Triphasic                                Posterior Tibial   Triphasic                                Dorsalis Pedis                                            Peroneal                                            RUBIO                                            TBI    Lab Values:    WBC:     @LABLAST(WBC)@  A1C:      Lab Results   Component Value Date/Time    HBA1C 8.0 (H) 08/11/2019 04:59 AM        Culture:   Obtained yes, by RN, Results  Methicillin Resistant Staphylococcus aureus   Heavy growth     INTERVENTIONS BY WOUND TEAM:  Removed dressing from RLE. Very painful for patient.  Soaked ABD pad to remove from  RLE wounds.  Cleansed with wound cleanser and gauze.  LLE dressing removed, wound cleansed and adhesive foam placed.  Covered RLE with several layers of adaptic, using 3 packages to ensure they do not stick, covered with ABD pads and secured with roll gauze.  Heel float boots applied, repositioned with CNA.       Dressing selection:  RLE: adaptic, abdominal pads, roll gauze.                                       LLE: adhesive foam      Interdisciplinary consultation: Patient, Bedside RN, patient's daughter    EVALUATION: wounds appear  per photograph after use of honey.  Very wet at this time and pending LPS consult.  Will use Adapatic to prevent wounds from sticking to cover dressing. Also helps maintain adequate moisture needed for wound healing.  RUBIO results for RLE inadequate for compression at this time.      Factors affecting wound healing: age, venous stasis, diabetes, MRSA infection to RLE  Goals: Steady decrease in wound area and depth weekly.    NURSING PLAN OF CARE ORDERS (X):    Dressing changes: See Dressing Care orders: X  Skin care: See Skin Care orders:   Rectal tube care: See Rectal Tube Care orders:   Other orders:    WOUND TEAM PLAN OF CARE (X):   NPWT change 3 x week:        Dressing changes by wound team:       Follow up as needed:     X for sacrum  Other (explain):     Anticipated discharge plans (X):   SNF:           Home Care:           Outpatient Wound Center:            Self Care:            Other:    To be determined

## 2019-08-12 NOTE — PROGRESS NOTES
LIMB PRESERVATION SERVICE - RN NOTE      Reason for LPS Consultation: Right LE    Past medical history, medicines, allergies, family history, social history,    reviewed    History of Present Illness: Patient is a 74 y.o. male with past medical history that includes type 2 diabetes, Afib, HTN, COPD, and pacemaker, admitted 8/5/2019 for Rhabdomyolysis, Weakness, DKA (diabetic ketoacidoses) (HCC).  An LPS consult has been requested for evaluation of Right LE wounds.  This wound started 07/15/19, as cut from a box that the patient accidentally scrapped against.  [facility] used silver dressings and honey dressings.      Patient was diagnosed with diabetes approximately 30 years ago, and currently manages with PO metformin and insulin 70/30, patient was recently changed to Lantus at this admission.  Checks blood sugars 3 times per day and reports that these average 250-300. Patient has had diabetes education before.  Reports has neuropathy.  Patient has not had any previous foot surgeries.  Does not have orthotics.  Patient is currently retired. Former smoker, quit in 1975, patient does not drink nor use drugs.           DIAGNOSTICS this admission    Xray:  None   MRI: none             CT: none             Vascular: RUBIO                  RIGHT     Waveform            Systolic BPs (mmHg)                              146           Brachial   Triphasic                                Common Femoral   Monophasic                               Posterior Tibial   Monophasic                               Dorsalis Pedis                                            Peroneal                                            RUBIO                                            TBI                          LEFT   Waveform        Systolic BPs (mmHg)                              152           Brachial   Triphasic                                Common Femoral   Triphasic                                 Posterior Tibial   Triphasic                                Dorsalis Pedis                                            Peroneal                                            RUBIO                                            TBI       Findings   Right.    Unable to assess ankle brachial index due to limitations.   Assessment of toe brachial index demonstrates moderate disease.   Toe pressure: 66 mmHg, TBI: 0.43.   Doppler waveform of the common femoral artery is of high amplitude and    triphasic.    Doppler waveforms of the popliteal, posterior tibial, and dorsalis pedis    arteries are monophasic.      Left.    Unable to assess ankle brachial index due to limitations.   Assessment of toe brachial index demonstratesno evidence of disease.   Toe pressure: 117 mmHg, TBI: 0.77.   Doppler waveforms of the common femoral, popliteal, posterior tibial, and    dorsalis pedis arteries are of high amplitude and triphasic.      Arterial duplex scan of the right lower extremity was performed in    accordance with lower extremity arterial evaluation protocol - see separate    report.    Other: Arterial studies Right LE   FINDINGS   Right.    Stenosis of the distal femoral artery. Velocities are consistent with 50-   75% stenosis. Stenosis may be underestimated due to heavy calcification of    the distal femoral artery.    Plaque is seen in the common femoral, profunda femoral, and popliteal    arteries with no elevated velocities to indicate hemodynamic significance.    Waveforms of the anterior tibial and posterior tibial arteries are    monophasic.   The peroneal, proximal posterior tibial and anterior tibial arteries were    not evaluated due to limitations.               Labs                                      ESR: 45                      CRP: none           A1c: 8.0    PHYSICAL EXAMINATION:   General Appearance:  Well developed, well nourished, in no acute distress.  Patient resting in bed  Vascular Assessment:  Pedal pulses  are faint, doppler monophasic to right LE  Sensory Assessment  Monofilament testing  Diminished sensation  Wound Assessment:       Wound 08/05/19 Venous Ulcer Leg mixed arterial/venous knee to ankle right (Active)   Wound Image        Site Assessment     Ella-wound Assessment NOEMÍ    Margins NOEMÍ    Wound Length (cm) 0 cm    Wound Width (cm) 0 cm    Wound Depth (cm) 0 cm    Wound Surface Area (cm^2) 0 cm^2    Tunneling 0 cm    Undermining 0 cm    Closure NOEMÍ    Drainage Amount None    Drainage Description Serosanguineous    Non-staged Wound Description Full thickness    Treatments Cleansed;Site care    Cleansing Approved Wound Cleanser    Periwound Protectant Not Applicable    Dressing Options Nonadherent Contact Layer;Absorbent Abdominal Pad;Roll Gauze    Dressing Cleansing/Solutions Not Applicable    Dressing Changed New    Dressing Status Clean;Dry;Intact    Dressing Change Frequency Daily    NEXT Dressing Change  08/12/19    NEXT Weekly Photo (Inpatient Only) 08/14/19    WOUND NURSE ONLY - Odor Mild    WOUND NURSE ONLY - Pulses Not palpable    WOUND NURSE ONLY - Exposed Structures None    WOUND NURSE ONLY - Tissue Type and Percentage 100% red/pink yellow    WOUND NURSE ONLY - Time Spent with Patient (mins) 60        PLAN    Wound Care:   Adaptic and rolled gauze    Offloading  none    Weightbearing Status  As tolerated    Labs  none    Imaging  none    Infection Management  Microbiology  Component 7d ago   Significant Indicator POSPositive (POS)    Source WND    Site RIGHT LEG    Culture Result -Abnormal     Gram Stain Result Few WBCs.   Many Gram positive cocci.    Culture Result Abnormal    Methicillin Resistant Staphylococcus aureus   Heavy growth   This isolate is presumed to be clindamycin resistant based on   detection of inducible resistance.  Clindamycin may still   be effective in some patients.      Antibiotics: zyvox        Referrals   Vascular: x   Ortho: none   Infectious diseases: none   Diabetes  Education: none   Ortho tech: none   PT/OT: x            Professional collaboration: RN, patient, patient's daughter, Vivian HOOPER, Dr. Irais Rios

## 2019-08-12 NOTE — PROGRESS NOTES
2 RN skin check performed with PHILLIP Rodarte.     Devices in place: none.  Known moisture fissures and R buttock wound with surrounding blanchable erythema to sacrum/coccyx.     Generalized scattered bruising, small abrasions, fragile skin. RUE skin tears x 2, bruising. Sacrum as noted above. Scrotum red, blanching. R knee red, blaching. BLE calf/shin wounds followed by wound team and LPS. Heels red, blanching, cracked.      Interventions: Reposition Q 2 hours, pillows for support/positioning, zinc barrier cream, mepilex to elbow (contradinicated to sacrum d/t incontinent loose stools), heel float boots, low air loss bed with dri-stephen pads.

## 2019-08-12 NOTE — DISCHARGE PLANNING
Agency[facility]   Spoke To: Hailee  Outcome: Received voicemail that patient is 90% service connected, and referral needs to be sent to [facility] or [facility].     [facility] Elizabeth notified.

## 2019-08-12 NOTE — CARE PLAN
Problem: Venous Thromboembolism (VTW)/Deep Vein Thrombosis (DVT) Prevention:  Goal: Patient will participate in Venous Thrombosis (VTE)/Deep Vein Thrombosis (DVT)Prevention Measures  Outcome: PROGRESSING AS EXPECTED  Note:   Apixaban. Unable to ambulate at this time d/t pain. Encourage bed mobility. No SCDs orders as pt unable to tolerate d/t pain and wounds BLE.      Problem: Respiratory:  Goal: Respiratory status will improve  Outcome: PROGRESSING AS EXPECTED  Note:   Maintaining saturations in 90s on RA, LS dim BLL. Promote IS, mobility, TCDB.

## 2019-08-13 LAB
ALBUMIN SERPL BCP-MCNC: 2.3 G/DL (ref 3.2–4.9)
ALBUMIN/GLOB SERPL: 0.8 G/DL
ALP SERPL-CCNC: 78 U/L (ref 30–99)
ALT SERPL-CCNC: 35 U/L (ref 2–50)
ANION GAP SERPL CALC-SCNC: 7 MMOL/L (ref 0–11.9)
AST SERPL-CCNC: 29 U/L (ref 12–45)
BASOPHILS # BLD AUTO: 0.5 % (ref 0–1.8)
BASOPHILS # BLD: 0.04 K/UL (ref 0–0.12)
BILIRUB SERPL-MCNC: 0.7 MG/DL (ref 0.1–1.5)
BUN SERPL-MCNC: 21 MG/DL (ref 8–22)
CALCIUM SERPL-MCNC: 7.8 MG/DL (ref 8.5–10.5)
CHLORIDE SERPL-SCNC: 112 MMOL/L (ref 96–112)
CO2 SERPL-SCNC: 25 MMOL/L (ref 20–33)
CREAT SERPL-MCNC: 1.49 MG/DL (ref 0.5–1.4)
EOSINOPHIL # BLD AUTO: 0.07 K/UL (ref 0–0.51)
EOSINOPHIL NFR BLD: 0.9 % (ref 0–6.9)
ERYTHROCYTE [DISTWIDTH] IN BLOOD BY AUTOMATED COUNT: 47.6 FL (ref 35.9–50)
GLOBULIN SER CALC-MCNC: 2.9 G/DL (ref 1.9–3.5)
GLUCOSE BLD-MCNC: 109 MG/DL (ref 65–99)
GLUCOSE BLD-MCNC: 141 MG/DL (ref 65–99)
GLUCOSE BLD-MCNC: 144 MG/DL (ref 65–99)
GLUCOSE BLD-MCNC: 154 MG/DL (ref 65–99)
GLUCOSE SERPL-MCNC: 84 MG/DL (ref 65–99)
HCT VFR BLD AUTO: 41.6 % (ref 42–52)
HGB BLD-MCNC: 13.2 G/DL (ref 14–18)
IMM GRANULOCYTES # BLD AUTO: 0.05 K/UL (ref 0–0.11)
IMM GRANULOCYTES NFR BLD AUTO: 0.7 % (ref 0–0.9)
LYMPHOCYTES # BLD AUTO: 0.86 K/UL (ref 1–4.8)
LYMPHOCYTES NFR BLD: 11.4 % (ref 22–41)
MCH RBC QN AUTO: 30.7 PG (ref 27–33)
MCHC RBC AUTO-ENTMCNC: 31.7 G/DL (ref 33.7–35.3)
MCV RBC AUTO: 96.7 FL (ref 81.4–97.8)
MONOCYTES # BLD AUTO: 0.48 K/UL (ref 0–0.85)
MONOCYTES NFR BLD AUTO: 6.3 % (ref 0–13.4)
NEUTROPHILS # BLD AUTO: 6.07 K/UL (ref 1.82–7.42)
NEUTROPHILS NFR BLD: 80.2 % (ref 44–72)
NRBC # BLD AUTO: 0 K/UL
NRBC BLD-RTO: 0 /100 WBC
PLATELET # BLD AUTO: 118 K/UL (ref 164–446)
PMV BLD AUTO: 9.5 FL (ref 9–12.9)
POTASSIUM SERPL-SCNC: 3.8 MMOL/L (ref 3.6–5.5)
PROT SERPL-MCNC: 5.2 G/DL (ref 6–8.2)
RBC # BLD AUTO: 4.3 M/UL (ref 4.7–6.1)
SODIUM SERPL-SCNC: 144 MMOL/L (ref 135–145)
WBC # BLD AUTO: 7.6 K/UL (ref 4.8–10.8)

## 2019-08-13 PROCEDURE — 700111 HCHG RX REV CODE 636 W/ 250 OVERRIDE (IP): Performed by: HOSPITALIST

## 2019-08-13 PROCEDURE — A9270 NON-COVERED ITEM OR SERVICE: HCPCS | Performed by: NURSE PRACTITIONER

## 2019-08-13 PROCEDURE — 99233 SBSQ HOSP IP/OBS HIGH 50: CPT | Performed by: HOSPITALIST

## 2019-08-13 PROCEDURE — 82962 GLUCOSE BLOOD TEST: CPT

## 2019-08-13 PROCEDURE — 700102 HCHG RX REV CODE 250 W/ 637 OVERRIDE(OP): Performed by: NURSE PRACTITIONER

## 2019-08-13 PROCEDURE — A9270 NON-COVERED ITEM OR SERVICE: HCPCS | Performed by: HOSPITALIST

## 2019-08-13 PROCEDURE — 700102 HCHG RX REV CODE 250 W/ 637 OVERRIDE(OP): Performed by: INTERNAL MEDICINE

## 2019-08-13 PROCEDURE — 770021 HCHG ROOM/CARE - ISO PRIVATE

## 2019-08-13 PROCEDURE — 700102 HCHG RX REV CODE 250 W/ 637 OVERRIDE(OP): Performed by: HOSPITALIST

## 2019-08-13 PROCEDURE — 85025 COMPLETE CBC W/AUTO DIFF WBC: CPT

## 2019-08-13 PROCEDURE — 80053 COMPREHEN METABOLIC PANEL: CPT

## 2019-08-13 PROCEDURE — 97530 THERAPEUTIC ACTIVITIES: CPT

## 2019-08-13 PROCEDURE — A9270 NON-COVERED ITEM OR SERVICE: HCPCS | Performed by: INTERNAL MEDICINE

## 2019-08-13 PROCEDURE — 36415 COLL VENOUS BLD VENIPUNCTURE: CPT

## 2019-08-13 RX ORDER — LINEZOLID 600 MG/1
600 TABLET, FILM COATED ORAL EVERY 12 HOURS
Status: DISCONTINUED | OUTPATIENT
Start: 2019-08-13 | End: 2019-08-17 | Stop reason: HOSPADM

## 2019-08-13 RX ADMIN — LINEZOLID 600 MG: 600 TABLET, FILM COATED ORAL at 18:00

## 2019-08-13 RX ADMIN — METOPROLOL SUCCINATE 25 MG: 25 TABLET, EXTENDED RELEASE ORAL at 18:28

## 2019-08-13 RX ADMIN — ACETAMINOPHEN 1000 MG: 500 TABLET ORAL at 11:29

## 2019-08-13 RX ADMIN — APIXABAN 5 MG: 5 TABLET, FILM COATED ORAL at 05:05

## 2019-08-13 RX ADMIN — VITAMIN D, TAB 1000IU (100/BT) 2000 UNITS: 25 TAB at 05:04

## 2019-08-13 RX ADMIN — METOPROLOL SUCCINATE 25 MG: 25 TABLET, EXTENDED RELEASE ORAL at 05:05

## 2019-08-13 RX ADMIN — APIXABAN 5 MG: 5 TABLET, FILM COATED ORAL at 18:28

## 2019-08-13 RX ADMIN — FINASTERIDE 5 MG: 5 TABLET, FILM COATED ORAL at 05:04

## 2019-08-13 RX ADMIN — MORPHINE SULFATE 3 MG: 4 INJECTION INTRAVENOUS at 15:24

## 2019-08-13 RX ADMIN — LINEZOLID 600 MG: 600 TABLET, FILM COATED ORAL at 05:04

## 2019-08-13 RX ADMIN — INSULIN HUMAN 3 UNITS: 100 INJECTION, SOLUTION PARENTERAL at 11:30

## 2019-08-13 RX ADMIN — GABAPENTIN 400 MG: 400 CAPSULE ORAL at 20:28

## 2019-08-13 RX ADMIN — OMEGA-3 FATTY ACIDS CAP 1000 MG 1000 MG: 1000 CAP at 09:24

## 2019-08-13 RX ADMIN — ACETAMINOPHEN 1000 MG: 500 TABLET ORAL at 18:32

## 2019-08-13 RX ADMIN — ATORVASTATIN CALCIUM 20 MG: 20 TABLET, FILM COATED ORAL at 20:28

## 2019-08-13 RX ADMIN — GABAPENTIN 400 MG: 400 CAPSULE ORAL at 15:24

## 2019-08-13 RX ADMIN — INSULIN GLARGINE 55 UNITS: 100 INJECTION, SOLUTION SUBCUTANEOUS at 18:22

## 2019-08-13 RX ADMIN — SENNOSIDES, DOCUSATE SODIUM 2 TABLET: 50; 8.6 TABLET, FILM COATED ORAL at 18:32

## 2019-08-13 RX ADMIN — OMEGA-3 FATTY ACIDS CAP 1000 MG 1000 MG: 1000 CAP at 18:30

## 2019-08-13 RX ADMIN — GABAPENTIN 400 MG: 400 CAPSULE ORAL at 05:05

## 2019-08-13 ASSESSMENT — ENCOUNTER SYMPTOMS
PALPITATIONS: 0
DIZZINESS: 0
SINUS PAIN: 0
DIAPHORESIS: 0
COUGH: 0
WHEEZING: 0
BACK PAIN: 0
ORTHOPNEA: 0
SPUTUM PRODUCTION: 0
FOCAL WEAKNESS: 0
NECK PAIN: 1
GASTROINTESTINAL NEGATIVE: 1
FEVER: 0
SHORTNESS OF BREATH: 0
SORE THROAT: 0
HEADACHES: 0
DEPRESSION: 0
CHILLS: 0
FALLS: 1
MYALGIAS: 1
WEAKNESS: 1
STRIDOR: 0
EYES NEGATIVE: 1
NERVOUS/ANXIOUS: 0

## 2019-08-13 ASSESSMENT — COGNITIVE AND FUNCTIONAL STATUS - GENERAL
WALKING IN HOSPITAL ROOM: TOTAL
TURNING FROM BACK TO SIDE WHILE IN FLAT BAD: UNABLE
MOBILITY SCORE: 7
MOVING TO AND FROM BED TO CHAIR: UNABLE
CLIMB 3 TO 5 STEPS WITH RAILING: TOTAL
MOVING FROM LYING ON BACK TO SITTING ON SIDE OF FLAT BED: UNABLE
STANDING UP FROM CHAIR USING ARMS: A LOT
SUGGESTED CMS G CODE MODIFIER MOBILITY: CM

## 2019-08-13 ASSESSMENT — GAIT ASSESSMENTS: GAIT LEVEL OF ASSIST: UNABLE TO PARTICIPATE

## 2019-08-13 NOTE — PROGRESS NOTES
2 RN skin check completed with PHILLIP Linda for Deejay score <17.  Devices in place: N/A  Multiple scattered abrasions and bruising also noted. Generalized skin fraile. RUE skin tears, non-adhesive dsg in place. Moisture fissures and R buttock wound noted. Scrotum red and blanching. BLE calf/shin wounds - NOEMÍ dressing in place, CDI. Wound team and LPS following. Heels red and blanching.  Interventions: Low air loss mattress, Q2H repositioning, pillows for support/positioning, barrier cream, mepilex to elbow (contradinicated to sacrum d/t pt occasionally incontinent ), heel float boots.

## 2019-08-13 NOTE — CARE PLAN
Problem: Infection  Goal: Will remain free from infection  Outcome: PROGRESSING AS EXPECTED  Note:   Standard precautions in place, labs and VS monitored as available, promote nutrition. BLE wounds with aseptic dressing changes.     Problem: Pain Management  Goal: Pain level will decrease to patient's comfort goal  Outcome: PROGRESSING AS EXPECTED  Note:   Reporting pain tolerable today. Pre-medicated with morphine for dressing changes to BLE. Prn medication as ordered, rest, distraction, reposition.

## 2019-08-13 NOTE — PROGRESS NOTES
Hospital Medicine Daily Progress Note    Date of Service  8/13/2019    Chief Complaint  ground-level fall with DKA, MARIZOL, rhabdomyolysis    Hospital Course    74 y.o. male admitted 8/5/2019 with a past medical history of atrial fibrillation, basal cell cancer, chronic lower extremity cellulitic changes, diabetes type 2, hypertension, COPD, and pacemaker placement. He suffered a ground level fall after tripping over bedding at home. He was unable to get up and was found after 3 days by his grandson.  Patient was found with MARIZOL, DKA, and rhabdomyolysis. The patient is usually seen at the [facility]. He was admitted to the ICU then transferred to the medical unit after three days. He is now on room air and continues to tolerate a diabetic nectar thick diet. His bilateral leg wound cultures are positive for MRSA. His right leg is the most painful and limits his mobility.      Interval Problem Update  8/12: He is alert and oriented x4. His blood glucose is now within normal limits. Bilateral neck/submandibular swelling is significantly improved. Voice hoarseness is improved. He expresses interest in mobilizing and strengthening, states the pain in his right leg is tolerable. Daughter at bedside.  8/13: Spoke to Dr. Rios in detail regards to this patient.  She is still contemplating in regards to angiography or further imaging lower extremity.  Will await further recommendations.  I have asked LPS to load new pictures of the wound to epic.  We will continue Zyvox for now.  Patient's creatinine is now stabilized and this is likely his baseline.  Blood sugars are controlled today.  SNF accepted patient and are waiting for medical clearance.      Consultants/Specialty  Pulmonary critical care  Limb preservation service  Wound care  Vascular surgery    Code Status  DNR/DNI    Disposition  SNF is accepted    Review of Systems  Review of Systems   Constitutional: Negative for chills, diaphoresis, fever and  malaise/fatigue.   HENT: Negative for congestion, nosebleeds, sinus pain and sore throat.    Eyes: Negative.    Respiratory: Negative for cough, sputum production, shortness of breath, wheezing and stridor.    Cardiovascular: Positive for leg swelling. Negative for chest pain, palpitations and orthopnea.   Gastrointestinal: Negative.    Genitourinary: Negative.    Musculoskeletal: Positive for falls, joint pain, myalgias and neck pain. Negative for back pain.   Skin: Positive for rash. Negative for itching.   Neurological: Positive for weakness. Negative for dizziness, focal weakness and headaches.   Endo/Heme/Allergies: Negative.    Psychiatric/Behavioral: Negative for depression. The patient is not nervous/anxious.    All other systems reviewed and are negative.       Physical Exam  Temp:  [36.4 °C (97.6 °F)-37.1 °C (98.8 °F)] 37.1 °C (98.8 °F)  Pulse:  [69-70] 70  Resp:  [16-20] 18  BP: (139-157)/(53-93) 157/93  SpO2:  [91 %-95 %] 94 %    Physical Exam   Constitutional: He is oriented to person, place, and time. He appears well-developed and well-nourished. He is cooperative. He does not appear ill. No distress.   Morbidly overweight, slightly elevated blood pressure   HENT:   Head: Normocephalic.   Mouth/Throat: Oropharynx is clear and moist. Mucous membranes are dry. Abnormal dentition.   Bilateral submandibular swelling, mass  Hoarse voice, difficulty swallowing   Eyes: Pupils are equal, round, and reactive to light. Conjunctivae and EOM are normal. Right eye exhibits no discharge. Left eye exhibits no discharge.   Neck: Normal range of motion. Tracheal tenderness and muscular tenderness present. Carotid bruit is not present. Edema present.   Less tender than yesterday   Cardiovascular: Normal rate, regular rhythm and intact distal pulses. Exam reveals distant heart sounds. Exam reveals no gallop and no friction rub.   No murmur heard.      Capillary refill <3 secs   Pulmonary/Chest: Effort normal. No stridor.  No respiratory distress. He has decreased breath sounds. He has no wheezes. He has no rales. He exhibits no tenderness.   Abdominal: Soft. Bowel sounds are normal. He exhibits no distension and no mass. There is no tenderness. There is no rebound and no guarding.   Difficult to assess given body habitus   Genitourinary: Rectum normal and penis normal. Right testis shows swelling. Left testis shows swelling.   Genitourinary Comments: 1+ scrotal edema   Musculoskeletal: Normal range of motion. He exhibits tenderness.        Back:    Lymphadenopathy:        Head (right side): Submandibular adenopathy present.        Head (left side): Submandibular adenopathy present.   Neurological: He is alert and oriented to person, place, and time.   Skin: Skin is warm and dry. Rash noted. He is not diaphoretic. There is erythema. No cyanosis. No pallor.   Lower extremity pustules  Erythematous patches   Psychiatric: He has a normal mood and affect. His speech is normal and behavior is normal. Judgment and thought content normal.   Nursing note and vitals reviewed.      Fluids    Intake/Output Summary (Last 24 hours) at 8/13/2019 1407  Last data filed at 8/13/2019 1030  Gross per 24 hour   Intake 360 ml   Output 775 ml   Net -415 ml       Laboratory  Recent Labs     08/11/19  0459 08/12/19  0402 08/13/19  0442   WBC 6.4 7.4 7.6   RBC 4.38* 4.21* 4.30*   HEMOGLOBIN 13.4* 12.5* 13.2*   HEMATOCRIT 41.6* 40.9* 41.6*   MCV 95.0 97.1 96.7   MCH 30.6 29.7 30.7   MCHC 32.2* 30.6* 31.7*   RDW 47.6 48.2 47.6   PLATELETCT 133* 128* 118*   MPV 9.8 9.3 9.5     Recent Labs     08/11/19  0459 08/12/19  0402 08/13/19  0442   SODIUM 143 144 144   POTASSIUM 4.3 3.9 3.8   CHLORIDE 113* 113* 112   CO2 23 26 25   GLUCOSE 224* 127* 84   BUN 31* 23* 21   CREATININE 1.62* 1.47* 1.49*   CALCIUM 7.8* 7.5* 7.8*                   Imaging  US-RUBIO SINGLE LEVEL BILAT   Final Result      US-EXTREMITY ARTERY LOWER UNILAT RIGHT   Final Result      CT-SOFT TISSUE  NECK WITH   Final Result      1.  There is no evidence of neck mass.   2.  There is mild edema of the parotid glands and submandibular glands with scattered parotid gland calcifications, probably postinflammatory.   3.  The airway is patent.   4.  There are no suspicious thyroid nodules.   5.  There is atherosclerosis of the aorta, coronary arteries and carotid arteries.   6.  There may be mild edema in the upper lung zones.      US-SOFT TISSUES OF HEAD - NECK   Final Result      Diffusely edematous neck soft tissues. As with any palpable abnormality, follow-up on clinical grounds is advised.      ACR TI-RADS Recommendations         Recommendations based on the American College of Radiology Thyroid imaging, reporting and Data System (TI-RADS) 2017.      DX-HIP-UNILATERAL-WITH PELVIS-1 VIEW LEFT   Final Result      Degenerative changes without acute osseous abnormality.      DX-CHEST-PORTABLE (1 VIEW)   Final Result      Stable enlargement of the cardiomediastinal silhouette, improving minimal diffuse interstitial pulmonary edema and bilateral lower lobe atelectasis and/or consolidation.      US-RENAL   Final Result      1.  No evidence of solid renal mass or hydronephrosis.      2.  Bilateral renal echogenic foci likely representing small nephroliths.      CT-HEAD W/O   Final Result      1.  No evidence of acute intracranial process.      2.  Cerebral atrophy as well as periventricular chronic small vessel ischemic change.      DX-CHEST-PORTABLE (1 VIEW)   Final Result      1.  Enlarged cardiac mediastinal silhouette.   2.  Cardiac pacer.   3.  No acute abnormality.           Assessment/Plan  * Diabetic ketoacidosis without coma associated with type 2 diabetes mellitus (HCC)- (present on admission)  Assessment & Plan  Continue to hydrate  DKA protocol, status post  Sliding scale, adjusted Lantus from 50 to 55 units, BG is 101 this am, continue at this dose  [id number]    Chronic kidney disease (CKD) stage G3a/A3,  moderately decreased glomerular filtration rate (GFR) between 45-59 mL/min/1.73 square meter and albuminuria creatinine ratio greater than 300 mg/g ([facility])  Assessment & Plan  Avoid nephrotoxins, encourage PO intake of fluids. Follow cmp.    Functional dysphagia  Assessment & Plan  Secondary to Acute sialadenitis. Thickened liquids. Continue to treat underlying cause.   SLP re-evaluated and recommends continuing thickened liquids. Patient encouraged to increase oral fluid intake to strengthen swallow.    Hypophosphatemia  Assessment & Plan  Encourage food intake    Pressure ulcer of buttock  Assessment & Plan  Wound care following    Physical debility- (present on admission)  Assessment & Plan  PT/OT recommends placement    Class 3 severe obesity due to excess calories in adult ([facility])- (present on admission)  Assessment & Plan  Likely chronic    Elevated troponin  Assessment & Plan  Likely type II injury    Persistent atrial fibrillation ([facility])- (present on admission)  Assessment & Plan  Encourage PO fluids for concerns of dehydration    COPD (chronic obstructive pulmonary disease) ([facility])- (present on admission)  Assessment & Plan  Respiratory protocol, currently no wheezing    Hyperphosphatemia- (present on admission)  Assessment & Plan  Monitor labs  resolved    Lymphadenopathy, submandibular- (present on admission)  Assessment & Plan  Right submandibular large adenopathy.  Bilateral sub-and tubular swelling  Ultrasound nondiagnostic  CT showed mild edema of the parotid glands and submandibular glands with scattered parotid gland calcifications, probably postinflammatory  Likely acute submandibular Sialadinitis, most commonly caused by s. Aureus. Zyvox should cover. Encouraged oral fluid  intake.  Significantly improved from yesterday: less tender, edema resolving, voice hoarseness resolving.    Open wound of leg- (present on admission)  Assessment & Plan  Wound care to bilateral lower extremities  [facility]  [facility]  Blisters positive for MRSA  Antibiotic course: Zyvox  Consulted LPS for Right lower leg. Dr. Rios consulted by LPS.  RUBIO shows reduced perfusion in both legs, right leg is more reduced than left    MARIZOL (acute kidney injury) (HCC)- (present on admission)  Assessment & Plan  Giving IV fluids monitoring strict I's and O's and labs, electrolytes stable with NS IV fluids  Likely secondary to mild component of rhabdomyolysis and dehydration after being on the floor for 3 days  Unclear baseline  Continue to monitor, avoid nephrotoxins    Pain of left hip joint- (present on admission)  Assessment & Plan    PT/OT recommend placement for continued inpatient therapies, Case management pursuing  Patient previous using walker    Head trauma- (present on admission)  Assessment & Plan  Head CT without any acute findings.    Patient is able to move all the extremities    Rhabdomyolysis- (present on admission)  Assessment & Plan  Patient was down for 3 days.  Mildly elevated levels, has improved     VTE prophylaxis: Cynthia Brennan M.D.

## 2019-08-13 NOTE — THERAPY
"Physical Therapy Treatment completed.   Bed Mobility:  Supine to Sit: Minimal Assist  Transfers: Sit to Stand: Moderate Assist  Gait: Level Of Assist: Unable to Participate with Front-Wheel Walker       Plan of Care: Will benefit from Physical Therapy 3 times per week  Discharge Recommendations: Equipment: Will Continue to Assess for Equipment Needs. Post-acute therapy Recommend post-acute placement for continued physical therapy services prior to discharge home. Patient can tolerate post-acute therapies at a 5x/week frequency.       See \"Rehab Therapy-Acute\" Patient Summary Report for complete documentation.     Pt preseting w/ improved functional mobility from previous tx. Pt doesn't like his LE's touched so he prefers to perfrom bed mobility which requires a lot of time. He was able to lift his LE's w/ improved ease from previous tx. Pt was able to tolerate socks today to attempt standing. He required ModA to stand and was primarily holding his weight through his UE's and L LE. Pt able to perform a SPT to the BSC. He required a lot of effort and FWW management to turn. Pt is very motivated to participate and will benefit from post acute therapy.  "

## 2019-08-13 NOTE — CONSULTS
"        Date of Service  2019    Reason For Consult  Bilateral foot wounds and right leg wounds    Requesting Physician  Petra Davey RN    Consulting Physician  Irais Rios M.D.    Primary Care Physician  No primary care provider on file.    Chief Complaint  Foot and leg wounds    History of Present Illness  74 y.o. male who presented 2019 after being found \"down\" at home.  He has multiple medical problems, not the least of them renal insufficiency and diabetes.  He relates some element of kidney problems even before this recent admission.  He is somewhat ambivalent about his legs and relates a dressing change earlier today. I have interrupted a visit with a friend.     Review of Systems  ROS    Past Medical History  Past Medical History:   Diagnosis Date   • Atrial fibrillation (HCC)    • Basal cell carcinoma    • Cancer (HCC)    • Chronic obstructive pulmonary disease (HCC)    • Diabetes (HCC)    • Hypertension    • Renal disorder        Surgical History  Past Surgical History:   Procedure Laterality Date   • KNEE REPLACEMENT, TOTAL     • PACEMAKER INSERTION          Family History  History reviewed. No pertinent family history.     Social History  Social History     Socioeconomic History   • Marital status: Not on file     Spouse name: Not on file   • Number of children: Not on file   • Years of education: Not on file   • Highest education level: Not on file   Occupational History   • Not on file   Social Needs   • Financial resource strain: Not on file   • Food insecurity:     Worry: Not on file     Inability: Not on file   • Transportation needs:     Medical: Not on file     Non-medical: Not on file   Tobacco Use   • Smoking status: Former Smoker     Types: Cigarettes     Last attempt to quit: 1975     Years since quittin.6   • Smokeless tobacco: Never Used   Substance and Sexual Activity   • Alcohol use: Never     Frequency: Never   • Drug use: Never   • Sexual activity: Not on file "   Lifestyle   • Physical activity:     Days per week: Not on file     Minutes per session: Not on file   • Stress: Not on file   Relationships   • Social connections:     Talks on phone: Not on file     Gets together: Not on file     Attends Islam service: Not on file     Active member of club or organization: Not on file     Attends meetings of clubs or organizations: Not on file     Relationship status: Not on file   • Intimate partner violence:     Fear of current or ex partner: Not on file     Emotionally abused: Not on file     Physically abused: Not on file     Forced sexual activity: Not on file   Other Topics Concern   • Not on file   Social History Narrative   • Not on file       Medications  Prior to Admission Medications   Prescriptions Last Dose Informant Patient Reported? Taking?   Omega-3 Fatty Acids (FISH OIL) 1000 MG Cap capsule 8/4/2019  Yes Yes   Sig: Take 1,000 mg by mouth 3 times a day, with meals.   apixaban (ELIQUIS) 5mg Tab   Yes Yes   Sig: Take 5 mg by mouth 2 Times a Day.   atorvastatin (LIPITOR) 20 MG Tab   Yes Yes   Sig: Take 20 mg by mouth every evening.   finasteride (PROSCAR) 5 MG Tab 8/4/2019  Yes Yes   Sig: Take 5 mg by mouth every day.   gabapentin (NEURONTIN) 400 MG Cap 8/4/2019  Yes Yes   Sig: Take 400 mg by mouth 3 times a day.   losartan (COZAAR) 50 MG Tab   Yes Yes   Sig: Take 75 mg by mouth every day.   metoprolol SR (TOPROL XL) 25 MG TABLET SR 24 HR   Yes Yes   Sig: Take 12.5 mg by mouth 2 Times a Day.   vitamin D (CHOLECALCIFEROL) 1000 UNIT Tab   Yes Yes   Sig: Take 2,000 Units by mouth every day.      Facility-Administered Medications: None       Current Facility-Administered Medications   Medication Dose Route Frequency Provider Last Rate Last Dose   • gabapentin (NEURONTIN) capsule 400 mg  400 mg Oral TID Harshal Brennan M.D.   400 mg at 08/12/19 2014   • insulin glargine (LANTUS) injection 55 Units  55 Units Subcutaneous Q EVENING YONY Pabon   55 Units at  08/12/19 1848   • finasteride (PROSCAR) tablet 5 mg  5 mg Oral DAILY Vivian Ash A.P.R.N.   5 mg at 08/12/19 0611   • fish oil capsule 1,000 mg  1,000 mg Oral TID WITH MEALS Vivian Ash A.P.R.N.   1,000 mg at 08/12/19 1844   • vitamin D (cholecalciferol) tablet 2,000 Units  2,000 Units Oral DAILY Vivian Ash A.P.R.N.   2,000 Units at 08/12/19 0611   • apixaban (ELIQUIS) tablet 5 mg  5 mg Oral BID Vivian Ash A.P.R.N.   5 mg at 08/12/19 1845   • atorvastatin (LIPITOR) tablet 20 mg  20 mg Oral Nightly Vivian Ash A.P.R.N.   20 mg at 08/12/19 2014   • metoprolol SR (TOPROL XL) tablet 25 mg  25 mg Oral BID Vivian Ash A.P.R.N.   25 mg at 08/12/19 1844   • morphine (pf) 4 mg/ml injection 2-4 mg  2-4 mg Intravenous QDAY PRN George Garibay M.D.   2 mg at 08/12/19 1610   • linezolid (ZYVOX) tablet 600 mg  600 mg Oral Q12HRS Roland Sauceda M.D.   600 mg at 08/12/19 1844   • insulin regular (HUMULIN R) injection 3-14 Units  3-14 Units Subcutaneous 4X/DAY GONZÁLEZ Sauceda M.D.   Stopped at 08/12/19 0700    And   • glucose 4 g chewable tablet 16 g  16 g Oral Q15 MIN PRN Roland Sauceda M.D.        And   • DEXTROSE 10% BOLUS 250 mL  250 mL Intravenous Q15 MIN PRN Roland Sauceda M.D.       • senna-docusate (PERICOLACE or SENOKOT S) 8.6-50 MG per tablet 2 Tab  2 Tab Oral BID Price Elizabeth D.O.   Stopped at 08/06/19 0600    And   • polyethylene glycol/lytes (MIRALAX) PACKET 1 Packet  1 Packet Oral QDAY PRN NILSON WoodO.        And   • magnesium hydroxide (MILK OF MAGNESIA) suspension 30 mL  30 mL Oral QDAY PRN Price Elizabeth D.O.        And   • bisacodyl (DULCOLAX) suppository 10 mg  10 mg Rectal QDAY PRN NILSON WoodO.       • labetalol (NORMODYNE,TRANDATE) injection 10 mg  10 mg Intravenous Q4HRS PRN NILSON WoodO.       • ondansetron (ZOFRAN) syringe/vial injection 4 mg  4 mg Intravenous Q4HRS PRN NILSON WoodO.       • ondansetron (ZOFRAN ODT) dispertab 4 mg  4 mg  Oral Q4HRS PRN Price Elizabeth D.O.       • oxyCODONE immediate-release (ROXICODONE) tablet 5 mg  5 mg Oral Q4HRS PRN Roland Sauceda M.D.   5 mg at 08/12/19 1425   • acetaminophen (TYLENOL) tablet 1,000 mg  1,000 mg Oral Q6HRS PRN Roland Sauceda M.D.   1,000 mg at 08/12/19 1844       Allergies  Not on File      Physical Exam  Temp:  [36.4 °C (97.6 °F)-37.1 °C (98.8 °F)] 37.1 °C (98.8 °F)  Pulse:  [69-70] 70  Resp:  [17-20] 20  BP: (141-148)/(53-91) 141/91  SpO2:  [91 %-95 %] 91 %    Pulse/Extremity Exam:    Wounds: punctate wounds to the dorsum of toes on the right toes    General appearance: NAD, conversing appropriately, obese  Psych: Normal affect, mood, judgement  Neuro: CN II-XII grossly intact.   Neck: full range of motion  Lungs: No inspiratory stridor or wheezing  CV: RRR  Abdomen: Soft, NT/ND, pannus and palpable femoral pulses  Skin: No rashes    Labs Reviewed Today:  Recent Labs     08/10/19  0421 08/11/19  0459 08/12/19  0402   WBC 6.3 6.4 7.4   RBC 4.89 4.38* 4.21*   HEMOGLOBIN 14.8 13.4* 12.5*   HEMATOCRIT 46.1 41.6* 40.9*   MCV 94.3 95.0 97.1   MCH 30.3 30.6 29.7   MCHC 32.1* 32.2* 30.6*   RDW 46.9 47.6 48.2   PLATELETCT 153* 133* 128*   MPV 9.8 9.8 9.3     Recent Labs     08/10/19  0421 08/11/19  0459 08/12/19  0402   SODIUM 146* 143 144   POTASSIUM 4.0 4.3 3.9   CHLORIDE 114* 113* 113*   CO2 24 23 26   GLUCOSE 208* 224* 127*   BUN 38* 31* 23*   CREATININE 1.49* 1.62* 1.47*   CALCIUM 8.2* 7.8* 7.5*     Recent Labs     08/10/19  0421 08/11/19  0459 08/12/19  0402   ALTSGPT 31 40 42   ASTSGOT 32 46* 38   ALKPHOSPHAT 61 70 70   TBILIRUBIN 0.7 0.7 0.7   GLUCOSE 208* 224* 127*         Imaging Reviewed Today:  I personally reviewed all non-invasive vascular testing including images, x-rays, tracings, arterial waveforms, and duplex exams relevant to this admission. My interpretation is below:    ABIs: Findings   Right.    Unable to assess ankle brachial index due to limitations.   Assessment of toe  brachial index demonstrates moderate disease.   Toe pressure: 66 mmHg, TBI: 0.43.   Doppler waveform of the common femoral artery is of high amplitude and    triphasic.    Doppler waveforms of the popliteal, posterior tibial, and dorsalis pedis    arteries are monophasic.      Left.    Unable to assess ankle brachial index due to limitations.   Assessment of toe brachial index demonstratesno evidence of disease.   Toe pressure: 117 mmHg, TBI: 0.77.   Doppler waveforms of the common femoral, popliteal, posterior tibial, and    dorsalis pedis arteries are of high amplitude and triphasic.      Arterial duplex scan of the right lower extremity was performed in    accordance with lower extremity arterial evaluation protocol - see separate    report.     Arterial Duplex:  Vascular Laboratory   CONCLUSIONS   Stenosis of the right distal femoral artery. Velocities are consistent with    50-75% stenosis. Stenosis may be underestimated due to heavy calcification    of the right distal femoral artery.    The peroneal, proximal posterior tibial and anterior tibial arteries were    not evaluated due to limitations as patient did not want bandages to be    removed from calf area.    Assessment/Plan & Medical Decision-Making    It sounds as if Mr. Valdez has some element of renal insufficiency.  There is suspicion of popliteal artery stenosis, but I have no recent images of the lower extremities to see progression of the wounds over the last 7 days.  I see no urgency to identify significant arterial disease in this gentleman and NIV are severely limited.     I will continue to follow along and consider arteriography or more definitive imaging studies.       Irais Rios MD  Vascular Surgeon  [facility]  Office: [phone]

## 2019-08-13 NOTE — PROGRESS NOTES
2 RN skin check performed with PHILLIP Rodarte.     Devices in place: .  Known moisture fissures and R buttock wound with surrounding blanchable erythema to sacrum/coccyx.     Generalized scattered bruising, small abrasions, fragile skin, sacabbing. RUE skin tears x 2, bruising. Sacrum as noted above. Scrotum red, blanching. R knee red, blanching. BLE calf/shin wounds followed by wound team and LPS. Heels blanching, cracked. Mild redness and slight excoriation under panus.      Interventions: Reposition Q 2 hours, pillows for support/positioning, zinc barrier cream, mepilex to elbow (contradinicated to sacrum d/t incontinent loose stools), heel float boots, low air loss bed with dri-stephen pads. Interdry to panus.

## 2019-08-13 NOTE — DISCHARGE PLANNING
Received Choice form at [phone]  Agency/Facility Name: [facility]  Referral sent per Choice form @ 1650

## 2019-08-14 LAB
ALBUMIN SERPL BCP-MCNC: 2.3 G/DL (ref 3.2–4.9)
ALBUMIN/GLOB SERPL: 0.8 G/DL
ALP SERPL-CCNC: 72 U/L (ref 30–99)
ALT SERPL-CCNC: 26 U/L (ref 2–50)
ANION GAP SERPL CALC-SCNC: 6 MMOL/L (ref 0–11.9)
AST SERPL-CCNC: 22 U/L (ref 12–45)
BASOPHILS # BLD AUTO: 0.7 % (ref 0–1.8)
BASOPHILS # BLD: 0.05 K/UL (ref 0–0.12)
BILIRUB SERPL-MCNC: 0.6 MG/DL (ref 0.1–1.5)
BUN SERPL-MCNC: 19 MG/DL (ref 8–22)
CALCIUM SERPL-MCNC: 7.7 MG/DL (ref 8.5–10.5)
CHLORIDE SERPL-SCNC: 111 MMOL/L (ref 96–112)
CO2 SERPL-SCNC: 25 MMOL/L (ref 20–33)
CREAT SERPL-MCNC: 1.46 MG/DL (ref 0.5–1.4)
EOSINOPHIL # BLD AUTO: 0.07 K/UL (ref 0–0.51)
EOSINOPHIL NFR BLD: 1 % (ref 0–6.9)
ERYTHROCYTE [DISTWIDTH] IN BLOOD BY AUTOMATED COUNT: 47.2 FL (ref 35.9–50)
GLOBULIN SER CALC-MCNC: 2.8 G/DL (ref 1.9–3.5)
GLUCOSE BLD-MCNC: 127 MG/DL (ref 65–99)
GLUCOSE BLD-MCNC: 130 MG/DL (ref 65–99)
GLUCOSE BLD-MCNC: 130 MG/DL (ref 65–99)
GLUCOSE BLD-MCNC: 82 MG/DL (ref 65–99)
GLUCOSE SERPL-MCNC: 112 MG/DL (ref 65–99)
HCT VFR BLD AUTO: 39.3 % (ref 42–52)
HGB BLD-MCNC: 12.6 G/DL (ref 14–18)
IMM GRANULOCYTES # BLD AUTO: 0.05 K/UL (ref 0–0.11)
IMM GRANULOCYTES NFR BLD AUTO: 0.7 % (ref 0–0.9)
LYMPHOCYTES # BLD AUTO: 0.93 K/UL (ref 1–4.8)
LYMPHOCYTES NFR BLD: 13.9 % (ref 22–41)
MCH RBC QN AUTO: 30.6 PG (ref 27–33)
MCHC RBC AUTO-ENTMCNC: 32.1 G/DL (ref 33.7–35.3)
MCV RBC AUTO: 95.4 FL (ref 81.4–97.8)
MONOCYTES # BLD AUTO: 0.46 K/UL (ref 0–0.85)
MONOCYTES NFR BLD AUTO: 6.9 % (ref 0–13.4)
NEUTROPHILS # BLD AUTO: 5.13 K/UL (ref 1.82–7.42)
NEUTROPHILS NFR BLD: 76.8 % (ref 44–72)
NRBC # BLD AUTO: 0 K/UL
NRBC BLD-RTO: 0 /100 WBC
PLATELET # BLD AUTO: 112 K/UL (ref 164–446)
PMV BLD AUTO: 9.4 FL (ref 9–12.9)
POTASSIUM SERPL-SCNC: 4.1 MMOL/L (ref 3.6–5.5)
PROT SERPL-MCNC: 5.1 G/DL (ref 6–8.2)
RBC # BLD AUTO: 4.12 M/UL (ref 4.7–6.1)
SODIUM SERPL-SCNC: 142 MMOL/L (ref 135–145)
WBC # BLD AUTO: 6.7 K/UL (ref 4.8–10.8)

## 2019-08-14 PROCEDURE — 36415 COLL VENOUS BLD VENIPUNCTURE: CPT

## 2019-08-14 PROCEDURE — 80053 COMPREHEN METABOLIC PANEL: CPT

## 2019-08-14 PROCEDURE — 82962 GLUCOSE BLOOD TEST: CPT

## 2019-08-14 PROCEDURE — A9270 NON-COVERED ITEM OR SERVICE: HCPCS | Performed by: HOSPITALIST

## 2019-08-14 PROCEDURE — 700102 HCHG RX REV CODE 250 W/ 637 OVERRIDE(OP): Performed by: NURSE PRACTITIONER

## 2019-08-14 PROCEDURE — A9270 NON-COVERED ITEM OR SERVICE: HCPCS | Performed by: NURSE PRACTITIONER

## 2019-08-14 PROCEDURE — 85025 COMPLETE CBC W/AUTO DIFF WBC: CPT

## 2019-08-14 PROCEDURE — 700102 HCHG RX REV CODE 250 W/ 637 OVERRIDE(OP): Performed by: HOSPITALIST

## 2019-08-14 PROCEDURE — 99232 SBSQ HOSP IP/OBS MODERATE 35: CPT | Performed by: HOSPITALIST

## 2019-08-14 PROCEDURE — 92526 ORAL FUNCTION THERAPY: CPT

## 2019-08-14 PROCEDURE — 700102 HCHG RX REV CODE 250 W/ 637 OVERRIDE(OP): Performed by: INTERNAL MEDICINE

## 2019-08-14 PROCEDURE — 770021 HCHG ROOM/CARE - ISO PRIVATE

## 2019-08-14 PROCEDURE — 700111 HCHG RX REV CODE 636 W/ 250 OVERRIDE (IP): Performed by: HOSPITALIST

## 2019-08-14 PROCEDURE — A9270 NON-COVERED ITEM OR SERVICE: HCPCS | Performed by: INTERNAL MEDICINE

## 2019-08-14 RX ADMIN — OXYCODONE HYDROCHLORIDE 5 MG: 5 TABLET ORAL at 08:19

## 2019-08-14 RX ADMIN — METOPROLOL SUCCINATE 25 MG: 25 TABLET, EXTENDED RELEASE ORAL at 06:07

## 2019-08-14 RX ADMIN — MORPHINE SULFATE 3 MG: 4 INJECTION INTRAVENOUS at 16:34

## 2019-08-14 RX ADMIN — GABAPENTIN 400 MG: 400 CAPSULE ORAL at 06:08

## 2019-08-14 RX ADMIN — FINASTERIDE 5 MG: 5 TABLET, FILM COATED ORAL at 06:08

## 2019-08-14 RX ADMIN — LINEZOLID 600 MG: 600 TABLET, FILM COATED ORAL at 06:08

## 2019-08-14 RX ADMIN — OMEGA-3 FATTY ACIDS CAP 1000 MG 1000 MG: 1000 CAP at 11:30

## 2019-08-14 RX ADMIN — INSULIN GLARGINE 55 UNITS: 100 INJECTION, SOLUTION SUBCUTANEOUS at 18:34

## 2019-08-14 RX ADMIN — APIXABAN 5 MG: 5 TABLET, FILM COATED ORAL at 16:54

## 2019-08-14 RX ADMIN — VITAMIN D, TAB 1000IU (100/BT) 2000 UNITS: 25 TAB at 06:08

## 2019-08-14 RX ADMIN — GABAPENTIN 400 MG: 400 CAPSULE ORAL at 22:55

## 2019-08-14 RX ADMIN — APIXABAN 5 MG: 5 TABLET, FILM COATED ORAL at 06:07

## 2019-08-14 RX ADMIN — LINEZOLID 600 MG: 600 TABLET, FILM COATED ORAL at 16:55

## 2019-08-14 RX ADMIN — ACETAMINOPHEN 1000 MG: 500 TABLET ORAL at 11:30

## 2019-08-14 RX ADMIN — OMEGA-3 FATTY ACIDS CAP 1000 MG 1000 MG: 1000 CAP at 16:55

## 2019-08-14 RX ADMIN — METOPROLOL SUCCINATE 25 MG: 25 TABLET, EXTENDED RELEASE ORAL at 16:54

## 2019-08-14 RX ADMIN — SENNOSIDES, DOCUSATE SODIUM 2 TABLET: 50; 8.6 TABLET, FILM COATED ORAL at 06:08

## 2019-08-14 RX ADMIN — GABAPENTIN 400 MG: 400 CAPSULE ORAL at 16:25

## 2019-08-14 RX ADMIN — OXYCODONE HYDROCHLORIDE 5 MG: 5 TABLET ORAL at 18:39

## 2019-08-14 RX ADMIN — ATORVASTATIN CALCIUM 20 MG: 20 TABLET, FILM COATED ORAL at 22:55

## 2019-08-14 ASSESSMENT — ENCOUNTER SYMPTOMS
DIAPHORESIS: 0
CONSTIPATION: 0
CARDIOVASCULAR NEGATIVE: 1
RESPIRATORY NEGATIVE: 1
PALPITATIONS: 0
MYALGIAS: 1
DOUBLE VISION: 0
CHILLS: 0
WHEEZING: 0
FOCAL WEAKNESS: 0
SEIZURES: 0
NAUSEA: 0
NERVOUS/ANXIOUS: 0
HEARTBURN: 0
WEAKNESS: 1
COUGH: 0
PSYCHIATRIC NEGATIVE: 1
HEMOPTYSIS: 0
BLOOD IN STOOL: 0
EYES NEGATIVE: 1
DIZZINESS: 0
LOSS OF CONSCIOUSNESS: 0
VOMITING: 0
ABDOMINAL PAIN: 0
DIARRHEA: 1
CONSTITUTIONAL NEGATIVE: 1
FEVER: 0
HEADACHES: 0
BRUISES/BLEEDS EASILY: 0

## 2019-08-14 NOTE — THERAPY
"Speech Language Therapy dysphagia treatment completed.   Functional Status:  Pt seen for dysphagia f/u and is tolerating current diet as well as trials of upgraded texture and thin liquids.  \"Gumming\" dry solid with excessive 'mastication' with bolus residue mid tongue blade.  Liquid wash faciliating clearance.  Thins via sm sip with and without straw, tolerated with 1x throat clear, which demos improvement vs when last seen. Will trial upgraded tray for next meal.  Recommendations: Continue SLP with upgraded trial tray at next meal.   Plan of Care: Will benefit from Speech Therapy 3 times per week  Post-Acute Therapy: Discharge to home with outpatient or home health for additional skilled therapy services.    See \"Rehab Therapy-Acute\" Patient Summary Report for complete documentation.     "

## 2019-08-14 NOTE — PROGRESS NOTES
Pt has reported pain tolerable today, except with dressing change to RLE (pre-medicated per MAR).  Up to commode with PT/OT this afternoon.  Agreeable to q2h turns and able to reposition upper body with trapeze.

## 2019-08-14 NOTE — PROGRESS NOTES
2 RN skin check completed with PHILLIP Howe for Deejay score <17.  Devices in place: N/A  Multiple scattered abrasions and bruising also noted. Generalized skin fragile. RUE skin tears, non-adhesive dsg in place. Moisture fissures and R buttock wound noted. Scrotum red and blanching. BLE calf/shin wounds - NOEMÍ dressing in place, CDI. Wound team and LPS following. Heels red and blanching.  Interventions: Low air loss mattress, Q2H repositioning, pillows for support/positioning, barrier cream, mepilex to elbow (contradinicated to sacrum d/t pt occasionally incontinent ), heel float boots.

## 2019-08-14 NOTE — CARE PLAN
Problem: Bowel/Gastric:  Goal: Will not experience complications related to bowel motility  Outcome: PROGRESSING AS EXPECTED  Note:   Last BM 8/12, loose. Stool softeners as ordered since no BM yesterday. Promote mobility, PO intake, hydration.       Problem: Psychosocial Needs:  Goal: Level of anxiety will decrease  Outcome: PROGRESSING AS EXPECTED  Note:   Pt states some needs and preferences repeatedly, reflecting some anxiety about care. Discuss POC with pt, respond to needs promptly, therapeutic listening, uphold pt preferences for care.

## 2019-08-14 NOTE — THERAPY
Pt with slightly upgraded tray with family noting sl increased throat-clearing with tray.  Will continue upgraded trials 8/15/19. Continue current diet at this time.  Dtr educated re POC.

## 2019-08-14 NOTE — PROGRESS NOTES
Hospital Medicine Daily Progress Note    Date of Service  8/14/2019    Chief Complaint  74 y.o. male admitted 8/5/2019 with being found down    Hospital Course    Patient was apparently found down next to his bed 3 days.  The patient was brought in his found to have acute renal failure with rhabdomyolysis.  Patient was treated with fluid resuscitation.  Patient was also found to have nonhealing wounds of his right lower extremity.  This was evaluated and at this point vascular surgery was involved they believe that the patient has very poor supply and will be evaluating him for possible need of an angiogram.      Interval Problem Update  Continue at this point with fluid resuscitation monitor CPK levels.  Continue with wound care management.  For his MRSA continue with Zyvox, monitor wounds on the lower extremity.  For his sialoadenitis continue with Zyvox and monitor wound as well as cultures.  Continue with pain management    Consultants/Specialty  Vascular surgery, critical care    Code Status  DNR/DNI    Disposition  He has been accepted at a skilled facility, once medical clearance is obtained we will be transferring him there.    Review of Systems  Review of Systems   Constitutional: Negative.  Negative for chills, diaphoresis and fever.   HENT: Negative.    Eyes: Negative.  Negative for double vision.   Respiratory: Negative.  Negative for cough, hemoptysis and wheezing.    Cardiovascular: Negative.  Negative for chest pain, palpitations and leg swelling.   Gastrointestinal: Positive for diarrhea. Negative for abdominal pain, blood in stool, constipation, heartburn, nausea and vomiting.   Genitourinary: Negative.  Negative for frequency, hematuria and urgency.   Musculoskeletal: Positive for myalgias. Negative for joint pain.   Skin: Negative for itching and rash.        Skin break down   Neurological: Positive for weakness. Negative for dizziness, focal weakness, seizures, loss of consciousness and  headaches.   Endo/Heme/Allergies: Negative.  Does not bruise/bleed easily.   Psychiatric/Behavioral: Negative.  Negative for suicidal ideas. The patient is not nervous/anxious.    All other systems reviewed and are negative.       Physical Exam  Temp:  [36.7 °C (98 °F)-37.2 °C (98.9 °F)] 36.7 °C (98 °F)  Pulse:  [66-70] 70  Resp:  [17-20] 20  BP: (131-148)/(72-90) 145/82  SpO2:  [92 %-95 %] 94 %    Physical Exam   Constitutional: He is oriented to person, place, and time. He appears well-developed and well-nourished.   HENT:   Head: Normocephalic and atraumatic.   Right Ear: External ear normal.   Left Ear: External ear normal.   Nose: Nose normal.   Mouth/Throat: Oropharynx is clear and moist.   Eyes: Pupils are equal, round, and reactive to light. Conjunctivae and EOM are normal.   Neck: Normal range of motion. Neck supple. No JVD present. No thyromegaly present.   Cardiovascular: Normal rate and regular rhythm.   Murmur heard.  Pulmonary/Chest: Effort normal. He has decreased breath sounds in the right lower field and the left lower field. He has no wheezes. He has no rales. He exhibits no tenderness.   Abdominal: He exhibits distension (obesity). He exhibits no mass. There is no tenderness. There is no rebound and no guarding.   Musculoskeletal: Normal range of motion. He exhibits edema and tenderness.   Lymphadenopathy:     He has no cervical adenopathy.   Neurological: He is alert and oriented to person, place, and time. He has normal reflexes. He is not disoriented. No cranial nerve deficit. GCS eye subscore is 4. GCS verbal subscore is 5. GCS motor subscore is 6.   Skin: Skin is warm and dry. No rash noted. No erythema.        Psychiatric: He has a normal mood and affect. His speech is normal and behavior is normal. Judgment and thought content normal. Cognition and memory are normal.   Nursing note and vitals reviewed.      Fluids    Intake/Output Summary (Last 24 hours) at 8/14/2019 1348  Last data filed  at 8/14/2019 0808  Gross per 24 hour   Intake 480 ml   Output 620 ml   Net -140 ml       Laboratory  Recent Labs     08/12/19  0402 08/13/19 0442 08/14/19  0430   WBC 7.4 7.6 6.7   RBC 4.21* 4.30* 4.12*   HEMOGLOBIN 12.5* 13.2* 12.6*   HEMATOCRIT 40.9* 41.6* 39.3*   MCV 97.1 96.7 95.4   MCH 29.7 30.7 30.6   MCHC 30.6* 31.7* 32.1*   RDW 48.2 47.6 47.2   PLATELETCT 128* 118* 112*   MPV 9.3 9.5 9.4     Recent Labs     08/12/19 0402 08/13/19 0442 08/14/19  0430   SODIUM 144 144 142   POTASSIUM 3.9 3.8 4.1   CHLORIDE 113* 112 111   CO2 26 25 25   GLUCOSE 127* 84 112*   BUN 23* 21 19   CREATININE 1.47* 1.49* 1.46*   CALCIUM 7.5* 7.8* 7.7*                   Imaging  US-RUBIO SINGLE LEVEL BILAT   Final Result      US-EXTREMITY ARTERY LOWER UNILAT RIGHT   Final Result      CT-SOFT TISSUE NECK WITH   Final Result      1.  There is no evidence of neck mass.   2.  There is mild edema of the parotid glands and submandibular glands with scattered parotid gland calcifications, probably postinflammatory.   3.  The airway is patent.   4.  There are no suspicious thyroid nodules.   5.  There is atherosclerosis of the aorta, coronary arteries and carotid arteries.   6.  There may be mild edema in the upper lung zones.      US-SOFT TISSUES OF HEAD - NECK   Final Result      Diffusely edematous neck soft tissues. As with any palpable abnormality, follow-up on clinical grounds is advised.      ACR TI-RADS Recommendations         Recommendations based on the American College of Radiology Thyroid imaging, reporting and Data System (TI-RADS) 2017.      DX-HIP-UNILATERAL-WITH PELVIS-1 VIEW LEFT   Final Result      Degenerative changes without acute osseous abnormality.      DX-CHEST-PORTABLE (1 VIEW)   Final Result      Stable enlargement of the cardiomediastinal silhouette, improving minimal diffuse interstitial pulmonary edema and bilateral lower lobe atelectasis and/or consolidation.      US-RENAL   Final Result      1.  No evidence of  solid renal mass or hydronephrosis.      2.  Bilateral renal echogenic foci likely representing small nephroliths.      CT-HEAD W/O   Final Result      1.  No evidence of acute intracranial process.      2.  Cerebral atrophy as well as periventricular chronic small vessel ischemic change.      DX-CHEST-PORTABLE (1 VIEW)   Final Result      1.  Enlarged cardiac mediastinal silhouette.   2.  Cardiac pacer.   3.  No acute abnormality.           Assessment/Plan  * Diabetic ketoacidosis without coma associated with type 2 diabetes mellitus (HCC)- (present on admission)  Assessment & Plan  DKA has resolved.  -accus with sliding scale coverage  -diabetic diet  -diabetic education  -follow glycohemoglobin levels long term, most recent hemoglobin A1c is 8.0  -continue with oral antihyperglycemics  -monitor for hypoglycemic episodes and adjust control if he should get low    Chronic kidney disease (CKD) stage G3a/A3, moderately decreased glomerular filtration rate (GFR) between 45-59 mL/min/1.73 square meter and albuminuria creatinine ratio greater than 300 mg/g ([facility])  Assessment & Plan  With the patient's chronic kidney disease it is probably not safe to have the patient undergo a angiogram as the contrast may be very toxic to his kidneys.    Functional dysphagia  Assessment & Plan  Speech has evaluated the patient and is recommending thickened liquid diet.    Hypophosphatemia  Assessment & Plan  Continue monitoring phosphorus levels and optimize phosphorus intake    Pressure ulcer of buttock  Assessment & Plan  Frequent turning protocol    Physical debility- (present on admission)  Assessment & Plan  Remains with physical therapy and Occupational Therapy management    Class 3 severe obesity due to excess calories in adult (HCC)- (present on admission)  Assessment & Plan  Body mass index is 40.81 kg/m².  Patient will benefit from outpatient weight loss management program including a bariatric clinic evaluation.    Elevated  troponin  Assessment & Plan  Most likely demand ischemia    Persistent atrial fibrillation (HCC)- (present on admission)  Assessment & Plan  Continue with rate control.  Continue with anticoagulation using Eliquis    COPD (chronic obstructive pulmonary disease) ([facility])- (present on admission)  Assessment & Plan  Continue with RT protocol nebulizer treatments.  Currently patient does not have an acute exacerbation.    Hyperphosphatemia- (present on admission)  Assessment & Plan  May be secondary to the renal failure.  Monitor at this point renal functions    Lymphadenopathy, submandibular- (present on admission)  Assessment & Plan  Patient does have chronic sialoadenitis.  He has been placed on Zyvox with the possibility of recurrent infection.  Imaging studies did show bilateral enlargement of the lymph nodes.    Open wound of leg- (present on admission)  Assessment & Plan  Patient is positive for MRSA in the lower extremity wound.  Continue with Zyvox.  Vascular surgery at this point does not plan on any interventions.  They will reevaluate his lower extremity after office today.    MARIZOL (acute kidney injury) ([facility])- (present on admission)  Assessment & Plan  Renal functions at this point have been stabilized with fluid resuscitation.  The patient's renal functions initially were extremely elevated due to rhabdomyolysis.  4 6.  Continue to monitor renal functions and avoid nephrotoxins      Pain of left hip joint- (present on admission)  Assessment & Plan  Continue with pain management optimization    Head trauma- (present on admission)  Assessment & Plan  Negative for injury according to imaging studies.    Rhabdomyolysis- (present on admission)  Assessment & Plan  Resolved, continue with fluid management and monitor CPK levels       VTE prophylaxis: Heparin

## 2019-08-15 LAB
ALBUMIN SERPL BCP-MCNC: 2.3 G/DL (ref 3.2–4.9)
ALBUMIN/GLOB SERPL: 0.8 G/DL
ALP SERPL-CCNC: 71 U/L (ref 30–99)
ALT SERPL-CCNC: 24 U/L (ref 2–50)
ANION GAP SERPL CALC-SCNC: 7 MMOL/L (ref 0–11.9)
AST SERPL-CCNC: 19 U/L (ref 12–45)
BASOPHILS # BLD AUTO: 0.6 % (ref 0–1.8)
BASOPHILS # BLD: 0.04 K/UL (ref 0–0.12)
BILIRUB SERPL-MCNC: 0.6 MG/DL (ref 0.1–1.5)
BUN SERPL-MCNC: 16 MG/DL (ref 8–22)
CALCIUM SERPL-MCNC: 7.8 MG/DL (ref 8.5–10.5)
CHLORIDE SERPL-SCNC: 109 MMOL/L (ref 96–112)
CO2 SERPL-SCNC: 26 MMOL/L (ref 20–33)
CREAT SERPL-MCNC: 1.47 MG/DL (ref 0.5–1.4)
EOSINOPHIL # BLD AUTO: 0.09 K/UL (ref 0–0.51)
EOSINOPHIL NFR BLD: 1.4 % (ref 0–6.9)
ERYTHROCYTE [DISTWIDTH] IN BLOOD BY AUTOMATED COUNT: 45.6 FL (ref 35.9–50)
GLOBULIN SER CALC-MCNC: 2.9 G/DL (ref 1.9–3.5)
GLUCOSE BLD-MCNC: 116 MG/DL (ref 65–99)
GLUCOSE BLD-MCNC: 124 MG/DL (ref 65–99)
GLUCOSE BLD-MCNC: 138 MG/DL (ref 65–99)
GLUCOSE BLD-MCNC: 78 MG/DL (ref 65–99)
GLUCOSE SERPL-MCNC: 86 MG/DL (ref 65–99)
HCT VFR BLD AUTO: 38.5 % (ref 42–52)
HGB BLD-MCNC: 12.6 G/DL (ref 14–18)
IMM GRANULOCYTES # BLD AUTO: 0.05 K/UL (ref 0–0.11)
IMM GRANULOCYTES NFR BLD AUTO: 0.8 % (ref 0–0.9)
LYMPHOCYTES # BLD AUTO: 0.97 K/UL (ref 1–4.8)
LYMPHOCYTES NFR BLD: 14.7 % (ref 22–41)
MCH RBC QN AUTO: 30.4 PG (ref 27–33)
MCHC RBC AUTO-ENTMCNC: 32.7 G/DL (ref 33.7–35.3)
MCV RBC AUTO: 93 FL (ref 81.4–97.8)
MONOCYTES # BLD AUTO: 0.47 K/UL (ref 0–0.85)
MONOCYTES NFR BLD AUTO: 7.1 % (ref 0–13.4)
NEUTROPHILS # BLD AUTO: 5 K/UL (ref 1.82–7.42)
NEUTROPHILS NFR BLD: 75.4 % (ref 44–72)
NRBC # BLD AUTO: 0 K/UL
NRBC BLD-RTO: 0 /100 WBC
PLATELET # BLD AUTO: 110 K/UL (ref 164–446)
PMV BLD AUTO: 9.1 FL (ref 9–12.9)
POTASSIUM SERPL-SCNC: 3.6 MMOL/L (ref 3.6–5.5)
PROT SERPL-MCNC: 5.2 G/DL (ref 6–8.2)
RBC # BLD AUTO: 4.14 M/UL (ref 4.7–6.1)
SODIUM SERPL-SCNC: 142 MMOL/L (ref 135–145)
WBC # BLD AUTO: 6.6 K/UL (ref 4.8–10.8)

## 2019-08-15 PROCEDURE — 82962 GLUCOSE BLOOD TEST: CPT | Mod: 91

## 2019-08-15 PROCEDURE — 99232 SBSQ HOSP IP/OBS MODERATE 35: CPT | Performed by: HOSPITALIST

## 2019-08-15 PROCEDURE — 36415 COLL VENOUS BLD VENIPUNCTURE: CPT

## 2019-08-15 PROCEDURE — 700102 HCHG RX REV CODE 250 W/ 637 OVERRIDE(OP): Performed by: HOSPITALIST

## 2019-08-15 PROCEDURE — A9270 NON-COVERED ITEM OR SERVICE: HCPCS | Performed by: HOSPITALIST

## 2019-08-15 PROCEDURE — A9270 NON-COVERED ITEM OR SERVICE: HCPCS | Performed by: NURSE PRACTITIONER

## 2019-08-15 PROCEDURE — 700111 HCHG RX REV CODE 636 W/ 250 OVERRIDE (IP): Performed by: HOSPITALIST

## 2019-08-15 PROCEDURE — 92526 ORAL FUNCTION THERAPY: CPT

## 2019-08-15 PROCEDURE — 700102 HCHG RX REV CODE 250 W/ 637 OVERRIDE(OP): Performed by: INTERNAL MEDICINE

## 2019-08-15 PROCEDURE — 80053 COMPREHEN METABOLIC PANEL: CPT

## 2019-08-15 PROCEDURE — 770021 HCHG ROOM/CARE - ISO PRIVATE

## 2019-08-15 PROCEDURE — A9270 NON-COVERED ITEM OR SERVICE: HCPCS | Performed by: INTERNAL MEDICINE

## 2019-08-15 PROCEDURE — 700102 HCHG RX REV CODE 250 W/ 637 OVERRIDE(OP): Performed by: NURSE PRACTITIONER

## 2019-08-15 PROCEDURE — 85025 COMPLETE CBC W/AUTO DIFF WBC: CPT

## 2019-08-15 RX ADMIN — MORPHINE SULFATE 4 MG: 4 INJECTION INTRAVENOUS at 13:44

## 2019-08-15 RX ADMIN — LINEZOLID 600 MG: 600 TABLET, FILM COATED ORAL at 17:26

## 2019-08-15 RX ADMIN — OMEGA-3 FATTY ACIDS CAP 1000 MG 1000 MG: 1000 CAP at 07:30

## 2019-08-15 RX ADMIN — APIXABAN 5 MG: 5 TABLET, FILM COATED ORAL at 05:24

## 2019-08-15 RX ADMIN — METOPROLOL SUCCINATE 25 MG: 25 TABLET, EXTENDED RELEASE ORAL at 05:25

## 2019-08-15 RX ADMIN — METOPROLOL SUCCINATE 25 MG: 25 TABLET, EXTENDED RELEASE ORAL at 17:25

## 2019-08-15 RX ADMIN — LINEZOLID 600 MG: 600 TABLET, FILM COATED ORAL at 05:25

## 2019-08-15 RX ADMIN — OMEGA-3 FATTY ACIDS CAP 1000 MG 1000 MG: 1000 CAP at 11:26

## 2019-08-15 RX ADMIN — SENNOSIDES, DOCUSATE SODIUM 2 TABLET: 50; 8.6 TABLET, FILM COATED ORAL at 05:25

## 2019-08-15 RX ADMIN — FINASTERIDE 5 MG: 5 TABLET, FILM COATED ORAL at 05:25

## 2019-08-15 RX ADMIN — OMEGA-3 FATTY ACIDS CAP 1000 MG 1000 MG: 1000 CAP at 17:26

## 2019-08-15 RX ADMIN — GABAPENTIN 400 MG: 400 CAPSULE ORAL at 21:48

## 2019-08-15 RX ADMIN — ATORVASTATIN CALCIUM 20 MG: 20 TABLET, FILM COATED ORAL at 21:48

## 2019-08-15 RX ADMIN — GABAPENTIN 400 MG: 400 CAPSULE ORAL at 05:25

## 2019-08-15 RX ADMIN — VITAMIN D, TAB 1000IU (100/BT) 2000 UNITS: 25 TAB at 05:25

## 2019-08-15 RX ADMIN — APIXABAN 5 MG: 5 TABLET, FILM COATED ORAL at 17:26

## 2019-08-15 RX ADMIN — INSULIN GLARGINE 55 UNITS: 100 INJECTION, SOLUTION SUBCUTANEOUS at 17:21

## 2019-08-15 RX ADMIN — OXYCODONE HYDROCHLORIDE 5 MG: 5 TABLET ORAL at 22:02

## 2019-08-15 ASSESSMENT — ENCOUNTER SYMPTOMS
PND: 0
FOCAL WEAKNESS: 0
ABDOMINAL PAIN: 0
CONSTIPATION: 0
EYE DISCHARGE: 0
SEIZURES: 0
WEIGHT LOSS: 0
SPUTUM PRODUCTION: 0
MYALGIAS: 0
EYE PAIN: 0
DIARRHEA: 1
WHEEZING: 0
COUGH: 0
BACK PAIN: 0
SPEECH CHANGE: 0
DEPRESSION: 0
FEVER: 0
NAUSEA: 0
SORE THROAT: 0
POLYDIPSIA: 0

## 2019-08-15 ASSESSMENT — LIFESTYLE VARIABLES: SUBSTANCE_ABUSE: 0

## 2019-08-15 NOTE — THERAPY
Attempted to see pt for OT tx. Pt currently getting dressing changed. Will try again later as appropriate.

## 2019-08-15 NOTE — CARE PLAN
Problem: Communication  Goal: The ability to communicate needs accurately and effectively will improve  8/15/2019 1056 by Margaret Dominguez R.N.  Outcome: PROGRESSING AS EXPECTED  8/15/2019 1029 by NIALL AnguianoN.  Outcome: PROGRESSING AS EXPECTED     Problem: Safety  Goal: Will remain free from injury  8/15/2019 1056 by Margaret Dominguez R.N.  Outcome: PROGRESSING AS EXPECTED  8/15/2019 1029 by NIALL AnguianoN.  Outcome: PROGRESSING AS EXPECTED     Problem: Venous Thromboembolism (VTW)/Deep Vein Thrombosis (DVT) Prevention:  Goal: Patient will participate in Venous Thrombosis (VTE)/Deep Vein Thrombosis (DVT)Prevention Measures  Outcome: PROGRESSING AS EXPECTED     Problem: Bowel/Gastric:  Goal: Normal bowel function is maintained or improved  8/15/2019 1056 by Margaret Dominguez R.N.  Outcome: PROGRESSING AS EXPECTED  8/15/2019 1029 by Margaret Dominguez REfraN.  Outcome: PROGRESSING AS EXPECTED     Problem: Knowledge Deficit  Goal: Knowledge of disease process/condition, treatment plan, diagnostic tests, and medications will improve  8/15/2019 1056 by Margaret Dominguez R.N.  Outcome: PROGRESSING AS EXPECTED  8/15/2019 1029 by NIALL AnguianoN.  Outcome: PROGRESSING AS EXPECTED     Problem: Discharge Barriers/Planning  Goal: Patient's continuum of care needs will be met  Outcome: PROGRESSING AS EXPECTED

## 2019-08-15 NOTE — THERAPY
"Speech Language Therapy dysphagia treatment completed.   Functional Status:  fnxl swallow for modified diet however pt is c/o 'dryness' of D1 entrees, and coughs post trials up with thins and has poor endurance, is edentulous.  Lateral diet change to be trialed and RN/dtr/pt aware of trial rationale.    Recommendations: Changed diet to NTFL with contd assisted fdg required.  SLP to follow   Plan of Care: Will benefit from Speech Therapy 3 times per week  Post-Acute Therapy: Discharge to a transitional care facility for continued skilled therapy services.    See \"Rehab Therapy-Acute\" Patient Summary Report for complete documentation.     "

## 2019-08-15 NOTE — PROGRESS NOTES
2 RN skin check performed with PHILLIP Bolton.     Devices in place: Archie. Flynn boots, interdry between Abd. folds.  Known moisture fissures and R buttock wound with surrounding blanchable erythema to sacrum/coccyx.     Skin assessed under devices: Generalized scattered bruising, small abrasions, fragile skin, scabbing. R elbow skin tears x 2, covered with mepilex. Sacrum as noted above. Scrotum red, blanching and swollen. R knee red, blanching. BLE calf/shin wounds followed by wound team and LPS, dressing is clean, dry and intact, unable to assess BLE wounds at this time. Heels blanching, cracked. Mild redness and slight excoriation under panus, interdry in place.     Confirmed pressure ulcers found on: NA.    New potential pressure ulcers noted on Sacrum.     Wound consult placed: LPS consult placed on 8-.    Interventions: Reposition Q 2 hours, pillows for support/positioning, zinc barrier cream, mepilex to elbow (contradinicated to sacrum d/t incontinent loose stools), heel float boots, low air loss bed with dri-stephen pads. Interdry to panus.

## 2019-08-15 NOTE — PROGRESS NOTES
Hospital Medicine Daily Progress Note    Date of Service  8/15/2019    Chief Complaint  74 y.o. male admitted 8/5/2019 with being found down    Hospital Course    8/14/2019-patient was apparently found down next to his bed 3 days.  The patient was brought in his found to have acute renal failure with rhabdomyolysis.  Patient was treated with fluid resuscitation.  Patient was also found to have nonhealing wounds of his right lower extremity.  This was evaluated and at this point vascular surgery was involved they believe that the patient has very poor supply and will be evaluating him for possible need of an angiogram.  8/15/2019- still waiting for vascular surgery to make a decision on possible surgical intervention.  If the patient does not have any further surgical interventions the patient can be discharged to an extended care facility for ongoing wound care and therapies.      Interval Problem Update  Remains on Zyvox with MRSA infection  Continue with wound care  Continue with turning protocol  Continue with pain management optimization  Continue with oxygen support and nebulizer management.    Consultants/Specialty  Vascular surgery, critical care    Code Status  DNR/DNI    Disposition  He has been accepted at a skilled facility, once medical clearance is obtained we will be transferring him there.    Review of Systems  Review of Systems   Constitutional: Negative for fever, malaise/fatigue and weight loss.   HENT: Negative for ear pain, nosebleeds and sore throat.    Eyes: Negative for pain and discharge.   Respiratory: Negative for cough, sputum production and wheezing.    Cardiovascular: Negative for chest pain, leg swelling and PND.   Gastrointestinal: Positive for diarrhea. Negative for abdominal pain, constipation and nausea.   Genitourinary: Negative for dysuria.   Musculoskeletal: Positive for joint pain. Negative for back pain and myalgias.   Skin: Negative for itching.        Right lower extremity  wounds   Neurological: Negative for speech change, focal weakness and seizures.   Endo/Heme/Allergies: Negative for polydipsia.   Psychiatric/Behavioral: Negative for depression and substance abuse.   All other systems reviewed and are negative.       Physical Exam  Temp:  [36.1 °C (97 °F)-36.9 °C (98.5 °F)] 36.7 °C (98 °F)  Pulse:  [65-70] 69  Resp:  [16-24] 20  BP: (129-159)/(75-93) 140/79  SpO2:  [92 %-94 %] 94 %    Physical Exam   Constitutional: He is oriented to person, place, and time. He appears well-developed and well-nourished.   HENT:   Head: Normocephalic and atraumatic.   Right Ear: External ear normal.   Left Ear: External ear normal.   Nose: Nose normal.   Mouth/Throat: Oropharynx is clear and moist.   Eyes: Pupils are equal, round, and reactive to light. Conjunctivae and EOM are normal. Right eye exhibits no discharge. Left eye exhibits no discharge.   Neck: Normal range of motion. Neck supple. No JVD present. No thyromegaly present.   Cardiovascular: Normal rate and regular rhythm.   Murmur heard.  Pulmonary/Chest: Effort normal. Stridor present. He has decreased breath sounds in the right lower field and the left lower field. He has no rales. He exhibits tenderness.   Abdominal: He exhibits distension (obesity). There is no tenderness. There is no rebound.   Musculoskeletal: Normal range of motion. He exhibits edema and tenderness.   Neurological: He is alert and oriented to person, place, and time. He has normal reflexes. He is not disoriented. He displays no atrophy and no tremor. He exhibits normal muscle tone. He displays no seizure activity. Coordination and gait abnormal. GCS eye subscore is 4. GCS verbal subscore is 5. GCS motor subscore is 6.   Skin: Skin is warm and dry. Rash noted. There is erythema.        Psychiatric: He has a normal mood and affect. His speech is normal and behavior is normal. Judgment and thought content normal. Cognition and memory are normal.   Nursing note and  vitals reviewed.      Fluids    Intake/Output Summary (Last 24 hours) at 8/15/2019 1332  Last data filed at 8/15/2019 0445  Gross per 24 hour   Intake 240 ml   Output 470 ml   Net -230 ml       Laboratory  Recent Labs     08/13/19 0442 08/14/19 0430 08/15/19  0549   WBC 7.6 6.7 6.6   RBC 4.30* 4.12* 4.14*   HEMOGLOBIN 13.2* 12.6* 12.6*   HEMATOCRIT 41.6* 39.3* 38.5*   MCV 96.7 95.4 93.0   MCH 30.7 30.6 30.4   MCHC 31.7* 32.1* 32.7*   RDW 47.6 47.2 45.6   PLATELETCT 118* 112* 110*   MPV 9.5 9.4 9.1     Recent Labs     08/13/19 0442 08/14/19  0430 08/15/19  0549   SODIUM 144 142 142   POTASSIUM 3.8 4.1 3.6   CHLORIDE 112 111 109   CO2 25 25 26   GLUCOSE 84 112* 86   BUN 21 19 16   CREATININE 1.49* 1.46* 1.47*   CALCIUM 7.8* 7.7* 7.8*                   Imaging  US-RUBIO SINGLE LEVEL BILAT   Final Result      US-EXTREMITY ARTERY LOWER UNILAT RIGHT   Final Result      CT-SOFT TISSUE NECK WITH   Final Result      1.  There is no evidence of neck mass.   2.  There is mild edema of the parotid glands and submandibular glands with scattered parotid gland calcifications, probably postinflammatory.   3.  The airway is patent.   4.  There are no suspicious thyroid nodules.   5.  There is atherosclerosis of the aorta, coronary arteries and carotid arteries.   6.  There may be mild edema in the upper lung zones.      US-SOFT TISSUES OF HEAD - NECK   Final Result      Diffusely edematous neck soft tissues. As with any palpable abnormality, follow-up on clinical grounds is advised.      ACR TI-RADS Recommendations         Recommendations based on the American College [employer] Thyroid imaging, reporting and Data System (TI-RADS) 2017.      DX-HIP-UNILATERAL-WITH PELVIS-1 VIEW LEFT   Final Result      Degenerative changes without acute osseous abnormality.      DX-CHEST-PORTABLE (1 VIEW)   Final Result      Stable enlargement of the cardiomediastinal silhouette, improving minimal diffuse interstitial pulmonary edema and  bilateral lower lobe atelectasis and/or consolidation.      US-RENAL   Final Result      1.  No evidence of solid renal mass or hydronephrosis.      2.  Bilateral renal echogenic foci likely representing small nephroliths.      CT-HEAD W/O   Final Result      1.  No evidence of acute intracranial process.      2.  Cerebral atrophy as well as periventricular chronic small vessel ischemic change.      DX-CHEST-PORTABLE (1 VIEW)   Final Result      1.  Enlarged cardiac mediastinal silhouette.   2.  Cardiac pacer.   3.  No acute abnormality.           Assessment/Plan  * Diabetic ketoacidosis without coma associated with type 2 diabetes mellitus (HCC)- (present on admission)  Assessment & Plan  Patient is no longer in DKA.  -accus with sliding scale coverage continued, most recent blood sugars are .  -diabetic diet continued  -diabetic education provided  -follow glycohemoglobin levels long term, most recent hemoglobin A1c is 8.0  -continue with oral antihyperglycemics  -monitor for hypoglycemic episodes and adjust control if he should get low    Open wound of leg- (present on admission)  Assessment & Plan  Patient currently continue with wound care.  Continue antibiotics using Zyvox for MRSA positive cultures.  Vascular surgery at this point to evaluate the patient for possible need of surgical intervention if the patient does not need surgery patient will be able to be discharged from extended care facility.        MARIZOL (acute kidney injury) (HCC)- (present on admission)  Assessment & Plan  The acute kidney injury at this point has resolved.  The patient does have chronic kidney disease stage III  Continue to monitor renal functions.  Avoid nephrotoxic agents.      Head trauma- (present on admission)  Assessment & Plan  Resolved    Chronic kidney disease (CKD) stage G3a/A3, moderately decreased glomerular filtration rate (GFR) between 45-59 mL/min/1.73 square meter and albuminuria creatinine ratio greater than 300  mg/g ([facility])  Assessment & Plan  Continue to monitor renal functions and avoid nephrotoxic agents.    Functional dysphagia  Assessment & Plan  Continue with speech therapy.  Patient is on a thickened liquid diet.    Class 3 severe obesity due to excess calories in adult ([facility])- (present on admission)  Assessment & Plan  Body mass index is 40.81 kg/m².  Will benefit from outpatient weight loss management program.    Persistent atrial fibrillation ([facility])- (present on admission)  Assessment & Plan  Currently rate control has been achieved on the atrial fibrillation continue at this point with rate control and anticoagulation.      COPD (chronic obstructive pulmonary disease) ([facility])- (present on admission)  Assessment & Plan  Remains with oxygen support, RT protocol, nebulizer treatments, currently no acute exacerbation.    Rhabdomyolysis- (present on admission)  Assessment & Plan  Resolved    Hypophosphatemia  Assessment & Plan  Monitor phosphorus levels and replace as needed    Lymphadenopathy, submandibular- (present on admission)  Assessment & Plan  Continue at this point with antibiotic management.    Pressure ulcer of buttock  Assessment & Plan  Patient remains on turning protocol and offloading.  Continue with wound care    Physical debility- (present on admission)  Assessment & Plan  Will need to go to rehab and at this point continue with physical therapy and Occupational Therapy    Elevated troponin  Assessment & Plan  Resolved    Hyperphosphatemia- (present on admission)  Assessment & Plan  PhosLo if needed.    Pain of left hip joint- (present on admission)  Assessment & Plan  Currently pain is well controlled.       VTE prophylaxis: Heparin

## 2019-08-15 NOTE — PROGRESS NOTES
2 RN skin check performed with PHILLIP Block.     Devices in place: .  Known moisture fissures and R buttock wound with surrounding blanchable erythema to sacrum/coccyx.     Generalized scattered bruising, small abrasions, fragile skin, scabbing. RUE skin tears x 2, bruising. Sacrum as noted above. Scrotum red, blanching. R knee red, blanching. BLE calf/shin wounds followed by wound team and LPS. Heels blanching, cracked. Mild redness and slight excoriation under panus.      Interventions: Reposition Q 2 hours, pillows for support/positioning, zinc barrier cream, mepilex to elbow (contradinicated to sacrum d/t incontinent loose stools), heel float boots, low air loss bed with dri-stephen pads. Interdry to panus.

## 2019-08-16 LAB
ALBUMIN SERPL BCP-MCNC: 2.4 G/DL (ref 3.2–4.9)
ALBUMIN/GLOB SERPL: 0.8 G/DL
ALP SERPL-CCNC: 75 U/L (ref 30–99)
ALT SERPL-CCNC: 23 U/L (ref 2–50)
ANION GAP SERPL CALC-SCNC: 8 MMOL/L (ref 0–11.9)
AST SERPL-CCNC: 22 U/L (ref 12–45)
BASOPHILS # BLD AUTO: 0.6 % (ref 0–1.8)
BASOPHILS # BLD: 0.04 K/UL (ref 0–0.12)
BILIRUB SERPL-MCNC: 0.6 MG/DL (ref 0.1–1.5)
BUN SERPL-MCNC: 16 MG/DL (ref 8–22)
CALCIUM SERPL-MCNC: 8.2 MG/DL (ref 8.5–10.5)
CHLORIDE SERPL-SCNC: 109 MMOL/L (ref 96–112)
CO2 SERPL-SCNC: 26 MMOL/L (ref 20–33)
CREAT SERPL-MCNC: 1.44 MG/DL (ref 0.5–1.4)
EOSINOPHIL # BLD AUTO: 0.08 K/UL (ref 0–0.51)
EOSINOPHIL NFR BLD: 1.3 % (ref 0–6.9)
ERYTHROCYTE [DISTWIDTH] IN BLOOD BY AUTOMATED COUNT: 45.6 FL (ref 35.9–50)
GLOBULIN SER CALC-MCNC: 2.9 G/DL (ref 1.9–3.5)
GLUCOSE BLD-MCNC: 68 MG/DL (ref 65–99)
GLUCOSE BLD-MCNC: 72 MG/DL (ref 65–99)
GLUCOSE BLD-MCNC: 76 MG/DL (ref 65–99)
GLUCOSE BLD-MCNC: 84 MG/DL (ref 65–99)
GLUCOSE SERPL-MCNC: 83 MG/DL (ref 65–99)
HCT VFR BLD AUTO: 38.9 % (ref 42–52)
HGB BLD-MCNC: 12.7 G/DL (ref 14–18)
IMM GRANULOCYTES # BLD AUTO: 0.02 K/UL (ref 0–0.11)
IMM GRANULOCYTES NFR BLD AUTO: 0.3 % (ref 0–0.9)
LYMPHOCYTES # BLD AUTO: 0.88 K/UL (ref 1–4.8)
LYMPHOCYTES NFR BLD: 14.2 % (ref 22–41)
MCH RBC QN AUTO: 30.3 PG (ref 27–33)
MCHC RBC AUTO-ENTMCNC: 32.6 G/DL (ref 33.7–35.3)
MCV RBC AUTO: 92.8 FL (ref 81.4–97.8)
MONOCYTES # BLD AUTO: 0.42 K/UL (ref 0–0.85)
MONOCYTES NFR BLD AUTO: 6.8 % (ref 0–13.4)
NEUTROPHILS # BLD AUTO: 4.74 K/UL (ref 1.82–7.42)
NEUTROPHILS NFR BLD: 76.8 % (ref 44–72)
NRBC # BLD AUTO: 0 K/UL
NRBC BLD-RTO: 0 /100 WBC
PLATELET # BLD AUTO: 108 K/UL (ref 164–446)
PMV BLD AUTO: 9.5 FL (ref 9–12.9)
POTASSIUM SERPL-SCNC: 3.6 MMOL/L (ref 3.6–5.5)
PROT SERPL-MCNC: 5.3 G/DL (ref 6–8.2)
RBC # BLD AUTO: 4.19 M/UL (ref 4.7–6.1)
SODIUM SERPL-SCNC: 143 MMOL/L (ref 135–145)
WBC # BLD AUTO: 6.2 K/UL (ref 4.8–10.8)

## 2019-08-16 PROCEDURE — 700102 HCHG RX REV CODE 250 W/ 637 OVERRIDE(OP): Performed by: NURSE PRACTITIONER

## 2019-08-16 PROCEDURE — A9270 NON-COVERED ITEM OR SERVICE: HCPCS | Performed by: NURSE PRACTITIONER

## 2019-08-16 PROCEDURE — 700102 HCHG RX REV CODE 250 W/ 637 OVERRIDE(OP): Performed by: HOSPITALIST

## 2019-08-16 PROCEDURE — A9270 NON-COVERED ITEM OR SERVICE: HCPCS | Performed by: INTERNAL MEDICINE

## 2019-08-16 PROCEDURE — 770021 HCHG ROOM/CARE - ISO PRIVATE

## 2019-08-16 PROCEDURE — 700105 HCHG RX REV CODE 258: Performed by: SURGERY

## 2019-08-16 PROCEDURE — A9270 NON-COVERED ITEM OR SERVICE: HCPCS | Performed by: HOSPITALIST

## 2019-08-16 PROCEDURE — 82962 GLUCOSE BLOOD TEST: CPT

## 2019-08-16 PROCEDURE — 97535 SELF CARE MNGMENT TRAINING: CPT

## 2019-08-16 PROCEDURE — 700111 HCHG RX REV CODE 636 W/ 250 OVERRIDE (IP): Performed by: HOSPITALIST

## 2019-08-16 PROCEDURE — 80053 COMPREHEN METABOLIC PANEL: CPT

## 2019-08-16 PROCEDURE — 36415 COLL VENOUS BLD VENIPUNCTURE: CPT

## 2019-08-16 PROCEDURE — 97530 THERAPEUTIC ACTIVITIES: CPT

## 2019-08-16 PROCEDURE — 700102 HCHG RX REV CODE 250 W/ 637 OVERRIDE(OP): Performed by: INTERNAL MEDICINE

## 2019-08-16 PROCEDURE — 85025 COMPLETE CBC W/AUTO DIFF WBC: CPT

## 2019-08-16 PROCEDURE — 99232 SBSQ HOSP IP/OBS MODERATE 35: CPT | Performed by: HOSPITALIST

## 2019-08-16 RX ORDER — SODIUM CHLORIDE 9 MG/ML
INJECTION, SOLUTION INTRAVENOUS CONTINUOUS
Status: DISCONTINUED | OUTPATIENT
Start: 2019-08-16 | End: 2019-08-17

## 2019-08-16 RX ADMIN — LINEZOLID 600 MG: 600 TABLET, FILM COATED ORAL at 16:54

## 2019-08-16 RX ADMIN — METOPROLOL SUCCINATE 25 MG: 25 TABLET, EXTENDED RELEASE ORAL at 06:04

## 2019-08-16 RX ADMIN — APIXABAN 5 MG: 5 TABLET, FILM COATED ORAL at 06:04

## 2019-08-16 RX ADMIN — GABAPENTIN 400 MG: 400 CAPSULE ORAL at 14:35

## 2019-08-16 RX ADMIN — SODIUM CHLORIDE: 9 INJECTION, SOLUTION INTRAVENOUS at 21:42

## 2019-08-16 RX ADMIN — GABAPENTIN 400 MG: 400 CAPSULE ORAL at 21:42

## 2019-08-16 RX ADMIN — SENNOSIDES, DOCUSATE SODIUM 2 TABLET: 50; 8.6 TABLET, FILM COATED ORAL at 16:54

## 2019-08-16 RX ADMIN — OMEGA-3 FATTY ACIDS CAP 1000 MG 1000 MG: 1000 CAP at 16:54

## 2019-08-16 RX ADMIN — OXYCODONE HYDROCHLORIDE 5 MG: 5 TABLET ORAL at 13:45

## 2019-08-16 RX ADMIN — GABAPENTIN 400 MG: 400 CAPSULE ORAL at 06:04

## 2019-08-16 RX ADMIN — FINASTERIDE 5 MG: 5 TABLET, FILM COATED ORAL at 06:04

## 2019-08-16 RX ADMIN — APIXABAN 5 MG: 5 TABLET, FILM COATED ORAL at 16:54

## 2019-08-16 RX ADMIN — LINEZOLID 600 MG: 600 TABLET, FILM COATED ORAL at 06:04

## 2019-08-16 RX ADMIN — ONDANSETRON 4 MG: 2 INJECTION INTRAMUSCULAR; INTRAVENOUS at 19:43

## 2019-08-16 RX ADMIN — MORPHINE SULFATE 4 MG: 4 INJECTION INTRAVENOUS at 14:35

## 2019-08-16 RX ADMIN — ATORVASTATIN CALCIUM 20 MG: 20 TABLET, FILM COATED ORAL at 21:42

## 2019-08-16 RX ADMIN — INSULIN GLARGINE 55 UNITS: 100 INJECTION, SOLUTION SUBCUTANEOUS at 18:32

## 2019-08-16 RX ADMIN — METOPROLOL SUCCINATE 25 MG: 25 TABLET, EXTENDED RELEASE ORAL at 16:54

## 2019-08-16 RX ADMIN — OMEGA-3 FATTY ACIDS CAP 1000 MG 1000 MG: 1000 CAP at 11:58

## 2019-08-16 RX ADMIN — VITAMIN D, TAB 1000IU (100/BT) 2000 UNITS: 25 TAB at 06:04

## 2019-08-16 RX ADMIN — OMEGA-3 FATTY ACIDS CAP 1000 MG 1000 MG: 1000 CAP at 06:04

## 2019-08-16 RX ADMIN — SENNOSIDES, DOCUSATE SODIUM 2 TABLET: 50; 8.6 TABLET, FILM COATED ORAL at 06:04

## 2019-08-16 ASSESSMENT — ENCOUNTER SYMPTOMS
SENSORY CHANGE: 0
PSYCHIATRIC NEGATIVE: 1
DOUBLE VISION: 0
STRIDOR: 0
CARDIOVASCULAR NEGATIVE: 1
MYALGIAS: 1
NEUROLOGICAL NEGATIVE: 1
GASTROINTESTINAL NEGATIVE: 1
CLAUDICATION: 0
CONSTITUTIONAL NEGATIVE: 1
TREMORS: 0
BACK PAIN: 0
SPUTUM PRODUCTION: 0
NAUSEA: 0
COUGH: 0
WEIGHT LOSS: 0
RESPIRATORY NEGATIVE: 1
HEARTBURN: 0
ORTHOPNEA: 0
TINGLING: 0
SHORTNESS OF BREATH: 0
DEPRESSION: 0
HEADACHES: 0
VOMITING: 0
BLURRED VISION: 0
EYES NEGATIVE: 1

## 2019-08-16 ASSESSMENT — COGNITIVE AND FUNCTIONAL STATUS - GENERAL
DAILY ACTIVITIY SCORE: 15
TURNING FROM BACK TO SIDE WHILE IN FLAT BAD: UNABLE
CLIMB 3 TO 5 STEPS WITH RAILING: TOTAL
MOBILITY SCORE: 7
DRESSING REGULAR UPPER BODY CLOTHING: A LITTLE
PERSONAL GROOMING: A LITTLE
STANDING UP FROM CHAIR USING ARMS: A LOT
SUGGESTED CMS G CODE MODIFIER MOBILITY: CM
EATING MEALS: A LITTLE
WALKING IN HOSPITAL ROOM: TOTAL
DRESSING REGULAR LOWER BODY CLOTHING: A LOT
MOVING FROM LYING ON BACK TO SITTING ON SIDE OF FLAT BED: UNABLE
HELP NEEDED FOR BATHING: A LOT
MOVING TO AND FROM BED TO CHAIR: UNABLE
SUGGESTED CMS G CODE MODIFIER DAILY ACTIVITY: CK
TOILETING: A LOT

## 2019-08-16 ASSESSMENT — LIFESTYLE VARIABLES: SUBSTANCE_ABUSE: 0

## 2019-08-16 ASSESSMENT — GAIT ASSESSMENTS: GAIT LEVEL OF ASSIST: UNABLE TO PARTICIPATE

## 2019-08-16 NOTE — THERAPY
"Occupational Therapy Treatment completed with focus on ADLs, ADL transfers and patient education.  Functional Status:  Pt seen for OT tx. Min A supine > sit, min A seated scooting. Max A LB dressing. Once seated EOB pt demonstrated light grooming w/ min A for thoroughness. Pt reports increased fatigue this session from previous session d/t not sleeping well last night. Psychosocial intervention addressed regarding current limitations, pain and d/c planning. Max A sit > stand, attempted to initiate small steps for BSC transfers but pt declined d/t fatigue and generalized weakness. Pt expresses motivation to regain strength, endurance and independence in ADLs and ADL transfers. Would continue to benefit from OT services while in-house.   Plan of Care: Will benefit from Occupational Therapy 3 times per week  Discharge Recommendations:  Equipment Will Continue to Assess for Equipment Needs. Post-acute therapy Discharge to a transitional care facility for continued skilled therapy services.    See \"Rehab Therapy-Acute\" Patient Summary Report for complete documentation.   "

## 2019-08-16 NOTE — THERAPY
"Physical Therapy Treatment completed.   Bed Mobility:  Supine to Sit: Minimal Assist  Transfers: Sit to Stand: Maximal Assist  Gait: Level Of Assist: Unable to Participate with Front-Wheel Walker       Plan of Care: Will benefit from Physical Therapy 3 times per week  Discharge Recommendations: Equipment: Will Continue to Assess for Equipment Needs. Post-acute therapy Recommend post-acute placement for continued physical therapy services prior to discharge home. Patient can tolerate post-acute therapies at a 5x/week frequency.       See \"Rehab Therapy-Acute\" Patient Summary Report for complete documentation.     Pt presenting very fatigued today. He was still motivated to participate however required a lot more time and assist to perform mobility. Pt was able to get his LE's to the EOB but needed help to get his trunk up. He maintained his balance sitting EOB however had significant increase in pain. He attempted stands today however was unable to tolerate long WB d/t a lot of pain in his LE's. Pt did demonstrate improved seated scooting to attempt SB transfers until pt is able to tolerate more standing WB. Pt continues to be at a level in which he will benefit from post acute therapy.  "

## 2019-08-16 NOTE — PROGRESS NOTES
Vascular    Photos reviewed.  The right lower leg looks terrible and despite the limited arterial duplex imaging exam, the suggestion of a critical popliteal stenosis deserves treatment prior to discharge to enhance perfusion.     Plan Right lower extremity angiogram with limited contrast due to renal insufficiency.

## 2019-08-16 NOTE — PROGRESS NOTES
2 RN skin check performed with PHILLIP Lang.     Devices in place: Archie. Flynn boots, interdry between Abd. folds.  Known moisture fissures and R buttock wound with surrounding blanchable erythema to sacrum/coccyx.     Skin assessed under devices: Generalized scattered bruising, small abrasions, fragile skin, scabbing. R elbow skin tears x 2, covered with mepilex. Sacrum as noted above. Scrotum red, blanching and swollen. R knee red, blanching. BLE calf/shin wounds followed by wound team and LPS, dressing is clean, dry and intact, unable to assess BLE wounds at this time. Heels blanching, cracked. Mild redness and slight excoriation under abdominal folds, interdry in place.      Confirmed pressure ulcers found on: NA.     New potential pressure ulcers noted on Sacrum and archie. heels.      Wound consult placed: LPS consult placed on 8-.     Interventions: Reposition Q 2 hours, pillows for support/positioning, barrier cream, mepilex to elbow (contradinicated to sacrum d/t incontinent loose stools), heel float boots, low air loss bed with dri-stephen pads. Interdry under abd folds,2 RN skin check Q shift.

## 2019-08-16 NOTE — PROGRESS NOTES
Hospital Medicine Daily Progress Note    Date of Service  8/16/2019    Chief Complaint  74 y.o. male admitted 8/5/2019 with being found down    Hospital Course    8/14/2019-patient was apparently found down next to his bed 3 days.  The patient was brought in his found to have acute renal failure with rhabdomyolysis.  Patient was treated with fluid resuscitation.  Patient was also found to have nonhealing wounds of his right lower extremity.  This was evaluated and at this point vascular surgery was involved they believe that the patient has very poor supply and will be evaluating him for possible need of an angiogram.  8/15/2019- still waiting for vascular surgery to make a decision on possible surgical intervention.  If the patient does not have any further surgical interventions the patient can be discharged to an extended care facility for ongoing wound care and therapies.  8/16/2019- today vascular surgeon I were able to speak and we discussed the fact that the patient will have an angiogram done in the morning.  The patient may have at that point definitive treatment done as well.  This was conveyed to the patient.  He will need to be n.p.o. as of midnight.      Interval Problem Update  Continue with antibiotic Zyvox for his MRSA infection, continue with wound care every day.  Continue with pain management especially around wound care changes.  Continue with n.p.o. status at midnight.  Angiography to be done tomorrow through vascular surgery.    Consultants/Specialty  Vascular surgery, critical care    Code Status  DNR/DNI    Disposition  He has been accepted at a skilled facility, once medical clearance is obtained we will be transferring him there.    Review of Systems  Review of Systems   Constitutional: Negative.  Negative for malaise/fatigue and weight loss.   HENT: Negative.  Negative for congestion and hearing loss.    Eyes: Negative.  Negative for blurred vision and double vision.   Respiratory:  Negative.  Negative for cough, sputum production, shortness of breath and stridor.    Cardiovascular: Negative.  Negative for chest pain, orthopnea and claudication.   Gastrointestinal: Negative.  Negative for heartburn, nausea and vomiting.   Genitourinary: Negative.  Negative for dysuria, frequency and urgency.   Musculoskeletal: Positive for joint pain and myalgias. Negative for back pain.   Skin: Negative for itching and rash.        Wounds on the right leg   Neurological: Negative.  Negative for tingling, tremors, sensory change and headaches.   Endo/Heme/Allergies: Negative.    Psychiatric/Behavioral: Negative.  Negative for depression, substance abuse and suicidal ideas.   All other systems reviewed and are negative.       Physical Exam  Temp:  [36.5 °C (97.7 °F)-37.2 °C (98.9 °F)] 36.8 °C (98.2 °F)  Pulse:  [69-80] 69  Resp:  [16-20] 20  BP: (128-147)/(76-83) 147/82  SpO2:  [90 %-93 %] 90 %    Physical Exam   Constitutional: He is oriented to person, place, and time. He appears well-developed and well-nourished. No distress.   HENT:   Head: Normocephalic and atraumatic.   Right Ear: External ear normal.   Left Ear: External ear normal.   Mouth/Throat: No oropharyngeal exudate.   Eyes: Pupils are equal, round, and reactive to light. Conjunctivae and EOM are normal. No scleral icterus.   Neck: Normal range of motion. Neck supple.   Cardiovascular: Normal rate and regular rhythm. Exam reveals no gallop and no friction rub.   Murmur heard.  Pulmonary/Chest: Effort normal. Stridor present. No respiratory distress. He has decreased breath sounds in the right lower field and the left lower field. He has no wheezes. He exhibits tenderness.   Abdominal: Soft. Bowel sounds are normal. He exhibits distension (obesity).   Musculoskeletal: Normal range of motion. He exhibits edema and tenderness.   Lymphadenopathy:     He has no cervical adenopathy.   Neurological: He is alert and oriented to person, place, and time. He  has normal reflexes. He is not disoriented. He displays no atrophy, no tremor and normal reflexes. No cranial nerve deficit. He displays no seizure activity. Coordination and gait abnormal. GCS eye subscore is 4. GCS verbal subscore is 5. GCS motor subscore is 6.   Skin: Skin is warm and dry. Rash noted. He is not diaphoretic. There is erythema. No pallor.        Psychiatric: He has a normal mood and affect. His speech is normal and behavior is normal. Judgment and thought content normal. Cognition and memory are normal.   Nursing note and vitals reviewed.      Fluids    Intake/Output Summary (Last 24 hours) at 8/16/2019 1546  Last data filed at 8/16/2019 1100  Gross per 24 hour   Intake 240 ml   Output 300 ml   Net -60 ml       Laboratory  Recent Labs     08/14/19  0430 08/15/19  0549 08/16/19 0437   WBC 6.7 6.6 6.2   RBC 4.12* 4.14* 4.19*   HEMOGLOBIN 12.6* 12.6* 12.7*   HEMATOCRIT 39.3* 38.5* 38.9*   MCV 95.4 93.0 92.8   MCH 30.6 30.4 30.3   MCHC 32.1* 32.7* 32.6*   RDW 47.2 45.6 45.6   PLATELETCT 112* 110* 108*   MPV 9.4 9.1 9.5     Recent Labs     08/14/19  0430 08/15/19  0549 08/16/19 0437   SODIUM 142 142 143   POTASSIUM 4.1 3.6 3.6   CHLORIDE 111 109 109   CO2 25 26 26   GLUCOSE 112* 86 83   BUN 19 16 16   CREATININE 1.46* 1.47* 1.44*   CALCIUM 7.7* 7.8* 8.2*                   Imaging  US-RUBIO SINGLE LEVEL BILAT   Final Result      US-EXTREMITY ARTERY LOWER UNILAT RIGHT   Final Result      CT-SOFT TISSUE NECK WITH   Final Result      1.  There is no evidence of neck mass.   2.  There is mild edema of the parotid glands and submandibular glands with scattered parotid gland calcifications, probably postinflammatory.   3.  The airway is patent.   4.  There are no suspicious thyroid nodules.   5.  There is atherosclerosis of the aorta, coronary arteries and carotid arteries.   6.  There may be mild edema in the upper lung zones.      US-SOFT TISSUES OF HEAD - NECK   Final Result      Diffusely edematous neck  soft tissues. As with any palpable abnormality, follow-up on clinical grounds is advised.      ACR TI-RADS Recommendations         Recommendations based on the American College of Rad[employer] Thyroid imaging, reporting and Data System (TI-RADS) 2017.      DX-HIP-UNILATERAL-WITH PELVIS-1 VIEW LEFT   Final Result      Degenerative changes without acute osseous abnormality.      DX-CHEST-PORTABLE (1 VIEW)   Final Result      Stable enlargement of the cardiomediastinal silhouette, improving minimal diffuse interstitial pulmonary edema and bilateral lower lobe atelectasis and/or consolidation.      US-RENAL   Final Result      1.  No evidence of solid renal mass or hydronephrosis.      2.  Bilateral renal echogenic foci likely representing small nephroliths.      CT-HEAD W/O   Final Result      1.  No evidence of acute intracranial process.      2.  Cerebral atrophy as well as periventricular chronic small vessel ischemic change.      DX-CHEST-PORTABLE (1 VIEW)   Final Result      1.  Enlarged cardiac mediastinal silhouette.   2.  Cardiac pacer.   3.  No acute abnormality.           Assessment/Plan  * Diabetic ketoacidosis without coma associated with type 2 diabetes mellitus (HCC)- (present on admission)  Assessment & Plan  -Diabetic ketoacidosis has resolved with treatment.  -accus with sliding scale coverage continued, most recent blood sugars are   -diabetic diet continued  -diabetic education provided  -follow glycohemoglobin levels long term, most recent hemoglobin A1c is 8.0  -continue with oral antihyperglycemics  -monitor for hypoglycemic episodes and adjust control if he should get low    Open wound of leg- (present on admission)  Assessment & Plan  Patient has MRSA in the wound.  The patient's wound is nonhealing.  Vascular surgery plans to do angioplasty with angiography tomorrow.  N.p.o. midnight        MARIZOL (acute kidney injury) (HCC)- (present on admission)  Assessment & Plan  Renal functions at this  point has stabilized kidney injury has resolved.  Creatinine is down to 1.44 with a BUN of 16      Head trauma- (present on admission)  Assessment & Plan  Resolved    Chronic kidney disease (CKD) stage G3a/A3, moderately decreased glomerular filtration rate (GFR) between 45-59 mL/min/1.73 square meter and albuminuria creatinine ratio greater than 300 mg/g ([facility])  Assessment & Plan  Renal functions at this point are better than his chronic renal status.    Functional dysphagia  Assessment & Plan  Remains on a thickened liquid diet    Class 3 severe obesity due to excess calories in adult ([facility])- (present on admission)  Assessment & Plan  Body mass index is 40.81 kg/m².  Eventually will need outpatient weight loss management program    Persistent atrial fibrillation ([facility])- (present on admission)  Assessment & Plan  Currently rate controlled.      COPD (chronic obstructive pulmonary disease) ([facility])- (present on admission)  Assessment & Plan  No acute exacerbation.  Continue with RT protocol    Rhabdomyolysis- (present on admission)  Assessment & Plan  Resolved, CPK levels back to normal    Hypophosphatemia  Assessment & Plan  Continue with phosphorus management.    Lymphadenopathy, submandibular- (present on admission)  Assessment & Plan  Continue at this point with antibiotic management.  Using Zyvox p.o.    Pressure ulcer of buttock  Assessment & Plan  Continue turning protocol, offload wound    Physical debility- (present on admission)  Assessment & Plan  PT OT to continue    Elevated troponin  Assessment & Plan  Resolved    Hyperphosphatemia- (present on admission)  Assessment & Plan  Continue with PhosLo as needed    Pain of left hip joint- (present on admission)  Assessment & Plan  Currently pain is well controlled.       VTE prophylaxis: Heparin

## 2019-08-16 NOTE — WOUND TEAM
"[facility]  Inpatient Services  Wound and Skin Care Progress Note    HPI, PMH, SH: Reviewed    WOUND TEAM FOLLOW UP: Scheduled follow up on right lower leg wounds.  Unit where seen by Wound Team: [id number]      SUBJECTIVE: \"I'm going in tomorrow so they can look at my arteries.\"    Self Report / Pain Level: Premedicated with PO pain med 45 min prior to dressing change, then IV morphine just before.     OBJECTIVE: agreeable. Strike through bleeding from wounds on dressing and on dri stephen under dressing.    WOUND TYPE, LOCATION, CHARACTERISTICS:     Wound 08/05/19 Venous Ulcer Leg mixed arterial/venous knee to ankle right (Active)   Wound Image        Site Assessment Red;Painful;Brown    Ella-wound Assessment Yellow-brown;Brown;Black;Painful    Margins NOEMÍ    Wound Length (cm) 0 cm    Wound Width (cm) 0 cm    Wound Depth (cm) 0 cm    Wound Surface Area (cm^2) 0 cm^2    Tunneling 0 cm    Undermining 0 cm    Closure None    Drainage Amount Moderate    Drainage Description Sanguineous    Non-staged Wound Description Full thickness    Treatments Cleansed;Site care    Cleansing Approved Wound Cleanser    Periwound Protectant Not Applicable    Dressing Options Nonadherent Contact Layer;Absorbent Abdominal Pad;Roll Gauze    Dressing Cleansing/Solutions Not Applicable    Dressing Changed Changed    Dressing Status Clean;Dry;Intact    Dressing Change Frequency Daily    NEXT Dressing Change  08/17/19    NEXT Weekly Photo (Inpatient Only) 08/21/19    WOUND NURSE ONLY - Odor None    WOUND NURSE ONLY - Pulses Not palpable    WOUND NURSE ONLY - Exposed Structures None    WOUND NURSE ONLY - Tissue Type and Percentage 80% red, 10% black/brown, 10% yellow            INTERVENTIONS BY WOUND TEAM: Removed dressings from right lower leg. Irrigated wounds with wound cleanser, patient states that the wound cleanser hurts so changing dressing care order to use saline instead. Triple layer of adaptic to calf, then single layers to " the rest of the lower leg. 2 abdominal pads placed posteriorly , one laterally, and one anteriorly. This was secured with roll gauze. Floated lower leg calf area by placing pillow under right thigh and one under right ankle to keep calf off bed. Updated CNA and nursing so that they will do this. Updated dressing care and skin care order.    Interdisciplinary consultation: Patient, patient's daughter, nursing.    EVALUATION AND PROGRESS OF WOUND(S): wounds stable. Less necrotic tissue than at admit. Adaptic helps maintain moisture to area and prevent cover dressing from sticking.     Rationale for changes in Plan of Care: No changes    Factors affecting wound healing: arterial/venous mixed disease, diabetes, MRSA infection  Goals: Decrease necrotic tissue, protect wounds. Angiogram to be done tomorrow    NURSING PLAN OF CARE:    Dressing changes: Continue previous Dressing Care orders:    X    See new Dressing Care orders:  X     Skin care: See Skin Care orders:    X    Rectal tube care: See Rectal Tube Care orders:      Other orders:           WOUND TEAM PLAN OF CARE (X):   NPWT change 3 x week:        Dressing changes:       Follow up as needed:    X weekly   Other:

## 2019-08-17 ENCOUNTER — APPOINTMENT (OUTPATIENT)
Dept: RADIOLOGY | Facility: MEDICAL CENTER | Age: 74
DRG: 629 | End: 2019-08-17
Attending: SURGERY
Payer: MEDICARE

## 2019-08-17 VITALS
DIASTOLIC BLOOD PRESSURE: 82 MMHG | SYSTOLIC BLOOD PRESSURE: 158 MMHG | OXYGEN SATURATION: 99 % | HEART RATE: 60 BPM | WEIGHT: 284.39 LBS | BODY MASS INDEX: 40.71 KG/M2 | TEMPERATURE: 99.2 F | HEIGHT: 70 IN | RESPIRATION RATE: 20 BRPM

## 2019-08-17 LAB
APTT PPP: 30.1 SEC (ref 24.7–36)
BASOPHILS # BLD AUTO: 0.5 % (ref 0–1.8)
BASOPHILS # BLD: 0.03 K/UL (ref 0–0.12)
EOSINOPHIL # BLD AUTO: 0.1 K/UL (ref 0–0.51)
EOSINOPHIL NFR BLD: 1.8 % (ref 0–6.9)
ERYTHROCYTE [DISTWIDTH] IN BLOOD BY AUTOMATED COUNT: 46.8 FL (ref 35.9–50)
GLUCOSE BLD-MCNC: 113 MG/DL (ref 65–99)
GLUCOSE BLD-MCNC: 123 MG/DL (ref 65–99)
GLUCOSE BLD-MCNC: 135 MG/DL (ref 65–99)
GLUCOSE BLD-MCNC: 45 MG/DL (ref 65–99)
GLUCOSE BLD-MCNC: 53 MG/DL (ref 65–99)
GLUCOSE BLD-MCNC: 59 MG/DL (ref 65–99)
GLUCOSE BLD-MCNC: 81 MG/DL (ref 65–99)
GLUCOSE BLD-MCNC: 98 MG/DL (ref 65–99)
HCT VFR BLD AUTO: 36.1 % (ref 42–52)
HGB BLD-MCNC: 11.6 G/DL (ref 14–18)
IMM GRANULOCYTES # BLD AUTO: 0.02 K/UL (ref 0–0.11)
IMM GRANULOCYTES NFR BLD AUTO: 0.4 % (ref 0–0.9)
INR PPP: 1.36 (ref 0.87–1.13)
LYMPHOCYTES # BLD AUTO: 0.7 K/UL (ref 1–4.8)
LYMPHOCYTES NFR BLD: 12.3 % (ref 22–41)
MCH RBC QN AUTO: 30.5 PG (ref 27–33)
MCHC RBC AUTO-ENTMCNC: 32.1 G/DL (ref 33.7–35.3)
MCV RBC AUTO: 95 FL (ref 81.4–97.8)
MONOCYTES # BLD AUTO: 0.44 K/UL (ref 0–0.85)
MONOCYTES NFR BLD AUTO: 7.7 % (ref 0–13.4)
NEUTROPHILS # BLD AUTO: 4.39 K/UL (ref 1.82–7.42)
NEUTROPHILS NFR BLD: 77.3 % (ref 44–72)
NRBC # BLD AUTO: 0 K/UL
NRBC BLD-RTO: 0 /100 WBC
PLATELET # BLD AUTO: 98 K/UL (ref 164–446)
PMV BLD AUTO: 9.1 FL (ref 9–12.9)
PROTHROMBIN TIME: 17.1 SEC (ref 12–14.6)
RBC # BLD AUTO: 3.8 M/UL (ref 4.7–6.1)
WBC # BLD AUTO: 5.7 K/UL (ref 4.8–10.8)

## 2019-08-17 PROCEDURE — 700105 HCHG RX REV CODE 258: Performed by: SURGERY

## 2019-08-17 PROCEDURE — 82962 GLUCOSE BLOOD TEST: CPT | Mod: 91

## 2019-08-17 PROCEDURE — 36415 COLL VENOUS BLD VENIPUNCTURE: CPT

## 2019-08-17 PROCEDURE — A9270 NON-COVERED ITEM OR SERVICE: HCPCS | Performed by: HOSPITALIST

## 2019-08-17 PROCEDURE — 700102 HCHG RX REV CODE 250 W/ 637 OVERRIDE(OP): Performed by: HOSPITALIST

## 2019-08-17 PROCEDURE — 85610 PROTHROMBIN TIME: CPT

## 2019-08-17 PROCEDURE — 047K3DZ DILATION OF RIGHT FEMORAL ARTERY WITH INTRALUMINAL DEVICE, PERCUTANEOUS APPROACH: ICD-10-PCS | Performed by: SURGERY

## 2019-08-17 PROCEDURE — 700111 HCHG RX REV CODE 636 W/ 250 OVERRIDE (IP)

## 2019-08-17 PROCEDURE — 700117 HCHG RX CONTRAST REV CODE 255: Performed by: SURGERY

## 2019-08-17 PROCEDURE — 700105 HCHG RX REV CODE 258: Performed by: INTERNAL MEDICINE

## 2019-08-17 PROCEDURE — 85025 COMPLETE CBC W/AUTO DIFF WBC: CPT

## 2019-08-17 PROCEDURE — 700111 HCHG RX REV CODE 636 W/ 250 OVERRIDE (IP): Performed by: SURGERY

## 2019-08-17 PROCEDURE — 700102 HCHG RX REV CODE 250 W/ 637 OVERRIDE(OP): Performed by: NURSE PRACTITIONER

## 2019-08-17 PROCEDURE — 700102 HCHG RX REV CODE 250 W/ 637 OVERRIDE(OP): Performed by: INTERNAL MEDICINE

## 2019-08-17 PROCEDURE — 85730 THROMBOPLASTIN TIME PARTIAL: CPT

## 2019-08-17 PROCEDURE — B41FYZZ FLUOROSCOPY OF RIGHT LOWER EXTREMITY ARTERIES USING OTHER CONTRAST: ICD-10-PCS | Performed by: SURGERY

## 2019-08-17 PROCEDURE — 99153 MOD SED SAME PHYS/QHP EA: CPT

## 2019-08-17 PROCEDURE — A9270 NON-COVERED ITEM OR SERVICE: HCPCS | Performed by: INTERNAL MEDICINE

## 2019-08-17 PROCEDURE — 99239 HOSP IP/OBS DSCHRG MGMT >30: CPT | Performed by: HOSPITALIST

## 2019-08-17 PROCEDURE — A9270 NON-COVERED ITEM OR SERVICE: HCPCS | Performed by: NURSE PRACTITIONER

## 2019-08-17 RX ORDER — OXYCODONE HYDROCHLORIDE 5 MG/1
5 TABLET ORAL EVERY 4 HOURS PRN
Qty: 18 TAB | Refills: 0 | Status: ON HOLD | OUTPATIENT
Start: 2019-08-17 | End: 2019-08-26

## 2019-08-17 RX ORDER — SODIUM CHLORIDE 9 MG/ML
500 INJECTION, SOLUTION INTRAVENOUS
Status: DISCONTINUED | OUTPATIENT
Start: 2019-08-17 | End: 2019-08-17 | Stop reason: HOSPADM

## 2019-08-17 RX ORDER — HEPARIN SODIUM,PORCINE 1000/ML
VIAL (ML) INJECTION
Status: COMPLETED
Start: 2019-08-17 | End: 2019-08-17

## 2019-08-17 RX ORDER — BISACODYL 10 MG
10 SUPPOSITORY, RECTAL RECTAL
Refills: 0 | Status: ON HOLD
Start: 2019-08-17 | End: 2022-04-15

## 2019-08-17 RX ORDER — FINASTERIDE 5 MG/1
5 TABLET, FILM COATED ORAL DAILY
Qty: 30 TAB
Start: 2019-08-18

## 2019-08-17 RX ORDER — ACETAMINOPHEN 500 MG
1000 TABLET ORAL EVERY 6 HOURS PRN
Qty: 30 TAB | Refills: 0 | Status: ON HOLD | OUTPATIENT
Start: 2019-08-17 | End: 2022-04-15

## 2019-08-17 RX ORDER — POLYETHYLENE GLYCOL 3350 17 G/17G
17 POWDER, FOR SOLUTION ORAL
Refills: 3 | Status: ON HOLD
Start: 2019-08-17 | End: 2022-04-15

## 2019-08-17 RX ORDER — ONDANSETRON 4 MG/1
4 TABLET, ORALLY DISINTEGRATING ORAL EVERY 4 HOURS PRN
Qty: 10 TAB | Refills: 0 | Status: ON HOLD | OUTPATIENT
Start: 2019-08-17 | End: 2022-04-15

## 2019-08-17 RX ORDER — GABAPENTIN 400 MG/1
400 CAPSULE ORAL 3 TIMES DAILY
Qty: 90 CAP | Status: ON HOLD
Start: 2019-08-17 | End: 2022-04-15

## 2019-08-17 RX ORDER — MIDAZOLAM HYDROCHLORIDE 1 MG/ML
.5-2 INJECTION INTRAMUSCULAR; INTRAVENOUS PRN
Status: DISCONTINUED | OUTPATIENT
Start: 2019-08-17 | End: 2019-08-17 | Stop reason: HOSPADM

## 2019-08-17 RX ORDER — INSULIN GLARGINE 100 [IU]/ML
55 INJECTION, SOLUTION SUBCUTANEOUS EVERY EVENING
Qty: 10 ML | Status: ON HOLD
Start: 2019-08-17 | End: 2019-08-26

## 2019-08-17 RX ORDER — METOPROLOL SUCCINATE 25 MG/1
25 TABLET, EXTENDED RELEASE ORAL 2 TIMES DAILY
Qty: 30 TAB | Status: ON HOLD
Start: 2019-08-17 | End: 2022-04-15

## 2019-08-17 RX ORDER — MIDAZOLAM HYDROCHLORIDE 1 MG/ML
INJECTION INTRAMUSCULAR; INTRAVENOUS
Status: COMPLETED
Start: 2019-08-17 | End: 2019-08-17

## 2019-08-17 RX ORDER — SODIUM CHLORIDE 9 MG/ML
INJECTION, SOLUTION INTRAVENOUS CONTINUOUS
Status: DISCONTINUED | OUTPATIENT
Start: 2019-08-17 | End: 2019-08-17 | Stop reason: HOSPADM

## 2019-08-17 RX ORDER — LINEZOLID 600 MG/1
600 TABLET, FILM COATED ORAL EVERY 12 HOURS
Qty: 8 TAB | Refills: 0 | Status: ON HOLD | OUTPATIENT
Start: 2019-08-17 | End: 2019-08-26

## 2019-08-17 RX ORDER — HEPARIN SODIUM 1000 [USP'U]/ML
9000 INJECTION, SOLUTION INTRAVENOUS; SUBCUTANEOUS ONCE
Status: COMPLETED | OUTPATIENT
Start: 2019-08-17 | End: 2019-08-17

## 2019-08-17 RX ORDER — IODIXANOL 270 MG/ML
20 INJECTION, SOLUTION INTRAVASCULAR ONCE
Status: COMPLETED | OUTPATIENT
Start: 2019-08-17 | End: 2019-08-17

## 2019-08-17 RX ORDER — AMOXICILLIN 250 MG
2 CAPSULE ORAL 2 TIMES DAILY
Qty: 30 TAB | Refills: 0 | Status: ON HOLD | OUTPATIENT
Start: 2019-08-17 | End: 2022-04-15

## 2019-08-17 RX ADMIN — OMEGA-3 FATTY ACIDS CAP 1000 MG 1000 MG: 1000 CAP at 16:02

## 2019-08-17 RX ADMIN — MIDAZOLAM HYDROCHLORIDE 1 MG: 1 INJECTION, SOLUTION INTRAMUSCULAR; INTRAVENOUS at 08:59

## 2019-08-17 RX ADMIN — LINEZOLID 600 MG: 600 TABLET, FILM COATED ORAL at 16:01

## 2019-08-17 RX ADMIN — FENTANYL CITRATE 25 MCG: 50 INJECTION, SOLUTION INTRAMUSCULAR; INTRAVENOUS at 08:59

## 2019-08-17 RX ADMIN — HEPARIN SODIUM 9000 UNITS: 1000 INJECTION, SOLUTION INTRAVENOUS; SUBCUTANEOUS at 09:25

## 2019-08-17 RX ADMIN — SODIUM CHLORIDE: 9 INJECTION, SOLUTION INTRAVENOUS at 11:30

## 2019-08-17 RX ADMIN — MIDAZOLAM 1 MG: 1 INJECTION INTRAMUSCULAR; INTRAVENOUS at 08:59

## 2019-08-17 RX ADMIN — IODIXANOL 20 ML: 270 INJECTION, SOLUTION INTRAVASCULAR at 09:50

## 2019-08-17 RX ADMIN — FENTANYL CITRATE 25 MCG: 50 INJECTION, SOLUTION INTRAMUSCULAR; INTRAVENOUS at 09:39

## 2019-08-17 RX ADMIN — OXYCODONE HYDROCHLORIDE 5 MG: 5 TABLET ORAL at 15:58

## 2019-08-17 RX ADMIN — Medication 16 G: at 11:52

## 2019-08-17 RX ADMIN — DEXTROSE MONOHYDRATE 250 ML: 100 INJECTION, SOLUTION INTRAVENOUS at 06:18

## 2019-08-17 RX ADMIN — MIDAZOLAM 1 MG: 1 INJECTION INTRAMUSCULAR; INTRAVENOUS at 09:25

## 2019-08-17 RX ADMIN — DEXTROSE MONOHYDRATE 250 ML: 100 INJECTION, SOLUTION INTRAVENOUS at 12:00

## 2019-08-17 RX ADMIN — APIXABAN 5 MG: 5 TABLET, FILM COATED ORAL at 16:02

## 2019-08-17 RX ADMIN — METOPROLOL SUCCINATE 25 MG: 25 TABLET, EXTENDED RELEASE ORAL at 16:02

## 2019-08-17 RX ADMIN — GABAPENTIN 400 MG: 400 CAPSULE ORAL at 15:11

## 2019-08-17 NOTE — DISCHARGE PLANNING
Received Transport Form @ 7602  Spoke to Abilio @ [facility]    Transport is scheduled for 8/17 @7832 going to [facility].    ARACELIS La notified. Suni @ [facility] notified of transport time. D/C summary sent via fax.

## 2019-08-17 NOTE — PROGRESS NOTES
2 RN skin check performed with Elaine RN  Devices in place: prevalon boots, heels floating on pillows, interdry between Abd. folds.  Known moisture fissures and R buttock wound with surrounding blanchable erythema to sacrum/coccyx.  Skin issues: Generalized scattered bruising, small abrasions, fragile skin, scabbing. R elbow skin tears x 2, covered with mepilex. Sacrum as noted above. Scrotum red, blanching and swollen. Scrotal edema noted. R knee red, blanching. Heels blanching, cracked. Mild redness and slight excoriation under abdominal folds, interdry in place.   Confirmed pressure ulcers found on: NA.  Interventions: Reposition Q 2 hours, pillows for support/positioning, barrier cream, mepilex to elbow, heels floating, low air loss bed. Interdry under abd folds,2 RN skin check Q shift.

## 2019-08-17 NOTE — DISCHARGE PLANNING
Anticipated Discharge Disposition: [facility]    Action: Pt initially wanting to contest his dc.  SW went over the appeals process, then pt changed his mind.      REGINALDO faxed PCS form to [facility].     Barriers to Discharge: None    Plan: Pt will dc to [facility] via [facility] at 1730.

## 2019-08-17 NOTE — OR SURGEON
OP Note    PreOp Diagnosis: Right lower extremity peripheral vascular disease with non-healing wounds    PostOp Diagnosis: Same    Procedure:  Right lower extremity angiogram.   Angioplasty and 7mm x 6cm stent right SFA  Ultrasound guided left femoral access    Anesthesiologist/Type of Anesthesia:Ileana Marti RN  Conscious sedation    Estimated Blood Loss: <20cc    Findings: Critical right SFA stenosis with calcified plaque    Complications: none    Flouro time - 14.7min  Radiation - 466.7 mGy  Contrast dose - 20cc  [id number]    8/17/2019 9:58 AM Irais Rios M.D.

## 2019-08-17 NOTE — PROGRESS NOTES
Pt blood glucose 45  Administered 2 glucose tablets.pt refusing other 2 glucose tablets  Blood sugar 39   Administered dextrose bolus per order   Blood sugar 98  Will recheck blood sugar in 15 minutes      Blood sugar 81  Pt drinking juice   Recheck 113

## 2019-08-17 NOTE — PROGRESS NOTES
2 RN skin check performed with PHILLIP Galvan.     Devices in place: Archie. Flynn boots currently off, heels floating on pillows, interdry between Abd. folds.  Known moisture fissures and R buttock wound with surrounding blanchable erythema to sacrum/coccyx.     Skin assessed under devices: Generalized scattered bruising, small abrasions, fragile skin, scabbing. R elbow skin tears x 2, covered with mepilex. Sacrum as noted above. Scrotum red, blanching and swollen. R knee red, blanching. BLE calf/shin wounds followed by wound team and LPS, dressing is clean, dry and intact, unable to assess BLE wounds at this time. Heels blanching, cracked. Mild redness and slight excoriation under abdominal folds, interdry in place.      Confirmed pressure ulcers found on: NA.     New potential pressure ulcers noted on Sacrum and archie. heels.      Wound consult placed: LPS consult placed on 8-.     Interventions: Reposition Q 2 hours, pillows for support/positioning, barrier cream, mepilex to elbow (contradinicated to sacrum d/t incontinent loose stools), heels floating, low air loss bed with dri-stephen pads. Interdry under abd folds,2 RN skin check Q shift.

## 2019-08-17 NOTE — CARE PLAN
Pt educated on 0-10 pain scale. Pt able to call for pain medication approprietly. Pharmacological and non pharmacological interventions in place. Call light in reach.

## 2019-08-17 NOTE — OP REPORT
DATE OF SERVICE:  08/17/2019    PREOPERATIVE DIAGNOSIS:  Right lower extremity peripheral vascular disease   with nonhealing wounds    POSTPROCEDURE DIAGNOSIS:  Right lower extremity peripheral vascular disease   with nonhealing wounds    PROCEDURES PERFORMED:  1.  Right lower extremity angiogram.  2.  Angioplasty and 7 mm x 6 cm right superficial femoral artery stent.  3.  Ultrasound-guided left femoral access.    ANESTHESIOLOGIST:  Ileana Marti RN    ANESTHESIA:  Conscious sedation.    INDICATIONS:  The patient is a 74-year-old gentleman who presents after being   found down at home.  He has been in the hospital for over a week and wounds to   the right lower extremity are not healing well.  Noninvasive studies suggest   a right lower extremity stenosis.  He is brought to the angio suite today to   identify and treat this.  Risks and benefits including contrast nephropathy,   bleeding, infection and further procedures were explained.  He understands and   agrees to proceed.    DESCRIPTION OF PROCEDURE:  Patient was taken to the angio suite, placed in   supine position.  After a difficult transfer due to his morbid obesity, the   groins were prepped and draped in the usual sterile fashion with Chloraseptic   prep, cloth towels and paper drapes.  A timeout was called and the proposed   procedure confirmed with all team members.    The left femoral artery was identified and accessed with ultrasound-guided   technique.  A direct puncture with single wall 19-gauge needle was performed.    A 0.035 guidewire followed by a 4-Yakut sheath.  An Omniflush catheter was   taken up to the aortic bifurcation and used to negotiate the aortic   bifurcation and then the right common iliac bifurcations.  The Omniflush   catheter was carefully negotiated to the distal right external iliac artery   and using a stiff Glidewire, exchanged for a glide catheter.  This was used to   perform a right lower extremity angiogram.  A critical  stenosis was   identified in the superficial femoral artery.  Over the stiff Glidewire, we   used a 6-Lebanese destination sheath and then, using the glide catheter and an   0.018 Command wire, I negotiated the area of critical, calcified plaque in the   superficial femoral artery.  The patient was given 9000 units of heparin and   this was allowed to circulate.    A 6 mm x 4 cm balloon was used to dilate the superficial femoral artery.  I   then used a 7 mm x 60 mm stent, in the absence of a shorter stent due to   stocking problems.  This was deployed and postdilated with a 7 mm x 4 cm   balloon.  Final images showed excellent response with decrease collaterals and   no evidence of residual stenosis.    The stiff Glidewire was replaced.  The destination sheath removed and a   6-Lebanese Angio-Seal deployed with good success.    FINDINGS:  Right superficial femoral artery is widely patent with a mild   stenosis at the mid thigh.  There is a critical stenosis at the adductor   canal, which was successfully treated with angioplasty and stent.  Runoff to   the right lower extremity is through 3 vessels with slow flow through the   anterior tibial artery.    The patient received 20 mL of Visipaque 270.  At a total fluoro time of 14.7   minutes and a total radiation dose of 466.7 mGy.       ____________________________________     MD CASE Veloz / BRADEN    DD:  08/17/2019 10:09:25  DT:  08/17/2019 10:34:05    D#:  [id number]  Job#:  [id number]

## 2019-08-17 NOTE — DISCHARGE SUMMARY
Discharge Summary    CHIEF COMPLAINT ON ADMISSION  Chief Complaint   Patient presents with   • Fall       Reason for Admission  ems      CODE STATUS  DNAR/DNI    HPI & HOSPITAL COURSE  This is a 74 y.o. male here with initial admission for rhabdomyolysis and dehydration.  The patient was initially found next to his bed for about 3 days.  The patient initially was of course with acute renal failure secondary to dehydration and rhabdomyolysis.  He had to be acutely resuscitated.  His renal functions at this point have gone back to his baseline.  The patient was found to also be diabetic and with his diabetes he has poorly controlled diabetes.  Here in the hospital he has much improved.  Patient was also found to have nonhealing ulcer of his right lower extremity.  This was found to be a vascular ulcer.  Vascular surgery, Dr. Irais Rios was consulted.  She took the patient for an angiogram and was able to stand the SFA.  The patient did grow out MRSA from the wound.  Recommendation was for 2 weeks of oral antibiotics with Zyvox.  The patient has completed 10 days and thus will need another 4 days of antibiotics.  The patient preoperatively was hypoglycemic as he was given his Lantus insulin but kept n.p.o.  At this point blood sugars have come back up as the patient has been fed.  Patient at this point will need continued wound care.  Patient was eval by physical therapy and Occupational Therapy and they found him to be an excellent candidate for intensive therapy at the skilled setting.  Patient has been accepted the at the [facility] facility and will be transferred there this afternoon.    Therefore, he is discharged in good and stable condition to an extended care facility for ongoing therapy, including wound therapy, antibiotics for 4 more days, physical therapy and occupational therapy until he is back to his baseline.    The patient met 2-midnight criteria for an inpatient stay at the time of  discharge.      FOLLOW UP ITEMS POST DISCHARGE  Follow-up with the primary care physician, vascular surgery as an outpatient.    DISCHARGE DIAGNOSES  Principal Problem:    Diabetic ketoacidosis without coma associated with type 2 diabetes mellitus (HCC) POA: Yes  Active Problems:    Head trauma POA: Yes    MARIZOL (acute kidney injury) (formerly [facility]) POA: Yes    Open wound of leg POA: Yes    Rhabdomyolysis POA: Yes    COPD (chronic obstructive pulmonary disease) (formerly [facility]) POA: Yes    Persistent atrial fibrillation (formerly [facility]) POA: Yes    Class 3 severe obesity due to excess calories in adult (formerly [facility]) POA: Yes    Functional dysphagia POA: Unknown    Chronic kidney disease (CKD) stage G3a/A3, moderately decreased glomerular filtration rate (GFR) between 45-59 mL/min/1.73 square meter and albuminuria creatinine ratio greater than 300 mg/g (formerly [facility]) POA: Unknown    Lymphadenopathy, submandibular POA: Yes    Hypophosphatemia POA: Unknown    Pain of left hip joint POA: Yes    Hyperphosphatemia POA: Yes    Elevated troponin POA: Unknown    Physical debility POA: Yes    Pressure ulcer of buttock POA: Unknown  Resolved Problems:    * No resolved hospital problems. *      FOLLOW UP  No future appointments.  [facility]  [address]  [place] [address]  [phone]          MEDICATIONS ON DISCHARGE     Medication List      START taking these medications      Instructions   acetaminophen 500 MG Tabs  Commonly known as:  TYLENOL   Take 2 Tabs by mouth every 6 hours as needed for Mild Pain.  Dose:  1,000 mg     bisacodyl 10 MG Supp  Commonly known as:  DULCOLAX   Insert 1 Suppository in rectum 1 time daily as needed (if magnesium hydroxide ineffective after 24 hours).  Dose:  10 mg     glucose 4 g chewable tablet   Take 4 Tabs by mouth as needed for Low Blood Sugar (If FSBG is less than or equal to 70 mg/dL and patient able to eat or drink).  Dose:  16 g     insulin glargine 100 UNIT/ML Soln  Commonly known as:  LANTUS   Inject 55 Units as  instructed every evening.  Dose:  55 Units     insulin regular 100 Unit/mL Soln  Commonly known as:  HUMULIN R   Inject 3-14 Units as instructed 4 Times a Day,Before Meals and at Bedtime.  Dose:  3-14 Units     linezolid 600 MG Tabs  Commonly known as:  ZYVOX   Take 1 Tab by mouth every 12 hours for 4 days.  Dose:  600 mg     magnesium hydroxide 400 MG/5ML Susp  Commonly known as:  MILK OF MAGNESIA   Take 30 mL by mouth 1 time daily as needed (if polyethylene glycol ineffective after 24 hours).  Dose:  30 mL     ondansetron 4 MG Tbdp  Commonly known as:  ZOFRAN ODT   Take 1 Tab by mouth every four hours as needed for Nausea (give PO if IV route is unavailable.).  Dose:  4 mg     oxyCODONE immediate-release 5 MG Tabs  Commonly known as:  ROXICODONE   Take 1 Tab by mouth every four hours as needed for up to 3 days.  Dose:  5 mg     polyethylene glycol/lytes Pack  Commonly known as:  MIRALAX   Take 1 Packet by mouth 1 time daily as needed (if sennosides and docusate ineffective after 24 hours).  Dose:  17 g     senna-docusate 8.6-50 MG Tabs  Commonly known as:  PERICOLACE or SENOKOT S   Take 2 Tabs by mouth 2 Times a Day.  Dose:  2 Tab        CHANGE how you take these medications      Instructions   Cholecalciferol 2000 UNIT Tabs  Start taking on:  8/18/2019  What changed:  medication strength   Take 1 Tab by mouth every day.  Dose:  2,000 Units     gabapentin 400 MG Caps  What changed:  when to take this  Commonly known as:  NEURONTIN   Take 1 Cap by mouth 3 times a day.  Dose:  400 mg     metoprolol SR 25 MG Tb24  What changed:  how much to take  Commonly known as:  TOPROL XL   Take 1 Tab by mouth 2 Times a Day.  Dose:  25 mg        CONTINUE taking these medications      Instructions   finasteride 5 MG Tabs  Start taking on:  8/18/2019  Commonly known as:  PROSCAR   Take 1 Tab by mouth every day.  Dose:  5 mg        STOP taking these medications    atorvastatin 20 MG Tabs  Commonly known as:  LIPITOR     ELIQUIS 5mg  Tabs  Generic drug:  apixaban     fish oil 1000 MG Caps capsule     losartan 50 MG Tabs  Commonly known as:  COZAAR            Allergies  Not on File    DIET  Orders Placed This Encounter   Procedures   • Diet Order Diabetic, Full Liquid     Standing Status:   Standing     Number of Occurrences:   1     Order Specific Question:   Diet:     Answer:   Diabetic [3]     Order Specific Question:   Diet:     Answer:   Full Liquid [11]     Order Specific Question:   Consistency/Fluid modifications:     Answer:   Nectar Thick [2]       ACTIVITY  As tolerated.  Weight bearing as tolerated    LINES, DRAINS, AND WOUNDS  This is an automated list. Peripheral IVs will be removed prior to discharge.  Peripheral IV 08/05/19 18 G Left Upper arm (Active)   Site Assessment Clean;Dry;Intact 8/16/2019 10:00 PM   Dressing Type Transparent 8/16/2019 10:00 PM   Line Status No blood return;Flushed;Scrubbed the hub prior to access;Saline locked 8/16/2019 10:00 PM   Dressing Status Clean;Dry;Intact 8/16/2019 10:00 PM   Dressing Intervention N/A 8/16/2019 10:00 PM   Date Primary Tubing Changed 08/11/19 8/11/2019  1:00 AM   Date Secondary Tubing Changed 08/10/19 8/10/2019  9:00 PM   NEXT Primary Tubing Change  08/13/19 8/12/2019  8:45 AM   Infiltration Grading ([facility] 0 8/16/2019 10:00 PM   Phlebitis Scale ([facility] Only) 0 8/16/2019 10:00 PM       Wound 08/05/19 Venous Ulcer Leg mixed arterial/venous knee to ankle right (Active)   Wound Image     8/6/2019  8:00 PM   Site Assessment NOEMÍ 8/16/2019 10:17 PM   Ella-wound Assessment NOEMÍ 8/16/2019 10:17 PM   Margins NOEMÍ 8/16/2019 10:17 PM   Wound Length (cm) 0 cm 8/11/2019  5:00 PM   Wound Width (cm) 0 cm 8/11/2019  5:00 PM   Wound Depth (cm) 0 cm 8/11/2019  5:00 PM   Wound Surface Area (cm^2) 0 cm^2 8/11/2019  5:00 PM   Tunneling 0 cm 8/11/2019  5:00 PM   Undermining 0 cm 8/11/2019  5:00 PM   Closure NOEMÍ 8/16/2019 10:17 PM   Drainage Amount NOEMÍ 8/16/2019 10:17 PM   Drainage Description NOEMÍ  8/16/2019 10:17 PM   Non-staged Wound Description Full thickness 8/16/2019  2:30 PM   Treatments Cleansed;Site care 8/16/2019  2:30 PM   Cleansing Approved Wound Cleanser 8/16/2019  2:30 PM   Periwound Protectant Not Applicable 8/16/2019  2:30 PM   Dressing Options Nonadherent Contact Layer;Absorbent Abdominal Pad;Roll Gauze 8/16/2019 10:17 PM   Dressing Cleansing/Solutions Not Applicable 8/16/2019  2:30 PM   Dressing Changed Changed 8/16/2019  2:30 PM   Dressing Status Clean;Dry;Intact 8/16/2019 10:17 PM   Dressing Change Frequency Daily 8/16/2019 10:17 PM   NEXT Dressing Change  08/17/19 8/16/2019 10:17 PM   NEXT Weekly Photo (Inpatient Only) 08/21/19 8/16/2019  2:30 PM   WOUND NURSE ONLY - Odor None 8/16/2019  2:30 PM   WOUND NURSE ONLY - Pulses Not palpable 8/11/2019  5:00 PM   WOUND NURSE ONLY - Exposed Structures None 8/16/2019  2:30 PM   WOUND NURSE ONLY - Tissue Type and Percentage 80% red, 10% black/brown, 10% yellow 8/16/2019  2:30 PM   WOUND NURSE ONLY - Time Spent with Patient (mins) 50 8/16/2019  2:30 PM       Wound 08/05/19 Venous Ulcer Leg venous stasis with partial thickness wounds to calf (Active)   Wound Image    8/6/2019  8:00 PM   Site Assessment NOEMÍ 8/16/2019 10:17 PM   Ella-wound Assessment NOEMÍ 8/16/2019 10:17 PM   Margins NOEMÍ 8/16/2019 10:17 PM   Wound Length (cm) 0 cm 8/11/2019  5:00 PM   Wound Width (cm) 0 cm 8/11/2019  5:00 PM   Wound Depth (cm) 0 cm 8/11/2019  5:00 PM   Wound Surface Area (cm^2) 0 cm^2 8/11/2019  5:00 PM   Tunneling 0 cm 8/11/2019  5:00 PM   Undermining 0 cm 8/11/2019  5:00 PM   Closure NOEMÍ 8/16/2019 10:17 PM   Drainage Amount NOEMÍ 8/16/2019 10:17 PM   Drainage Description NOEMÍ 8/16/2019 10:17 PM   Non-staged Wound Description Partial thickness 8/15/2019  8:00 AM   Treatments Other (Comment) 8/15/2019 10:02 PM   Cleansing Approved Wound Cleanser 8/16/2019  9:00 AM   Periwound Protectant Not Applicable 8/11/2019  5:00 PM   Dressing Options Adhesive Foam 8/16/2019 10:17 PM    Dressing Cleansing/Solutions Not Applicable 8/13/2019  8:30 PM   Dressing Changed Changed 8/16/2019  9:00 AM   Dressing Status Clean;Dry;Intact 8/16/2019 10:17 PM   Dressing Change Frequency Every 48 hrs 8/16/2019 10:17 PM   NEXT Dressing Change  08/18/19 8/16/2019 10:17 PM   NEXT Weekly Photo (Inpatient Only) 08/21/19 8/13/2019  4:00 PM   WOUND NURSE ONLY - Odor None 8/11/2019  5:00 PM   WOUND NURSE ONLY - Exposed Structures None 8/11/2019  5:00 PM   WOUND NURSE ONLY - Tissue Type and Percentage 100% pink 8/11/2019  5:00 PM       Moisture Associated Skin Damage Sacrum (Active)   Drainage Amount None 8/16/2019 10:17 PM   Drainage Description NOEMÍ 8/15/2019 10:02 PM   Ella-wound Assessment Intact 8/16/2019 10:17 PM   Periwound Protectant Barrier Paste 8/16/2019 10:17 PM       Peripheral IV 08/05/19 18 G Left Upper arm (Active)   Site Assessment Clean;Dry;Intact 8/16/2019 10:00 PM   Dressing Type Transparent 8/16/2019 10:00 PM   Line Status No blood return;Flushed;Scrubbed the hub prior to access;Saline locked 8/16/2019 10:00 PM   Dressing Status Clean;Dry;Intact 8/16/2019 10:00 PM   Dressing Intervention N/A 8/16/2019 10:00 PM   Date Primary Tubing Changed 08/11/19 8/11/2019  1:00 AM   Date Secondary Tubing Changed 08/10/19 8/10/2019  9:00 PM   NEXT Primary Tubing Change  08/13/19 8/12/2019  8:45 AM   Infiltration Grading ([facility]) 0 8/16/2019 10:00 PM   Phlebitis Scale ([facility] Only) 0 8/16/2019 10:00 PM               MENTAL STATUS ON TRANSFER  Level of Consciousness: Alert  Orientation : Oriented x 4  Speech: Speech Clear    CONSULTATIONS  Vascular surgery, pulmonology    PROCEDURES  SFA repair with a stent using a 7 mm x 6 cm stent done on 8/17/2019    LABORATORY  Lab Results   Component Value Date    SODIUM 143 08/16/2019    POTASSIUM 3.6 08/16/2019    CHLORIDE 109 08/16/2019    CO2 26 08/16/2019    GLUCOSE 83 08/16/2019    BUN 16 08/16/2019    CREATININE 1.44 (H) 08/16/2019    CREATININE 1.1 10/07/2005         Lab Results   Component Value Date    WBC 5.7 08/17/2019    HEMOGLOBIN 11.6 (L) 08/17/2019    HEMATOCRIT 36.1 (L) 08/17/2019    PLATELETCT 98 (L) 08/17/2019        Total time of the discharge process exceeds 39 minutes.

## 2019-08-17 NOTE — PROGRESS NOTES
IR Procedure Note:    Dr. Rios consented patient prior to procedure; all questions answered.    Site confirmed with imaging guidance by patient, physician, RT, and RN.     Dr. Rios completed a right lower extremity angiogram with balloon angioplasty and stenting of right SFA.  The patient tolerated the procedure well; ETCo2 range 29-34, with consistent waveform during the procedure.      Angioseal, tegaderm and gauze applied to left groin, CDI and soft; pressure held x 5 minutes.  Patient alert, oriented and verbally appropriate post procedure, patient remained hemodynamically stable during procedure and transport, see flow sheet for vital signs.  Report given to PHILLIP Tavarez.  RN transported patient to T333 with SaO2 monitor @ 98 % on oxygen via simple mask on  4 lpm.     Sedation End Time: 0955  Angioseal Deployment time: 0946    Right SFA Stent Placement

## 2019-08-18 ENCOUNTER — APPOINTMENT (OUTPATIENT)
Dept: RADIOLOGY | Facility: MEDICAL CENTER | Age: 74
DRG: 417 | End: 2019-08-18
Attending: EMERGENCY MEDICINE
Payer: COMMERCIAL

## 2019-08-18 ENCOUNTER — HOSPITAL ENCOUNTER (INPATIENT)
Facility: MEDICAL CENTER | Age: 74
LOS: 9 days | DRG: 417 | End: 2019-08-29
Attending: EMERGENCY MEDICINE | Admitting: INTERNAL MEDICINE
Payer: COMMERCIAL

## 2019-08-18 DIAGNOSIS — R79.89 ELEVATED TROPONIN: ICD-10-CM

## 2019-08-18 DIAGNOSIS — E08.621 DIABETIC ULCER OF OTHER PART OF RIGHT FOOT ASSOCIATED WITH DIABETES MELLITUS DUE TO UNDERLYING CONDITION, UNSPECIFIED ULCER STAGE (HCC): ICD-10-CM

## 2019-08-18 DIAGNOSIS — K80.00 CALCULUS OF GALLBLADDER WITH ACUTE CHOLECYSTITIS WITHOUT OBSTRUCTION: ICD-10-CM

## 2019-08-18 DIAGNOSIS — L97.519 DIABETIC ULCER OF OTHER PART OF RIGHT FOOT ASSOCIATED WITH DIABETES MELLITUS DUE TO UNDERLYING CONDITION, UNSPECIFIED ULCER STAGE (HCC): ICD-10-CM

## 2019-08-18 DIAGNOSIS — E16.2 HYPOGLYCEMIA: ICD-10-CM

## 2019-08-18 LAB
ALBUMIN SERPL BCP-MCNC: 2.7 G/DL (ref 3.2–4.9)
ALBUMIN/GLOB SERPL: 0.9 G/DL
ALP SERPL-CCNC: 82 U/L (ref 30–99)
ALT SERPL-CCNC: 25 U/L (ref 2–50)
ANION GAP SERPL CALC-SCNC: 7 MMOL/L (ref 0–11.9)
AST SERPL-CCNC: 34 U/L (ref 12–45)
BASOPHILS # BLD AUTO: 0.5 % (ref 0–1.8)
BASOPHILS # BLD: 0.04 K/UL (ref 0–0.12)
BILIRUB SERPL-MCNC: 0.8 MG/DL (ref 0.1–1.5)
BUN SERPL-MCNC: 16 MG/DL (ref 8–22)
CALCIUM SERPL-MCNC: 8.5 MG/DL (ref 8.5–10.5)
CHLORIDE SERPL-SCNC: 107 MMOL/L (ref 96–112)
CO2 SERPL-SCNC: 26 MMOL/L (ref 20–33)
CREAT SERPL-MCNC: 1.47 MG/DL (ref 0.5–1.4)
EKG IMPRESSION: NORMAL
EOSINOPHIL # BLD AUTO: 0.04 K/UL (ref 0–0.51)
EOSINOPHIL NFR BLD: 0.5 % (ref 0–6.9)
ERYTHROCYTE [DISTWIDTH] IN BLOOD BY AUTOMATED COUNT: 45.8 FL (ref 35.9–50)
GLOBULIN SER CALC-MCNC: 2.9 G/DL (ref 1.9–3.5)
GLUCOSE BLD-MCNC: 102 MG/DL (ref 65–99)
GLUCOSE SERPL-MCNC: 138 MG/DL (ref 65–99)
HCT VFR BLD AUTO: 39.5 % (ref 42–52)
HGB BLD-MCNC: 12.6 G/DL (ref 14–18)
IMM GRANULOCYTES # BLD AUTO: 0.03 K/UL (ref 0–0.11)
IMM GRANULOCYTES NFR BLD AUTO: 0.4 % (ref 0–0.9)
LIPASE SERPL-CCNC: 18 U/L (ref 11–82)
LYMPHOCYTES # BLD AUTO: 0.4 K/UL (ref 1–4.8)
LYMPHOCYTES NFR BLD: 5.4 % (ref 22–41)
MCH RBC QN AUTO: 29.8 PG (ref 27–33)
MCHC RBC AUTO-ENTMCNC: 31.9 G/DL (ref 33.7–35.3)
MCV RBC AUTO: 93.4 FL (ref 81.4–97.8)
MONOCYTES # BLD AUTO: 0.46 K/UL (ref 0–0.85)
MONOCYTES NFR BLD AUTO: 6.3 % (ref 0–13.4)
NEUTROPHILS # BLD AUTO: 6.39 K/UL (ref 1.82–7.42)
NEUTROPHILS NFR BLD: 86.9 % (ref 44–72)
NRBC # BLD AUTO: 0 K/UL
NRBC BLD-RTO: 0 /100 WBC
PLATELET # BLD AUTO: 108 K/UL (ref 164–446)
PMV BLD AUTO: 9.1 FL (ref 9–12.9)
POTASSIUM SERPL-SCNC: 4.2 MMOL/L (ref 3.6–5.5)
PROT SERPL-MCNC: 5.6 G/DL (ref 6–8.2)
RBC # BLD AUTO: 4.23 M/UL (ref 4.7–6.1)
SODIUM SERPL-SCNC: 140 MMOL/L (ref 135–145)
TROPONIN T SERPL-MCNC: 59 NG/L (ref 6–19)
WBC # BLD AUTO: 7.4 K/UL (ref 4.8–10.8)

## 2019-08-18 PROCEDURE — 82962 GLUCOSE BLOOD TEST: CPT

## 2019-08-18 PROCEDURE — 99285 EMERGENCY DEPT VISIT HI MDM: CPT

## 2019-08-18 PROCEDURE — 94760 N-INVAS EAR/PLS OXIMETRY 1: CPT

## 2019-08-18 PROCEDURE — 80053 COMPREHEN METABOLIC PANEL: CPT

## 2019-08-18 PROCEDURE — 304561 HCHG STAT O2

## 2019-08-18 PROCEDURE — 74177 CT ABD & PELVIS W/CONTRAST: CPT

## 2019-08-18 PROCEDURE — 85025 COMPLETE CBC W/AUTO DIFF WBC: CPT

## 2019-08-18 PROCEDURE — 700117 HCHG RX CONTRAST REV CODE 255: Performed by: EMERGENCY MEDICINE

## 2019-08-18 PROCEDURE — 93005 ELECTROCARDIOGRAM TRACING: CPT | Performed by: EMERGENCY MEDICINE

## 2019-08-18 PROCEDURE — 84484 ASSAY OF TROPONIN QUANT: CPT

## 2019-08-18 PROCEDURE — 83690 ASSAY OF LIPASE: CPT

## 2019-08-18 RX ADMIN — IOHEXOL 80 ML: 350 INJECTION, SOLUTION INTRAVENOUS at 23:45

## 2019-08-18 ASSESSMENT — PAIN SCALES - WONG BAKER: WONGBAKER_NUMERICALRESPONSE: HURTS EVEN MORE

## 2019-08-18 NOTE — DISCHARGE INSTRUCTIONS
Discharge Instructions    Discharged to [facility] by medical transportation with self. Discharged via ambulance, hospital escort: Yes.  Special equipment needed: Not Applicable    Be sure to schedule a follow-up appointment with your primary care doctor or any specialists as instructed.     Discharge Plan:   Influenza Vaccine Indication: Indicated: Not available from distributor/    I understand that a diet low in cholesterol, fat, and sodium is recommended for good health. Unless I have been given specific instructions below for another diet, I accept this instruction as my diet prescription.   Other diet: diabetic full liquid    Special Instructions: None    · Is patient discharged on Warfarin / Coumadin?   No     Depression / Suicide Risk    As you are discharged from this [facility] facility, it is important to learn how to keep safe from harming yourself.    Recognize the warning signs:  · Abrupt changes in personality, positive or negative- including increase in energy   · Giving away possessions  · Change in eating patterns- significant weight changes-  positive or negative  · Change in sleeping patterns- unable to sleep or sleeping all the time   · Unwillingness or inability to communicate  · Depression  · Unusual sadness, discouragement and loneliness  · Talk of wanting to die  · Neglect of personal appearance   · Rebelliousness- reckless behavior  · Withdrawal from people/activities they love  · Confusion- inability to concentrate     If you or a loved one observes any of these behaviors or has concerns about self-harm, here's what you can do:  · Talk about it- your feelings and reasons for harming yourself  · Remove any means that you might use to hurt yourself (examples: pills, rope, extension cords, firearm)  · Get professional help from the community (Mental Health, Substance Abuse, psychological counseling)  · Do not be alone:Call your Safe Contact- someone whom you trust who will be  there for you.  · Call your local CRISIS HOTLINE 469-0907 or 135-051-2007  · Call your local Children's Mobile Crisis Response Team [place] (435) 637-6335 or www.[employer]  · Call the toll free National Suicide Prevention Hotlines   · National Suicide Prevention Lifeline 165-252-ETMW (1805)  · [facility] Line Network 800-SUICIDE (656-3394)      Angiogram, Angioplasty, or Stent Placement  Care After  One of the following procedures was done today.  ANGIOGRAM:  A catheter was placed through the blood vessel in your groin, contrast was injected into the vessels, and pictures were taken.  ANGIOPLASTY:  A catheter was placed through the blood vessel in your groin and directed to an area of blocked blood flow. A balloon, and possibly a metal stent were used to open the blockage. If no other blockages are present below this area, your symptoms should improve. If blockages are present below this area, surgery may still be necessary.  STENT:  A catheter was placed in your groin through which a metal mesh tube was placed in a narrowed part of the artery to facilitate blood flow.  You were given intravenous sedation. These medications are rapidly cleared from your bloodstream. You may feel some discomfort at the insertion site after the local anesthetic wears off. This discomfort should gradually improve over the next several days.  · Only take over-the-counter or prescription medicines for pain, discomfort, or fever as directed by your caregiver.  · Complications are very uncommon after this procedure. Go to the nearest emergency department if you develop any of the following symptoms:  · Worsening pain.  · Bleeding.  · Swelling at the puncture site.  · Lightheadedness.  · Dizziness or fainting.  · Fever or chills.  · If oozing, bleeding, or a lump appears at the puncture site, apply firm pressure directly to the site steadily for 15 minutes and go to the emergency department.  · Keep the skin around the  insertion site dry. You may take showers after 24 hours. If the area does get wet, dry the skin completely. Avoid baths until the skin puncture site heals, usually 5 to 7 days.  · Development of redness, increased soreness, or swelling may be signs of a skin infection. Contact your physician.  · Rest for the remainder of the day and avoid any heavy lifting (more than 10 pounds or 4.5 kg). Do not operate heavy machinery, drive, or make legal decisions for the first 24 hours after the procedure. Have a responsible person drive you home.  · You may resume your usual diet after the procedure. Avoid alcoholic beverages for 24 hours after the procedure.  Document Released: 12/18/2006 Document Revised: 03/11/2013 Document Reviewed: 10/17/2007  [employer]® Patient Information ©2014 [employer].        Diabetic Ketoacidosis  Diabetic ketoacidosis is a life-threatening complication of diabetes. If it is not treated, it can cause severe dehydration and organ damage and can lead to a coma or death.  What are the causes?  This condition develops when there is not enough of the hormone insulin in the body. Insulin helps the body to break down sugar for energy. Without insulin, the body cannot break down sugar, so it breaks down fats instead. This leads to the production of acids that are called ketones. Ketones are poisonous at high levels.  This condition can be triggered by:  · Stress on the body that is brought on by an illness.  · Medicines that raise blood glucose levels.  · Not taking diabetes medicine.  What are the signs or symptoms?  Symptoms of this condition include:  · Fatigue.  · Weight loss.  · Excessive thirst.  · Light-headedness.  · Fruity or sweet-smelling breath.  · Excessive urination.  · Vision changes.  · Confusion or irritability.  · Nausea.  · Vomiting.  · Rapid breathing.  · Abdominal pain.  · Feeling flushed.  How is this diagnosed?  This condition is diagnosed based on a medical history, a physical  exam, and blood tests. You may also have a urine test that checks for ketones.  How is this treated?  This condition may be treated with:  · Fluid replacement. This may be done to correct dehydration.  · Insulin injections. These may be given through the skin or through an IV tube.  · Electrolyte replacement. Electrolytes, such as potassium and sodium, may be given in pill form or through an IV tube.  · Antibiotic medicines. These may be prescribed if your condition was caused by an infection.  Follow these instructions at home:  Eating and drinking  · Drink enough fluids to keep your urine clear or pale yellow.  · If you cannot eat, alternate between drinking fluids with sugar (such as juice) and salty fluids (such as broth or bouillon).  · If you can eat, follow your usual diet and drink sugar-free liquids, such as water.  Other Instructions   · Take insulin as directed by your health care provider. Do not skip insulin injections. Do not use  insulin.  · If your blood sugar is over 240 mg/dL, monitor your urine ketones every 4-6 hours.  · If you were prescribed an antibiotic medicine, finish all of it even if you start to feel better.  · Rest and exercise only as directed by your health care provider.  · If you get sick, call your health care provider and begin treatment quickly. Your body often needs extra insulin to fight an illness.  · Check your blood glucose levels regularly. If your blood glucose is high, drink plenty of fluids. This helps to flush out ketones.  Contact a health care provider if:  · Your blood glucose level is too high or too low.  · You have ketones in your urine.  · You have a fever.  · You cannot eat.  · You cannot tolerate fluids.  · You have been vomiting for more than 2 hours.  · You continue to have symptoms of this condition.  · You develop new symptoms.  Get help right away if:  · Your blood glucose levels continue to be high (elevated).  · Your monitor reads “high” even when  you are taking insulin.  · You faint.  · You have chest pain.  · You have trouble breathing.  · You have a sudden, severe headache.  · You have sudden weakness in one arm or one leg.  · You have sudden trouble speaking or swallowing.  · You have vomiting or diarrhea that gets worse after 3 hours.  · You feel severely fatigued.  · You have trouble thinking.  · You have abdominal pain.  · You are severely dehydrated. Symptoms of severe dehydration include:  ¨ Extreme thirst.  ¨ Dry mouth.  ¨ Blue lips.  ¨ Cold hands and feet.  ¨ Rapid breathing.  This information is not intended to replace advice given to you by your health care provider. Make sure you discuss any questions you have with your health care provider.  Document Released: 12/15/2001 Document Revised: 05/25/2017 Document Reviewed: 11/25/2015  [employer] Patient Education © 2017 [employer]

## 2019-08-19 ENCOUNTER — APPOINTMENT (OUTPATIENT)
Dept: RADIOLOGY | Facility: MEDICAL CENTER | Age: 74
DRG: 417 | End: 2019-08-19
Attending: INTERNAL MEDICINE
Payer: COMMERCIAL

## 2019-08-19 ENCOUNTER — APPOINTMENT (OUTPATIENT)
Dept: RADIOLOGY | Facility: MEDICAL CENTER | Age: 74
DRG: 417 | End: 2019-08-19
Attending: EMERGENCY MEDICINE
Payer: COMMERCIAL

## 2019-08-19 PROBLEM — E11.649 TYPE 2 DIABETES MELLITUS WITH HYPOGLYCEMIA, WITH LONG-TERM CURRENT USE OF INSULIN (HCC): Status: ACTIVE | Noted: 2019-08-19

## 2019-08-19 PROBLEM — Z79.4 TYPE 2 DIABETES MELLITUS WITH HYPOGLYCEMIA, WITH LONG-TERM CURRENT USE OF INSULIN (HCC): Status: ACTIVE | Noted: 2019-08-19

## 2019-08-19 PROBLEM — R10.11 RUQ ABDOMINAL PAIN: Status: ACTIVE | Noted: 2019-08-19

## 2019-08-19 LAB
APPEARANCE UR: CLEAR
BACTERIA #/AREA URNS HPF: NEGATIVE /HPF
BILIRUB UR QL STRIP.AUTO: NEGATIVE
COLOR UR: YELLOW
EPI CELLS #/AREA URNS HPF: NEGATIVE /HPF
GLUCOSE BLD-MCNC: 138 MG/DL (ref 65–99)
GLUCOSE BLD-MCNC: 181 MG/DL (ref 65–99)
GLUCOSE BLD-MCNC: 194 MG/DL (ref 65–99)
GLUCOSE UR STRIP.AUTO-MCNC: NEGATIVE MG/DL
HYALINE CASTS #/AREA URNS LPF: ABNORMAL /LPF
KETONES UR STRIP.AUTO-MCNC: ABNORMAL MG/DL
LACTATE BLD-SCNC: 1.5 MMOL/L (ref 0.5–2)
LEUKOCYTE ESTERASE UR QL STRIP.AUTO: NEGATIVE
MICRO URNS: ABNORMAL
NITRITE UR QL STRIP.AUTO: NEGATIVE
PH UR STRIP.AUTO: 5 [PH] (ref 5–8)
PROT UR QL STRIP: 300 MG/DL
RBC # URNS HPF: ABNORMAL /HPF
RBC UR QL AUTO: ABNORMAL
SP GR UR STRIP.AUTO: 1.02
TROPONIN T SERPL-MCNC: 64 NG/L (ref 6–19)
TROPONIN T SERPL-MCNC: 67 NG/L (ref 6–19)
UROBILINOGEN UR STRIP.AUTO-MCNC: 1 MG/DL
WBC #/AREA URNS HPF: ABNORMAL /HPF

## 2019-08-19 PROCEDURE — 96376 TX/PRO/DX INJ SAME DRUG ADON: CPT

## 2019-08-19 PROCEDURE — 700111 HCHG RX REV CODE 636 W/ 250 OVERRIDE (IP): Performed by: EMERGENCY MEDICINE

## 2019-08-19 PROCEDURE — 99220 PR INITIAL OBSERVATION CARE,LEVL III: CPT | Performed by: INTERNAL MEDICINE

## 2019-08-19 PROCEDURE — 700102 HCHG RX REV CODE 250 W/ 637 OVERRIDE(OP): Performed by: INTERNAL MEDICINE

## 2019-08-19 PROCEDURE — 36415 COLL VENOUS BLD VENIPUNCTURE: CPT

## 2019-08-19 PROCEDURE — G0378 HOSPITAL OBSERVATION PER HR: HCPCS

## 2019-08-19 PROCEDURE — 96375 TX/PRO/DX INJ NEW DRUG ADDON: CPT

## 2019-08-19 PROCEDURE — 700111 HCHG RX REV CODE 636 W/ 250 OVERRIDE (IP): Performed by: INTERNAL MEDICINE

## 2019-08-19 PROCEDURE — 92610 EVALUATE SWALLOWING FUNCTION: CPT

## 2019-08-19 PROCEDURE — A9537 TC99M MEBROFENIN: HCPCS

## 2019-08-19 PROCEDURE — A9270 NON-COVERED ITEM OR SERVICE: HCPCS | Performed by: INTERNAL MEDICINE

## 2019-08-19 PROCEDURE — 94760 N-INVAS EAR/PLS OXIMETRY 1: CPT

## 2019-08-19 PROCEDURE — 700105 HCHG RX REV CODE 258: Performed by: INTERNAL MEDICINE

## 2019-08-19 PROCEDURE — 84484 ASSAY OF TROPONIN QUANT: CPT | Mod: 91

## 2019-08-19 PROCEDURE — 76705 ECHO EXAM OF ABDOMEN: CPT

## 2019-08-19 PROCEDURE — 81001 URINALYSIS AUTO W/SCOPE: CPT

## 2019-08-19 PROCEDURE — 700111 HCHG RX REV CODE 636 W/ 250 OVERRIDE (IP): Performed by: RADIOLOGY

## 2019-08-19 PROCEDURE — 700105 HCHG RX REV CODE 258: Performed by: EMERGENCY MEDICINE

## 2019-08-19 PROCEDURE — 96366 THER/PROPH/DIAG IV INF ADDON: CPT

## 2019-08-19 PROCEDURE — 82962 GLUCOSE BLOOD TEST: CPT | Mod: 91

## 2019-08-19 PROCEDURE — 83605 ASSAY OF LACTIC ACID: CPT

## 2019-08-19 PROCEDURE — 87040 BLOOD CULTURE FOR BACTERIA: CPT | Mod: 91

## 2019-08-19 PROCEDURE — 96365 THER/PROPH/DIAG IV INF INIT: CPT

## 2019-08-19 RX ORDER — ONDANSETRON 2 MG/ML
4 INJECTION INTRAMUSCULAR; INTRAVENOUS EVERY 4 HOURS PRN
Status: DISCONTINUED | OUTPATIENT
Start: 2019-08-19 | End: 2019-08-29 | Stop reason: HOSPADM

## 2019-08-19 RX ORDER — MORPHINE SULFATE 4 MG/ML
4 INJECTION, SOLUTION INTRAMUSCULAR; INTRAVENOUS
Status: DISCONTINUED | OUTPATIENT
Start: 2019-08-19 | End: 2019-08-29 | Stop reason: HOSPADM

## 2019-08-19 RX ORDER — ACETAMINOPHEN 325 MG/1
650 TABLET ORAL EVERY 6 HOURS PRN
Status: DISCONTINUED | OUTPATIENT
Start: 2019-08-19 | End: 2019-08-29 | Stop reason: HOSPADM

## 2019-08-19 RX ORDER — BISACODYL 10 MG
10 SUPPOSITORY, RECTAL RECTAL
Status: DISCONTINUED | OUTPATIENT
Start: 2019-08-19 | End: 2019-08-29 | Stop reason: HOSPADM

## 2019-08-19 RX ORDER — METOPROLOL SUCCINATE 25 MG/1
25 TABLET, EXTENDED RELEASE ORAL 2 TIMES DAILY
Status: DISCONTINUED | OUTPATIENT
Start: 2019-08-19 | End: 2019-08-29 | Stop reason: HOSPADM

## 2019-08-19 RX ORDER — POLYETHYLENE GLYCOL 3350 17 G/17G
1 POWDER, FOR SOLUTION ORAL
Status: DISCONTINUED | OUTPATIENT
Start: 2019-08-19 | End: 2019-08-29 | Stop reason: HOSPADM

## 2019-08-19 RX ORDER — MORPHINE SULFATE 10 MG/ML
INJECTION, SOLUTION INTRAMUSCULAR; INTRAVENOUS
Status: COMPLETED
Start: 2019-08-19 | End: 2019-08-19

## 2019-08-19 RX ORDER — GABAPENTIN 400 MG/1
400 CAPSULE ORAL 3 TIMES DAILY
Status: DISCONTINUED | OUTPATIENT
Start: 2019-08-19 | End: 2019-08-29 | Stop reason: HOSPADM

## 2019-08-19 RX ORDER — ONDANSETRON 4 MG/1
4 TABLET, ORALLY DISINTEGRATING ORAL EVERY 4 HOURS PRN
Status: DISCONTINUED | OUTPATIENT
Start: 2019-08-19 | End: 2019-08-29 | Stop reason: HOSPADM

## 2019-08-19 RX ORDER — OXYCODONE HYDROCHLORIDE 10 MG/1
10 TABLET ORAL
Status: DISCONTINUED | OUTPATIENT
Start: 2019-08-19 | End: 2019-08-29 | Stop reason: HOSPADM

## 2019-08-19 RX ORDER — MORPHINE SULFATE 4 MG/ML
4 INJECTION, SOLUTION INTRAMUSCULAR; INTRAVENOUS ONCE
Status: COMPLETED | OUTPATIENT
Start: 2019-08-19 | End: 2019-08-19

## 2019-08-19 RX ORDER — OXYCODONE HYDROCHLORIDE 5 MG/1
5 TABLET ORAL
Status: DISCONTINUED | OUTPATIENT
Start: 2019-08-19 | End: 2019-08-29 | Stop reason: HOSPADM

## 2019-08-19 RX ORDER — LINEZOLID 600 MG/1
600 TABLET, FILM COATED ORAL EVERY 12 HOURS
Status: DISCONTINUED | OUTPATIENT
Start: 2019-08-19 | End: 2019-08-21

## 2019-08-19 RX ORDER — FINASTERIDE 5 MG/1
5 TABLET, FILM COATED ORAL DAILY
Status: DISCONTINUED | OUTPATIENT
Start: 2019-08-19 | End: 2019-08-29 | Stop reason: HOSPADM

## 2019-08-19 RX ORDER — MORPHINE SULFATE 4 MG/ML
3 INJECTION, SOLUTION INTRAMUSCULAR; INTRAVENOUS ONCE
Status: COMPLETED | OUTPATIENT
Start: 2019-08-19 | End: 2019-08-19

## 2019-08-19 RX ORDER — AMOXICILLIN 250 MG
2 CAPSULE ORAL 2 TIMES DAILY
Status: DISCONTINUED | OUTPATIENT
Start: 2019-08-19 | End: 2019-08-29 | Stop reason: HOSPADM

## 2019-08-19 RX ORDER — SODIUM CHLORIDE 9 MG/ML
1000 INJECTION, SOLUTION INTRAVENOUS
Status: DISCONTINUED | OUTPATIENT
Start: 2019-08-19 | End: 2019-08-23

## 2019-08-19 RX ORDER — SODIUM CHLORIDE, SODIUM LACTATE, POTASSIUM CHLORIDE, CALCIUM CHLORIDE 600; 310; 30; 20 MG/100ML; MG/100ML; MG/100ML; MG/100ML
INJECTION, SOLUTION INTRAVENOUS CONTINUOUS
Status: DISCONTINUED | OUTPATIENT
Start: 2019-08-19 | End: 2019-08-23

## 2019-08-19 RX ADMIN — MORPHINE SULFATE 3 MG: 4 INJECTION INTRAVENOUS at 16:45

## 2019-08-19 RX ADMIN — FINASTERIDE 5 MG: 5 TABLET, FILM COATED ORAL at 09:14

## 2019-08-19 RX ADMIN — SODIUM CHLORIDE, POTASSIUM CHLORIDE, SODIUM LACTATE AND CALCIUM CHLORIDE: 600; 310; 30; 20 INJECTION, SOLUTION INTRAVENOUS at 09:15

## 2019-08-19 RX ADMIN — GABAPENTIN 400 MG: 400 CAPSULE ORAL at 09:14

## 2019-08-19 RX ADMIN — PIPERACILLIN SODIUM AND TAZOBACTAM SODIUM 3.38 G: 3; .375 INJECTION, POWDER, FOR SOLUTION INTRAVENOUS at 06:38

## 2019-08-19 RX ADMIN — OXYCODONE HYDROCHLORIDE 10 MG: 10 TABLET ORAL at 20:27

## 2019-08-19 RX ADMIN — LINEZOLID 600 MG: 600 TABLET, FILM COATED ORAL at 17:52

## 2019-08-19 RX ADMIN — LINEZOLID 600 MG: 600 TABLET, FILM COATED ORAL at 09:14

## 2019-08-19 RX ADMIN — MORPHINE SULFATE 4 MG: 4 INJECTION INTRAVENOUS at 02:28

## 2019-08-19 RX ADMIN — METOPROLOL SUCCINATE 25 MG: 25 TABLET, EXTENDED RELEASE ORAL at 09:14

## 2019-08-19 RX ADMIN — MORPHINE SULFATE 4 MG: 4 INJECTION INTRAVENOUS at 21:37

## 2019-08-19 RX ADMIN — PIPERACILLIN SODIUM AND TAZOBACTAM SODIUM 3.38 G: 3; .375 INJECTION, POWDER, FOR SOLUTION INTRAVENOUS at 12:29

## 2019-08-19 RX ADMIN — GABAPENTIN 400 MG: 400 CAPSULE ORAL at 17:51

## 2019-08-19 RX ADMIN — PIPERACILLIN AND TAZOBACTAM 3.38 G: 3; .375 INJECTION, POWDER, LYOPHILIZED, FOR SOLUTION INTRAVENOUS; PARENTERAL at 02:27

## 2019-08-19 RX ADMIN — PIPERACILLIN SODIUM AND TAZOBACTAM SODIUM 3.38 G: 3; .375 INJECTION, POWDER, FOR SOLUTION INTRAVENOUS at 20:21

## 2019-08-19 ASSESSMENT — COPD QUESTIONNAIRES
COPD SCREENING SCORE: 5
HAVE YOU SMOKED AT LEAST 100 CIGARETTES IN YOUR ENTIRE LIFE: YES
HAVE YOU SMOKED AT LEAST 100 CIGARETTES IN YOUR ENTIRE LIFE: YES
IN THE PAST 12 MONTHS DO YOU DO LESS THAN YOU USED TO BECAUSE OF YOUR BREATHING PROBLEMS: DISAGREE/UNSURE
COPD SCREENING SCORE: 4
DO YOU EVER COUGH UP ANY MUCUS OR PHLEGM?: NO/ONLY WITH OCCASIONAL COLDS OR INFECTIONS
DURING THE PAST 4 WEEKS HOW MUCH DID YOU FEEL SHORT OF BREATH: NONE/LITTLE OF THE TIME
DO YOU EVER COUGH UP ANY MUCUS OR PHLEGM?: NO/ONLY WITH OCCASIONAL COLDS OR INFECTIONS
DURING THE PAST 4 WEEKS HOW MUCH DID YOU FEEL SHORT OF BREATH: NONE/LITTLE OF THE TIME

## 2019-08-19 ASSESSMENT — COGNITIVE AND FUNCTIONAL STATUS - GENERAL
WALKING IN HOSPITAL ROOM: TOTAL
PERSONAL GROOMING: A LITTLE
TOILETING: TOTAL
SUGGESTED CMS G CODE MODIFIER DAILY ACTIVITY: CL
DAILY ACTIVITIY SCORE: 12
DRESSING REGULAR LOWER BODY CLOTHING: TOTAL
SUGGESTED CMS G CODE MODIFIER MOBILITY: CL
MOBILITY SCORE: 10
DRESSING REGULAR UPPER BODY CLOTHING: A LITTLE
EATING MEALS: A LITTLE
CLIMB 3 TO 5 STEPS WITH RAILING: TOTAL
MOVING TO AND FROM BED TO CHAIR: A LOT
TURNING FROM BACK TO SIDE WHILE IN FLAT BAD: A LOT
HELP NEEDED FOR BATHING: TOTAL
MOVING FROM LYING ON BACK TO SITTING ON SIDE OF FLAT BED: A LOT
STANDING UP FROM CHAIR USING ARMS: A LOT

## 2019-08-19 ASSESSMENT — ENCOUNTER SYMPTOMS
NECK PAIN: 0
SEIZURES: 0
SPUTUM PRODUCTION: 0
SHORTNESS OF BREATH: 0
MYALGIAS: 0
FOCAL WEAKNESS: 0
BLURRED VISION: 0
CHILLS: 0
NAUSEA: 1
FEVER: 0
PALPITATIONS: 0
DIZZINESS: 0
HEADACHES: 0
BLOOD IN STOOL: 0
BRUISES/BLEEDS EASILY: 0
FLANK PAIN: 0
BACK PAIN: 0
DIARRHEA: 0
WHEEZING: 0
SORE THROAT: 0
DIAPHORESIS: 0
COUGH: 0
VOMITING: 0
ABDOMINAL PAIN: 1

## 2019-08-19 ASSESSMENT — LIFESTYLE VARIABLES
EVER FELT BAD OR GUILTY ABOUT YOUR DRINKING: NO
CONSUMPTION TOTAL: INCOMPLETE
EVER HAD A DRINK FIRST THING IN THE MORNING TO STEADY YOUR NERVES TO GET RID OF A HANGOVER: NO
HAVE YOU EVER FELT YOU SHOULD CUT DOWN ON YOUR DRINKING: NO
EVER_SMOKED: YES
TOTAL SCORE: 0
HAVE PEOPLE ANNOYED YOU BY CRITICIZING YOUR DRINKING: NO
TOTAL SCORE: 0
ALCOHOL_USE: NO
TOTAL SCORE: 0
EVER_SMOKED: YES

## 2019-08-19 ASSESSMENT — PATIENT HEALTH QUESTIONNAIRE - PHQ9
SUM OF ALL RESPONSES TO PHQ9 QUESTIONS 1 AND 2: 0
1. LITTLE INTEREST OR PLEASURE IN DOING THINGS: NOT AT ALL
2. FEELING DOWN, DEPRESSED, IRRITABLE, OR HOPELESS: NOT AT ALL

## 2019-08-19 NOTE — DIETARY
NUTRITION SERVICES: BMI - Pt with BMI >40 (=Body mass index is 43.18 kg/m².), morbid obesity. Weight loss counseling not appropriate in acute care setting. Pt also with Wound Team consult, no wound charted per Flowsheet nor pictured per Epic at this time.     RECOMMEND - Referral to outpatient nutrition services for weight management after D/C.   Will follow Wt for staging and make recommendations as appropriate.

## 2019-08-19 NOTE — ASSESSMENT & PLAN NOTE
Uncontrolled with hyperglycemia  BS are elevated to 342 today  Will adjust long acting lantus and SSI as needed

## 2019-08-19 NOTE — ED TRIAGE NOTES
"Chief Complaint   Patient presents with   • Pain   • Abdominal Pain       BIB by ems for above complaint. Per report patient just left [facility] yesterday and was sent to [facility]. Patient states that he has not been receiving his pain medications for his leg wounds or for his abdominal pain. Reports that he left here with the same abdominal pain and leg pain. Nothing has changed in his status. Given 4 mg of IV morphine from EMS before he arrived. Patient was here due to a fall. Has chronic wounds with mrsa to lower legs and [facility]     Blood Pressure : 157/73, Pulse: 60, Respiration: 18, Temperature: 36.7 °C (98 °F), Height: 177.8 cm (5' 10\"), Weight: (!) 128.8 kg (284 lb), BMI (Calculated): 40.75, BSA (Calculated): 2.5, Pulse Oximetry: 98 %, O2 (LPM): 4, O2 Delivery: Nasal Cannula      "

## 2019-08-19 NOTE — ED NOTES
Rounded on patient, repositioned for comfort, assisted pt with urinal. Hospital bed ordered for patient comfort

## 2019-08-19 NOTE — ED PROVIDER NOTES
"ED Provider Note    CHIEF COMPLAINT  Chief Complaint   Patient presents with   • Pain   • Abdominal Pain       HPI  Jamar Valdez is a 74 y.o. male who presents to emerge department for midepigastric pain and low blood sugar.  Patient has recently been in the hospital for roughly 10 days after right lower extremity cellulitic and chronic wound infections.  He actually had his lower extremity dermal changes for some time prior to what is reported as a fall with approximately 24 to 48 hours of downtime on the floor before paramedics found him.  Again this led to prolonged stay here in the hospital and he was sent approximately 24 hours ago to [facility].  It is reported by family that the patient had minimal care during the last 24 hours.  They admitted that they had not been checking his blood sugars and when they checked his blood sugar after looking quite unwell it read \"low\".  They then gave him a tube of oral glucose and some cranberry juice.  He says that he is currently feeling better from that regard although he continues to have some midepigastric abdominal discomfort.    I was called to the work room emergently due to cardiac monitoring here showing possible A. fib.  Patient has history of pacemaker.    REVIEW OF SYSTEMS  See HPI for further details. All other systems are negative.     PAST MEDICAL HISTORY   has a past medical history of Atrial fibrillation (HCC), Basal cell carcinoma, Cancer (HCC), Chronic obstructive pulmonary disease (HCC), Diabetes (HCC), Hypertension, and Renal disorder.    SOCIAL HISTORY  Social History     Tobacco Use   • Smoking status: Former Smoker     Types: Cigarettes     Last attempt to quit: 1975     Years since quittin.6   • Smokeless tobacco: Never Used   Substance and Sexual Activity   • Alcohol use: Never     Frequency: Never   • Drug use: Never   • Sexual activity: Not on file       SURGICAL HISTORY   has a past surgical history that includes pacemaker " "insertion and knee replacement, total.    CURRENT MEDICATIONS  Home Medications     Reviewed by Sindy Diallo R.N. (Registered Nurse) on 08/18/19 at 2116  Med List Status: Not Addressed   Medication Last Dose Status   acetaminophen (TYLENOL) 500 MG Tab  Active   bisacodyl (DULCOLAX) 10 MG Suppos  Active   Dextrose, Diabetic Use, (GLUCOSE) 4 g chewable tablet  Active   finasteride (PROSCAR) 5 MG Tab  Active   gabapentin (NEURONTIN) 400 MG Cap  Active   insulin glargine (LANTUS) 100 UNIT/ML Solution  Active   insulin regular (HUMULIN R) 100 Unit/mL Solution  Active   linezolid (ZYVOX) 600 MG Tab  Active   magnesium hydroxide (MILK OF MAGNESIA) 400 MG/5ML Suspension  Active   metoprolol SR (TOPROL XL) 25 MG TABLET SR 24 HR  Active   ondansetron (ZOFRAN ODT) 4 MG TABLET DISPERSIBLE  Active   oxyCODONE immediate-release (ROXICODONE) 5 MG Tab  Active   polyethylene glycol/lytes (MIRALAX) Pack  Active   senna-docusate (PERICOLACE OR SENOKOT S) 8.6-50 MG Tab  Active   vitamin D 2000 UNIT Tab  Active                ALLERGIES  No Known Allergies    PHYSICAL EXAM  VITAL SIGNS: /69   Pulse 60   Temp 36.2 °C (97.1 °F) (Temporal)   Resp 18   Ht 1.778 m (5' 10\")   Wt (!) 136.8 kg (301 lb 9.4 oz)   SpO2 97%   BMI 43.27 kg/m²  @JATINDER[[id number]::@   Pulse ox interpretation: I interpret this pulse ox as normal.  Constitutional: Alert in no apparent distress.  HENT: No signs of trauma, Bilateral external ears normal, Nose normal.   Eyes: Pupils are equal and reactive, Conjunctiva normal, Non-icteric.   Neck: Normal range of motion, No tenderness, Supple, No stridor.   Cardiovascular: Regular rate and rhythm, no murmurs.   Thorax & Lungs: Normal breath sounds, No respiratory distress, No wheezing, No chest tenderness.   Abdomen: Bowel sounds normal, Soft, minimal midepigastric tenderness, No masses, No pulsatile masses. No peritoneal signs.  Overweight  Skin: Warm, Dry, No erythema, No rash.   Back: No bony tenderness, No " CVA tenderness.   Extremities: Intact distal pulses.  Chronic wound to right lower extremity which is bandaged.  Musculoskeletal: Good range of motion in all major joints. No tenderness to palpation or major deformities noted.   Neurologic: Alert , Normal motor function, Normal sensory function, No focal deficits noted.   Psychiatric: Affect normal, Judgment normal, Mood normal.       DIAGNOSTIC STUDIES / PROCEDURES    EKG  2025: Shows a junctional rhythm at a rate of 65, left axis, no obvious pacer spikes and significant precordial QRS changes compared to prior which was clearly a paced rhythm    LABS  Results for orders placed or performed during the hospital encounter of 08/18/19   CBC WITH DIFFERENTIAL   Result Value Ref Range    WBC 7.4 4.8 - 10.8 K/uL    RBC 4.23 (L) 4.70 - 6.10 M/uL    Hemoglobin 12.6 (L) 14.0 - 18.0 g/dL    Hematocrit 39.5 (L) 42.0 - 52.0 %    MCV 93.4 81.4 - 97.8 fL    MCH 29.8 27.0 - 33.0 pg    MCHC 31.9 (L) 33.7 - 35.3 g/dL    RDW 45.8 35.9 - 50.0 fL    Platelet Count 108 (L) 164 - 446 K/uL    MPV 9.1 9.0 - 12.9 fL    Neutrophils-Polys 86.90 (H) 44.00 - 72.00 %    Lymphocytes 5.40 (L) 22.00 - 41.00 %    Monocytes 6.30 0.00 - 13.40 %    Eosinophils 0.50 0.00 - 6.90 %    Basophils 0.50 0.00 - 1.80 %    Immature Granulocytes 0.40 0.00 - 0.90 %    Nucleated RBC 0.00 /100 WBC    Neutrophils (Absolute) 6.39 1.82 - 7.42 K/uL    Lymphs (Absolute) 0.40 (L) 1.00 - 4.80 K/uL    Monos (Absolute) 0.46 0.00 - 0.85 K/uL    Eos (Absolute) 0.04 0.00 - 0.51 K/uL    Baso (Absolute) 0.04 0.00 - 0.12 K/uL    Immature Granulocytes (abs) 0.03 0.00 - 0.11 K/uL    NRBC (Absolute) 0.00 K/uL   COMP METABOLIC PANEL   Result Value Ref Range    Sodium 140 135 - 145 mmol/L    Potassium 4.2 3.6 - 5.5 mmol/L    Chloride 107 96 - 112 mmol/L    Co2 26 20 - 33 mmol/L    Anion Gap 7.0 0.0 - 11.9    Glucose 138 (H) 65 - 99 mg/dL    Bun 16 8 - 22 mg/dL    Creatinine 1.47 (H) 0.50 - 1.40 mg/dL    Calcium 8.5 8.5 - 10.5 mg/dL     AST(SGOT) 34 12 - 45 U/L    ALT(SGPT) 25 2 - 50 U/L    Alkaline Phosphatase 82 30 - 99 U/L    Total Bilirubin 0.8 0.1 - 1.5 mg/dL    Albumin 2.7 (L) 3.2 - 4.9 g/dL    Total Protein 5.6 (L) 6.0 - 8.2 g/dL    Globulin 2.9 1.9 - 3.5 g/dL    A-G Ratio 0.9 g/dL   LIPASE   Result Value Ref Range    Lipase 18 11 - 82 U/L   URINALYSIS,CULTURE IF INDICATED   Result Value Ref Range    Color Yellow     Character Clear     Specific Gravity 1.020 <1.035    Ph 5.0 5.0 - 8.0    Glucose Negative Negative mg/dL    Ketones Trace (A) Negative mg/dL    Protein 300 (A) Negative mg/dL    Bilirubin Negative Negative    Urobilinogen, Urine 1.0 Negative    Nitrite Negative Negative    Leukocyte Esterase Negative Negative    Occult Blood Trace (A) Negative    Micro Urine Req Microscopic    ESTIMATED GFR   Result Value Ref Range    GFR If  57 (A) >60 mL/min/1.73 m 2    GFR If Non  47 (A) >60 mL/min/1.73 m 2   TROPONIN   Result Value Ref Range    Troponin T 59 (H) 6 - 19 ng/L   URINE MICROSCOPIC (W/UA)   Result Value Ref Range    WBC 0-2 (A) /hpf    RBC 2-5 (A) /hpf    Bacteria Negative None /hpf    Epithelial Cells Negative /hpf    Hyaline Cast 0-2 /lpf   TROPONIN   Result Value Ref Range    Troponin T 67 (H) 6 - 19 ng/L   TROPONIN   Result Value Ref Range    Troponin T 64 (H) 6 - 19 ng/L   LACTIC ACID   Result Value Ref Range    Lactic Acid 1.5 0.5 - 2.0 mmol/L   ACCU-CHEK GLUCOSE   Result Value Ref Range    Glucose - Accu-Ck 102 (H) 65 - 99 mg/dL   ACCU-CHEK GLUCOSE   Result Value Ref Range    Glucose - Accu-Ck 138 (H) 65 - 99 mg/dL   ACCU-CHEK GLUCOSE   Result Value Ref Range    Glucose - Accu-Ck 194 (H) 65 - 99 mg/dL   ACCU-CHEK GLUCOSE   Result Value Ref Range    Glucose - Accu-Ck 181 (H) 65 - 99 mg/dL   EKG (NOW)   Result Value Ref Range    Report       [facility] Emergency Dept.    Test Date:  2019-08-18  Pt Name:    RONNIE DAVID               Department: ER  MRN:        [id number]                       Room:        17  Gender:     Male                         Technician: [id number]  :        1945                   Requested By:SOTERO MCCULLOUGH  Order #:    [id number]                    Reading MD:    Measurements  Intervals                                Axis  Rate:       65                           P:  IL:                                      QRS:        -75  QRSD:       170                          T:          108  QT:         452  QTc:        470    Interpretive Statements  ACCELERATED JUNCTIONAL ESCAPE RHYTHM  NONSPECIFIC IVCD WITH LAD  LVH WITH SECONDARY REPOLARIZATION ABNORMALITY  Compared to ECG 2019 17:47:08  Junctional rhythm now present  Accelerated junctional rhythm now present  Intraventricular conduction delay now present  Left ventricular hypertrophy now present  Early repolarization now pre sent  Ventricular-paced complex(es) or rhythm no longer present           RADIOLOGY  NM-HEPATOBILIARY SCAN   Final Result      Findings consistent with acute cholecystitis.      US-RUQ   Final Result      1.  Cholelithiasis and gallbladder sludge. Normal gallbladder wall thickness. No definitive evidence of cholecystitis. If there is high clinical concern, consider HIDA scan.   2.  Hepatic steatosis.      CT-ABDOMEN-PELVIS WITH   Final Result      1.  Cholelithiasis with mild pericholecystic fat stranding. This is concerning for early cholecystitis. Correlate with symptoms and consider HIDA scan can be done for further evaluation.   2.  Small bilateral pleural effusions and associated atelectasis.   3.  Nonobstructing left nephrolithiasis.              COURSE & MEDICAL DECISION MAKING  Pertinent Labs & Imaging studies reviewed. (See chart for details)  Patient returning to the hospital after recent discharge.  History as above.  Midepigastric pain.  Multiple possibilities with the region of his current pain.  Initial labs are evaluation concerning for mildly elevated troponin.   Furthermore CT imaging showing cholelithiasis with pericholecystic stranding.  HIDA recommended by radiology.  Gallbladder ultrasound otherwise non-confirmatory.  Patient has been kept n.p.o.  IV fluids given n.p.o. status.  No significant clinical change with this.  Patient has been given antibiotics empirically and will await HIDA.  He will be kept on cardiac monitor given his elevated troponin.  Lastly the patient did have a hypoglycemic episode which may have caused some cardiac strain.  This may be secondary to an infectious process but most likely secondary to poor care and poor p.o. intake without glucose monitoring at the [facility] nursing [place].  At this time I discussed case with hospital service which revealed ongoing inpatient care.      FINAL IMPRESSION  1. Calculus of gallbladder with acute cholecystitis without obstruction    2. Elevated troponin    3. Hypoglycemia            Electronically signed by: Phillip Keller, 8/18/2019 10:36 PM

## 2019-08-19 NOTE — ASSESSMENT & PLAN NOTE
Laparoscopic cholecystectomy 8/20/2019  Finished antibiotic course (Zosyn then Rocephin+Flagyl)  pain controlled

## 2019-08-19 NOTE — THERAPY
"Speech Language Therapy Clinical Swallow Evaluation completed.  Functional Status: Pt seen at bedside for clinical swallow evaluation. Pt reports he was still on a nectar thick liquid but was able to eat scrambled eggs at the SNF, reporting he is \"sick of\" pureed diet and wants to be advanced. Oral [facility] exam was WNL. Pt is edentulous and does not have dentures. Pt was presented with ice chips (3), thin water via tsp/cup, NTL via tsp/cup, purees and soft solids, mixed consistencies. Oral phase was mild-moderately prolonged for mastication/bolus manipulation of soft solids, though oral cavity was clear post swallow. Pharyngeal swallow response was timely. Hyolaryngeal excursion palpated as complete. Delayed throat clearing noted following intake of thins via tsp. Delayed cough occurred following a cup sip of thin liquids. Pt coughed x1 while chewing mixed consistencies (fruit in juice), which is concerning for penetration/aspiration. Discussed diet texture recommendations with patient, who reports he does not want pureed diet. Following education, pt agreed to Dysphagia 1 (pureed) diet as long as he could have one Dysphagia 2 item per tray. Pt does agree with recommendation for nectar thick liquids as he recognizes he is still having difficulty with thins. SLP to follow. RN to place diet order after HIDA scan as pt now has to be NPO for the procedure.    Recommendations - Diet: Dysphagia I, Nectar Thick Liquid (one item of Dysphagia 2 per tray)                          Strategies: Monitor during meals, Assistance needed for meal tray set-up and Head of Bed at 90 Degrees                          Medication Administration: Float Whole with Puree  Plan of Care: Will benefit from Speech Therapy 3 times per week  Post-Acute Therapy: Recommend inpatient transitional care services for continued speech therapy services.        See \"Rehab Therapy-Acute\" Patient Summary Report for complete documentation.   "

## 2019-08-19 NOTE — DOCUMENTATION QUERY
[facility]                                                                       Query Response Note      PATIENT:               RONNIE DAVID  ACCT #:                  [id number]  MRN:                     [id number]  :                      1945  ADMIT DATE:       2019 12:16 PM  DISCH DATE:        2019 6:16 PM  RESPONDING  PROVIDER #:        [id number]           QUERY TEXT:    Per the Medical Record there appears to be conflicting documentation. A Pressure ulcer of buttock is documented in the Progress Notes and Discharge Summary. Per Wound Team Notes, sacral MAD with caitie maceration is noted. Can this condition be further clarified?    NOTE:  If an appropriate response is not listed below, please respond with a new note.    The patient's Clinical Indicators include:  Per Progress Notes   -Pressure ulcer of buttock     Per Wound Team Notes 8/10:   -Wound team in to see pt for tissue damage around sacrum.  Observed MAD with caitie maceration.      Treatment:   Wound Team Consultation, reposition q2hrs, & barrier paste    Risk Factors:   Found down, DKA, DM2, rhabdomyolysis, lower extremity wounds MRSA positive,  physical debility, acute kidney injury, CKD 3, & obesity  Options provided:   -- Agree with Wound Care RN assessment of sacral MAD with caitie maceration; Pressure ulcer ruled out   -- Pressure ulcer of buttock ruled in, Please specify pressure ulcer stage and laterality   -- Unable to determine      Query created by: Lucia Hyman on 2019 2:04 PM    RESPONSE TEXT:    Agree with Wound Care RN assessment of sacral MAD with caitie maceration; Pressure ulcer ruled out          Electronically signed by:  WALDO HILL 2019 4:53 PM

## 2019-08-19 NOTE — PROGRESS NOTES
Bedside report received. POC discussed with pt; all questions answered at this time.  Pt NPO for HIDA scan today.

## 2019-08-19 NOTE — PROGRESS NOTES
Patient was seen at bedside and chart was reviewed. Please see Dr. Brennan's note for further details.    75 yo man with afib, pacemaker, HTN, DM, CKD, COPD, recent hospitalization for MARIZOL and rhabdo and nonhealing wound infection with SFA stent placement for PAD. He was discharged to SNF and found with hypoglycemia and abd pain.  RUQ abd pain - He still has upper abd pain. LFTs were normal, lipase normal. CT shows possible early cholecystitis. HIDA scan shows acute cholecystitis, I consulted Dr. Solomon.  Troponin elevated - he has chest discomfort with deep breath only, likely demand, trend, telemetry  DM - ISS, holding lantus for now, glucose is in normal range

## 2019-08-19 NOTE — PROGRESS NOTES
Pt reports being on a modified diet. Speech swallow evaluation ordered per protocol. Pt currently NPO for HIDA scan.

## 2019-08-19 NOTE — H&P
Hospital Medicine History & Physical Note    Date of Service  8/19/2019    Primary Care Physician  Pcp Pt States None    Consultants  None    Code Status  Full code    Chief Complaint  Abdominal pain and hypoglycemia    History of Presenting Illness  74 y.o. male with a past medical history of obesity, atrial fibrillation, status pacemaker placement, hypertension, insulin-dependent diabetes mellitus, COPD who presented 8/18/2019 with abdominal pain and hypoglycemia.  Patient was admitted to the hospital on 8/5/2019 after being found down for about 3 days.  He was treated for MARIZOL and rhabdomyolysis with IV fluids.  He was also noted to have a nonhealing ulcer of his right lower extremity, vascular surgery Dr. Rios performed an angiogram and stent placement to the SFA.  His right lower extremity wound culture grew out MRSA and he was started on oral Zyvox for 2 weeks.  During that hospitalization he had a hypoglycemic event which resolved after he was fed.  He was discharged to [facility] on 8/17/2019.  Patient states he received minimal care at the facility and they had not been checking his blood sugars.  When the staff evaluated him he appeared unwell and his blood sugar was found to be low.  He was given oral glucose and cranberry juice with improvement.  He then developed right upper quadrant and epigastric abdominal pain.  He called EMS and presented to the ER.  He continues to note right upper quadrant abdominal pain associated with some nausea but no vomiting.  He denies any fevers, chills, chest pain or shortness of breath.  He denies any diarrhea.    EKG interpreted by me reveals ventricular paced complexes    Review of Systems  Review of Systems   Constitutional: Positive for malaise/fatigue. Negative for chills, diaphoresis and fever.   HENT: Negative for hearing loss and sore throat.    Eyes: Negative for blurred vision.   Respiratory: Negative for cough, sputum production,  shortness of breath and wheezing.    Cardiovascular: Negative for chest pain, palpitations and leg swelling.   Gastrointestinal: Positive for abdominal pain and nausea. Negative for blood in stool, diarrhea and vomiting.   Genitourinary: Negative for dysuria, flank pain and urgency.   Musculoskeletal: Negative for back pain, joint pain, myalgias and neck pain.   Skin: Negative for rash.   Neurological: Negative for dizziness, focal weakness, seizures and headaches.   Endo/Heme/Allergies: Does not bruise/bleed easily.   Psychiatric/Behavioral: Negative for suicidal ideas.   All other systems reviewed and are negative.      Past Medical History   has a past medical history of Atrial fibrillation (HCC), Basal cell carcinoma, Cancer (HCC), Chronic obstructive pulmonary disease (HCC), Diabetes (HCC), Hypertension, and Renal disorder.    Surgical History   has a past surgical history that includes pacemaker insertion and knee replacement, total.     Family History  No pertinent family history    Social History   reports that he quit smoking about 44 years ago. His smoking use included cigarettes. He has never used smokeless tobacco. He reports that he does not drink alcohol or use drugs.    Allergies  No Known Allergies    Medications  Prior to Admission Medications   Prescriptions Last Dose Informant Patient Reported? Taking?   Dextrose, Diabetic Use, (GLUCOSE) 4 g chewable tablet   No No   Sig: Take 4 Tabs by mouth as needed for Low Blood Sugar (If FSBG is less than or equal to 70 mg/dL and patient able to eat or drink).   acetaminophen (TYLENOL) 500 MG Tab   No No   Sig: Take 2 Tabs by mouth every 6 hours as needed for Mild Pain.   bisacodyl (DULCOLAX) 10 MG Suppos   No No   Sig: Insert 1 Suppository in rectum 1 time daily as needed (if magnesium hydroxide ineffective after 24 hours).   finasteride (PROSCAR) 5 MG Tab   No No   Sig: Take 1 Tab by mouth every day.   gabapentin (NEURONTIN) 400 MG Cap   No No   Sig: Take 1  Cap by mouth 3 times a day.   insulin glargine (LANTUS) 100 UNIT/ML Solution   No No   Sig: Inject 55 Units as instructed every evening.   insulin regular (HUMULIN R) 100 Unit/mL Solution   No No   Sig: Inject 3-14 Units as instructed 4 Times a Day,Before Meals and at Bedtime.   linezolid (ZYVOX) 600 MG Tab   No No   Sig: Take 1 Tab by mouth every 12 hours for 4 days.   magnesium hydroxide (MILK OF MAGNESIA) 400 MG/5ML Suspension   No No   Sig: Take 30 mL by mouth 1 time daily as needed (if polyethylene glycol ineffective after 24 hours).   metoprolol SR (TOPROL XL) 25 MG TABLET SR 24 HR   No No   Sig: Take 1 Tab by mouth 2 Times a Day.   ondansetron (ZOFRAN ODT) 4 MG TABLET DISPERSIBLE   No No   Sig: Take 1 Tab by mouth every four hours as needed for Nausea (give PO if IV route is unavailable.).   oxyCODONE immediate-release (ROXICODONE) 5 MG Tab   No No   Sig: Take 1 Tab by mouth every four hours as needed for up to 3 days.   polyethylene glycol/lytes (MIRALAX) Pack   No No   Sig: Take 1 Packet by mouth 1 time daily as needed (if sennosides and docusate ineffective after 24 hours).   senna-docusate (PERICOLACE OR SENOKOT S) 8.6-50 MG Tab   No No   Sig: Take 2 Tabs by mouth 2 Times a Day.   vitamin D 2000 UNIT Tab   No No   Sig: Take 1 Tab by mouth every day.      Facility-Administered Medications: None       Physical Exam  Temp:  [36.7 °C (98 °F)] 36.7 °C (98 °F)  Pulse:  [60-67] 67  Resp:  [18] 18  BP: (157)/(73) 157/73  SpO2:  [98 %] 98 %    Physical Exam   Constitutional: He is oriented to person, place, and time. He appears well-developed and well-nourished. No distress.   Obese   HENT:   Head: Normocephalic and atraumatic.   Mouth/Throat: Oropharynx is clear and moist.   Eyes: Pupils are equal, round, and reactive to light. Conjunctivae are normal. No scleral icterus.   Neck: Normal range of motion. Neck supple.   Cardiovascular: Normal rate, regular rhythm and normal heart sounds.   Pulmonary/Chest: Breath  sounds normal. No respiratory distress. He has no wheezes. He has no rales.   Abdominal: Soft. Bowel sounds are normal. He exhibits no distension. There is tenderness (Right upper quadrant). There is no rebound.   Musculoskeletal: Normal range of motion. He exhibits no edema or tenderness.   Lymphadenopathy:     He has no cervical adenopathy.   Neurological: He is alert and oriented to person, place, and time. No cranial nerve deficit. Coordination normal.   Skin: Skin is warm. No rash noted.   Right lower extremity with dressing in place  Brawny discoloration of bilateral lower extrema   Psychiatric: He has a normal mood and affect. His behavior is normal.   Nursing note and vitals reviewed.      Laboratory:  Recent Labs     08/16/19 0437 08/17/19 0644 08/18/19 2201   WBC 6.2 5.7 7.4   RBC 4.19* 3.80* 4.23*   HEMOGLOBIN 12.7* 11.6* 12.6*   HEMATOCRIT 38.9* 36.1* 39.5*   MCV 92.8 95.0 93.4   MCH 30.3 30.5 29.8   MCHC 32.6* 32.1* 31.9*   RDW 45.6 46.8 45.8   PLATELETCT 108* 98* 108*   MPV 9.5 9.1 9.1     Recent Labs     08/16/19 0437 08/18/19 2201   SODIUM 143 140   POTASSIUM 3.6 4.2   CHLORIDE 109 107   CO2 26 26   GLUCOSE 83 138*   BUN 16 16   CREATININE 1.44* 1.47*   CALCIUM 8.2* 8.5     Recent Labs     08/16/19 0437 08/18/19 2201   ALTSGPT 23 25   ASTSGOT 22 34   ALKPHOSPHAT 75 82   TBILIRUBIN 0.6 0.8   LIPASE  --  18   GLUCOSE 83 138*     Recent Labs     08/17/19 0644   APTT 30.1   INR 1.36*     No results for input(s): NTPROBNP in the last 72 hours.      Recent Labs     08/18/19 2201   TROPONINT 59*       Urinalysis:    No results found     Imaging:  US-RUQ   Final Result      1.  Cholelithiasis and gallbladder sludge. Normal gallbladder wall thickness. No definitive evidence of cholecystitis. If there is high clinical concern, consider HIDA scan.   2.  Hepatic steatosis.      CT-ABDOMEN-PELVIS WITH   Final Result      1.  Cholelithiasis with mild pericholecystic fat stranding. This is concerning for  early cholecystitis. Correlate with symptoms and consider HIDA scan can be done for further evaluation.   2.  Small bilateral pleural effusions and associated atelectasis.   3.  Nonobstructing left nephrolithiasis.      NM-BILIARY (HIDA) SCAN WITH CCK    (Results Pending)         Assessment/Plan:  I anticipate this patient is appropriate for observation status at this time.    RUQ abdominal pain- (present on admission)  Assessment & Plan  Concerning for cholecystitis  CT abdomen pelvis with IV contrast reveals cholelithiasis with mild pericholecystic fat stranding.  Ultrasound RUQ reveals cholelithiasis and gallbladder sludge without gallbladder wall thickness.  Will order HIDA scan for further evaluation.  If positive, we will consult general surgery  N.p.o.  IV fluid hydration with LR  Patient has been started on IV Zosyn  Pain control with Tylenol, oxycodone and IV morphine, monitor respiratory status closely    Class 3 severe obesity due to excess calories in adult ([facility])- (present on admission)  Assessment & Plan  Body mass index is 40.75 kg/m².      Type 2 diabetes mellitus with hypoglycemia, with long-term current use of insulin ([facility])- (present on admission)  Assessment & Plan  Start on insulin sliding scale with serial Accu-Checks  Hypoglycemic protocol in place        Chronic kidney disease (CKD) stage G3a/A3, moderately decreased glomerular filtration rate (GFR) between 45-59 mL/min/1.73 square meter and albuminuria creatinine ratio greater than 300 mg/g ([facility])- (present on admission)  Assessment & Plan  At baseline creatinine  Maintaining hydration  Avoiding nephrotoxics: NSAIDs etc  Monitor BMP        Elevated troponin- (present on admission)  Assessment & Plan  [place] type II NSTEMI in the setting of CKD  Denies active chest pain or shortness of breath  Continuous cardiac monitoring with serial EKG and troponin      VTE prophylaxis: scd

## 2019-08-19 NOTE — PROGRESS NOTES
2 RN skin check completed with PHILLIP Wooten    Ears pink and blanching  Scattered bruises, flakiness, and scabs on bilateral arms  Scabs on bilateral elbows. Red and slow to eliezer.  Skin tear under lower abdominal fold  Excoriation on both sides of groin  Scattered bruises, flakiness, and scabs on bilateral legs and feet  Dressing on right lower leg from nonhealing ulcer.   Bilateral heels red and slow to eliezer. Dry and flaky.  Sacrum red and excoriated.    Interventions in place: q2h turns, pillows to offload heels and elbows, mepilex patch, wound consult placed.

## 2019-08-20 ENCOUNTER — ANESTHESIA (OUTPATIENT)
Dept: SURGERY | Facility: MEDICAL CENTER | Age: 74
DRG: 417 | End: 2019-08-20
Payer: COMMERCIAL

## 2019-08-20 ENCOUNTER — ANESTHESIA EVENT (OUTPATIENT)
Dept: SURGERY | Facility: MEDICAL CENTER | Age: 74
DRG: 417 | End: 2019-08-20
Payer: COMMERCIAL

## 2019-08-20 PROBLEM — E11.29 TYPE 2 DIABETES MELLITUS WITH KIDNEY COMPLICATION, WITH LONG-TERM CURRENT USE OF INSULIN (HCC): Status: ACTIVE | Noted: 2019-08-19

## 2019-08-20 PROBLEM — K81.9 CHOLECYSTITIS: Status: ACTIVE | Noted: 2019-08-19

## 2019-08-20 PROBLEM — D69.6 THROMBOCYTOPENIA (HCC): Status: ACTIVE | Noted: 2019-08-20

## 2019-08-20 PROBLEM — L97.509 DIABETIC FOOT ULCER (HCC): Status: ACTIVE | Noted: 2019-08-20

## 2019-08-20 PROBLEM — E11.621 DIABETIC FOOT ULCER (HCC): Status: ACTIVE | Noted: 2019-08-20

## 2019-08-20 PROBLEM — N18.30 CKD (CHRONIC KIDNEY DISEASE) STAGE 3, GFR 30-59 ML/MIN: Status: ACTIVE | Noted: 2019-08-12

## 2019-08-20 PROBLEM — I73.9 PVD (PERIPHERAL VASCULAR DISEASE) (HCC): Status: ACTIVE | Noted: 2019-08-20

## 2019-08-20 PROBLEM — I10 HTN (HYPERTENSION): Status: ACTIVE | Noted: 2019-08-20

## 2019-08-20 LAB
ALBUMIN SERPL BCP-MCNC: 2.3 G/DL (ref 3.2–4.9)
ALBUMIN/GLOB SERPL: 0.9 G/DL
ALP SERPL-CCNC: 91 U/L (ref 30–99)
ALT SERPL-CCNC: 27 U/L (ref 2–50)
ANION GAP SERPL CALC-SCNC: 8 MMOL/L (ref 0–11.9)
AST SERPL-CCNC: 33 U/L (ref 12–45)
BASOPHILS # BLD AUTO: 0.5 % (ref 0–1.8)
BASOPHILS # BLD: 0.04 K/UL (ref 0–0.12)
BILIRUB SERPL-MCNC: 1.2 MG/DL (ref 0.1–1.5)
BUN SERPL-MCNC: 18 MG/DL (ref 8–22)
CALCIUM SERPL-MCNC: 8.3 MG/DL (ref 8.5–10.5)
CHLORIDE SERPL-SCNC: 105 MMOL/L (ref 96–112)
CO2 SERPL-SCNC: 26 MMOL/L (ref 20–33)
CREAT SERPL-MCNC: 1.6 MG/DL (ref 0.5–1.4)
EOSINOPHIL # BLD AUTO: 0.1 K/UL (ref 0–0.51)
EOSINOPHIL NFR BLD: 1.2 % (ref 0–6.9)
ERYTHROCYTE [DISTWIDTH] IN BLOOD BY AUTOMATED COUNT: 48.2 FL (ref 35.9–50)
GLOBULIN SER CALC-MCNC: 2.6 G/DL (ref 1.9–3.5)
GLUCOSE BLD-MCNC: 211 MG/DL (ref 65–99)
GLUCOSE BLD-MCNC: 218 MG/DL (ref 65–99)
GLUCOSE BLD-MCNC: 238 MG/DL (ref 65–99)
GLUCOSE BLD-MCNC: 250 MG/DL (ref 65–99)
GLUCOSE BLD-MCNC: 255 MG/DL (ref 65–99)
GLUCOSE SERPL-MCNC: 277 MG/DL (ref 65–99)
HCT VFR BLD AUTO: 36.3 % (ref 42–52)
HGB BLD-MCNC: 11.4 G/DL (ref 14–18)
IMM GRANULOCYTES # BLD AUTO: 0.03 K/UL (ref 0–0.11)
IMM GRANULOCYTES NFR BLD AUTO: 0.4 % (ref 0–0.9)
LYMPHOCYTES # BLD AUTO: 0.61 K/UL (ref 1–4.8)
LYMPHOCYTES NFR BLD: 7.5 % (ref 22–41)
MCH RBC QN AUTO: 29.7 PG (ref 27–33)
MCHC RBC AUTO-ENTMCNC: 31.4 G/DL (ref 33.7–35.3)
MCV RBC AUTO: 94.5 FL (ref 81.4–97.8)
MONOCYTES # BLD AUTO: 0.87 K/UL (ref 0–0.85)
MONOCYTES NFR BLD AUTO: 10.8 % (ref 0–13.4)
NEUTROPHILS # BLD AUTO: 6.43 K/UL (ref 1.82–7.42)
NEUTROPHILS NFR BLD: 79.6 % (ref 44–72)
NRBC # BLD AUTO: 0 K/UL
NRBC BLD-RTO: 0 /100 WBC
PATHOLOGY CONSULT NOTE: NORMAL
PLATELET # BLD AUTO: 83 K/UL (ref 164–446)
PMV BLD AUTO: 9.2 FL (ref 9–12.9)
POTASSIUM SERPL-SCNC: 4.3 MMOL/L (ref 3.6–5.5)
PROT SERPL-MCNC: 4.9 G/DL (ref 6–8.2)
RBC # BLD AUTO: 3.84 M/UL (ref 4.7–6.1)
SODIUM SERPL-SCNC: 139 MMOL/L (ref 135–145)
WBC # BLD AUTO: 8.1 K/UL (ref 4.8–10.8)

## 2019-08-20 PROCEDURE — A6402 STERILE GAUZE <= 16 SQ IN: HCPCS | Performed by: SURGERY

## 2019-08-20 PROCEDURE — 160028 HCHG SURGERY MINUTES - 1ST 30 MINS LEVEL 3: Performed by: SURGERY

## 2019-08-20 PROCEDURE — 700105 HCHG RX REV CODE 258: Performed by: INTERNAL MEDICINE

## 2019-08-20 PROCEDURE — 501582 HCHG TROCAR, THRD BLADED: Performed by: SURGERY

## 2019-08-20 PROCEDURE — 501584 HCHG TROCAR, THRD CAN&SEAL11X100: Performed by: SURGERY

## 2019-08-20 PROCEDURE — 502571 HCHG PACK, LAP CHOLE: Performed by: SURGERY

## 2019-08-20 PROCEDURE — 501399 HCHG SPECIMAN BAG, ENDO CATC: Performed by: SURGERY

## 2019-08-20 PROCEDURE — 82962 GLUCOSE BLOOD TEST: CPT | Mod: 91

## 2019-08-20 PROCEDURE — 770020 HCHG ROOM/CARE - TELE (206)

## 2019-08-20 PROCEDURE — 700111 HCHG RX REV CODE 636 W/ 250 OVERRIDE (IP): Performed by: ANESTHESIOLOGY

## 2019-08-20 PROCEDURE — 160048 HCHG OR STATISTICAL LEVEL 1-5: Performed by: SURGERY

## 2019-08-20 PROCEDURE — 700111 HCHG RX REV CODE 636 W/ 250 OVERRIDE (IP): Performed by: INTERNAL MEDICINE

## 2019-08-20 PROCEDURE — A9270 NON-COVERED ITEM OR SERVICE: HCPCS | Performed by: INTERNAL MEDICINE

## 2019-08-20 PROCEDURE — 160039 HCHG SURGERY MINUTES - EA ADDL 1 MIN LEVEL 3: Performed by: SURGERY

## 2019-08-20 PROCEDURE — 500367 HCHG DRAIN KIT, HEMOVAC: Performed by: SURGERY

## 2019-08-20 PROCEDURE — 36415 COLL VENOUS BLD VENIPUNCTURE: CPT

## 2019-08-20 PROCEDURE — 160036 HCHG PACU - EA ADDL 30 MINS PHASE I: Performed by: SURGERY

## 2019-08-20 PROCEDURE — 160009 HCHG ANES TIME/MIN: Performed by: SURGERY

## 2019-08-20 PROCEDURE — 700105 HCHG RX REV CODE 258: Performed by: FAMILY MEDICINE

## 2019-08-20 PROCEDURE — 99232 SBSQ HOSP IP/OBS MODERATE 35: CPT | Performed by: FAMILY MEDICINE

## 2019-08-20 PROCEDURE — 88304 TISSUE EXAM BY PATHOLOGIST: CPT

## 2019-08-20 PROCEDURE — 160002 HCHG RECOVERY MINUTES (STAT): Performed by: SURGERY

## 2019-08-20 PROCEDURE — 51798 US URINE CAPACITY MEASURE: CPT

## 2019-08-20 PROCEDURE — 80053 COMPREHEN METABOLIC PANEL: CPT

## 2019-08-20 PROCEDURE — 96372 THER/PROPH/DIAG INJ SC/IM: CPT

## 2019-08-20 PROCEDURE — 501570 HCHG TROCAR, SEPARATOR: Performed by: SURGERY

## 2019-08-20 PROCEDURE — 500513 HCHG ENDO RETRACTOR: Performed by: SURGERY

## 2019-08-20 PROCEDURE — A6213 FOAM DRG >16<=48 SQ IN W/BDR: HCPCS | Performed by: FAMILY MEDICINE

## 2019-08-20 PROCEDURE — 500378 HCHG DRAIN, J-VAC ROUND 19FR: Performed by: SURGERY

## 2019-08-20 PROCEDURE — 0FT44ZZ RESECTION OF GALLBLADDER, PERCUTANEOUS ENDOSCOPIC APPROACH: ICD-10-PCS | Performed by: SURGERY

## 2019-08-20 PROCEDURE — 700102 HCHG RX REV CODE 250 W/ 637 OVERRIDE(OP): Performed by: INTERNAL MEDICINE

## 2019-08-20 PROCEDURE — 700101 HCHG RX REV CODE 250: Performed by: SURGERY

## 2019-08-20 PROCEDURE — 307059 PAD,EAR PROTECTOR: Performed by: FAMILY MEDICINE

## 2019-08-20 PROCEDURE — 160035 HCHG PACU - 1ST 60 MINS PHASE I: Performed by: SURGERY

## 2019-08-20 PROCEDURE — 501838 HCHG SUTURE GENERAL: Performed by: SURGERY

## 2019-08-20 PROCEDURE — 501583 HCHG TROCAR, THRD CAN&SEAL 5X100: Performed by: SURGERY

## 2019-08-20 PROCEDURE — 700101 HCHG RX REV CODE 250: Performed by: ANESTHESIOLOGY

## 2019-08-20 PROCEDURE — 500002 HCHG ADHESIVE, DERMABOND: Performed by: SURGERY

## 2019-08-20 PROCEDURE — 85025 COMPLETE CBC W/AUTO DIFF WBC: CPT

## 2019-08-20 PROCEDURE — 500868 HCHG NEEDLE, SURGI(VARES): Performed by: SURGERY

## 2019-08-20 RX ORDER — ROCURONIUM BROMIDE 10 MG/ML
INJECTION, SOLUTION INTRAVENOUS PRN
Status: DISCONTINUED | OUTPATIENT
Start: 2019-08-20 | End: 2019-08-20 | Stop reason: SURG

## 2019-08-20 RX ORDER — SODIUM CHLORIDE, SODIUM LACTATE, POTASSIUM CHLORIDE, CALCIUM CHLORIDE 600; 310; 30; 20 MG/100ML; MG/100ML; MG/100ML; MG/100ML
INJECTION, SOLUTION INTRAVENOUS CONTINUOUS
Status: DISCONTINUED | OUTPATIENT
Start: 2019-08-20 | End: 2019-08-20 | Stop reason: HOSPADM

## 2019-08-20 RX ORDER — LABETALOL HYDROCHLORIDE 5 MG/ML
5 INJECTION, SOLUTION INTRAVENOUS
Status: DISCONTINUED | OUTPATIENT
Start: 2019-08-20 | End: 2019-08-20 | Stop reason: HOSPADM

## 2019-08-20 RX ORDER — ONDANSETRON 2 MG/ML
4 INJECTION INTRAMUSCULAR; INTRAVENOUS
Status: DISCONTINUED | OUTPATIENT
Start: 2019-08-20 | End: 2019-08-20 | Stop reason: HOSPADM

## 2019-08-20 RX ORDER — HYDROMORPHONE HYDROCHLORIDE 1 MG/ML
0.4 INJECTION, SOLUTION INTRAMUSCULAR; INTRAVENOUS; SUBCUTANEOUS
Status: DISCONTINUED | OUTPATIENT
Start: 2019-08-20 | End: 2019-08-20 | Stop reason: HOSPADM

## 2019-08-20 RX ORDER — KETOROLAC TROMETHAMINE 30 MG/ML
INJECTION, SOLUTION INTRAMUSCULAR; INTRAVENOUS PRN
Status: DISCONTINUED | OUTPATIENT
Start: 2019-08-20 | End: 2019-08-20 | Stop reason: SURG

## 2019-08-20 RX ORDER — HALOPERIDOL 5 MG/ML
1 INJECTION INTRAMUSCULAR
Status: DISCONTINUED | OUTPATIENT
Start: 2019-08-20 | End: 2019-08-20 | Stop reason: HOSPADM

## 2019-08-20 RX ORDER — DIPHENHYDRAMINE HYDROCHLORIDE 50 MG/ML
12.5 INJECTION INTRAMUSCULAR; INTRAVENOUS
Status: DISCONTINUED | OUTPATIENT
Start: 2019-08-20 | End: 2019-08-20 | Stop reason: HOSPADM

## 2019-08-20 RX ORDER — OXYCODONE HCL 5 MG/5 ML
10 SOLUTION, ORAL ORAL
Status: DISCONTINUED | OUTPATIENT
Start: 2019-08-20 | End: 2019-08-20 | Stop reason: HOSPADM

## 2019-08-20 RX ORDER — BUPIVACAINE HYDROCHLORIDE AND EPINEPHRINE 5; 5 MG/ML; UG/ML
INJECTION, SOLUTION EPIDURAL; INTRACAUDAL; PERINEURAL
Status: DISCONTINUED | OUTPATIENT
Start: 2019-08-20 | End: 2019-08-20 | Stop reason: HOSPADM

## 2019-08-20 RX ORDER — HYDROMORPHONE HYDROCHLORIDE 1 MG/ML
0.2 INJECTION, SOLUTION INTRAMUSCULAR; INTRAVENOUS; SUBCUTANEOUS
Status: DISCONTINUED | OUTPATIENT
Start: 2019-08-20 | End: 2019-08-20 | Stop reason: HOSPADM

## 2019-08-20 RX ORDER — MEPERIDINE HYDROCHLORIDE 25 MG/ML
6.25 INJECTION INTRAMUSCULAR; INTRAVENOUS; SUBCUTANEOUS
Status: DISCONTINUED | OUTPATIENT
Start: 2019-08-20 | End: 2019-08-20 | Stop reason: HOSPADM

## 2019-08-20 RX ORDER — HYDROMORPHONE HYDROCHLORIDE 1 MG/ML
0.1 INJECTION, SOLUTION INTRAMUSCULAR; INTRAVENOUS; SUBCUTANEOUS
Status: DISCONTINUED | OUTPATIENT
Start: 2019-08-20 | End: 2019-08-20 | Stop reason: HOSPADM

## 2019-08-20 RX ORDER — LIDOCAINE HYDROCHLORIDE 20 MG/ML
INJECTION, SOLUTION EPIDURAL; INFILTRATION; INTRACAUDAL; PERINEURAL PRN
Status: DISCONTINUED | OUTPATIENT
Start: 2019-08-20 | End: 2019-08-20 | Stop reason: SURG

## 2019-08-20 RX ORDER — OXYCODONE HCL 5 MG/5 ML
5 SOLUTION, ORAL ORAL EVERY 4 HOURS PRN
Status: DISCONTINUED | OUTPATIENT
Start: 2019-08-20 | End: 2019-08-20 | Stop reason: HOSPADM

## 2019-08-20 RX ADMIN — PROPOFOL 200 MG: 10 INJECTION, EMULSION INTRAVENOUS at 09:35

## 2019-08-20 RX ADMIN — INSULIN HUMAN 2 UNITS: 100 INJECTION, SOLUTION PARENTERAL at 11:45

## 2019-08-20 RX ADMIN — FENTANYL CITRATE 50 MCG: 50 INJECTION, SOLUTION INTRAMUSCULAR; INTRAVENOUS at 10:36

## 2019-08-20 RX ADMIN — SENNOSIDES, DOCUSATE SODIUM 2 TABLET: 50; 8.6 TABLET, FILM COATED ORAL at 17:46

## 2019-08-20 RX ADMIN — METOPROLOL SUCCINATE 25 MG: 25 TABLET, EXTENDED RELEASE ORAL at 17:46

## 2019-08-20 RX ADMIN — METOPROLOL SUCCINATE 25 MG: 25 TABLET, EXTENDED RELEASE ORAL at 05:12

## 2019-08-20 RX ADMIN — PIPERACILLIN SODIUM AND TAZOBACTAM SODIUM 3.38 G: 3; .375 INJECTION, POWDER, FOR SOLUTION INTRAVENOUS at 05:12

## 2019-08-20 RX ADMIN — INSULIN HUMAN 2 UNITS: 100 INJECTION, SOLUTION PARENTERAL at 00:41

## 2019-08-20 RX ADMIN — FENTANYL CITRATE 50 MCG: 50 INJECTION, SOLUTION INTRAMUSCULAR; INTRAVENOUS at 10:25

## 2019-08-20 RX ADMIN — ROCURONIUM BROMIDE 50 MG: 10 INJECTION, SOLUTION INTRAVENOUS at 09:35

## 2019-08-20 RX ADMIN — FINASTERIDE 5 MG: 5 TABLET, FILM COATED ORAL at 05:12

## 2019-08-20 RX ADMIN — ONDANSETRON 4 MG: 2 INJECTION INTRAMUSCULAR; INTRAVENOUS at 10:24

## 2019-08-20 RX ADMIN — SODIUM CHLORIDE, POTASSIUM CHLORIDE, SODIUM LACTATE AND CALCIUM CHLORIDE: 600; 310; 30; 20 INJECTION, SOLUTION INTRAVENOUS at 02:17

## 2019-08-20 RX ADMIN — GABAPENTIN 400 MG: 400 CAPSULE ORAL at 17:46

## 2019-08-20 RX ADMIN — LIDOCAINE HYDROCHLORIDE 5 ML: 20 INJECTION, SOLUTION EPIDURAL; INFILTRATION; INTRACAUDAL at 09:35

## 2019-08-20 RX ADMIN — SODIUM CHLORIDE, POTASSIUM CHLORIDE, SODIUM LACTATE AND CALCIUM CHLORIDE: 600; 310; 30; 20 INJECTION, SOLUTION INTRAVENOUS at 09:16

## 2019-08-20 RX ADMIN — SODIUM CHLORIDE, POTASSIUM CHLORIDE, SODIUM LACTATE AND CALCIUM CHLORIDE: 600; 310; 30; 20 INJECTION, SOLUTION INTRAVENOUS at 21:14

## 2019-08-20 RX ADMIN — PIPERACILLIN SODIUM AND TAZOBACTAM SODIUM 3.38 G: 3; .375 INJECTION, POWDER, FOR SOLUTION INTRAVENOUS at 21:05

## 2019-08-20 RX ADMIN — PIPERACILLIN SODIUM AND TAZOBACTAM SODIUM 4.5 G: 3; .375 INJECTION, POWDER, FOR SOLUTION INTRAVENOUS at 09:16

## 2019-08-20 RX ADMIN — FENTANYL CITRATE 50 MCG: 50 INJECTION, SOLUTION INTRAMUSCULAR; INTRAVENOUS at 10:11

## 2019-08-20 RX ADMIN — KETOROLAC TROMETHAMINE 30 MG: 30 INJECTION, SOLUTION INTRAMUSCULAR at 10:24

## 2019-08-20 RX ADMIN — SUGAMMADEX 200 MG: 100 INJECTION, SOLUTION INTRAVENOUS at 10:25

## 2019-08-20 RX ADMIN — GABAPENTIN 400 MG: 400 CAPSULE ORAL at 05:12

## 2019-08-20 RX ADMIN — LINEZOLID 600 MG: 600 TABLET, FILM COATED ORAL at 17:47

## 2019-08-20 RX ADMIN — OXYCODONE HYDROCHLORIDE 10 MG: 10 TABLET ORAL at 17:57

## 2019-08-20 RX ADMIN — FENTANYL CITRATE 50 MCG: 50 INJECTION, SOLUTION INTRAMUSCULAR; INTRAVENOUS at 10:01

## 2019-08-20 RX ADMIN — PIPERACILLIN SODIUM AND TAZOBACTAM SODIUM 3.38 G: 3; .375 INJECTION, POWDER, FOR SOLUTION INTRAVENOUS at 12:49

## 2019-08-20 RX ADMIN — LINEZOLID 600 MG: 600 TABLET, FILM COATED ORAL at 05:12

## 2019-08-20 RX ADMIN — MORPHINE SULFATE 4 MG: 4 INJECTION INTRAVENOUS at 15:06

## 2019-08-20 RX ADMIN — FENTANYL CITRATE 100 MCG: 50 INJECTION, SOLUTION INTRAMUSCULAR; INTRAVENOUS at 09:35

## 2019-08-20 RX ADMIN — FENTANYL CITRATE 50 MCG: 50 INJECTION, SOLUTION INTRAMUSCULAR; INTRAVENOUS at 09:44

## 2019-08-20 ASSESSMENT — ENCOUNTER SYMPTOMS
SHORTNESS OF BREATH: 0
SORE THROAT: 0
NERVOUS/ANXIOUS: 0
DIARRHEA: 0
DIAPHORESIS: 0
HEADACHES: 0
FEVER: 0
ABDOMINAL PAIN: 1
DIZZINESS: 0
VOMITING: 0
BACK PAIN: 0
COUGH: 0
NAUSEA: 0
CHILLS: 0
NECK PAIN: 0
PALPITATIONS: 0
WHEEZING: 0
FLANK PAIN: 0
BLURRED VISION: 0
HEARTBURN: 0

## 2019-08-20 ASSESSMENT — PAIN SCALES - GENERAL: PAIN_LEVEL: 3

## 2019-08-20 NOTE — PROGRESS NOTES
Pt originally scheduled for OR at 0930. Got bumped to 0730. CHG bath completed with complete linen/gown change. AM blood sugar checked, 218. Pain controlled without movement at a 2. Daughter Nicky called and notified of time of surgery. Pt transported down to OR by this nurse and transport on monitor, V-Paced 60.

## 2019-08-20 NOTE — OP REPORT
General Surgery Operative Report    Date of Service: 8/20/2019  Patient Name: Jamar Valdez  Patient MRN:  [id number]    --------------------------------------------------------------------------------    Preoperative Diagnosis:  - Acute cholecystitis    Postoperative Diagnosis:  - Acute gangrenous perforated cholecystitis    Procedure Performed:  1) Laparoscopic cholecystectomy      --------------------------------------------------------------------------------    Surgeon:  Sonu Solomon MD    Assistant:  Moe HOOPER    Anesthesia:  GETA, and local anesthetic    Est. Blood Loss: minimal     Drains:  none     Specimens:  gallbladder for permanent pathology    Findings:  Gangrene of gallbladder, free bilious fluid in the right upper quadrant    Complications: none     Disposition:  PACU in stable condition    --------------------------------------------------------------------------------    Indications:      Jamar Valdez is a 74 y.o. male with abdominal pain, likely attributed to acute cholecystitis.  I explained the operation, alternatives, and potential risks, including but not limited to bleeding, infection, injury to organs or intestines, possible exposure to xray and contrast, possible need for blood transfusion, possible need for open incision, risks of anesthesia, and also global risks such as stroke, heart attack, blood clots, and potentially not surviving the operation or the recovery.  All questions were answered. Patient understands and agrees to proceed.    Procedure in detail:  The patient was brought to the operating suite, placed supine on the operating table and general anesthesia was administered.  The patient's abdomen was prepped and draped in the sterile fashion using ChloraPrep.  A surgical time-out was called to identify the correct patient, procedure and equipment and everyone was in an agreement.  Patient received preoperative antibiotics.    Local anesthetic was infiltrated  at all incision sites.  We began with a 5 mm incision at the umbilicus.  The fascia was elevated with a Kocher clamp and a Veress needle was inserted.  Aspiration and saline drop test was normal and then the abdomen was insufflated to a pressure of 15.  The Veress needle was removed and a 5-mm trocar was inserted at the umbilicus.   The camera was inserted to inspect the abdomen and there was no evidence of trauma from insertion of the Veress needle or trocar.  There was no evidence of a diffuse inflammatory or malignant process.  The gallbladder appeared gangrenous and there was free flowing bilious fluid in the right upper quadrant under the liver and over the dome of the liver..    An 11mm trocar was inserted in the midline superior epigastric region, and two 5mm ports were inserted in the right upper quadrant, all under direct visualization. An 11mm port was inserted in the right lower quadrant under direct visualization for a fan retractor.    The cystic triangle was then carefully dissected using electrocautery to score the peritoneal veil and then the cystic duct was circumferentially dissected.  At this point, we placed 3 clips on the proximal cystic duct and one on the distal and then the duct was transected.  Further dissection was carried out until the cystic artery was identified.  It was dissected circumferentially, clipped twice proximally, once distally, and then transected.  Further dissection was carried out and no additional critical structures were identified and therefore the gallbladder was removed from the undersurface of the liver using electrocautery.  There was mild blood loss and significant spillage of bile and stones during this part of the procedure. The right upper quadrant was copiously irrigated and suctioned clean.  A 19Fr luis carlos drain was placed under the liver and exited the right upper quadrant and secured with a 2-0 nylon suture.    The gallbladder was placed in an endocatch bag  and brought out through the epigastric port site.  The epigastric port site fascia was closed with an 0-vicryl suture.  All of the other ports were removed under direct visualization and no bleeding was seen.  Skin at all port sites was closed with subcuticular absorbable suture and then the incisions were covered with skin glue. A bandage was placed over the drain.    All counts were correct. The patient tolerated the procedure well and was sent to recovery in stable condition.    Sonu Solomon MD  [facility]  [phone]

## 2019-08-20 NOTE — ASSESSMENT & PLAN NOTE
Wound care, pain control  Finished course of Zyvox  Counseled on the importance of good glycemic control to allow for proper wound healing  Labs stable, plt still low  Ok to discharge once platelets and hemoglobin have stabilized and wound no longer bleeding- to SNF

## 2019-08-20 NOTE — OR NURSING
"Physician contacted, Bloodless consent signed, No Advanced Directive or Durable POA available, \"Bloodless\" entered into allergy section, patient has \"No Blood\" armband and stickers applied.  "

## 2019-08-20 NOTE — WOUND TEAM
"[facility]  Inpatient Services  Initial Wound and Skin Care Evaluation    Admission Date: 8/18/2019     Consult Date: 08/20   HPI, PMH, SH: Reviewed    Unit where seen by Wound Team: T820/00     WOUND CONSULT RELATED TO:  RLE     SUBJECTIVE:  \"The right one hurts\"      Self Report / Pain Level:  Pre-medicated with IV morphine        OBJECTIVE:  In bed, previous dressing  With adaptic over wounds with moderate/heavy drainage.      WOUND TYPE, LOCATION, CHARACTERISTICS (Pressure Injuries: location, stage, POA or date identified)     Wound 08/19/19 Venous Ulcer Leg Right almost circumfrential  (Active)   Wound Image      Site Assessment Pink    Ella-wound Assessment Yellow-brown;Swelling    Margins Attached edges    Tunneling 0 cm    Undermining 0 cm    Closure None    Drainage Amount Large    Drainage Description Yellow;Purulent    Non-staged Wound Description Partial thickness    Treatments Cleansed;Site care    Cleansing Normal Saline Irrigation    Periwound Protectant Skin Moisturizer    Dressing Options Nonadherent Contact Layer;Absorbent Abdominal Pad;Roll Gauze    Dressing Cleansing/Solutions Not Applicable    Dressing Changed New    Dressing Status Clean;Dry;Intact    Dressing Change Frequency Daily    NEXT Dressing Change  08/21/19    NEXT Weekly Photo (Inpatient Only) 08/27/19    WOUND NURSE ONLY - Odor Mild    WOUND NURSE ONLY - Pulses Not palpable    WOUND NURSE ONLY - Exposed Structures None    WOUND NURSE ONLY - Tissue Type and Percentage 100% pink    WOUND NURSE ONLY - Time Spent with Patient (mins) 90      Vascular:    08/17/19    PROCEDURES PERFORMED:  1.  Right lower extremity angiogram.  2.  Angioplasty and 7 mm x 6 cm right superficial femoral artery stent.  3.  Ultrasound-guided left femoral access.    Lab Values:    Lab Results   Component Value Date/Time    WBC 8.1 08/20/2019 12:39 AM    RBC 3.84 (L) 08/20/2019 12:39 AM    HEMOGLOBIN 11.4 (L) 08/20/2019 12:39 AM    HEMATOCRIT 36.3 " (L) 08/20/2019 12:39 AM        Lab Results   Component Value Date/Time    HBA1C 8.0 (H) 08/11/2019 04:59 AM        Culture:   Not indicated     INTERVENTIONS BY WOUND TEAM:  Previous dressings removed, cleansed caitie-wound with warm wash cloth and 4 in 1 cleanser, sween 24 moisturizer to caitie-wound bilaterally.  Tubi  F applied to left leg.  RLE wounds cleansed with NS and gauze, new photos taken.  Open areas covered with 2-3 layers of adaptic, covered with ABD pads and secured with roll gauze.       Dressing Selection:  Adaptic, ABD, roll gauze.           Interdisciplinary consultation: Patient, Bedside RN (Harpal), Dr. Lewis, patient family     EVALUATION: patient recently admitted, DC'd 08/17 to SNF and re-admitted 08/18.  Had angio and stent done with Dr. Rios 08/17 with what appears to be improvement of wounds.  Still with large amount of drainage and need for compression.  Educated patient on rational and need, patient allowed LLE to have tubi  but is refusing to have RLE with tubi  saying it is too painful.  Wound team to return in a couple days to re-assess and try alternate dressing possible modified unna's boot.     Factors affecting wound healing: venous insufficiency, PVD, DM, CKD    Goals: Steady decrease in wound area and depth weekly.    NURSING PLAN OF CARE ORDERS (X):    Dressing changes: See Dressing Care orders: X  Skin care: See Skin Care orders: X  Rectal tube care: See Rectal Tube Care orders:   Other orders:    RSKIN: CURRENT (X) ORDERED (O):   Q shift Deejay:  X  Q shift pressure point assessments:  X  Pressure redistribution mattress          X  ATNONIO          Bariatric ANTONIO         Bariatric foam           Heel float boots          Float Heels off Bed with Pillows       X        Barrier wipes         Barrier Cream         Barrier paste          Sacral silicone dressing       X  Silicone O2 tubing         Anchorfast         Cannula fixation Device (Tender )           Gray Foam Ear protectors           Trach with Optifoam split foam                 Waffle cushion        Waffle Overlay         Rectal tube or BMS         Antifungal tx      Interdry          Reposition q 2 hours      X  Up to chair        Ambulate      PT/OT        Dietician        Diabetes Education      PO   X  TF     TPN     NPO   # days   Other        WOUND TEAM PLAN OF CARE (X):   NPWT change 3 x week:        Dressing changes by wound team:       Follow up as needed:     X wound team to follow up   Other (explain):     Anticipated discharge plans (X): will need ongoing wound care upon DC  SNF:         X  Home Care:           Outpatient Wound Center:            Self Care:            Other:

## 2019-08-20 NOTE — ASSESSMENT & PLAN NOTE
S/p transfusion with improvement in plt count, now 35k  Heme/onco on board, appreciate recs  Bleeding stable  Monitor closely

## 2019-08-20 NOTE — ANESTHESIA POSTPROCEDURE EVALUATION
Patient: Jamar Valdez    Procedure Summary     Date:  08/20/19 Room / Location:  [place] [address] / SURGERY [facility]    Anesthesia Start:  0916 Anesthesia Stop:  1045    Procedure:  CHOLECYSTECTOMY, LAPAROSCOPIC (N/A Abdomen) Diagnosis:  (GANGRENOUS PERFORATED CHOLECYSTITIS)    Surgeon:  Sonu Solomon M.D. Responsible Provider:  Price Mckinley M.D.    Anesthesia Type:  general ASA Status:  3          Final Anesthesia Type: general  Last vitals  BP   Blood Pressure : 140/68    Temp   36.8 °C (98.3 °F)    Pulse   Pulse: 67, Heart Rate (Monitored): 64   Resp   18    SpO2   95 %      Anesthesia Post Evaluation    Patient location during evaluation: PACU  Patient participation: complete - patient participated  Level of consciousness: awake and alert  Pain score: 3    Airway patency: patent  Anesthetic complications: no  Cardiovascular status: hemodynamically stable  Respiratory status: acceptable  Hydration status: euvolemic    PONV: none           Nurse Pain Score: 2 (NPRS)

## 2019-08-20 NOTE — CARE PLAN
Problem: Safety  Goal: Will remain free from injury  Outcome: PROGRESSING AS EXPECTED  Goal: Will remain free from falls  Outcome: PROGRESSING AS EXPECTED     Problem: Pain Management  Goal: Pain level will decrease to patient's comfort goal  Outcome: PROGRESSING AS EXPECTED  Note:   Controlled with PRN meds. Plan for oly cedillo today.     Problem: Infection  Goal: Will remain free from infection  Outcome: PROGRESSING SLOWER THAN EXPECTED     Problem: Skin Integrity  Goal: Risk for impaired skin integrity will decrease  Outcome: PROGRESSING SLOWER THAN EXPECTED  Note:   WC placed. Dressing changes PRN. Continue IV antibiotics as ordered.

## 2019-08-20 NOTE — ANESTHESIA PREPROCEDURE EVALUATION
Relevant Problems   PULMONARY   (+) COPD (chronic obstructive pulmonary disease) ([facility])      CARDIAC   (+) Persistent atrial fibrillation (HCC)         (+) MARIZOL (acute kidney injury) ([facility])   (+) Chronic kidney disease (CKD) stage G3a/A3, moderately decreased glomerular filtration rate (GFR) between 45-59 mL/min/1.73 square meter and albuminuria creatinine ratio greater than 300 mg/g ([facility])      ENDO   (+) Type 2 diabetes mellitus with hypoglycemia, with long-term current use of insulin ([facility])      Other   (+) Lymphadenopathy, submandibular       Physical Exam    Airway   Mallampati: II  TM distance: >3 FB  Neck ROM: full       Cardiovascular - normal exam  Rhythm: regular  Rate: normal  (-) murmur     Dental - normal exam  (+) upper dentures, lower dentures         Pulmonary - normal exam  Breath sounds clear to auscultation  (+) decreased breath sounds     Abdominal    Neurological - normal exam                 Anesthesia Plan    ASA 3   ASA physical status 3 criteria: morbid obesity - BMI greater than or equal to 40, implanted pacemaker and diabetes - poorly controlled    Plan - general       Airway plan will be ETT        Induction: intravenous    Postoperative Plan: Postoperative administration of opioids is intended.    Pertinent diagnostic labs and testing reviewed    Informed Consent:    Anesthetic plan and risks discussed with patient.    Use of blood products discussed with: patient whom consented to blood products.

## 2019-08-20 NOTE — CONSULTS
General Surgery Consult  -------------------------------------------------------------------------------------------------  Date of Service: 8/19/2019    Consulting Physician: Sonu Solomon M.D. [facility]  -------------------------------------------------------------------------------------------------    Chief complaint: abdominal pain    HPI: This is a 74 y.o. male who is presenting with with 3-4 days of worsening right upper quadrant abdominal pain.  Some nausea but no vomiting.  Has had episodes like this in the past but never this severe or persistent. HIDA consistent with acute cholecystitis.  He is alert and conversant and in no obvious distress.    Past Medical History:   Diagnosis Date   • Atrial fibrillation (HCC)    • Basal cell carcinoma    • Cancer (HCC)    • Chronic obstructive pulmonary disease (HCC)    • Diabetes (HCC)    • Hypertension    • Renal disorder        Past Surgical History:   Procedure Laterality Date   • KNEE REPLACEMENT, TOTAL     • PACEMAKER INSERTION         Current Facility-Administered Medications   Medication Dose Route Frequency Provider Last Rate Last Dose   • senna-docusate (PERICOLACE or SENOKOT S) 8.6-50 MG per tablet 2 Tab  2 Tab Oral BID Harrison Brennan M.D.   Stopped at 08/19/19 0759    And   • polyethylene glycol/lytes (MIRALAX) PACKET 1 Packet  1 Packet Oral QDAY PRN Harrison Brennan M.D.        And   • magnesium hydroxide (MILK OF MAGNESIA) suspension 30 mL  30 mL Oral QDAY PRN Harrison Brennan M.D.        And   • bisacodyl (DULCOLAX) suppository 10 mg  10 mg Rectal QDAY PRCRUZ Brennan M.D.       • Respiratory Care per Protocol   Nebulization Continuous RT Harrison Brennan M.D.       • lactated ringers infusion   Intravenous Continuous Harrison Brennan M.D. 125 mL/hr at 08/19/19 0915     • NS (BOLUS) infusion 1,000 mL  1,000 mL Intravenous Once PRN Harrison Brennan M.D.       • acetaminophen (TYLENOL) tablet 650 mg  650 mg Oral Q6HRS PRN Harrison Brennan M.D.   Stopped at  08/19/19 1211   • Pharmacy Consult Request ...Pain Management Review 1 Each  1 Each Other PHARMACY TO DOSE Harrison Brennan M.D.        And   • oxyCODONE immediate-release (ROXICODONE) tablet 5 mg  5 mg Oral Q3HRS PRN Harrison Brennan M.D.        And   • oxyCODONE immediate-release (ROXICODONE) tablet 10 mg  10 mg Oral Q3HRS PRN Harrison Brennan M.D.   10 mg at 08/19/19 2027    And   • morphine (pf) 4 mg/ml injection 4 mg  4 mg Intravenous Q3HRS PRN Harrison Brennan M.D.   4 mg at 08/19/19 2137   • ondansetron (ZOFRAN) syringe/vial injection 4 mg  4 mg Intravenous Q4HRS PRN Harrison Brennan M.D.       • ondansetron (ZOFRAN ODT) dispertab 4 mg  4 mg Oral Q4HRS PRN Harrison Brennan M.D.       • finasteride (PROSCAR) tablet 5 mg  5 mg Oral DAILY Harrison Brennan M.D.   5 mg at 08/19/19 0914   • gabapentin (NEURONTIN) capsule 400 mg  400 mg Oral TID Harrison Brennan M.D.   400 mg at 08/19/19 1751   • linezolid (ZYVOX) tablet 600 mg  600 mg Oral Q12HRS Harrison Brennan M.D.   600 mg at 08/19/19 1752   • metoprolol SR (TOPROL XL) tablet 25 mg  25 mg Oral BID Harrison Brennan M.D.   Stopped at 08/19/19 1752   • piperacillin-tazobactam (ZOSYN) 3.375 g in  mL IVPB  3.375 g Intravenous Q8HRS Harrison Brennan M.D. 25 mL/hr at 08/19/19 2021 3.375 g at 08/19/19 2021   • insulin regular (HUMULIN R) injection 1-6 Units  1-6 Units Subcutaneous Q6HRS Harrison Brennan M.D.   Stopped at 08/19/19 0830    And   • glucose 4 g chewable tablet 16 g  16 g Oral Q15 MIN PRN Harrison Brennan M.D.        And   • DEXTROSE 10% BOLUS 250 mL  250 mL Intravenous Q15 MIN PRN Harrison Brennan M.D.           Social History     Socioeconomic History   • Marital status: Single     Spouse name: Not on file   • Number of children: Not on file   • Years of education: Not on file   • Highest education level: Not on file   Occupational History   • Not on file   Social Needs   • Financial resource strain: Not on file   • Food insecurity:     Worry: Not on file     Inability: Not on file   • Transportation  "needs:     Medical: Not on file     Non-medical: Not on file   Tobacco Use   • Smoking status: Former Smoker     Types: Cigarettes     Last attempt to quit: 1975     Years since quittin.6   • Smokeless tobacco: Never Used   Substance and Sexual Activity   • Alcohol use: Never     Frequency: Never   • Drug use: Never   • Sexual activity: Not on file   Lifestyle   • Physical activity:     Days per week: Not on file     Minutes per session: Not on file   • Stress: Not on file   Relationships   • Social connections:     Talks on phone: Not on file     Gets together: Not on file     Attends [employer] service: Not on file     Active member of club or organization: Not on file     Attends meetings of clubs or organizations: Not on file     Relationship status: Not on file   • Intimate partner violence:     Fear of current or ex partner: Not on file     Emotionally abused: Not on file     Physically abused: Not on file     Forced sexual activity: Not on file   Other Topics Concern   • Not on file   Social History Narrative   • Not on file       No family history on file.    Allergies:  Patient has no known allergies.    Review of Systems:  Noncontributory except as per HPI      Physical Exam:  /69   Pulse 60   Temp 36.2 °C (97.1 °F) (Temporal)   Resp 18   Ht 1.778 m (5' 10\")   Wt (!) 136.8 kg (301 lb 9.4 oz)   SpO2 97%     Constitutional: Alert, oriented, no acute distress  HEENT:  Normocephalic and atraumatic, EOMI  Neck:   Supple, no JVD,   Cardiovascular: Regular rate and rhythm,   Pulmonary:  Good air entry bilaterally,   Abdominal:  Soft,  Non-distended     Tender to palpation in right upper quadrant     No rebound/guarding  Musculoskeletal: No edema, no tenderness  Neurological:  CN II-XII grossly intact, no focal deficits  Skin:   Skin is warm and dry. No rash noted.  Psychiatric:  Normal mood and affect.    Labs:  Recent Labs     19  0644 19  2201   WBC 5.7 7.4   RBC 3.80* 4.23* "   HEMOGLOBIN 11.6* 12.6*   HEMATOCRIT 36.1* 39.5*   MCV 95.0 93.4   MCH 30.5 29.8   MCHC 32.1* 31.9*   RDW 46.8 45.8   PLATELETCT 98* 108*   MPV 9.1 9.1     Recent Labs     08/18/19 2201   SODIUM 140   POTASSIUM 4.2   CHLORIDE 107   CO2 26   GLUCOSE 138*   BUN 16   CREATININE 1.47*   CALCIUM 8.5     Recent Labs     08/17/19 0644   APTT 30.1   INR 1.36*     Recent Labs     08/17/19  0644 08/18/19 2201   ASTSGOT  --  34   ALTSGPT  --  25   TBILIRUBIN  --  0.8   ALKPHOSPHAT  --  82   GLOBULIN  --  2.9   INR 1.36*  --        Radiology:  US: Cholelithiasis and gallbladder sludge. Normal gallbladder wall thickness. No definitive evidence of cholecystitis. If there is high clinical concern, consider HIDA scan.    HIDA: Findings consistent with acute cholecystitis.    Assessment: This is a 74 y.o. male with acute cholecystitis.    Plan:  IV ABx  OR for lap mamadou    I explained the details of the operation, alternatives, and potential risks, including but not limited to bleeding, infection, injury to bowel/organs/vessels/nerves, possible open incision, and risks of anesthesia. All questions were answered. Patient understands and agrees to proceed.    Sonu Solomon M.D.  [facility]  [phone]  Cell: [phone]

## 2019-08-20 NOTE — OR NURSING
Pt on 3 L NC.  No c/o nausea, declined PO fluids at this time.  X 6 laparoscopic abdominal incisions CDI, dermabond.  Hemovac dressing gauze and Tegaderm CDI.  Hemovac compressed patent and draining.  Pt denies pain/discomfort at this time.  Ice packs in place.  VSS, afebrile, BORJAS, A.O x4.   Sleepy, wakes easily to voice.     Left hand ring in place.

## 2019-08-20 NOTE — ANESTHESIA PROCEDURE NOTES
Airway  Date/Time: 8/20/2019 9:31 AM  Performed by: Price Mckinley M.D.  Authorized by: Price Mckinley M.D.     Location:  OR  Urgency:  Elective  Difficult Airway: No    Indications for Airway Management:  Anesthesia  Spontaneous Ventilation: present    Sedation Level:  Deep  Preoxygenated: Yes    Patient Position:  Sniffing  MILS Maintained Throughout: Yes    Mask Difficulty Assessment:  1 - vent by mask  Final Airway Type:  Endotracheal airway  Final Endotracheal Airway:  ETT  Cuffed: Yes    Technique Used for Successful ETT Placement:  Direct laryngoscopy  Devices/Methods Used in Placement:  Intubating stylet  Insertion Site:  Oral  Blade Type:  Pipe  Laryngoscope Blade/Videolaryngoscope Blade Size:  3  ETT Size (mm):  8.0  Measured from:  Gums  ETT to Gums (cm):  23  Placement Verified by: auscultation and capnometry    Cormack-Lehane Classification:  Grade IIa - partial view of glottis  Number of Attempts at Approach:  1

## 2019-08-20 NOTE — ANESTHESIA QCDR
2019 [facility] Clinical Data Registry (for Quality Improvement)     Postoperative nausea/vomiting risk protocol (Adult = 18 yrs and Pediatric 3-17 yrs)- (430 and 463)  General inhalation anesthetic (NOT TIVA) with PONV risk factors: Yes  Provision of anti-emetic therapy with at least 2 different classes of agents: Yes   Patient DID NOT receive anti-emetic therapy and reason is documented in Medical Record:  N/A    Multimodal Pain Management- (AQI59)  Patient undergoing Elective Surgery (i.e. Outpatient, or ASC, or Prescheduled Surgery prior to Hospital Admission): Yes  Use of Multimodal Pain Management, two or more drugs and/or interventions, NOT including systemic opioids: Yes   Exception: Documented allergy to multiple classes of analgesics:  N/A    PACU assessment of acute postoperative pain prior to Anesthesia Care End- Applies to Patients Age = 18- (ABG7)  Initial PACU pain score is which of the following: < 7/10  Patient unable to report pain score: N/A    Post-anesthetic transfer of care checklist/protocol to PACU/ICU- (426 and 427)  Upon conclusion of case, patient transferred to which of the following locations: PACU/Non-ICU  Use of transfer checklist/protocol: Yes  Exclusion: Service Performed in Patient Hospital Room (and thus did not require transfer): N/A    PACU Reintubation- (AQI31)  General anesthesia requiring endotracheal intubation (ETT) along with subsequent extubation in OR or PACU: Yes  Required reintubation in the PACU: No   Extubation was a planned trial documented in the medical record prior to removal of the original airway device:  N/A    Unplanned admission to ICU related to anesthesia service up through end of PACU care- (MD51)  Unplanned admission to ICU (not initially anticipated at anesthesia start time): No

## 2019-08-20 NOTE — OP REPORT
Operative Report    Date: 8/19/2019    Surgeon: Sonu Solomon M.D.    Assistant: Moe HOOPER    Pre-operative Diagnosis: acute appendicitis    Post-operative Diagnosis: same     Procedure: laparoscopic appendectomy    Findings: local inflammation and turbid fluid around the appendix    Specimen: appendix for permanent pathology    EBL: minimal    Dispo: stable, extubated, to PACU    -----------------------------------------------    Procedure in detail: The patient was identified in the pre-operative holding area and brought to the operating room. Pre-operative antibiotics were administered prior to the procedure. GETA was smoothly induced. The patient was prepped and draped in the usual sterile fashion.      After infiltration of local anesthetic, an incision was made above the umbilicus to accomodate a 5 mm trocar. The veress needle was placed intraperitoneally, and sterile saline drop test was performed. The abdomen was insufflated to 15 mmHg, which the patient tolerated well, with initially low insufflation pressures. A 5 mm trocar was placed, and a 5 mm 30 degree lapararoscope was used to survey the abdomen. No evidence of intraperitoneal trauma from Veress or trocar placement was found.     I then placed, under video assistance, a 5 mm trocar in the lower midline and a 12 mm trocar in the suprapubic region.  I visualized the cecum and found the appendix, it was inflamed. The appendix was elevated with an atraumatic grasper, a window was created in its mesentery with a [place] dissector, and the appendiceal mesentery was divided with the harmonic dissector. The base of the appendix was divided with a blue load of the endoGIA stapler. The field and pelvis were copiously irrigated.  The appendiceal artery and base were hemostatic.  The appendix was placed in an endocatch bag and removed through the 12 mm port site.  A 19Fr round Mohsen drain was placed in the pelvis and exited the suprapubic port site and  was held in place with a 2-0 nylon suture.     All ports were removed with video assist, and were hemostatic. All skin sites were closed with subcuticular absorbable suture and skin glue was applied.     The patient was awakened from general anesthetic and taken to the recovery room in stable condition.  Sponge and needle counts were correct at the end of the case.        Sonu Solomon M.D.  [facility]  [phone]

## 2019-08-20 NOTE — PROGRESS NOTES
Patient arrived to the floor from PACU at this time. Bedside report completed with PACU RN. Patient drowsy but awakens to voice, on 3L 02 which is baseline. 6 lap sites C/D/I to abdomen with dermabond, open to air. Hemovac to RUQ. Post procedure vital signs initiated. Bed locked and in lowest position, call bell in hand, whiteboard updated, family at bedside and updated on plan of care. On telemetry

## 2019-08-20 NOTE — RESPIRATORY CARE
COPD EDUCATION by COPD CLINICAL EDUCATOR  8/20/2019 at 6:51 AM by Giorgi Sanders     Patient reviewed by COPD education team. Patient does not have a history or diagnosis of COPD and is a non-smoker, therefore does not qualify for the COPD program.

## 2019-08-20 NOTE — PROGRESS NOTES
[facility] Medicine Daily Progress Note    Date of Service  8/20/2019    Chief Complaint  74 y.o. male admitted 8/18/2019 with Right upper quadrant abdominal pain    Hospital Course  Admitted with right upper quadrant abdominal pain, work-up showed cholecystitis, underwent laparoscopic cholecystectomy.  Recent admission for diabetic foot ulcers, peripheral vascular disease.    Interval Problem Update  Cholecystitis -surgery done today  Diabetic foot ulcer -per wound care appears to be better  Diabetes - BS 200s  HTN - sbp 130-140    Consultants/Specialty  Surgery    Code Status  Full    Disposition  TBD    Review of Systems  Review of Systems   Constitutional: Positive for malaise/fatigue. Negative for chills, diaphoresis and fever.   HENT: Negative for hearing loss and sore throat.    Eyes: Negative for blurred vision.   Respiratory: Negative for cough, shortness of breath and wheezing.    Cardiovascular: Positive for leg swelling. Negative for chest pain and palpitations.   Gastrointestinal: Positive for abdominal pain. Negative for diarrhea, heartburn, nausea and vomiting.   Genitourinary: Negative for dysuria and flank pain.   Musculoskeletal: Negative for back pain and neck pain.   Skin: Negative for rash.   Neurological: Negative for dizziness and headaches.   Psychiatric/Behavioral: The patient is not nervous/anxious.         Physical Exam  Temp:  [36.2 °C (97.1 °F)-37.3 °C (99.1 °F)] 36.7 °C (98 °F)  Pulse:  [60-67] 62  Resp:  [14-20] 16  BP: (116-164)/(55-76) 144/70  SpO2:  [95 %-98 %] 95 %    Physical Exam   Constitutional: He is oriented to person, place, and time. He appears well-developed.   HENT:   Head: Normocephalic and atraumatic.   Eyes: Pupils are equal, round, and reactive to light. Conjunctivae are normal.   Neck: No tracheal deviation present. No thyromegaly present.   Cardiovascular: Normal rate and regular rhythm.   Pulmonary/Chest: Effort normal and breath sounds normal.   Abdominal: Soft.  Bowel sounds are normal. He exhibits no distension. There is tenderness. There is no rebound and no guarding.   Musculoskeletal: He exhibits edema.   Ulcers of multiple toes   Lymphadenopathy:     He has no cervical adenopathy.   Neurological: He is alert and oriented to person, place, and time.   Skin: There is erythema.   Stasis dermatitis changes both legs right greater than left   Nursing note and vitals reviewed.      Fluids    Intake/Output Summary (Last 24 hours) at 8/20/2019 1539  Last data filed at 8/20/2019 1044  Gross per 24 hour   Intake 2330 ml   Output 575 ml   Net 1755 ml       Laboratory  Recent Labs     08/18/19 2201 08/20/19  0039   WBC 7.4 8.1   RBC 4.23* 3.84*   HEMOGLOBIN 12.6* 11.4*   HEMATOCRIT 39.5* 36.3*   MCV 93.4 94.5   MCH 29.8 29.7   MCHC 31.9* 31.4*   RDW 45.8 48.2   PLATELETCT 108* 83*   MPV 9.1 9.2     Recent Labs     08/18/19 2201 08/20/19  0039   SODIUM 140 139   POTASSIUM 4.2 4.3   CHLORIDE 107 105   CO2 26 26   GLUCOSE 138* 277*   BUN 16 18   CREATININE 1.47* 1.60*   CALCIUM 8.5 8.3*                   Imaging  NM-HEPATOBILIARY SCAN   Final Result      Findings consistent with acute cholecystitis.      US-RUQ   Final Result      1.  Cholelithiasis and gallbladder sludge. Normal gallbladder wall thickness. No definitive evidence of cholecystitis. If there is high clinical concern, consider HIDA scan.   2.  Hepatic steatosis.      CT-ABDOMEN-PELVIS WITH   Final Result      1.  Cholelithiasis with mild pericholecystic fat stranding. This is concerning for early cholecystitis. Correlate with symptoms and consider HIDA scan can be done for further evaluation.   2.  Small bilateral pleural effusions and associated atelectasis.   3.  Nonobstructing left nephrolithiasis.           Assessment/Plan  * Cholecystitis- (present on admission)  Assessment & Plan  Laparoscopic cholecystectomy 8/20/2019  IV Zosyn    Thrombocytopenia (HCC)- (present on admission)  Assessment & Plan  Follow  cbc    HTN (hypertension)- (present on admission)  Assessment & Plan  Toprol XL    PVD (peripheral vascular disease) ([facility])- (present on admission)  Assessment & Plan  Right lower extremity angiogram. Angioplasty and 7mm x 6cm stent right SFA 8/17/2019    Diabetic foot ulcer ([facility])- (present on admission)  Assessment & Plan  Wound care  Zyvox    Type 2 diabetes mellitus with kidney complication, with long-term current use of insulin ([facility])- (present on admission)  Assessment & Plan  Adjust SSI      CKD (chronic kidney disease) stage 3, GFR 30-59 ml/min ([facility])- (present on admission)  Assessment & Plan  Monitor BMP    Elevated troponin- (present on admission)  Assessment & Plan  Likely type II NSTEMI    Class 3 severe obesity due to excess calories in adult ([facility])- (present on admission)  Assessment & Plan  Body mass index is 40.75 kg/m².           VTE prophylaxis: SCD

## 2019-08-20 NOTE — ANESTHESIA TIME REPORT
Anesthesia Start and Stop Event Times     Date Time Event    8/20/2019 0744 Ready for Procedure     0916 Anesthesia Start        Responsible Staff  08/20/19    Name Role Begin End    Price Mckinley M.D. Anesth 0916         Preop Diagnosis (Free Text):  Pre-op Diagnosis     ACUTE CHOLECYSTITIS        Preop Diagnosis (Codes):    Post op Diagnosis  Gangrenous cholecystitis      Premium Reason  Non-Premium    Comments:

## 2019-08-21 PROBLEM — R13.10 DYSPHAGIA: Status: ACTIVE | Noted: 2019-08-10

## 2019-08-21 LAB
ALBUMIN SERPL BCP-MCNC: 2.4 G/DL (ref 3.2–4.9)
ALBUMIN/GLOB SERPL: 0.8 G/DL
ALP SERPL-CCNC: 112 U/L (ref 30–99)
ALT SERPL-CCNC: 33 U/L (ref 2–50)
ANION GAP SERPL CALC-SCNC: 7 MMOL/L (ref 0–11.9)
AST SERPL-CCNC: 45 U/L (ref 12–45)
BASOPHILS # BLD AUTO: 0.4 % (ref 0–1.8)
BASOPHILS # BLD: 0.03 K/UL (ref 0–0.12)
BILIRUB SERPL-MCNC: 0.9 MG/DL (ref 0.1–1.5)
BUN SERPL-MCNC: 24 MG/DL (ref 8–22)
CALCIUM SERPL-MCNC: 8 MG/DL (ref 8.5–10.5)
CHLORIDE SERPL-SCNC: 107 MMOL/L (ref 96–112)
CO2 SERPL-SCNC: 26 MMOL/L (ref 20–33)
CREAT SERPL-MCNC: 1.8 MG/DL (ref 0.5–1.4)
EOSINOPHIL # BLD AUTO: 0.18 K/UL (ref 0–0.51)
EOSINOPHIL NFR BLD: 2.3 % (ref 0–6.9)
ERYTHROCYTE [DISTWIDTH] IN BLOOD BY AUTOMATED COUNT: 50.7 FL (ref 35.9–50)
GLOBULIN SER CALC-MCNC: 3 G/DL (ref 1.9–3.5)
GLUCOSE BLD-MCNC: 200 MG/DL (ref 65–99)
GLUCOSE BLD-MCNC: 227 MG/DL (ref 65–99)
GLUCOSE BLD-MCNC: 240 MG/DL (ref 65–99)
GLUCOSE SERPL-MCNC: 210 MG/DL (ref 65–99)
HCT VFR BLD AUTO: 34.4 % (ref 42–52)
HGB BLD-MCNC: 10.8 G/DL (ref 14–18)
IMM GRANULOCYTES # BLD AUTO: 0.03 K/UL (ref 0–0.11)
IMM GRANULOCYTES NFR BLD AUTO: 0.4 % (ref 0–0.9)
LYMPHOCYTES # BLD AUTO: 0.73 K/UL (ref 1–4.8)
LYMPHOCYTES NFR BLD: 9.3 % (ref 22–41)
MCH RBC QN AUTO: 30.8 PG (ref 27–33)
MCHC RBC AUTO-ENTMCNC: 31.4 G/DL (ref 33.7–35.3)
MCV RBC AUTO: 98 FL (ref 81.4–97.8)
MONOCYTES # BLD AUTO: 0.77 K/UL (ref 0–0.85)
MONOCYTES NFR BLD AUTO: 9.9 % (ref 0–13.4)
NEUTROPHILS # BLD AUTO: 6.07 K/UL (ref 1.82–7.42)
NEUTROPHILS NFR BLD: 77.7 % (ref 44–72)
NRBC # BLD AUTO: 0 K/UL
NRBC BLD-RTO: 0 /100 WBC
PLATELET # BLD AUTO: 90 K/UL (ref 164–446)
PMV BLD AUTO: 9.9 FL (ref 9–12.9)
POTASSIUM SERPL-SCNC: 4.6 MMOL/L (ref 3.6–5.5)
PROT SERPL-MCNC: 5.4 G/DL (ref 6–8.2)
RBC # BLD AUTO: 3.51 M/UL (ref 4.7–6.1)
SODIUM SERPL-SCNC: 140 MMOL/L (ref 135–145)
WBC # BLD AUTO: 7.8 K/UL (ref 4.8–10.8)

## 2019-08-21 PROCEDURE — 82962 GLUCOSE BLOOD TEST: CPT | Mod: 91

## 2019-08-21 PROCEDURE — 700105 HCHG RX REV CODE 258: Performed by: FAMILY MEDICINE

## 2019-08-21 PROCEDURE — 85025 COMPLETE CBC W/AUTO DIFF WBC: CPT

## 2019-08-21 PROCEDURE — 307059 PAD,EAR PROTECTOR: Performed by: FAMILY MEDICINE

## 2019-08-21 PROCEDURE — 99232 SBSQ HOSP IP/OBS MODERATE 35: CPT | Performed by: FAMILY MEDICINE

## 2019-08-21 PROCEDURE — 700111 HCHG RX REV CODE 636 W/ 250 OVERRIDE (IP): Performed by: INTERNAL MEDICINE

## 2019-08-21 PROCEDURE — 770020 HCHG ROOM/CARE - TELE (206)

## 2019-08-21 PROCEDURE — 700105 HCHG RX REV CODE 258: Performed by: INTERNAL MEDICINE

## 2019-08-21 PROCEDURE — 80053 COMPREHEN METABOLIC PANEL: CPT

## 2019-08-21 PROCEDURE — 92526 ORAL FUNCTION THERAPY: CPT

## 2019-08-21 PROCEDURE — 700111 HCHG RX REV CODE 636 W/ 250 OVERRIDE (IP): Performed by: FAMILY MEDICINE

## 2019-08-21 PROCEDURE — A9270 NON-COVERED ITEM OR SERVICE: HCPCS | Performed by: FAMILY MEDICINE

## 2019-08-21 PROCEDURE — 700102 HCHG RX REV CODE 250 W/ 637 OVERRIDE(OP): Performed by: FAMILY MEDICINE

## 2019-08-21 PROCEDURE — 36415 COLL VENOUS BLD VENIPUNCTURE: CPT

## 2019-08-21 PROCEDURE — A9270 NON-COVERED ITEM OR SERVICE: HCPCS | Performed by: INTERNAL MEDICINE

## 2019-08-21 PROCEDURE — 51798 US URINE CAPACITY MEASURE: CPT

## 2019-08-21 PROCEDURE — 700102 HCHG RX REV CODE 250 W/ 637 OVERRIDE(OP): Performed by: INTERNAL MEDICINE

## 2019-08-21 RX ORDER — METRONIDAZOLE 500 MG/1
500 TABLET ORAL EVERY 8 HOURS
Status: DISCONTINUED | OUTPATIENT
Start: 2019-08-21 | End: 2019-08-23

## 2019-08-21 RX ORDER — LINEZOLID 600 MG/1
600 TABLET, FILM COATED ORAL EVERY 12 HOURS
Status: COMPLETED | OUTPATIENT
Start: 2019-08-21 | End: 2019-08-21

## 2019-08-21 RX ORDER — MICONAZOLE NITRATE 20 MG/G
CREAM TOPICAL 2 TIMES DAILY
Status: DISPENSED | OUTPATIENT
Start: 2019-08-21 | End: 2019-08-28

## 2019-08-21 RX ORDER — INSULIN GLARGINE 100 [IU]/ML
20 INJECTION, SOLUTION SUBCUTANEOUS
Status: DISCONTINUED | OUTPATIENT
Start: 2019-08-22 | End: 2019-08-23

## 2019-08-21 RX ADMIN — OXYCODONE HYDROCHLORIDE 5 MG: 5 TABLET ORAL at 10:54

## 2019-08-21 RX ADMIN — PIPERACILLIN SODIUM AND TAZOBACTAM SODIUM 3.38 G: 3; .375 INJECTION, POWDER, FOR SOLUTION INTRAVENOUS at 05:31

## 2019-08-21 RX ADMIN — SODIUM CHLORIDE, POTASSIUM CHLORIDE, SODIUM LACTATE AND CALCIUM CHLORIDE: 600; 310; 30; 20 INJECTION, SOLUTION INTRAVENOUS at 09:32

## 2019-08-21 RX ADMIN — GABAPENTIN 400 MG: 400 CAPSULE ORAL at 17:47

## 2019-08-21 RX ADMIN — MORPHINE SULFATE 4 MG: 4 INJECTION INTRAVENOUS at 10:54

## 2019-08-21 RX ADMIN — METRONIDAZOLE 500 MG: 500 TABLET, FILM COATED ORAL at 13:39

## 2019-08-21 RX ADMIN — METOPROLOL SUCCINATE 25 MG: 25 TABLET, EXTENDED RELEASE ORAL at 17:47

## 2019-08-21 RX ADMIN — GABAPENTIN 400 MG: 400 CAPSULE ORAL at 10:54

## 2019-08-21 RX ADMIN — CEFTRIAXONE SODIUM 2 G: 2 INJECTION, POWDER, FOR SOLUTION INTRAMUSCULAR; INTRAVENOUS at 17:47

## 2019-08-21 RX ADMIN — METOPROLOL SUCCINATE 25 MG: 25 TABLET, EXTENDED RELEASE ORAL at 05:32

## 2019-08-21 RX ADMIN — MICONAZOLE NITRATE: 20 CREAM TOPICAL at 17:47

## 2019-08-21 RX ADMIN — ONDANSETRON 4 MG: 2 INJECTION INTRAMUSCULAR; INTRAVENOUS at 14:20

## 2019-08-21 RX ADMIN — SODIUM CHLORIDE, POTASSIUM CHLORIDE, SODIUM LACTATE AND CALCIUM CHLORIDE: 600; 310; 30; 20 INJECTION, SOLUTION INTRAVENOUS at 22:59

## 2019-08-21 RX ADMIN — LINEZOLID 600 MG: 600 TABLET, FILM COATED ORAL at 17:47

## 2019-08-21 RX ADMIN — METRONIDAZOLE 500 MG: 500 TABLET, FILM COATED ORAL at 21:29

## 2019-08-21 RX ADMIN — LINEZOLID 600 MG: 600 TABLET, FILM COATED ORAL at 05:32

## 2019-08-21 RX ADMIN — SENNOSIDES, DOCUSATE SODIUM 2 TABLET: 50; 8.6 TABLET, FILM COATED ORAL at 17:47

## 2019-08-21 RX ADMIN — GABAPENTIN 400 MG: 400 CAPSULE ORAL at 05:32

## 2019-08-21 RX ADMIN — PIPERACILLIN SODIUM AND TAZOBACTAM SODIUM 3.38 G: 3; .375 INJECTION, POWDER, FOR SOLUTION INTRAVENOUS at 12:35

## 2019-08-21 RX ADMIN — FINASTERIDE 5 MG: 5 TABLET, FILM COATED ORAL at 05:32

## 2019-08-21 ASSESSMENT — ENCOUNTER SYMPTOMS
DIARRHEA: 0
VOMITING: 0
FLANK PAIN: 0
FEVER: 0
SORE THROAT: 0
HEADACHES: 0
BLURRED VISION: 0
WHEEZING: 0
NERVOUS/ANXIOUS: 0
CHILLS: 0
NAUSEA: 0
BACK PAIN: 0
ABDOMINAL PAIN: 1
HEARTBURN: 0
PALPITATIONS: 0
DIAPHORESIS: 0
COUGH: 0
DIZZINESS: 0
NECK PAIN: 0
SHORTNESS OF BREATH: 0

## 2019-08-21 NOTE — PROGRESS NOTES
[facility] Medicine Daily Progress Note    Date of Service  8/21/2019    Chief Complaint  74 y.o. male admitted 8/18/2019 with Right upper quadrant abdominal pain    Hospital Course  Admitted with right upper quadrant abdominal pain, work-up showed cholecystitis, underwent laparoscopic cholecystectomy.  Recent admission for diabetic foot ulcers, peripheral vascular disease.    Interval Problem Update  Cholecystitis - pain controlled, discussed case with Surgery  Diabetic foot ulcer - per wound care appears to be better  Diabetes - BS 200s  HTN - sbp 120s  Dysphagia - reevaluation done today    Consultants/Specialty  Surgery    Code Status  Full    Disposition  TBD    Review of Systems  Review of Systems   Constitutional: Positive for malaise/fatigue. Negative for chills, diaphoresis and fever.   HENT: Negative for hearing loss and sore throat.    Eyes: Negative for blurred vision.   Respiratory: Negative for cough, shortness of breath and wheezing.    Cardiovascular: Positive for leg swelling. Negative for chest pain and palpitations.   Gastrointestinal: Positive for abdominal pain. Negative for diarrhea, heartburn, nausea and vomiting.   Genitourinary: Negative for dysuria and flank pain.   Musculoskeletal: Negative for back pain and neck pain.   Skin: Negative for rash.   Neurological: Negative for dizziness and headaches.   Psychiatric/Behavioral: The patient is not nervous/anxious.         Physical Exam  Temp:  [36.4 °C (97.5 °F)-37 °C (98.6 °F)] 36.6 °C (97.8 °F)  Pulse:  [60-62] 60  Resp:  [14-18] 16  BP: (114-149)/(62-76) 121/67  SpO2:  [91 %-98 %] 91 %    Physical Exam   Constitutional: He is oriented to person, place, and time. He appears well-developed.   HENT:   Head: Normocephalic and atraumatic.   Eyes: Pupils are equal, round, and reactive to light. Conjunctivae are normal.   Neck: No tracheal deviation present. No thyromegaly present.   Cardiovascular: Normal rate and regular rhythm.   Pulmonary/Chest:  Effort normal and breath sounds normal.   Abdominal: Soft. Bowel sounds are normal. He exhibits no distension. There is tenderness. There is no rebound and no guarding.   Musculoskeletal: He exhibits edema.   Ulcers of multiple toes   Lymphadenopathy:     He has no cervical adenopathy.   Neurological: He is alert and oriented to person, place, and time.   Skin: There is erythema.   Stasis dermatitis changes both legs right greater than left   Nursing note and vitals reviewed.      Fluids    Intake/Output Summary (Last 24 hours) at 8/21/2019 1224  Last data filed at 8/21/2019 0200  Gross per 24 hour   Intake 120 ml   Output 670 ml   Net -550 ml       Laboratory  Recent Labs     08/18/19 2201 08/20/19  0039 08/21/19  0247   WBC 7.4 8.1 7.8   RBC 4.23* 3.84* 3.51*   HEMOGLOBIN 12.6* 11.4* 10.8*   HEMATOCRIT 39.5* 36.3* 34.4*   MCV 93.4 94.5 98.0*   MCH 29.8 29.7 30.8   MCHC 31.9* 31.4* 31.4*   RDW 45.8 48.2 50.7*   PLATELETCT 108* 83* 90*   MPV 9.1 9.2 9.9     Recent Labs     08/18/19 2201 08/20/19  0039 08/21/19  0247   SODIUM 140 139 140   POTASSIUM 4.2 4.3 4.6   CHLORIDE 107 105 107   CO2 26 26 26   GLUCOSE 138* 277* 210*   BUN 16 18 24*   CREATININE 1.47* 1.60* 1.80*   CALCIUM 8.5 8.3* 8.0*                   Imaging  NM-HEPATOBILIARY SCAN   Final Result      Findings consistent with acute cholecystitis.      US-RUQ   Final Result      1.  Cholelithiasis and gallbladder sludge. Normal gallbladder wall thickness. No definitive evidence of cholecystitis. If there is high clinical concern, consider HIDA scan.   2.  Hepatic steatosis.      CT-ABDOMEN-PELVIS WITH   Final Result      1.  Cholelithiasis with mild pericholecystic fat stranding. This is concerning for early cholecystitis. Correlate with symptoms and consider HIDA scan can be done for further evaluation.   2.  Small bilateral pleural effusions and associated atelectasis.   3.  Nonobstructing left nephrolithiasis.           Assessment/Plan  * Cholecystitis-  (present on admission)  Assessment & Plan  Laparoscopic cholecystectomy 8/20/2019  IV Zosyn x 1 more day    Thrombocytopenia ([facility])- (present on admission)  Assessment & Plan  Follow cbc    HTN (hypertension)- (present on admission)  Assessment & Plan  Toprol XL    PVD (peripheral vascular disease) ([facility])- (present on admission)  Assessment & Plan  Right lower extremity angiogram. Angioplasty and 7mm x 6cm stent right SFA 8/17/2019    Diabetic foot ulcer ([facility])- (present on admission)  Assessment & Plan  Wound care  Zyvox    Type 2 diabetes mellitus with kidney complication, with long-term current use of insulin ([facility])- (present on admission)  Assessment & Plan  Start Lantus 20 units  SSI      CKD (chronic kidney disease) stage 3, GFR 30-59 ml/min ([facility])- (present on admission)  Assessment & Plan  IVF LR  Monitor BMP    Dysphagia- (present on admission)  Assessment & Plan  Dysphagia 2, NTL  SLP to follow    Elevated troponin- (present on admission)  Assessment & Plan  Likely type II NSTEMI    Class 3 severe obesity due to excess calories in adult ([facility])- (present on admission)  Assessment & Plan  Body mass index is 40.75 kg/m².         VTE prophylaxis: SCD

## 2019-08-21 NOTE — THERAPY
"Speech Language Therapy dysphagia treatment completed.   Functional Status:  Pt with improving status vs when seen by this clinician prior to short d/c and return to [facility].  Pt seen and diet upgrade recommended.  Strategies at [facility] ammended.   Recommendations: D2/NTL with strategies and assisted fdg.  Dov ok for ntl.    Plan of Care: Will benefit from Speech Therapy 3 times per week  Post-Acute Therapy: Discharge to a transitional care facility for continued skilled therapy services.    See \"Rehab Therapy-Acute\" Patient Summary Report for complete documentation.     "

## 2019-08-21 NOTE — PROGRESS NOTES
Patient has not voided yet since surgery, bladder scan per protocol, 280 ml in bladder. Bedside report to Renato FISHER, continue to monitor.

## 2019-08-21 NOTE — PROGRESS NOTES
This RN offered for patient to get OOB to chair or attempt to ambulate after surgery. Patient refused. Patient educated on importance of turning and repositioning in bed. On waffle mattress, pillows to offload heals and reposition. Family present at bedside for skin breakdown prevention education.

## 2019-08-21 NOTE — DISCHARGE PLANNING
Anticipated Discharge Disposition:   · SNF    Action:   · LSW met with pt at bedside to complete assessment and review discharge plans/discuss transfer to SNF.   · CHOICE form was completed via verbal with pt. Pt requested referrals be sent to the following facilities with preference in the following order 1) [facility] on [address] 2) [facility] 3) [facility].   · CHOICE form faxed to [facility] ext [phone]    Barriers to Discharge:   · SNF acceptance     Plan:   · Care coordination will continue to follow and provide additional assistance with discharge planning and potential barriers as needed.

## 2019-08-21 NOTE — PROGRESS NOTES
Pt bladder scanned at shift change showing 280cc. Pt still unable to urinate. Bladder scan again and still only showing 380cc urine. Currently has IVF infusing at 75cc/hr. Call to Dr. Duran. No new orders at this time. Just encourage oral intake.

## 2019-08-21 NOTE — CARE PLAN
Problem: Safety  Goal: Will remain free from injury  Outcome: PROGRESSING AS EXPECTED  Goal: Will remain free from falls  Outcome: PROGRESSING AS EXPECTED     Problem: Infection  Goal: Will remain free from infection  Outcome: PROGRESSING AS EXPECTED     Problem: Pain Management  Goal: Pain level will decrease to patient's comfort goal  Outcome: PROGRESSING AS EXPECTED     Problem: Fluid Volume:  Goal: Will maintain balanced intake and output  Outcome: PROGRESSING AS EXPECTED     Problem: Skin Integrity  Goal: Risk for impaired skin integrity will decrease  Outcome: PROGRESSING SLOWER THAN EXPECTED     Problem: Respiratory:  Goal: Respiratory status will improve  Outcome: MET

## 2019-08-21 NOTE — DISCHARGE PLANNING
Received Choice form at [phone]  Agency[facility]Facility [facility]: [facility]  Referral sent per Choice form @ 1500 via manual fax

## 2019-08-21 NOTE — DISCHARGE PLANNING
"Care Transition Team Assessment  LSW met with pt at bedside to complete assessment, pt's daughter Nicky present, and discuss discharge planning and potential barriers. Pt was previously admitted to [facility] from 08/05/2019 to 08/17/2019. Pt was discharged to [facility] but returned 08/18/2019 to [facility]. Pt reported he felt \"invisable\" at [facility] explaining he was not getting medications or food and overall was not happy with the care. LSW discussed discharge and review possibility of needing to return to SNF for continued care. Pt is open to SNF but indicated he does not want to return to [facility]. Discussed other options of facilities with [facility] coverage. Pt was agreeable for referrals to be sent to the [facility], [facility] and [facility].     Pt is a 74 year old  male. He reported his wife is a long-term care resident at [facility] but he lives in their home: [address]. Daughter is here in [place] from [place] to help provide care for pt. Pt also has a grandson and son that live locally. He reported strong social support from his family and friends. He also reported he is 100% benefit connected with the [facility]. Prior to pt's 1st admission on 08/15/2019, pt was independent with all ADLs and iADLs and drives. He denies any financial barriers or difficulties affording his basic needs.     Pt does not believe he has a healthcare POA but is unsure. LSW reviewed the importance of having a Healthcare POA and provided with Advanced Directive booklet.     Information Source  Orientation : Oriented x 4  Information Given By: Patient  Informant's Name: Jamar  Who is responsible for making decisions for patient? : Patient    Readmission Evaluation   Is this a readmission?: Yes - unplanned readmission  Why do you think you were readmitted?: The facility wasn't providing me the care I needed    Elopement Risk  Legal Hold: No  Ambulatory or Self Mobile in Wheelchair: No-Not an Elopement Risk  Elopement Risk: Not at Risk for " Elopement    Interdisciplinary Discharge Planning  Primary Care Physician: Lauren Trano  Lives with - Patient's Self Care Capacity: Alone and Able to Care For Self  Patient or legal guardian wants to designate a caregiver (see row info): No  Housing / Facility: Skilled Nursing Facility  Name of Care Facility: [facility]  Prior Services: Continuous (24 Hour) Care Giving Per Service  Durable Medical Equipment: Not Applicable    Discharge Preparedness  What is your plan after discharge?: Skilled nursing facility  Prior Functional Level: Ambulatory, Drives Self, Independent with Activities of Daily Living, Independent with Medication Management  Difficulity with ADLs: None  Difficulity with IADLs: None    Functional Assesment  Prior Functional Level: Ambulatory, Drives Self, Independent with Activities of Daily Living, Independent with Medication Management    Finances  Financial Barriers to Discharge: No  Prescription Coverage: Yes    Vision / Hearing Impairment  Vision Impairment : Yes  Right Eye Vision: Impaired, Wears Glasses  Left Eye Vision: Impaired, Wears Glasses  Hearing Impairment : No    Advance Directive  Advance Directive?: None  Advance Directive offered?: AD Booklet given    Domestic Abuse  Have you ever been the victim of abuse or violence?: No  Physical Abuse or Sexual Abuse: No  Verbal Abuse or Emotional Abuse: No  Possible Abuse Reported to:: Not Applicable    Psychological Assessment  History of Substance Abuse: None  History of Psychiatric Problems: No    Discharge Risks or Barriers  Discharge risks or barriers?: Complex medical needs    Anticipated Discharge Information  Anticipated discharge disposition: SNF

## 2019-08-22 LAB
ALBUMIN SERPL BCP-MCNC: 2.3 G/DL (ref 3.2–4.9)
ALBUMIN/GLOB SERPL: 0.8 G/DL
ALP SERPL-CCNC: 129 U/L (ref 30–99)
ALT SERPL-CCNC: 33 U/L (ref 2–50)
ANION GAP SERPL CALC-SCNC: 6 MMOL/L (ref 0–11.9)
AST SERPL-CCNC: 33 U/L (ref 12–45)
BASOPHILS # BLD AUTO: 0.5 % (ref 0–1.8)
BASOPHILS # BLD: 0.03 K/UL (ref 0–0.12)
BILIRUB SERPL-MCNC: 0.5 MG/DL (ref 0.1–1.5)
BUN SERPL-MCNC: 22 MG/DL (ref 8–22)
CALCIUM SERPL-MCNC: 8 MG/DL (ref 8.5–10.5)
CHLORIDE SERPL-SCNC: 105 MMOL/L (ref 96–112)
CO2 SERPL-SCNC: 29 MMOL/L (ref 20–33)
CREAT SERPL-MCNC: 1.64 MG/DL (ref 0.5–1.4)
EOSINOPHIL # BLD AUTO: 0.2 K/UL (ref 0–0.51)
EOSINOPHIL NFR BLD: 3.6 % (ref 0–6.9)
ERYTHROCYTE [DISTWIDTH] IN BLOOD BY AUTOMATED COUNT: 51.7 FL (ref 35.9–50)
GLOBULIN SER CALC-MCNC: 3 G/DL (ref 1.9–3.5)
GLUCOSE BLD-MCNC: 189 MG/DL (ref 65–99)
GLUCOSE BLD-MCNC: 195 MG/DL (ref 65–99)
GLUCOSE BLD-MCNC: 196 MG/DL (ref 65–99)
GLUCOSE BLD-MCNC: 230 MG/DL (ref 65–99)
GLUCOSE BLD-MCNC: 255 MG/DL (ref 65–99)
GLUCOSE SERPL-MCNC: 228 MG/DL (ref 65–99)
HCT VFR BLD AUTO: 36.3 % (ref 42–52)
HGB BLD-MCNC: 11.3 G/DL (ref 14–18)
IMM GRANULOCYTES # BLD AUTO: 0.04 K/UL (ref 0–0.11)
IMM GRANULOCYTES NFR BLD AUTO: 0.7 % (ref 0–0.9)
LYMPHOCYTES # BLD AUTO: 0.71 K/UL (ref 1–4.8)
LYMPHOCYTES NFR BLD: 12.7 % (ref 22–41)
MCH RBC QN AUTO: 30.3 PG (ref 27–33)
MCHC RBC AUTO-ENTMCNC: 31.1 G/DL (ref 33.7–35.3)
MCV RBC AUTO: 97.3 FL (ref 81.4–97.8)
MONOCYTES # BLD AUTO: 0.58 K/UL (ref 0–0.85)
MONOCYTES NFR BLD AUTO: 10.4 % (ref 0–13.4)
NEUTROPHILS # BLD AUTO: 4.02 K/UL (ref 1.82–7.42)
NEUTROPHILS NFR BLD: 72.1 % (ref 44–72)
NRBC # BLD AUTO: 0 K/UL
NRBC BLD-RTO: 0 /100 WBC
PLATELET # BLD AUTO: 100 K/UL (ref 164–446)
PMV BLD AUTO: 9.4 FL (ref 9–12.9)
POTASSIUM SERPL-SCNC: 4.4 MMOL/L (ref 3.6–5.5)
PROT SERPL-MCNC: 5.3 G/DL (ref 6–8.2)
RBC # BLD AUTO: 3.73 M/UL (ref 4.7–6.1)
SODIUM SERPL-SCNC: 140 MMOL/L (ref 135–145)
WBC # BLD AUTO: 5.6 K/UL (ref 4.8–10.8)

## 2019-08-22 PROCEDURE — 97162 PT EVAL MOD COMPLEX 30 MIN: CPT

## 2019-08-22 PROCEDURE — 700102 HCHG RX REV CODE 250 W/ 637 OVERRIDE(OP): Performed by: INTERNAL MEDICINE

## 2019-08-22 PROCEDURE — 97535 SELF CARE MNGMENT TRAINING: CPT

## 2019-08-22 PROCEDURE — 36415 COLL VENOUS BLD VENIPUNCTURE: CPT

## 2019-08-22 PROCEDURE — A9270 NON-COVERED ITEM OR SERVICE: HCPCS | Performed by: INTERNAL MEDICINE

## 2019-08-22 PROCEDURE — 99232 SBSQ HOSP IP/OBS MODERATE 35: CPT | Performed by: FAMILY MEDICINE

## 2019-08-22 PROCEDURE — 80053 COMPREHEN METABOLIC PANEL: CPT

## 2019-08-22 PROCEDURE — 700102 HCHG RX REV CODE 250 W/ 637 OVERRIDE(OP): Performed by: FAMILY MEDICINE

## 2019-08-22 PROCEDURE — 700105 HCHG RX REV CODE 258: Performed by: FAMILY MEDICINE

## 2019-08-22 PROCEDURE — A9270 NON-COVERED ITEM OR SERVICE: HCPCS | Performed by: FAMILY MEDICINE

## 2019-08-22 PROCEDURE — 85025 COMPLETE CBC W/AUTO DIFF WBC: CPT

## 2019-08-22 PROCEDURE — 700111 HCHG RX REV CODE 636 W/ 250 OVERRIDE (IP): Performed by: FAMILY MEDICINE

## 2019-08-22 PROCEDURE — 770020 HCHG ROOM/CARE - TELE (206)

## 2019-08-22 PROCEDURE — 97165 OT EVAL LOW COMPLEX 30 MIN: CPT

## 2019-08-22 PROCEDURE — 82962 GLUCOSE BLOOD TEST: CPT | Mod: 91

## 2019-08-22 PROCEDURE — 92526 ORAL FUNCTION THERAPY: CPT

## 2019-08-22 PROCEDURE — 700111 HCHG RX REV CODE 636 W/ 250 OVERRIDE (IP): Performed by: INTERNAL MEDICINE

## 2019-08-22 RX ADMIN — SODIUM CHLORIDE, POTASSIUM CHLORIDE, SODIUM LACTATE AND CALCIUM CHLORIDE: 600; 310; 30; 20 INJECTION, SOLUTION INTRAVENOUS at 13:38

## 2019-08-22 RX ADMIN — METOPROLOL SUCCINATE 25 MG: 25 TABLET, EXTENDED RELEASE ORAL at 18:15

## 2019-08-22 RX ADMIN — METRONIDAZOLE 500 MG: 500 TABLET, FILM COATED ORAL at 21:13

## 2019-08-22 RX ADMIN — GABAPENTIN 400 MG: 400 CAPSULE ORAL at 05:58

## 2019-08-22 RX ADMIN — MICONAZOLE NITRATE: 20 CREAM TOPICAL at 18:26

## 2019-08-22 RX ADMIN — SENNOSIDES, DOCUSATE SODIUM 2 TABLET: 50; 8.6 TABLET, FILM COATED ORAL at 18:15

## 2019-08-22 RX ADMIN — GABAPENTIN 400 MG: 400 CAPSULE ORAL at 12:35

## 2019-08-22 RX ADMIN — CEFTRIAXONE SODIUM 2 G: 2 INJECTION, POWDER, FOR SOLUTION INTRAMUSCULAR; INTRAVENOUS at 05:58

## 2019-08-22 RX ADMIN — MICONAZOLE NITRATE: 20 CREAM TOPICAL at 05:59

## 2019-08-22 RX ADMIN — GABAPENTIN 400 MG: 400 CAPSULE ORAL at 18:15

## 2019-08-22 RX ADMIN — INSULIN GLARGINE 20 UNITS: 100 INJECTION, SOLUTION SUBCUTANEOUS at 09:00

## 2019-08-22 RX ADMIN — SENNOSIDES, DOCUSATE SODIUM 2 TABLET: 50; 8.6 TABLET, FILM COATED ORAL at 05:58

## 2019-08-22 RX ADMIN — METOPROLOL SUCCINATE 25 MG: 25 TABLET, EXTENDED RELEASE ORAL at 05:58

## 2019-08-22 RX ADMIN — METRONIDAZOLE 500 MG: 500 TABLET, FILM COATED ORAL at 13:10

## 2019-08-22 RX ADMIN — METRONIDAZOLE 500 MG: 500 TABLET, FILM COATED ORAL at 05:58

## 2019-08-22 RX ADMIN — MORPHINE SULFATE 4 MG: 4 INJECTION INTRAVENOUS at 13:38

## 2019-08-22 RX ADMIN — OXYCODONE HYDROCHLORIDE 5 MG: 5 TABLET ORAL at 13:10

## 2019-08-22 RX ADMIN — FINASTERIDE 5 MG: 5 TABLET, FILM COATED ORAL at 05:58

## 2019-08-22 ASSESSMENT — ENCOUNTER SYMPTOMS
FLANK PAIN: 0
VOMITING: 0
WHEEZING: 0
COUGH: 0
FEVER: 0
NECK PAIN: 0
PALPITATIONS: 0
HEARTBURN: 0
BACK PAIN: 0
NERVOUS/ANXIOUS: 0
SHORTNESS OF BREATH: 0
DIARRHEA: 0
BLURRED VISION: 0
CHILLS: 0
SORE THROAT: 0
ABDOMINAL PAIN: 1
HEADACHES: 0
DIAPHORESIS: 0
DIZZINESS: 0
NAUSEA: 0

## 2019-08-22 ASSESSMENT — COGNITIVE AND FUNCTIONAL STATUS - GENERAL
HELP NEEDED FOR BATHING: A LOT
WALKING IN HOSPITAL ROOM: A LOT
PERSONAL GROOMING: A LITTLE
EATING MEALS: A LITTLE
SUGGESTED CMS G CODE MODIFIER MOBILITY: CM
CLIMB 3 TO 5 STEPS WITH RAILING: TOTAL
TURNING FROM BACK TO SIDE WHILE IN FLAT BAD: UNABLE
SUGGESTED CMS G CODE MODIFIER DAILY ACTIVITY: CK
DRESSING REGULAR LOWER BODY CLOTHING: TOTAL
MOVING TO AND FROM BED TO CHAIR: UNABLE
TOILETING: A LOT
MOBILITY SCORE: 8
MOVING FROM LYING ON BACK TO SITTING ON SIDE OF FLAT BED: UNABLE
DRESSING REGULAR UPPER BODY CLOTHING: A LITTLE
STANDING UP FROM CHAIR USING ARMS: A LOT
DAILY ACTIVITIY SCORE: 14

## 2019-08-22 ASSESSMENT — ACTIVITIES OF DAILY LIVING (ADL): TOILETING: INDEPENDENT

## 2019-08-22 ASSESSMENT — GAIT ASSESSMENTS: GAIT LEVEL OF ASSIST: UNABLE TO PARTICIPATE

## 2019-08-22 NOTE — CARE PLAN
Problem: Safety  Goal: Will remain free from injury  Outcome: PROGRESSING AS EXPECTED  Goal: Will remain free from falls  Outcome: PROGRESSING AS EXPECTED     Problem: Infection  Goal: Will remain free from infection  Outcome: PROGRESSING AS EXPECTED     Problem: Pain Management  Goal: Pain level will decrease to patient's comfort goal  Outcome: PROGRESSING AS EXPECTED  Note:   Abdominal pain resolved. Pain only with movement of leg and dressing changes. Pre medicate for leg dressing changes.      Problem: Skin Integrity  Goal: Risk for impaired skin integrity will decrease  Outcome: PROGRESSING AS EXPECTED  Note:   Continue daily dressing changes and monitor for excess drainage or infection.     Problem: Mobility  Goal: Risk for activity intolerance will decrease  Outcome: PROGRESSING SLOWER THAN EXPECTED

## 2019-08-22 NOTE — CARE PLAN
Problem: Communication  Goal: The ability to communicate needs accurately and effectively will improve  Outcome: PROGRESSING AS EXPECTED   Patient using call light appropriately.     Problem: Safety  Goal: Will remain free from falls  Outcome: PROGRESSING AS EXPECTED

## 2019-08-22 NOTE — THERAPY
"Occupational Therapy Evaluation completed.   Functional Status:  Pt presents to [facility] from SNF following RUQ abdominal pain now post laparoscopic cholecystectomy, recently admitted to hospital for diabetic foot ulcers and PVD with stent placement was discharged to SNF on 8/17. Pt performed bed mobility with mod to max a, cues required for proper sequencing and pain management, max a for LB dressing, F balance sitting eob, STS eob with mod a of 2 people and FWW, c/o increased pain with standing and declined further attempts to stand; requires education regarding proper body mechanics, encouragement to participate, guarded and fearful with movements due to prior experiences. Pt demonstrates impaired balance, activity tolerance, pain, decreased strength, motor planning and decreased endurance. Pt will benefit from acute skilled OT services while in house and post acute recommended.   Plan of Care: Will benefit from Occupational Therapy 3 times per week  Discharge Recommendations:  Equipment: Will Continue to Assess for Equipment Needs. Post-acute therapy Recommend post-acute placement for additional occupational therapy services prior to discharge home. Patient can tolerate post-acute therapies at a 5x/week frequency.      See \"Rehab Therapy-Acute\" Patient Summary Report for complete documentation.    "

## 2019-08-22 NOTE — THERAPY
"Physical Therapy Evaluation completed.   Bed Mobility:  Supine to Sit: Maximal Assist(moves very slowly, cues required for body mechanics)  Transfers: Sit to Stand: Moderate Assist(2 people)  Gait: Level Of Assist: Unable to Participate(pre-gait EOB with fww) with Front-Wheel Walker       Plan of Care: Will benefit from Physical Therapy 3 times per week  Discharge Recommendations: Equipment: Will Continue to Assess for Equipment Needs. Post-acute therapy Recommend post-acute placement for continued physical therapy services prior to discharge home. Patient can tolerate post-acute therapies at a 5x/week frequency.       See \"Rehab Therapy-Acute\" Patient Summary Report for complete documentation.     "

## 2019-08-22 NOTE — THERAPY
"Speech Language Therapy dysphagia treatment completed.   Functional Status:  Pt seen with upgraded tray; refused D1 formed squares, better intake with NTFL.  Currently with D2 without dentures; min residue mid tongue blade but clears with intermittent liquid wash.  Vocal quality is clear.  Min cues to problem-solve issues with increased texture and edentulous state.  Pt CAN further mash soft, moist solids provided on tray; strategy utilized and written above HOB, rather than reverting back to NTFL.  RN aware of status.    Recommendations: Continue current diet with addtl mash as needed/preferred by pt. Diet can be downgraded if not tolerated, to NTFL.   Plan of Care: Will benefit from Speech Therapy 1 times per week  Post-Acute Therapy: Discharge to a transitional care facility for continued skilled therapy services.    See \"Rehab Therapy-Acute\" Patient Summary Report for complete documentation.     "

## 2019-08-22 NOTE — PROGRESS NOTES
"Surgery    No acute events  Patient reports he feels \"so much better\" after the surgery  Tolerating PO    /77   Pulse 60   Temp 36 °C (96.8 °F) (Temporal)   Resp 16   Ht 1.778 m (5' 10\")   Wt (!) 139.5 kg (307 lb 8.7 oz)   SpO2 95%   BMI 44.13 kg/m²     Abdomen soft, mild distention, mild TTP  Incisions CDI    Labs:  Recent Labs     08/18/19 2201 08/20/19  0039 08/21/19  0247   WBC 7.4 8.1 7.8   RBC 4.23* 3.84* 3.51*   HEMOGLOBIN 12.6* 11.4* 10.8*   HEMATOCRIT 39.5* 36.3* 34.4*   MCV 93.4 94.5 98.0*   MCH 29.8 29.7 30.8   MCHC 31.9* 31.4* 31.4*   RDW 45.8 48.2 50.7*   PLATELETCT 108* 83* 90*   MPV 9.1 9.2 9.9     Recent Labs     08/18/19 2201 08/20/19  0039 08/21/19  0247   SODIUM 140 139 140   POTASSIUM 4.2 4.3 4.6   CHLORIDE 107 105 107   CO2 26 26 26   GLUCOSE 138* 277* 210*   BUN 16 18 24*   CREATININE 1.47* 1.60* 1.80*   CALCIUM 8.5 8.3* 8.0*         Recent Labs     08/18/19 2201 08/20/19  0039 08/21/19  0247   ASTSGOT 34 33 45   ALTSGPT 25 27 33   TBILIRUBIN 0.8 1.2 0.9   ALKPHOSPHAT 82 91 112*   GLOBULIN 2.9 2.6 3.0     A/P)  8/20: lap mamadou for gangrenous perforated cholecystitis    Doing well  Diet as tolerated  Activity as tolerated  IV Abx and drain for 1-2 more days due to gross contamination    Following along    Sonu Solomon MD  [facility] (General and Vascular Surgery)  Cell: [phone] (text or call is fine, if you don't reach me please try my office)  Office: [phone]  __________________________________________________________________  Patient:Jamar Valdez   MRN:[id number]   CSN:[id number]    8/21/2019    5:56 PM    "

## 2019-08-22 NOTE — PROGRESS NOTES
Hemovac removed this morning by surgeon. Dressing completed saturated at 1300. Dressing change completed.

## 2019-08-22 NOTE — PROGRESS NOTES
On assessment, hemovac drain almost completely full- 310cc serosanguinous fluid emptied. Abdominal incisions x6 well approximated and drain dressing CDI. No signs of infection.

## 2019-08-22 NOTE — PROGRESS NOTES
[facility] Medicine Daily Progress Note    Date of Service  8/22/2019    Chief Complaint  74 y.o. male admitted 8/18/2019 with Right upper quadrant abdominal pain    Hospital Course  Admitted with right upper quadrant abdominal pain, work-up showed cholecystitis, underwent laparoscopic cholecystectomy.  Recent admission for diabetic foot ulcers, peripheral vascular disease.    Interval Problem Update  Cholecystitis - pain controlled  Diabetic foot ulcer - per wound care appears to be better  Diabetes - BS high 100s  HTN - sbp 120s  Dysphagia - reevaluation again done today    Consultants/Specialty  Surgery    Code Status  Full    Disposition  SNF    Review of Systems  Review of Systems   Constitutional: Positive for malaise/fatigue. Negative for chills, diaphoresis and fever.   HENT: Negative for hearing loss and sore throat.    Eyes: Negative for blurred vision.   Respiratory: Negative for cough, shortness of breath and wheezing.    Cardiovascular: Positive for leg swelling. Negative for chest pain and palpitations.   Gastrointestinal: Positive for abdominal pain. Negative for diarrhea, heartburn, nausea and vomiting.   Genitourinary: Negative for dysuria and flank pain.   Musculoskeletal: Negative for back pain and neck pain.   Skin: Negative for rash.   Neurological: Negative for dizziness and headaches.   Psychiatric/Behavioral: The patient is not nervous/anxious.         Physical Exam  Temp:  [36 °C (96.8 °F)-36.6 °C (97.8 °F)] 36.2 °C (97.2 °F)  Pulse:  [60] 60  Resp:  [16-20] 18  BP: (140-160)/(65-83) 160/65  SpO2:  [95 %-98 %] 96 %    Physical Exam   Constitutional: He is oriented to person, place, and time. He appears well-developed.   HENT:   Head: Normocephalic and atraumatic.   Eyes: Pupils are equal, round, and reactive to light. Conjunctivae are normal.   Neck: No tracheal deviation present. No thyromegaly present.   Cardiovascular: Normal rate and regular rhythm.   Pulmonary/Chest: Effort normal and  breath sounds normal.   Abdominal: Soft. Bowel sounds are normal. He exhibits no distension. There is tenderness. There is no rebound and no guarding.   Musculoskeletal: He exhibits edema.   Ulcers of multiple toes   Lymphadenopathy:     He has no cervical adenopathy.   Neurological: He is alert and oriented to person, place, and time.   Skin: There is erythema.   Stasis dermatitis changes both legs right greater than left   Nursing note and vitals reviewed.      Fluids    Intake/Output Summary (Last 24 hours) at 8/22/2019 1302  Last data filed at 8/22/2019 0600  Gross per 24 hour   Intake 2520 ml   Output 1225 ml   Net 1295 ml       Laboratory  Recent Labs     08/20/19  0039 08/21/19  0247 08/22/19  0218   WBC 8.1 7.8 5.6   RBC 3.84* 3.51* 3.73*   HEMOGLOBIN 11.4* 10.8* 11.3*   HEMATOCRIT 36.3* 34.4* 36.3*   MCV 94.5 98.0* 97.3   MCH 29.7 30.8 30.3   MCHC 31.4* 31.4* 31.1*   RDW 48.2 50.7* 51.7*   PLATELETCT 83* 90* 100*   MPV 9.2 9.9 9.4     Recent Labs     08/20/19  0039 08/21/19  0247 08/22/19  0218   SODIUM 139 140 140   POTASSIUM 4.3 4.6 4.4   CHLORIDE 105 107 105   CO2 26 26 29   GLUCOSE 277* 210* 228*   BUN 18 24* 22   CREATININE 1.60* 1.80* 1.64*   CALCIUM 8.3* 8.0* 8.0*                   Imaging  NM-HEPATOBILIARY SCAN   Final Result      Findings consistent with acute cholecystitis.      US-RUQ   Final Result      1.  Cholelithiasis and gallbladder sludge. Normal gallbladder wall thickness. No definitive evidence of cholecystitis. If there is high clinical concern, consider HIDA scan.   2.  Hepatic steatosis.      CT-ABDOMEN-PELVIS WITH   Final Result      1.  Cholelithiasis with mild pericholecystic fat stranding. This is concerning for early cholecystitis. Correlate with symptoms and consider HIDA scan can be done for further evaluation.   2.  Small bilateral pleural effusions and associated atelectasis.   3.  Nonobstructing left nephrolithiasis.           Assessment/Plan  * Cholecystitis- (present on  admission)  Assessment & Plan  Laparoscopic cholecystectomy 8/20/2019  IV Rocephin and Flagyl  pain control  Incentive spirometry  Bowel protocol    Thrombocytopenia ([facility])- (present on admission)  Assessment & Plan  Follow cbc    HTN (hypertension)- (present on admission)  Assessment & Plan  Toprol XL    PVD (peripheral vascular disease) ([facility])- (present on admission)  Assessment & Plan  Right lower extremity angiogram. Angioplasty and 7mm x 6cm stent right SFA 8/17/2019    Diabetic foot ulcer ([facility])- (present on admission)  Assessment & Plan  Wound care  Zyvox    Type 2 diabetes mellitus with kidney complication, with long-term current use of insulin ([facility])- (present on admission)  Assessment & Plan  Started Lantus 20 units  SSI      CKD (chronic kidney disease) stage 3, GFR 30-59 ml/min ([facility])- (present on admission)  Assessment & Plan  IVF LR  Monitor BMP    Dysphagia- (present on admission)  Assessment & Plan  Dysphagia 2, NTL  SLP to follow    Elevated troponin- (present on admission)  Assessment & Plan  Likely type II NSTEMI    Class 3 severe obesity due to excess calories in adult ([facility])- (present on admission)  Assessment & Plan  Body mass index is 40.75 kg/m².         VTE prophylaxis: SCD

## 2019-08-22 NOTE — PROGRESS NOTES
Beside report received from night RN, patient care assumed. Updated patient on POC. Patient 100% paced. Bed in lowest, locked position, call lights and belongings within reach.

## 2019-08-22 NOTE — DISCHARGE PLANNING
Agency/Facility Name: [facility]  Spoke To: Hailee  Outcome: Patient declined.  Patient service connected.  Needs to be referred to Earline or Reuben.    ARACELIS Nagel notified.

## 2019-08-23 LAB
ALBUMIN SERPL BCP-MCNC: 2.3 G/DL (ref 3.2–4.9)
ALBUMIN/GLOB SERPL: 0.8 G/DL
ALP SERPL-CCNC: 127 U/L (ref 30–99)
ALT SERPL-CCNC: 29 U/L (ref 2–50)
ANION GAP SERPL CALC-SCNC: 9 MMOL/L (ref 0–11.9)
AST SERPL-CCNC: 29 U/L (ref 12–45)
BASOPHILS # BLD AUTO: 0.2 % (ref 0–1.8)
BASOPHILS # BLD: 0.01 K/UL (ref 0–0.12)
BILIRUB SERPL-MCNC: 0.5 MG/DL (ref 0.1–1.5)
BUN SERPL-MCNC: 19 MG/DL (ref 8–22)
CALCIUM SERPL-MCNC: 8 MG/DL (ref 8.5–10.5)
CHLORIDE SERPL-SCNC: 106 MMOL/L (ref 96–112)
CO2 SERPL-SCNC: 24 MMOL/L (ref 20–33)
CREAT SERPL-MCNC: 1.35 MG/DL (ref 0.5–1.4)
EOSINOPHIL # BLD AUTO: 0.23 K/UL (ref 0–0.51)
EOSINOPHIL NFR BLD: 4.5 % (ref 0–6.9)
ERYTHROCYTE [DISTWIDTH] IN BLOOD BY AUTOMATED COUNT: 50.4 FL (ref 35.9–50)
GLOBULIN SER CALC-MCNC: 2.9 G/DL (ref 1.9–3.5)
GLUCOSE BLD-MCNC: 172 MG/DL (ref 65–99)
GLUCOSE BLD-MCNC: 191 MG/DL (ref 65–99)
GLUCOSE BLD-MCNC: 195 MG/DL (ref 65–99)
GLUCOSE BLD-MCNC: 205 MG/DL (ref 65–99)
GLUCOSE BLD-MCNC: 214 MG/DL (ref 65–99)
GLUCOSE SERPL-MCNC: 211 MG/DL (ref 65–99)
HCT VFR BLD AUTO: 38.8 % (ref 42–52)
HGB BLD-MCNC: 12.6 G/DL (ref 14–18)
IMM GRANULOCYTES # BLD AUTO: 0.04 K/UL (ref 0–0.11)
IMM GRANULOCYTES NFR BLD AUTO: 0.8 % (ref 0–0.9)
LYMPHOCYTES # BLD AUTO: 0.73 K/UL (ref 1–4.8)
LYMPHOCYTES NFR BLD: 14.3 % (ref 22–41)
MCH RBC QN AUTO: 31 PG (ref 27–33)
MCHC RBC AUTO-ENTMCNC: 32.5 G/DL (ref 33.7–35.3)
MCV RBC AUTO: 95.6 FL (ref 81.4–97.8)
MONOCYTES # BLD AUTO: 0.48 K/UL (ref 0–0.85)
MONOCYTES NFR BLD AUTO: 9.4 % (ref 0–13.4)
NEUTROPHILS # BLD AUTO: 3.62 K/UL (ref 1.82–7.42)
NEUTROPHILS NFR BLD: 70.8 % (ref 44–72)
NRBC # BLD AUTO: 0 K/UL
NRBC BLD-RTO: 0 /100 WBC
PLATELET # BLD AUTO: 75 K/UL (ref 164–446)
PMV BLD AUTO: 9.9 FL (ref 9–12.9)
POTASSIUM SERPL-SCNC: 4.5 MMOL/L (ref 3.6–5.5)
PROT SERPL-MCNC: 5.2 G/DL (ref 6–8.2)
RBC # BLD AUTO: 4.06 M/UL (ref 4.7–6.1)
SODIUM SERPL-SCNC: 139 MMOL/L (ref 135–145)
WBC # BLD AUTO: 5.1 K/UL (ref 4.8–10.8)

## 2019-08-23 PROCEDURE — 85025 COMPLETE CBC W/AUTO DIFF WBC: CPT

## 2019-08-23 PROCEDURE — 700102 HCHG RX REV CODE 250 W/ 637 OVERRIDE(OP): Performed by: INTERNAL MEDICINE

## 2019-08-23 PROCEDURE — A9270 NON-COVERED ITEM OR SERVICE: HCPCS | Performed by: FAMILY MEDICINE

## 2019-08-23 PROCEDURE — 80053 COMPREHEN METABOLIC PANEL: CPT

## 2019-08-23 PROCEDURE — 99232 SBSQ HOSP IP/OBS MODERATE 35: CPT | Performed by: FAMILY MEDICINE

## 2019-08-23 PROCEDURE — 700102 HCHG RX REV CODE 250 W/ 637 OVERRIDE(OP): Performed by: FAMILY MEDICINE

## 2019-08-23 PROCEDURE — 700111 HCHG RX REV CODE 636 W/ 250 OVERRIDE (IP): Performed by: FAMILY MEDICINE

## 2019-08-23 PROCEDURE — 700105 HCHG RX REV CODE 258: Performed by: FAMILY MEDICINE

## 2019-08-23 PROCEDURE — 82962 GLUCOSE BLOOD TEST: CPT | Mod: 91

## 2019-08-23 PROCEDURE — 770020 HCHG ROOM/CARE - TELE (206)

## 2019-08-23 PROCEDURE — 29580 STRAPPING UNNA BOOT: CPT

## 2019-08-23 PROCEDURE — 36415 COLL VENOUS BLD VENIPUNCTURE: CPT

## 2019-08-23 PROCEDURE — 700111 HCHG RX REV CODE 636 W/ 250 OVERRIDE (IP): Performed by: INTERNAL MEDICINE

## 2019-08-23 PROCEDURE — A9270 NON-COVERED ITEM OR SERVICE: HCPCS | Performed by: INTERNAL MEDICINE

## 2019-08-23 RX ORDER — HYDRALAZINE HYDROCHLORIDE 20 MG/ML
10 INJECTION INTRAMUSCULAR; INTRAVENOUS EVERY 6 HOURS PRN
Status: DISCONTINUED | OUTPATIENT
Start: 2019-08-23 | End: 2019-08-29 | Stop reason: HOSPADM

## 2019-08-23 RX ORDER — AMLODIPINE BESYLATE 5 MG/1
5 TABLET ORAL
Status: DISCONTINUED | OUTPATIENT
Start: 2019-08-23 | End: 2019-08-25

## 2019-08-23 RX ORDER — INSULIN GLARGINE 100 [IU]/ML
30 INJECTION, SOLUTION SUBCUTANEOUS
Status: DISCONTINUED | OUTPATIENT
Start: 2019-08-24 | End: 2019-08-26

## 2019-08-23 RX ADMIN — MAGNESIUM HYDROXIDE 30 ML: 400 SUSPENSION ORAL at 18:10

## 2019-08-23 RX ADMIN — AMLODIPINE BESYLATE 5 MG: 5 TABLET ORAL at 13:29

## 2019-08-23 RX ADMIN — GABAPENTIN 400 MG: 400 CAPSULE ORAL at 05:14

## 2019-08-23 RX ADMIN — SENNOSIDES, DOCUSATE SODIUM 2 TABLET: 50; 8.6 TABLET, FILM COATED ORAL at 05:14

## 2019-08-23 RX ADMIN — GABAPENTIN 400 MG: 400 CAPSULE ORAL at 12:50

## 2019-08-23 RX ADMIN — ONDANSETRON 4 MG: 4 TABLET, ORALLY DISINTEGRATING ORAL at 10:03

## 2019-08-23 RX ADMIN — GABAPENTIN 400 MG: 400 CAPSULE ORAL at 18:10

## 2019-08-23 RX ADMIN — HYDRALAZINE HYDROCHLORIDE 10 MG: 20 INJECTION INTRAMUSCULAR; INTRAVENOUS at 16:22

## 2019-08-23 RX ADMIN — FINASTERIDE 5 MG: 5 TABLET, FILM COATED ORAL at 05:12

## 2019-08-23 RX ADMIN — METOPROLOL SUCCINATE 25 MG: 25 TABLET, EXTENDED RELEASE ORAL at 18:09

## 2019-08-23 RX ADMIN — SENNOSIDES, DOCUSATE SODIUM 2 TABLET: 50; 8.6 TABLET, FILM COATED ORAL at 18:10

## 2019-08-23 RX ADMIN — METOPROLOL SUCCINATE 25 MG: 25 TABLET, EXTENDED RELEASE ORAL at 05:14

## 2019-08-23 RX ADMIN — OXYCODONE HYDROCHLORIDE 10 MG: 10 TABLET ORAL at 18:09

## 2019-08-23 RX ADMIN — METRONIDAZOLE 500 MG: 500 TABLET, FILM COATED ORAL at 05:14

## 2019-08-23 RX ADMIN — INSULIN GLARGINE 20 UNITS: 100 INJECTION, SOLUTION SUBCUTANEOUS at 05:11

## 2019-08-23 RX ADMIN — MICONAZOLE NITRATE: 20 CREAM TOPICAL at 05:19

## 2019-08-23 RX ADMIN — MORPHINE SULFATE 4 MG: 4 INJECTION INTRAVENOUS at 18:23

## 2019-08-23 RX ADMIN — CEFTRIAXONE SODIUM 2 G: 2 INJECTION, POWDER, FOR SOLUTION INTRAMUSCULAR; INTRAVENOUS at 05:15

## 2019-08-23 RX ADMIN — MICONAZOLE NITRATE: 20 CREAM TOPICAL at 18:21

## 2019-08-23 ASSESSMENT — ENCOUNTER SYMPTOMS
BACK PAIN: 0
PALPITATIONS: 0
VOMITING: 0
ABDOMINAL PAIN: 1
DIARRHEA: 0
HEARTBURN: 0
NECK PAIN: 0
WHEEZING: 0
SHORTNESS OF BREATH: 0
NERVOUS/ANXIOUS: 0
HEADACHES: 0
FLANK PAIN: 0
DIZZINESS: 0
NAUSEA: 0
CHILLS: 0
COUGH: 0
BLURRED VISION: 0
DIAPHORESIS: 0
FEVER: 0
SORE THROAT: 0

## 2019-08-23 ASSESSMENT — LIFESTYLE VARIABLES
HAVE YOU EVER FELT YOU SHOULD CUT DOWN ON YOUR DRINKING: NO
AVERAGE NUMBER OF DAYS PER WEEK YOU HAVE A DRINK CONTAINING ALCOHOL: 0
HOW MANY TIMES IN THE PAST YEAR HAVE YOU HAD 5 OR MORE DRINKS IN A DAY: 0
TOTAL SCORE: 0
CONSUMPTION TOTAL: NEGATIVE
ALCOHOL_USE: NO
TOTAL SCORE: 0
ON A TYPICAL DAY WHEN YOU DRINK ALCOHOL HOW MANY DRINKS DO YOU HAVE: 0
EVER FELT BAD OR GUILTY ABOUT YOUR DRINKING: NO
EVER HAD A DRINK FIRST THING IN THE MORNING TO STEADY YOUR NERVES TO GET RID OF A HANGOVER: NO
TOTAL SCORE: 0
HAVE PEOPLE ANNOYED YOU BY CRITICIZING YOUR DRINKING: NO

## 2019-08-23 NOTE — PROGRESS NOTES
[facility] Medicine Daily Progress Note    Date of Service  8/23/2019    Chief Complaint  74 y.o. male admitted 8/18/2019 with Right upper quadrant abdominal pain    Hospital Course  Admitted with right upper quadrant abdominal pain, work-up showed cholecystitis, underwent laparoscopic cholecystectomy.  Recent admission for diabetic foot ulcers, peripheral vascular disease.    Interval Problem Update  Cholecystitis - pain controlled, no BM yet  Diabetic foot ulcer - previous culture MRSA  Diabetes - BS high 100s  HTN - sbp 140-170    Consultants/Specialty  Surgery    Code Status  Full    Disposition  SNF    Review of Systems  Review of Systems   Constitutional: Positive for malaise/fatigue. Negative for chills, diaphoresis and fever.   HENT: Negative for hearing loss and sore throat.    Eyes: Negative for blurred vision.   Respiratory: Negative for cough, shortness of breath and wheezing.    Cardiovascular: Positive for leg swelling. Negative for chest pain and palpitations.   Gastrointestinal: Positive for abdominal pain. Negative for diarrhea, heartburn, nausea and vomiting.   Genitourinary: Negative for dysuria and flank pain.   Musculoskeletal: Negative for back pain and neck pain.   Skin: Negative for rash.   Neurological: Negative for dizziness and headaches.   Psychiatric/Behavioral: The patient is not nervous/anxious.         Physical Exam  Temp:  [36.2 °C (97.2 °F)-36.7 °C (98.1 °F)] 36.6 °C (97.8 °F)  Pulse:  [60-62] 60  Resp:  [16-20] 18  BP: (147-170)/(70-87) 165/79  SpO2:  [97 %-99 %] 99 %    Physical Exam   Constitutional: He is oriented to person, place, and time. He appears well-developed.   HENT:   Head: Normocephalic and atraumatic.   Eyes: Pupils are equal, round, and reactive to light. Conjunctivae are normal.   Neck: No tracheal deviation present. No thyromegaly present.   Cardiovascular: Normal rate and regular rhythm.   Pulmonary/Chest: Effort normal and breath sounds normal.   Abdominal: Soft.  Bowel sounds are normal. He exhibits no distension. There is tenderness. There is no rebound and no guarding.   Musculoskeletal: He exhibits edema.   Ulcers of multiple toes   Lymphadenopathy:     He has no cervical adenopathy.   Neurological: He is alert and oriented to person, place, and time.   Skin: There is erythema.   Stasis dermatitis changes both legs right greater than left   Nursing note and vitals reviewed.      Fluids    Intake/Output Summary (Last 24 hours) at 8/23/2019 1304  Last data filed at 8/23/2019 0739  Gross per 24 hour   Intake --   Output 1225 ml   Net -1225 ml       Laboratory  Recent Labs     08/21/19  0247 08/22/19  0218 08/23/19  0308   WBC 7.8 5.6 5.1   RBC 3.51* 3.73* 4.06*   HEMOGLOBIN 10.8* 11.3* 12.6*   HEMATOCRIT 34.4* 36.3* 38.8*   MCV 98.0* 97.3 95.6   MCH 30.8 30.3 31.0   MCHC 31.4* 31.1* 32.5*   RDW 50.7* 51.7* 50.4*   PLATELETCT 90* 100* 75*   MPV 9.9 9.4 9.9     Recent Labs     08/21/19 0247 08/22/19  0218 08/23/19  0308   SODIUM 140 140 139   POTASSIUM 4.6 4.4 4.5   CHLORIDE 107 105 106   CO2 26 29 24   GLUCOSE 210* 228* 211*   BUN 24* 22 19   CREATININE 1.80* 1.64* 1.35   CALCIUM 8.0* 8.0* 8.0*                   Imaging  NM-HEPATOBILIARY SCAN   Final Result      Findings consistent with acute cholecystitis.      US-RUQ   Final Result      1.  Cholelithiasis and gallbladder sludge. Normal gallbladder wall thickness. No definitive evidence of cholecystitis. If there is high clinical concern, consider HIDA scan.   2.  Hepatic steatosis.      CT-ABDOMEN-PELVIS WITH   Final Result      1.  Cholelithiasis with mild pericholecystic fat stranding. This is concerning for early cholecystitis. Correlate with symptoms and consider HIDA scan can be done for further evaluation.   2.  Small bilateral pleural effusions and associated atelectasis.   3.  Nonobstructing left nephrolithiasis.           Assessment/Plan  * Cholecystitis- (present on admission)  Assessment & Plan  Laparoscopic  cholecystectomy 8/20/2019  Finished antibiotic course (Zosyn then Rocephin+Flagyl)  pain control  Incentive spirometry  Bowel protocol    Thrombocytopenia (HCC)- (present on admission)  Assessment & Plan  Follow cbc    HTN (hypertension)- (present on admission)  Assessment & Plan  Toprol XL  Start Norvasc 5 mg daily  Start IV Hydralazine prn with parameters    PVD (peripheral vascular disease) ([facility])- (present on admission)  Assessment & Plan  Right lower extremity angiogram. Angioplasty and 7mm x 6cm stent right SFA 8/17/2019    Diabetic foot ulcer ([facility])- (present on admission)  Assessment & Plan  Wound care  Finished course of Zyvox    Type 2 diabetes mellitus with kidney complication, with long-term current use of insulin ([facility])- (present on admission)  Assessment & Plan  Increase Lantus to 30 units  SSI      CKD (chronic kidney disease) stage 3, GFR 30-59 ml/min ([facility])- (present on admission)  Assessment & Plan  Stop IVF LR  Monitor BMP    Dysphagia- (present on admission)  Assessment & Plan  Dysphagia 2, NTL  SLP to follow    Elevated troponin- (present on admission)  Assessment & Plan  Likely type II NSTEMI    Class 3 severe obesity due to excess calories in adult ([facility])- (present on admission)  Assessment & Plan  Body mass index is 40.75 kg/m².         VTE prophylaxis: SCD

## 2019-08-23 NOTE — DISCHARGE PLANNING
Agency/Facility Name: [facility]  Spoke To: Mattie  Outcome: Confirmed bed is available for 8/24.  Will call back with transport time.

## 2019-08-23 NOTE — PROGRESS NOTES
Bedside report received from night RN, patient care assumed. Patient A&Ox4, no complaints of pain. Updated pt on POC, questions answered. Bed in lowest, locked position, call lights and belongings within reach, treaded socks on.

## 2019-08-23 NOTE — DISCHARGE PLANNING
Agency/Facility Name: [facility]  Spoke To: Marilyn  Outcome: Transport is set up for 8/24 at 11am

## 2019-08-23 NOTE — PROGRESS NOTES
Went in to turn pt. Noticed R leg dressing was saturated. Tried to educate the patient multiple times on why he needed a dressing change. Per pt, he gets it once a day in the afternoon and it's very painful, pt denies dressing change.  Pt was educated on the importance of not having a wet dressing by me and Jose FISHER. Still denies at this time. Will pass on to day shift.

## 2019-08-23 NOTE — PROGRESS NOTES
"Surgery    No acute events  Patient reports he feels \"so much better\" after the surgery  Tolerating PO better today    BP (!) 165/79   Pulse 60   Temp 36.6 °C (97.8 °F) (Temporal)   Resp 18   Ht 1.778 m (5' 10\")   Wt (!) 141.3 kg (311 lb 8.2 oz)   SpO2 99%   BMI 44.70 kg/m²     Abdomen soft, mild distention, mild TTP  Incisions CDI  Drain site on right flank is draining clear fluid  Edema of abdominal wall and lower extremities.    Labs:  Recent Labs     08/21/19 0247 08/22/19 0218 08/23/19  0308   WBC 7.8 5.6 5.1   RBC 3.51* 3.73* 4.06*   HEMOGLOBIN 10.8* 11.3* 12.6*   HEMATOCRIT 34.4* 36.3* 38.8*   MCV 98.0* 97.3 95.6   MCH 30.8 30.3 31.0   MCHC 31.4* 31.1* 32.5*   RDW 50.7* 51.7* 50.4*   PLATELETCT 90* 100* 75*   MPV 9.9 9.4 9.9     Recent Labs     08/21/19 0247 08/22/19 0218 08/23/19  0308   SODIUM 140 140 139   POTASSIUM 4.6 4.4 4.5   CHLORIDE 107 105 106   CO2 26 29 24   GLUCOSE 210* 228* 211*   BUN 24* 22 19   CREATININE 1.80* 1.64* 1.35   CALCIUM 8.0* 8.0* 8.0*         Recent Labs     08/21/19 0247 08/22/19  0218 08/23/19  0308   ASTSGOT 45 33 29   ALTSGPT 33 33 29   TBILIRUBIN 0.9 0.5 0.5   ALKPHOSPHAT 112* 129* 127*   GLOBULIN 3.0 3.0 2.9     A/P)  8/20: lap mamadou for gangrenous perforated cholecystitis    Doing well  Diet as tolerated  Activity as tolerated  Change bandage as needed until no more drainage  Discharge when cleared by medicine  Available anytime if questions arise.    Sonu Solomon MD  [facility] (General and Vascular Surgery)  Cell: [phone] (text or call is fine, if you don't reach me please try my office)  Office: [phone]  __________________________________________________________________  Patient:Jamar Carlos Valdez   MRN:[id number]   CSN:[id number]    "

## 2019-08-23 NOTE — CARE PLAN
Problem: Communication  Goal: The ability to communicate needs accurately and effectively will improve  Outcome: PROGRESSING AS EXPECTED  Note:   Pt's white board is updated. Pt has been updated on POC. All questions have been answered at this time.      Problem: Safety  Goal: Will remain free from falls  Outcome: PROGRESSING AS EXPECTED  Note:   Bed locked in lowest position. Bed alarm on. Treaded socks in use. Call light and belongings within reach. Patient educated to call for assistance. Pt verbalized understanding. Hourly rounding in place.

## 2019-08-23 NOTE — CARE PLAN
Problem: Communication  Goal: The ability to communicate needs accurately and effectively will improve  Outcome: PROGRESSING AS EXPECTED   Patient utilizing call light appropriately.     Problem: Bowel/Gastric:  Goal: Normal bowel function is maintained or improved  Outcome: PROGRESSING SLOWER THAN EXPECTED   Patient has not had BM since 8/18. Implementing bowel protocol.

## 2019-08-24 PROBLEM — K59.00 CONSTIPATION: Status: ACTIVE | Noted: 2019-08-24

## 2019-08-24 PROBLEM — R60.0 BILATERAL LOWER EXTREMITY EDEMA: Status: ACTIVE | Noted: 2019-08-24

## 2019-08-24 LAB
ALBUMIN SERPL BCP-MCNC: 2.4 G/DL (ref 3.2–4.9)
ALBUMIN/GLOB SERPL: 0.9 G/DL
ALP SERPL-CCNC: 124 U/L (ref 30–99)
ALT SERPL-CCNC: 21 U/L (ref 2–50)
ANION GAP SERPL CALC-SCNC: 5 MMOL/L (ref 0–11.9)
AST SERPL-CCNC: 25 U/L (ref 12–45)
BACTERIA BLD CULT: NORMAL
BASOPHILS # BLD AUTO: 0.7 % (ref 0–1.8)
BASOPHILS # BLD: 0.04 K/UL (ref 0–0.12)
BILIRUB SERPL-MCNC: 0.6 MG/DL (ref 0.1–1.5)
BUN SERPL-MCNC: 15 MG/DL (ref 8–22)
CALCIUM SERPL-MCNC: 8.2 MG/DL (ref 8.5–10.5)
CHLORIDE SERPL-SCNC: 107 MMOL/L (ref 96–112)
CO2 SERPL-SCNC: 27 MMOL/L (ref 20–33)
COMMENT 1642: NORMAL
CREAT SERPL-MCNC: 1.09 MG/DL (ref 0.5–1.4)
EOSINOPHIL # BLD AUTO: 0.18 K/UL (ref 0–0.51)
EOSINOPHIL NFR BLD: 3 % (ref 0–6.9)
ERYTHROCYTE [DISTWIDTH] IN BLOOD BY AUTOMATED COUNT: 49.1 FL (ref 35.9–50)
GLOBULIN SER CALC-MCNC: 2.8 G/DL (ref 1.9–3.5)
GLUCOSE BLD-MCNC: 154 MG/DL (ref 65–99)
GLUCOSE BLD-MCNC: 163 MG/DL (ref 65–99)
GLUCOSE BLD-MCNC: 167 MG/DL (ref 65–99)
GLUCOSE BLD-MCNC: 168 MG/DL (ref 65–99)
GLUCOSE SERPL-MCNC: 169 MG/DL (ref 65–99)
HCT VFR BLD AUTO: 36.4 % (ref 42–52)
HGB BLD-MCNC: 11.7 G/DL (ref 14–18)
IMM GRANULOCYTES # BLD AUTO: 0.06 K/UL (ref 0–0.11)
IMM GRANULOCYTES NFR BLD AUTO: 1 % (ref 0–0.9)
LYMPHOCYTES # BLD AUTO: 0.66 K/UL (ref 1–4.8)
LYMPHOCYTES NFR BLD: 11 % (ref 22–41)
MCH RBC QN AUTO: 30.2 PG (ref 27–33)
MCHC RBC AUTO-ENTMCNC: 32.1 G/DL (ref 33.7–35.3)
MCV RBC AUTO: 94.1 FL (ref 81.4–97.8)
MONOCYTES # BLD AUTO: 0.49 K/UL (ref 0–0.85)
MONOCYTES NFR BLD AUTO: 8.2 % (ref 0–13.4)
MORPHOLOGY BLD-IMP: NORMAL
NEUTROPHILS # BLD AUTO: 4.57 K/UL (ref 1.82–7.42)
NEUTROPHILS NFR BLD: 76.1 % (ref 44–72)
NRBC # BLD AUTO: 0 K/UL
NRBC BLD-RTO: 0 /100 WBC
PLATELET # BLD AUTO: 52 K/UL (ref 164–446)
PLATELETS.RETICULATED NFR BLD AUTO: 1.2 K/UL (ref 0.6–13.1)
PMV BLD AUTO: 10.1 FL (ref 9–12.9)
POTASSIUM SERPL-SCNC: 4.2 MMOL/L (ref 3.6–5.5)
PROT SERPL-MCNC: 5.2 G/DL (ref 6–8.2)
RBC # BLD AUTO: 3.87 M/UL (ref 4.7–6.1)
SIGNIFICANT IND 70042: NORMAL
SITE SITE: NORMAL
SODIUM SERPL-SCNC: 139 MMOL/L (ref 135–145)
SOURCE SOURCE: NORMAL
WBC # BLD AUTO: 6 K/UL (ref 4.8–10.8)

## 2019-08-24 PROCEDURE — 700102 HCHG RX REV CODE 250 W/ 637 OVERRIDE(OP): Performed by: FAMILY MEDICINE

## 2019-08-24 PROCEDURE — 99232 SBSQ HOSP IP/OBS MODERATE 35: CPT | Performed by: FAMILY MEDICINE

## 2019-08-24 PROCEDURE — 700102 HCHG RX REV CODE 250 W/ 637 OVERRIDE(OP): Performed by: INTERNAL MEDICINE

## 2019-08-24 PROCEDURE — 700111 HCHG RX REV CODE 636 W/ 250 OVERRIDE (IP): Performed by: FAMILY MEDICINE

## 2019-08-24 PROCEDURE — 85055 RETICULATED PLATELET ASSAY: CPT

## 2019-08-24 PROCEDURE — 770020 HCHG ROOM/CARE - TELE (206)

## 2019-08-24 PROCEDURE — A6250 SKIN SEAL PROTECT MOISTURIZR: HCPCS | Performed by: FAMILY MEDICINE

## 2019-08-24 PROCEDURE — 80053 COMPREHEN METABOLIC PANEL: CPT

## 2019-08-24 PROCEDURE — 82962 GLUCOSE BLOOD TEST: CPT | Mod: 91

## 2019-08-24 PROCEDURE — 36415 COLL VENOUS BLD VENIPUNCTURE: CPT

## 2019-08-24 PROCEDURE — A9270 NON-COVERED ITEM OR SERVICE: HCPCS | Performed by: INTERNAL MEDICINE

## 2019-08-24 PROCEDURE — A9270 NON-COVERED ITEM OR SERVICE: HCPCS | Performed by: FAMILY MEDICINE

## 2019-08-24 PROCEDURE — 85025 COMPLETE CBC W/AUTO DIFF WBC: CPT

## 2019-08-24 RX ORDER — FUROSEMIDE 10 MG/ML
40 INJECTION INTRAMUSCULAR; INTRAVENOUS ONCE
Status: COMPLETED | OUTPATIENT
Start: 2019-08-24 | End: 2019-08-24

## 2019-08-24 RX ADMIN — FUROSEMIDE 40 MG: 10 INJECTION, SOLUTION INTRAMUSCULAR; INTRAVENOUS at 16:10

## 2019-08-24 RX ADMIN — METOPROLOL SUCCINATE 25 MG: 25 TABLET, EXTENDED RELEASE ORAL at 05:19

## 2019-08-24 RX ADMIN — FINASTERIDE 5 MG: 5 TABLET, FILM COATED ORAL at 05:18

## 2019-08-24 RX ADMIN — MICONAZOLE NITRATE: 20 CREAM TOPICAL at 18:11

## 2019-08-24 RX ADMIN — GABAPENTIN 400 MG: 400 CAPSULE ORAL at 18:11

## 2019-08-24 RX ADMIN — HYDRALAZINE HYDROCHLORIDE 10 MG: 20 INJECTION INTRAMUSCULAR; INTRAVENOUS at 12:52

## 2019-08-24 RX ADMIN — MAGNESIUM HYDROXIDE 30 ML: 400 SUSPENSION ORAL at 11:05

## 2019-08-24 RX ADMIN — GABAPENTIN 400 MG: 400 CAPSULE ORAL at 05:19

## 2019-08-24 RX ADMIN — BISACODYL 10 MG: 10 SUPPOSITORY RECTAL at 11:05

## 2019-08-24 RX ADMIN — SENNOSIDES, DOCUSATE SODIUM 2 TABLET: 50; 8.6 TABLET, FILM COATED ORAL at 05:19

## 2019-08-24 RX ADMIN — METOPROLOL SUCCINATE 25 MG: 25 TABLET, EXTENDED RELEASE ORAL at 18:11

## 2019-08-24 RX ADMIN — MICONAZOLE NITRATE: 20 CREAM TOPICAL at 05:22

## 2019-08-24 RX ADMIN — AMLODIPINE BESYLATE 5 MG: 5 TABLET ORAL at 05:19

## 2019-08-24 RX ADMIN — GABAPENTIN 400 MG: 400 CAPSULE ORAL at 11:05

## 2019-08-24 RX ADMIN — INSULIN GLARGINE 30 UNITS: 100 INJECTION, SOLUTION SUBCUTANEOUS at 08:38

## 2019-08-24 ASSESSMENT — ENCOUNTER SYMPTOMS
BLURRED VISION: 0
WHEEZING: 0
DIARRHEA: 0
VOMITING: 0
SORE THROAT: 0
SHORTNESS OF BREATH: 0
NERVOUS/ANXIOUS: 0
BACK PAIN: 0
CONSTIPATION: 1
DIZZINESS: 0
NAUSEA: 0
HEADACHES: 0
PALPITATIONS: 0
ABDOMINAL PAIN: 1
NECK PAIN: 0
COUGH: 0
DIAPHORESIS: 0
HEARTBURN: 0
FLANK PAIN: 0
FEVER: 0
CHILLS: 0

## 2019-08-24 NOTE — PROGRESS NOTES
Received bedside report from PHILLIP Mendoza. POC discussed. First assessment completed, call light within reach. Fall precautions within place. Will continue to monitor.

## 2019-08-24 NOTE — PROGRESS NOTES
Patient transitioned to room air today and sating >90%. PRN hydralazine given x1 for SBP >160 and one time lasix given. No complaints of pain. Patient log rolls with an assistance of one. BMx3 today after bowel regimen. Patient requires limit setting. Wound care RN to see patient q48hr for LE wound care.         MONITOR SUMMARY    100% AV Paced 60    Ectopy: None      12hr chart check

## 2019-08-24 NOTE — WOUND TEAM
"[facility]  Inpatient Services  Initial Wound and Skin Care Evaluation    Admission Date: 8/18/2019     Consult Date: 08/20   HPI, PMH, SH: Reviewed    Unit where seen by Wound Team: [id number]     WOUND CONSULT RELATED TO:  RLE     SUBJECTIVE:  \"[facility]\"      Self Report / Pain Level:  Pre-medicated with IV morphine        OBJECTIVE:  In bed, previous dressing of adpatic ABD and rolled gauze - drainage through all layers.      WOUND TYPE, LOCATION, CHARACTERISTICS (Pressure Injuries: location, stage, POA or date identified)          Wound 08/19/19 Venous Ulcer Leg Right almost circumfrential  (Active)   Wound Image     8/19/2019  8:30 PM   Site Assessment NOEMÍ 8/23/2019  9:59 PM   Ella-wound Assessment Edema;Yellow-brown 8/23/2019  6:30 PM   Margins Undefined edges 8/23/2019  6:30 PM   Tunneling 0 cm 8/20/2019  4:00 PM   Undermining 0 cm 8/20/2019  4:00 PM   Closure None 8/21/2019  9:48 AM   Drainage Amount Moderate 8/23/2019  6:30 PM   Drainage Description Sanguineous 8/23/2019  6:30 PM   Non-staged Wound Description Full thickness 8/23/2019  6:30 PM   Treatments Site care;Cleansed 8/23/2019  6:30 PM   Cleansing Approved Wound Cleanser 8/23/2019  6:30 PM   Periwound Protectant Skin Moisturizer 8/22/2019  2:00 PM   Dressing Options Unna Boot;Absorbent Abdominal Pad 8/23/2019  6:30 PM   Dressing Cleansing/Solutions Not Applicable 8/22/2019  8:15 PM   Dressing Changed Changed 8/23/2019  6:30 PM   Dressing Status Clean;Dry;Intact 8/22/2019  8:15 PM   Dressing Change Frequency Every 48 hrs 8/23/2019  6:30 PM   NEXT Dressing Change  08/24/19 8/23/2019  9:59 PM   NEXT Weekly Photo (Inpatient Only) 08/28/19 8/23/2019  9:59 PM   WOUND NURSE ONLY - Odor None 8/23/2019  6:30 PM   WOUND NURSE ONLY - Pulses Not palpable 8/20/2019  4:00 PM   WOUND NURSE ONLY - Exposed Structures None 8/23/2019  6:30 PM   WOUND NURSE ONLY - Tissue Type and Percentage red 8/23/2019  6:30 PM   WOUND NURSE " ONLY - Time Spent with Patient (mins) 90 8/23/2019  6:30 PM            Vascular:    08/17/19    PROCEDURES PERFORMED:  1.  Right lower extremity angiogram.  2.  Angioplasty and 7 mm x 6 cm right superficial femoral artery stent.  3.  Ultrasound-guided left femoral access.    Lab Values:    Lab Results   Component Value Date/Time    WBC 5.1 08/23/2019 03:08 AM    RBC 4.06 (L) 08/23/2019 03:08 AM    HEMOGLOBIN 12.6 (L) 08/23/2019 03:08 AM    HEMATOCRIT 38.8 (L) 08/23/2019 03:08 AM        Lab Results   Component Value Date/Time    HBA1C 8.0 (H) 08/11/2019 04:59 AM        Culture:   Not indicated     INTERVENTIONS BY WOUND TEAM:  Dressing removed, wound cleansed with wound cleanser, placed an unna boot with ABD between layers, coban wrapped loosely.             Interdisciplinary consultation: Patient, Bedside RN    EVALUATION: patient recently admitted, DC'd 08/17 to SNF and re-admitted 08/18.  Had angio and stent done with Dr. Rios 08/17 with what appears to be improvement of wounds.  Still with large amount of drainage and need for compression.  Pt now willing to try unna boot.  Wrapped loosely to gauge pt's response.      Factors affecting wound healing: venous insufficiency, PVD, DM, CKD    Goals: Steady decrease in wound area and depth weekly.    NURSING PLAN OF CARE ORDERS (X):    Dressing changes: See Dressing Care orders: X  Skin care: See Skin Care orders: X  Rectal tube care: See Rectal Tube Care orders:   Other orders:    RSKIN: CURRENT (X) ORDERED (O):   Q shift Deejay:  X  Q shift pressure point assessments:  X  Pressure redistribution mattress          X  ANTONIO          Bariatric ANTONIO         Bariatric foam           Heel float boots          Float Heels off Bed with Pillows       X        Barrier wipes         Barrier Cream         Barrier paste          Sacral silicone dressing       X  Silicone O2 tubing         Anchorfast         Cannula fixation Device (Tender )          Gray Foam Ear protectors            Trach with Optifoam split foam                 Waffle cushion        Waffle Overlay         Rectal tube or BMS         Antifungal tx      Interdry          Reposition q 2 hours      X  Up to chair        Ambulate      PT/OT        Dietician        Diabetes Education      PO   X  TF     TPN     NPO   # days   Other        WOUND TEAM PLAN OF CARE (X):   NPWT change 3 x week:        Dressing changes by wound team:       Follow up as needed:     X wound team to follow up   Other (explain):     Anticipated discharge plans (X): will need ongoing wound care upon DC  SNF:         X - pt will require ongoing wound care for right leg upon discharge  Home Care:           Outpatient Wound Center:            Self Care:            Other:

## 2019-08-24 NOTE — DISCHARGE PLANNING
Agency/Facility Name: [facility]   Spoke To: Setu  Outcome: Notified that patient is not clear for transfer. Transport canceled.

## 2019-08-24 NOTE — PROGRESS NOTES
[facility] Medicine Daily Progress Note    Date of Service  8/24/2019    Chief Complaint  74 y.o. male admitted 8/18/2019 with Right upper quadrant abdominal pain    Hospital Course  Admitted with right upper quadrant abdominal pain, work-up showed cholecystitis, underwent laparoscopic cholecystectomy.  Recent admission for diabetic foot ulcers, peripheral vascular disease.    Interval Problem Update  Cholecystitis - pain controlled, no BM yet  Diabetic foot ulcer - previous culture MRSA  Diabetes - BS mid 100s  HTN - sbp 150s  Constipation - last BM 8/18    Consultants/Specialty  Surgery    Code Status  Full    Disposition  SNF    Review of Systems  Review of Systems   Constitutional: Positive for malaise/fatigue. Negative for chills, diaphoresis and fever.   HENT: Negative for hearing loss and sore throat.    Eyes: Negative for blurred vision.   Respiratory: Negative for cough, shortness of breath and wheezing.    Cardiovascular: Positive for leg swelling. Negative for chest pain and palpitations.   Gastrointestinal: Positive for abdominal pain and constipation. Negative for diarrhea, heartburn, nausea and vomiting.   Genitourinary: Negative for dysuria and flank pain.   Musculoskeletal: Negative for back pain and neck pain.   Skin: Negative for rash.   Neurological: Negative for dizziness and headaches.   Psychiatric/Behavioral: The patient is not nervous/anxious.         Physical Exam  Temp:  [36.3 °C (97.4 °F)-36.7 °C (98.1 °F)] 36.7 °C (98 °F)  Pulse:  [60-72] 72  Resp:  [18-20] 18  BP: (155-167)/(79-90) 164/80  SpO2:  [91 %-99 %] 91 %    Physical Exam   Constitutional: He is oriented to person, place, and time. He appears well-developed.   HENT:   Head: Normocephalic and atraumatic.   Eyes: Pupils are equal, round, and reactive to light. Conjunctivae are normal.   Neck: No tracheal deviation present. No thyromegaly present.   Cardiovascular: Normal rate and regular rhythm.   Pulmonary/Chest: Effort normal and  breath sounds normal.   Abdominal: Soft. Bowel sounds are normal. He exhibits no distension. There is tenderness. There is no rebound and no guarding.   Musculoskeletal: He exhibits edema.   Ulcers of multiple toes   Lymphadenopathy:     He has no cervical adenopathy.   Neurological: He is alert and oriented to person, place, and time.   Skin: There is erythema.   Stasis dermatitis changes both legs right greater than left   Nursing note and vitals reviewed.      Fluids    Intake/Output Summary (Last 24 hours) at 8/24/2019 1417  Last data filed at 8/24/2019 0524  Gross per 24 hour   Intake 260 ml   Output 1075 ml   Net -815 ml       Laboratory  Recent Labs     08/22/19  0218 08/23/19  0308 08/24/19  1232   WBC 5.6 5.1 6.0   RBC 3.73* 4.06* 3.87*   HEMOGLOBIN 11.3* 12.6* 11.7*   HEMATOCRIT 36.3* 38.8* 36.4*   MCV 97.3 95.6 94.1   MCH 30.3 31.0 30.2   MCHC 31.1* 32.5* 32.1*   RDW 51.7* 50.4* 49.1   PLATELETCT 100* 75* 52*   MPV 9.4 9.9 10.1     Recent Labs     08/22/19  0218 08/23/19  0308 08/24/19  0915   SODIUM 140 139 139   POTASSIUM 4.4 4.5 4.2   CHLORIDE 105 106 107   CO2 29 24 27   GLUCOSE 228* 211* 169*   BUN 22 19 15   CREATININE 1.64* 1.35 1.09   CALCIUM 8.0* 8.0* 8.2*                   Imaging  NM-HEPATOBILIARY SCAN   Final Result      Findings consistent with acute cholecystitis.      US-RUQ   Final Result      1.  Cholelithiasis and gallbladder sludge. Normal gallbladder wall thickness. No definitive evidence of cholecystitis. If there is high clinical concern, consider HIDA scan.   2.  Hepatic steatosis.      CT-ABDOMEN-PELVIS WITH   Final Result      1.  Cholelithiasis with mild pericholecystic fat stranding. This is concerning for early cholecystitis. Correlate with symptoms and consider HIDA scan can be done for further evaluation.   2.  Small bilateral pleural effusions and associated atelectasis.   3.  Nonobstructing left nephrolithiasis.           Assessment/Plan  * Cholecystitis- (present on  admission)  Assessment & Plan  Laparoscopic cholecystectomy 8/20/2019  Finished antibiotic course (Zosyn then Rocephin+Flagyl)  pain control  Incentive spirometry  Bowel protocol    Bilateral lower extremity edema- (present on admission)  Assessment & Plan  IV lasix x 1 to diurese  Check proBNP     Constipation- (present on admission)  Assessment & Plan  Bowel protocol    Thrombocytopenia (HCC)- (present on admission)  Assessment & Plan  Follow cbc    HTN (hypertension)- (present on admission)  Assessment & Plan  Toprol XL, started Norvasc 5 mg daily  IV Hydralazine prn with parameters    PVD (peripheral vascular disease) ([facility])- (present on admission)  Assessment & Plan  Right lower extremity angiogram. Angioplasty and 7mm x 6cm stent right SFA 8/17/2019    Diabetic foot ulcer ([facility])- (present on admission)  Assessment & Plan  Wound care  Finished course of Zyvox    Type 2 diabetes mellitus with kidney complication, with long-term current use of insulin ([facility])- (present on admission)  Assessment & Plan  Increased Lantus to 30 units  SSI      CKD (chronic kidney disease) stage 3, GFR 30-59 ml/min ([facility])- (present on admission)  Assessment & Plan  Monitor BMP    Dysphagia- (present on admission)  Assessment & Plan  Dysphagia 2, NTL  SLP to follow    Elevated troponin- (present on admission)  Assessment & Plan  Likely type II NSTEMI    Class 3 severe obesity due to excess calories in adult ([facility])- (present on admission)  Assessment & Plan  Body mass index is 40.75 kg/m².         VTE prophylaxis: SCD

## 2019-08-25 LAB
ALBUMIN SERPL BCP-MCNC: 2.3 G/DL (ref 3.2–4.9)
ALBUMIN/GLOB SERPL: 0.8 G/DL
ALP SERPL-CCNC: 123 U/L (ref 30–99)
ALT SERPL-CCNC: 23 U/L (ref 2–50)
ANION GAP SERPL CALC-SCNC: 4 MMOL/L (ref 0–11.9)
AST SERPL-CCNC: 26 U/L (ref 12–45)
BASOPHILS # BLD AUTO: 0 % (ref 0–1.8)
BASOPHILS # BLD: 0 K/UL (ref 0–0.12)
BILIRUB SERPL-MCNC: 0.6 MG/DL (ref 0.1–1.5)
BUN SERPL-MCNC: 14 MG/DL (ref 8–22)
CALCIUM SERPL-MCNC: 8.1 MG/DL (ref 8.5–10.5)
CHLORIDE SERPL-SCNC: 103 MMOL/L (ref 96–112)
CO2 SERPL-SCNC: 31 MMOL/L (ref 20–33)
CREAT SERPL-MCNC: 1.25 MG/DL (ref 0.5–1.4)
EOSINOPHIL # BLD AUTO: 0.21 K/UL (ref 0–0.51)
EOSINOPHIL NFR BLD: 4.4 % (ref 0–6.9)
ERYTHROCYTE [DISTWIDTH] IN BLOOD BY AUTOMATED COUNT: 48.4 FL (ref 35.9–50)
GLOBULIN SER CALC-MCNC: 3 G/DL (ref 1.9–3.5)
GLUCOSE BLD-MCNC: 153 MG/DL (ref 65–99)
GLUCOSE BLD-MCNC: 168 MG/DL (ref 65–99)
GLUCOSE BLD-MCNC: 179 MG/DL (ref 65–99)
GLUCOSE BLD-MCNC: 182 MG/DL (ref 65–99)
GLUCOSE BLD-MCNC: 197 MG/DL (ref 65–99)
GLUCOSE SERPL-MCNC: 183 MG/DL (ref 65–99)
HCT VFR BLD AUTO: 35.9 % (ref 42–52)
HGB BLD-MCNC: 11.9 G/DL (ref 14–18)
LYMPHOCYTES # BLD AUTO: 0.51 K/UL (ref 1–4.8)
LYMPHOCYTES NFR BLD: 10.6 % (ref 22–41)
MANUAL DIFF BLD: NORMAL
MCH RBC QN AUTO: 30.9 PG (ref 27–33)
MCHC RBC AUTO-ENTMCNC: 33.1 G/DL (ref 33.7–35.3)
MCV RBC AUTO: 93.2 FL (ref 81.4–97.8)
METAMYELOCYTES NFR BLD MANUAL: 0.9 %
MONOCYTES # BLD AUTO: 0.25 K/UL (ref 0–0.85)
MONOCYTES NFR BLD AUTO: 5.3 % (ref 0–13.4)
MORPHOLOGY BLD-IMP: NORMAL
NEUTROPHILS # BLD AUTO: 3.78 K/UL (ref 1.82–7.42)
NEUTROPHILS NFR BLD: 78.8 % (ref 44–72)
NRBC # BLD AUTO: 0 K/UL
NRBC BLD-RTO: 0 /100 WBC
NT-PROBNP SERPL IA-MCNC: 2698 PG/ML (ref 0–125)
PLATELET # BLD AUTO: 37 K/UL (ref 164–446)
PLATELET BLD QL SMEAR: NORMAL
PLATELETS.RETICULATED NFR BLD AUTO: 1.7 K/UL (ref 0.6–13.1)
PMV BLD AUTO: 10.1 FL (ref 9–12.9)
POTASSIUM SERPL-SCNC: 3.9 MMOL/L (ref 3.6–5.5)
PROT SERPL-MCNC: 5.3 G/DL (ref 6–8.2)
RBC # BLD AUTO: 3.85 M/UL (ref 4.7–6.1)
RBC BLD AUTO: NORMAL
SODIUM SERPL-SCNC: 138 MMOL/L (ref 135–145)
WBC # BLD AUTO: 4.8 K/UL (ref 4.8–10.8)

## 2019-08-25 PROCEDURE — 36415 COLL VENOUS BLD VENIPUNCTURE: CPT

## 2019-08-25 PROCEDURE — 700111 HCHG RX REV CODE 636 W/ 250 OVERRIDE (IP): Performed by: INTERNAL MEDICINE

## 2019-08-25 PROCEDURE — 85027 COMPLETE CBC AUTOMATED: CPT

## 2019-08-25 PROCEDURE — 82962 GLUCOSE BLOOD TEST: CPT

## 2019-08-25 PROCEDURE — 700102 HCHG RX REV CODE 250 W/ 637 OVERRIDE(OP): Performed by: FAMILY MEDICINE

## 2019-08-25 PROCEDURE — A9270 NON-COVERED ITEM OR SERVICE: HCPCS | Performed by: INTERNAL MEDICINE

## 2019-08-25 PROCEDURE — 770020 HCHG ROOM/CARE - TELE (206)

## 2019-08-25 PROCEDURE — 700102 HCHG RX REV CODE 250 W/ 637 OVERRIDE(OP): Performed by: INTERNAL MEDICINE

## 2019-08-25 PROCEDURE — A9270 NON-COVERED ITEM OR SERVICE: HCPCS | Performed by: FAMILY MEDICINE

## 2019-08-25 PROCEDURE — 80053 COMPREHEN METABOLIC PANEL: CPT

## 2019-08-25 PROCEDURE — 85055 RETICULATED PLATELET ASSAY: CPT

## 2019-08-25 PROCEDURE — 83880 ASSAY OF NATRIURETIC PEPTIDE: CPT

## 2019-08-25 PROCEDURE — 51798 US URINE CAPACITY MEASURE: CPT

## 2019-08-25 PROCEDURE — 99232 SBSQ HOSP IP/OBS MODERATE 35: CPT | Performed by: FAMILY MEDICINE

## 2019-08-25 PROCEDURE — 85007 BL SMEAR W/DIFF WBC COUNT: CPT

## 2019-08-25 RX ORDER — AMLODIPINE BESYLATE 10 MG/1
10 TABLET ORAL
Status: DISCONTINUED | OUTPATIENT
Start: 2019-08-26 | End: 2019-08-29 | Stop reason: HOSPADM

## 2019-08-25 RX ADMIN — OXYCODONE HYDROCHLORIDE 10 MG: 10 TABLET ORAL at 13:46

## 2019-08-25 RX ADMIN — INSULIN GLARGINE 30 UNITS: 100 INJECTION, SOLUTION SUBCUTANEOUS at 05:53

## 2019-08-25 RX ADMIN — SENNOSIDES, DOCUSATE SODIUM 2 TABLET: 50; 8.6 TABLET, FILM COATED ORAL at 05:48

## 2019-08-25 RX ADMIN — FINASTERIDE 5 MG: 5 TABLET, FILM COATED ORAL at 05:47

## 2019-08-25 RX ADMIN — MICONAZOLE NITRATE: 20 CREAM TOPICAL at 05:55

## 2019-08-25 RX ADMIN — GABAPENTIN 400 MG: 400 CAPSULE ORAL at 17:36

## 2019-08-25 RX ADMIN — SENNOSIDES, DOCUSATE SODIUM 2 TABLET: 50; 8.6 TABLET, FILM COATED ORAL at 17:36

## 2019-08-25 RX ADMIN — METOPROLOL SUCCINATE 25 MG: 25 TABLET, EXTENDED RELEASE ORAL at 17:36

## 2019-08-25 RX ADMIN — METOPROLOL SUCCINATE 25 MG: 25 TABLET, EXTENDED RELEASE ORAL at 05:47

## 2019-08-25 RX ADMIN — AMLODIPINE BESYLATE 5 MG: 5 TABLET ORAL at 05:47

## 2019-08-25 RX ADMIN — GABAPENTIN 400 MG: 400 CAPSULE ORAL at 13:03

## 2019-08-25 RX ADMIN — MORPHINE SULFATE 4 MG: 4 INJECTION INTRAVENOUS at 14:31

## 2019-08-25 RX ADMIN — GABAPENTIN 400 MG: 400 CAPSULE ORAL at 05:48

## 2019-08-25 ASSESSMENT — ENCOUNTER SYMPTOMS
SORE THROAT: 0
FLANK PAIN: 0
NECK PAIN: 0
DIAPHORESIS: 0
BACK PAIN: 0
CHILLS: 0
NERVOUS/ANXIOUS: 0
FEVER: 0
DIARRHEA: 0
VOMITING: 0
SHORTNESS OF BREATH: 0
COUGH: 0
PALPITATIONS: 0
WHEEZING: 0
CONSTIPATION: 0
ABDOMINAL PAIN: 1
BLURRED VISION: 0
HEADACHES: 0
DIZZINESS: 0
NAUSEA: 0
HEARTBURN: 0

## 2019-08-25 NOTE — PROGRESS NOTES
Assumed care of pt, he is alert and oriented. Discussed POC with pt and NOC RN. Discussed safety. Bed is locked in lowest position and call light/personal belongings are within reach.

## 2019-08-25 NOTE — PROGRESS NOTES
[facility] Medicine Daily Progress Note    Date of Service  8/25/2019    Chief Complaint  74 y.o. male admitted 8/18/2019 with Right upper quadrant abdominal pain    Hospital Course  Admitted with right upper quadrant abdominal pain, work-up showed cholecystitis, underwent laparoscopic cholecystectomy.  Recent admission for diabetic foot ulcers, peripheral vascular disease.    Interval Problem Update  Cholecystitis - pain controlled  Diabetic foot ulcer - previous culture MRSA  Diabetes - BS mid 100s  HTN - sbp 140-160  Constipation - BM x 3 yesterday    Consultants/Specialty  Surgery    Code Status  Full    Disposition  SNF    Review of Systems  Review of Systems   Constitutional: Positive for malaise/fatigue. Negative for chills, diaphoresis and fever.   HENT: Negative for hearing loss and sore throat.    Eyes: Negative for blurred vision.   Respiratory: Negative for cough, shortness of breath and wheezing.    Cardiovascular: Positive for leg swelling. Negative for chest pain and palpitations.   Gastrointestinal: Positive for abdominal pain. Negative for constipation, diarrhea, heartburn, nausea and vomiting.   Genitourinary: Negative for dysuria and flank pain.   Musculoskeletal: Negative for back pain and neck pain.   Skin: Negative for rash.   Neurological: Negative for dizziness and headaches.   Psychiatric/Behavioral: The patient is not nervous/anxious.         Physical Exam  Temp:  [36.2 °C (97.2 °F)-36.8 °C (98.2 °F)] 36.8 °C (98.2 °F)  Pulse:  [60-69] 66  Resp:  [16-18] 18  BP: (148-163)/(72-90) 148/76  SpO2:  [90 %-95 %] 95 %    Physical Exam   Constitutional: He is oriented to person, place, and time. He appears well-developed.   HENT:   Head: Normocephalic and atraumatic.   Eyes: Pupils are equal, round, and reactive to light. Conjunctivae are normal.   Neck: No tracheal deviation present. No thyromegaly present.   Cardiovascular: Normal rate and regular rhythm.   Pulmonary/Chest: Effort normal and breath  sounds normal.   Abdominal: Soft. Bowel sounds are normal. He exhibits no distension. There is tenderness. There is no rebound and no guarding.   Musculoskeletal: He exhibits edema.   Ulcers of multiple toes   Lymphadenopathy:     He has no cervical adenopathy.   Neurological: He is alert and oriented to person, place, and time.   Skin: There is erythema.   Stasis dermatitis changes both legs right greater than left   Nursing note and vitals reviewed.      Fluids    Intake/Output Summary (Last 24 hours) at 8/25/2019 1336  Last data filed at 8/25/2019 1000  Gross per 24 hour   Intake 480 ml   Output 3150 ml   Net -2670 ml       Laboratory  Recent Labs     08/23/19  0308 08/24/19  1232 08/25/19  0236   WBC 5.1 6.0 4.8   RBC 4.06* 3.87* 3.85*   HEMOGLOBIN 12.6* 11.7* 11.9*   HEMATOCRIT 38.8* 36.4* 35.9*   MCV 95.6 94.1 93.2   MCH 31.0 30.2 30.9   MCHC 32.5* 32.1* 33.1*   RDW 50.4* 49.1 48.4   PLATELETCT 75* 52* 37*   MPV 9.9 10.1 10.1     Recent Labs     08/23/19  0308 08/24/19  0915 08/25/19  0236   SODIUM 139 139 138   POTASSIUM 4.5 4.2 3.9   CHLORIDE 106 107 103   CO2 24 27 31   GLUCOSE 211* 169* 183*   BUN 19 15 14   CREATININE 1.35 1.09 1.25   CALCIUM 8.0* 8.2* 8.1*                   Imaging  NM-HEPATOBILIARY SCAN   Final Result      Findings consistent with acute cholecystitis.      US-RUQ   Final Result      1.  Cholelithiasis and gallbladder sludge. Normal gallbladder wall thickness. No definitive evidence of cholecystitis. If there is high clinical concern, consider HIDA scan.   2.  Hepatic steatosis.      CT-ABDOMEN-PELVIS WITH   Final Result      1.  Cholelithiasis with mild pericholecystic fat stranding. This is concerning for early cholecystitis. Correlate with symptoms and consider HIDA scan can be done for further evaluation.   2.  Small bilateral pleural effusions and associated atelectasis.   3.  Nonobstructing left nephrolithiasis.           Assessment/Plan  * Cholecystitis- (present on  admission)  Assessment & Plan  Laparoscopic cholecystectomy 8/20/2019  Finished antibiotic course (Zosyn then Rocephin+Flagyl)  pain control  Incentive spirometry  Bowel protocol    Bilateral lower extremity edema- (present on admission)  Assessment & Plan  IV lasix x 1 given    Constipation- (present on admission)  Assessment & Plan  Bowel protocol    Thrombocytopenia (HCC)- (present on admission)  Assessment & Plan  Follow cbc    HTN (hypertension)- (present on admission)  Assessment & Plan  Toprol XL, increase Norvasc to 5 mg daily  IV Hydralazine prn with parameters    PVD (peripheral vascular disease) ([facility])- (present on admission)  Assessment & Plan  Right lower extremity angiogram. Angioplasty and 7mm x 6cm stent right SFA 8/17/2019    Diabetic foot ulcer ([facility])- (present on admission)  Assessment & Plan  Wound care  Finished course of Zyvox    Type 2 diabetes mellitus with kidney complication, with long-term current use of insulin ([facility])- (present on admission)  Assessment & Plan  Lantus, SSI      CKD (chronic kidney disease) stage 3, GFR 30-59 ml/min ([facility])- (present on admission)  Assessment & Plan  Monitor BMP    Dysphagia- (present on admission)  Assessment & Plan  Dysphagia 2, NTL  SLP to follow    Elevated troponin- (present on admission)  Assessment & Plan  Likely type II NSTEMI    Class 3 severe obesity due to excess calories in adult ([facility])- (present on admission)  Assessment & Plan  Body mass index is 40.75 kg/m².         VTE prophylaxis: SCD

## 2019-08-25 NOTE — PROGRESS NOTES
Received call back from Dr. Duran on critical platelet count. No new orders received at this time. Will update day shift.

## 2019-08-25 NOTE — WOUND TEAM
"[facility]  Inpatient Services  Wound and Skin Care Progress     Admission Date: 8/18/2019     Consult Date: 08/20   [facility], [facility], SH: Reviewed    Unit where seen by Wound Team: [id number]     WOUND CONSULT RELATED TO:  RLE     SUBJECTIVE:  \"Did they tell you how to do this?\"      Self Report / Pain Level:  Pre-medicated with IV morphine and oral oxy        OBJECTIVE:  In bed, previous dressing intact but still drained through, added additional ABD pad to make 3 as patient may DC to SNF.      WOUND TYPE, LOCATION, CHARACTERISTICS (Pressure Injuries: location, stage, POA or date identified)     Wound 08/19/19 Venous Ulcer Leg Right almost circumfrential  (Active)   Wound Image        Site Assessment Red;Bleeding    Ella-wound Assessment Dry    Margins Attached edges    Tunneling 0 cm    Undermining 0 cm    Closure None    Drainage Amount Large    Drainage Description Serosanguineous;Yellow;Tan    Non-staged Wound Description Full thickness    Treatments Cleansed;Site care    Cleansing Approved Wound Cleanser    Periwound Protectant Not Applicable    Dressing Options Unna Boot    Dressing Cleansing/Solutions Not Applicable    Dressing Changed Changed    Dressing Status Clean;Dry;Intact    Dressing Change Frequency Every 48 hrs    NEXT Dressing Change  08/27/19    NEXT Weekly Photo (Inpatient Only) 08/28/19    WOUND NURSE ONLY - Odor None    WOUND NURSE ONLY - Pulses DP;PT;2+    WOUND NURSE ONLY - Exposed Structures None    WOUND NURSE ONLY - Tissue Type and Percentage 100% red    WOUND NURSE ONLY - Time Spent with Patient (mins) 60          Vascular:    08/17/19    PROCEDURES PERFORMED:  1.  Right lower extremity angiogram.  2.  Angioplasty and 7 mm x 6 cm right superficial femoral artery stent.  3.  Ultrasound-guided left femoral access.    Lab Values:    Lab Results   Component Value Date/Time    WBC 4.8 08/25/2019 02:36 AM    RBC 3.85 (L) 08/25/2019 02:36 AM    HEMOGLOBIN 11.9 (L) 08/25/2019 02:36 AM    " HEMATOCRIT 35.9 (L) 08/25/2019 02:36 AM        Lab Results   Component Value Date/Time    HBA1C 8.0 (H) 08/11/2019 04:59 AM        Culture:   Not indicated     INTERVENTIONS BY WOUND TEAM:  Dressing removed, wound cleansed with wound cleanser, placed an unna boot with ABD x 3 between layers, coban wrapped loosely.           Interdisciplinary consultation: Patient, Bedside RN    EVALUATION: patient recently admitted, DC'd 08/17 to SNF and re-admitted 08/18.  Had angio and stent done with Dr. Rios 08/17 with what appears to be improvement of wounds.  Still with large amount of drainage and need for compression.  Pt now willing to try unna boot.  Agreed to try compression a little tighter today, still not full compression.      Factors affecting wound healing: venous insufficiency, PVD, DM, CKD    Goals: Steady decrease in wound area and depth weekly.    NURSING PLAN OF CARE ORDERS (X):    Dressing changes: See Dressing Care orders: X  Skin care: See Skin Care orders: X  Rectal tube care: See Rectal Tube Care orders:   Other orders:     WOUND TEAM PLAN OF CARE (X):   NPWT change 3 x week:        Dressing changes by wound team:       Follow up as needed:     X wound team to follow up   Other (explain):     Anticipated discharge plans (X): will need ongoing wound care upon DC  SNF:         X - pt will require ongoing wound care for right leg upon discharge  Home Care:           Outpatient Wound Center:            Self Care:            Other:

## 2019-08-26 LAB
ALBUMIN SERPL BCP-MCNC: 2.3 G/DL (ref 3.2–4.9)
ALBUMIN/GLOB SERPL: 0.7 G/DL
ALP SERPL-CCNC: 131 U/L (ref 30–99)
ALT SERPL-CCNC: 21 U/L (ref 2–50)
ANION GAP SERPL CALC-SCNC: 7 MMOL/L (ref 0–11.9)
AST SERPL-CCNC: 25 U/L (ref 12–45)
BASOPHILS # BLD AUTO: 0.4 % (ref 0–1.8)
BASOPHILS # BLD: 0.02 K/UL (ref 0–0.12)
BILIRUB SERPL-MCNC: 0.6 MG/DL (ref 0.1–1.5)
BUN SERPL-MCNC: 14 MG/DL (ref 8–22)
CALCIUM SERPL-MCNC: 8.3 MG/DL (ref 8.5–10.5)
CHLORIDE SERPL-SCNC: 104 MMOL/L (ref 96–112)
CO2 SERPL-SCNC: 27 MMOL/L (ref 20–33)
CREAT SERPL-MCNC: 1.17 MG/DL (ref 0.5–1.4)
EOSINOPHIL # BLD AUTO: 0.23 K/UL (ref 0–0.51)
EOSINOPHIL NFR BLD: 4.7 % (ref 0–6.9)
ERYTHROCYTE [DISTWIDTH] IN BLOOD BY AUTOMATED COUNT: 49.6 FL (ref 35.9–50)
GLOBULIN SER CALC-MCNC: 3.1 G/DL (ref 1.9–3.5)
GLUCOSE BLD-MCNC: 120 MG/DL (ref 65–99)
GLUCOSE BLD-MCNC: 147 MG/DL (ref 65–99)
GLUCOSE BLD-MCNC: 179 MG/DL (ref 65–99)
GLUCOSE BLD-MCNC: 183 MG/DL (ref 65–99)
GLUCOSE SERPL-MCNC: 154 MG/DL (ref 65–99)
HCT VFR BLD AUTO: 37.9 % (ref 42–52)
HGB BLD-MCNC: 11.9 G/DL (ref 14–18)
HGB BLD-MCNC: 12.5 G/DL (ref 14–18)
IMM GRANULOCYTES # BLD AUTO: 0.09 K/UL (ref 0–0.11)
IMM GRANULOCYTES NFR BLD AUTO: 1.8 % (ref 0–0.9)
LYMPHOCYTES # BLD AUTO: 0.81 K/UL (ref 1–4.8)
LYMPHOCYTES NFR BLD: 16.6 % (ref 22–41)
MCH RBC QN AUTO: 30.8 PG (ref 27–33)
MCHC RBC AUTO-ENTMCNC: 33 G/DL (ref 33.7–35.3)
MCV RBC AUTO: 93.3 FL (ref 81.4–97.8)
MONOCYTES # BLD AUTO: 0.46 K/UL (ref 0–0.85)
MONOCYTES NFR BLD AUTO: 9.4 % (ref 0–13.4)
NEUTROPHILS # BLD AUTO: 3.26 K/UL (ref 1.82–7.42)
NEUTROPHILS NFR BLD: 67.1 % (ref 44–72)
NRBC # BLD AUTO: 0 K/UL
NRBC BLD-RTO: 0 /100 WBC
PLATELET # BLD AUTO: 26 K/UL (ref 164–446)
PMV BLD AUTO: 10.1 FL (ref 9–12.9)
POTASSIUM SERPL-SCNC: 3.7 MMOL/L (ref 3.6–5.5)
PROT SERPL-MCNC: 5.4 G/DL (ref 6–8.2)
RBC # BLD AUTO: 4.06 M/UL (ref 4.7–6.1)
SODIUM SERPL-SCNC: 138 MMOL/L (ref 135–145)
WBC # BLD AUTO: 4.9 K/UL (ref 4.8–10.8)

## 2019-08-26 PROCEDURE — 80053 COMPREHEN METABOLIC PANEL: CPT

## 2019-08-26 PROCEDURE — A9270 NON-COVERED ITEM OR SERVICE: HCPCS | Performed by: INTERNAL MEDICINE

## 2019-08-26 PROCEDURE — 99232 SBSQ HOSP IP/OBS MODERATE 35: CPT | Performed by: FAMILY MEDICINE

## 2019-08-26 PROCEDURE — 82962 GLUCOSE BLOOD TEST: CPT

## 2019-08-26 PROCEDURE — 700111 HCHG RX REV CODE 636 W/ 250 OVERRIDE (IP): Performed by: INTERNAL MEDICINE

## 2019-08-26 PROCEDURE — A6250 SKIN SEAL PROTECT MOISTURIZR: HCPCS | Performed by: FAMILY MEDICINE

## 2019-08-26 PROCEDURE — 770020 HCHG ROOM/CARE - TELE (206)

## 2019-08-26 PROCEDURE — 85025 COMPLETE CBC W/AUTO DIFF WBC: CPT

## 2019-08-26 PROCEDURE — 700102 HCHG RX REV CODE 250 W/ 637 OVERRIDE(OP): Performed by: FAMILY MEDICINE

## 2019-08-26 PROCEDURE — 700102 HCHG RX REV CODE 250 W/ 637 OVERRIDE(OP): Performed by: INTERNAL MEDICINE

## 2019-08-26 PROCEDURE — 36415 COLL VENOUS BLD VENIPUNCTURE: CPT

## 2019-08-26 PROCEDURE — A9270 NON-COVERED ITEM OR SERVICE: HCPCS | Performed by: FAMILY MEDICINE

## 2019-08-26 PROCEDURE — 85018 HEMOGLOBIN: CPT

## 2019-08-26 PROCEDURE — 700111 HCHG RX REV CODE 636 W/ 250 OVERRIDE (IP): Performed by: FAMILY MEDICINE

## 2019-08-26 RX ORDER — HYDROMORPHONE HYDROCHLORIDE 2 MG/ML
2 INJECTION, SOLUTION INTRAMUSCULAR; INTRAVENOUS; SUBCUTANEOUS ONCE
Status: DISCONTINUED | OUTPATIENT
Start: 2019-08-26 | End: 2019-08-27

## 2019-08-26 RX ORDER — INSULIN GLARGINE 100 [IU]/ML
35 INJECTION, SOLUTION SUBCUTANEOUS
Status: DISCONTINUED | OUTPATIENT
Start: 2019-08-27 | End: 2019-08-29 | Stop reason: HOSPADM

## 2019-08-26 RX ORDER — AMLODIPINE BESYLATE 10 MG/1
10 TABLET ORAL DAILY
Qty: 30 TAB
Start: 2019-08-27

## 2019-08-26 RX ORDER — OXYCODONE HYDROCHLORIDE 15 MG/1
15 TABLET ORAL EVERY 4 HOURS PRN
Qty: 18 TAB | Refills: 0 | Status: SHIPPED | OUTPATIENT
Start: 2019-08-26 | End: 2019-08-29

## 2019-08-26 RX ORDER — INSULIN GLARGINE 100 [IU]/ML
35 INJECTION, SOLUTION SUBCUTANEOUS EVERY MORNING
Qty: 10 ML | Status: ON HOLD
Start: 2019-08-27 | End: 2022-04-15

## 2019-08-26 RX ADMIN — AMLODIPINE BESYLATE 10 MG: 10 TABLET ORAL at 05:44

## 2019-08-26 RX ADMIN — METOPROLOL SUCCINATE 25 MG: 25 TABLET, EXTENDED RELEASE ORAL at 17:45

## 2019-08-26 RX ADMIN — OXYCODONE HYDROCHLORIDE 10 MG: 10 TABLET ORAL at 13:09

## 2019-08-26 RX ADMIN — FINASTERIDE 5 MG: 5 TABLET, FILM COATED ORAL at 05:44

## 2019-08-26 RX ADMIN — MICONAZOLE NITRATE: 20 CREAM TOPICAL at 17:51

## 2019-08-26 RX ADMIN — METOPROLOL SUCCINATE 25 MG: 25 TABLET, EXTENDED RELEASE ORAL at 05:45

## 2019-08-26 RX ADMIN — INSULIN GLARGINE 30 UNITS: 100 INJECTION, SOLUTION SUBCUTANEOUS at 09:32

## 2019-08-26 RX ADMIN — ONDANSETRON 4 MG: 2 INJECTION INTRAMUSCULAR; INTRAVENOUS at 17:45

## 2019-08-26 RX ADMIN — GABAPENTIN 400 MG: 400 CAPSULE ORAL at 17:45

## 2019-08-26 RX ADMIN — HYDRALAZINE HYDROCHLORIDE 10 MG: 20 INJECTION INTRAMUSCULAR; INTRAVENOUS at 00:56

## 2019-08-26 RX ADMIN — GABAPENTIN 400 MG: 400 CAPSULE ORAL at 05:45

## 2019-08-26 RX ADMIN — MICONAZOLE NITRATE: 20 CREAM TOPICAL at 05:59

## 2019-08-26 RX ADMIN — MORPHINE SULFATE 4 MG: 4 INJECTION INTRAVENOUS at 13:09

## 2019-08-26 ASSESSMENT — ENCOUNTER SYMPTOMS
ABDOMINAL PAIN: 1
DIAPHORESIS: 0
SHORTNESS OF BREATH: 0
VOMITING: 0
DIZZINESS: 0
CHILLS: 0
DIARRHEA: 0
FEVER: 0
BACK PAIN: 0
HEARTBURN: 0
NAUSEA: 0
PALPITATIONS: 0
HEADACHES: 0
FLANK PAIN: 0
BLURRED VISION: 0
CONSTIPATION: 0
NERVOUS/ANXIOUS: 0
SORE THROAT: 0
WHEEZING: 0
NECK PAIN: 0
COUGH: 0

## 2019-08-26 NOTE — PROGRESS NOTES
Attempting to transfer pt to wheelchair prior to D/C. Upon pt getting to edge of bed, blood began to ooze from wound on R leg. Notified MD and wound RN. D/C postponed. See wound note for details.

## 2019-08-26 NOTE — DISCHARGE PLANNING
Anticipated Discharge Disposition: SNF    Action: Per charge RN, patient will not be discharged today due to wound bleeding when patient was getting up for transport to wheelchair. MD wants patient to stay in hospital, Reuben notified. Transport cancelled.    Barriers to Discharge: bleeding from wound, now not medically cleared    Plan: RN CM to follow for medical clearance

## 2019-08-26 NOTE — PROGRESS NOTES
Received bedside report from PHILLIP Kruse. POC discussed. First assessment completed, call light within reach. Fall precautions within place. Will continue to monitor.

## 2019-08-26 NOTE — PROGRESS NOTES
Lab called with critical value, platelets 26. Hospitalist notified. As per Dr. Duran, no new orders at this time.

## 2019-08-26 NOTE — DISCHARGE PLANNING
Anticipated Discharge Disposition: [facility]    Action: RN CM notified patient's daughter, Nicky, that patient to be transported to [facility] at 1300 today. Bedside RN aware.    Barriers to Discharge: none    Plan: Patient to discharge to [facility] at 1300 today

## 2019-08-26 NOTE — DISCHARGE SUMMARY
Discharge Summary    CHIEF COMPLAINT ON ADMISSION  Chief Complaint   Patient presents with   • Pain   • Abdominal Pain       Reason for Admission  Wound Check     CODE STATUS  Full Code    HPI & HOSPITAL COURSE  This is a 74 y.o. male here with Cholecystitis, surgery was consulted, patient underwent upper scopic cholecystectomy.  He was initially placed on IV Zosyn, did finish the course of antibiotics with IV Rocephin and Flagyl.  He has recent history of diabetic foot ulcers and peripheral vascular disease for which he underwent intervention.  He finished a course of Zyvox during his stay here.  His diabetes and antihypertensive medications were adjusted for better control.  Physical therapy and outpatient therapy came to evaluate and he will need continued wound care and rehabitation skilled facility.  Surgery is clear for discharge.       Therefore, he is discharged in good and stable condition to skilled nursing facility.    The patient met 2-midnight criteria for an inpatient stay at the time of discharge.      FOLLOW UP ITEMS POST DISCHARGE  8/26/2019    DISCHARGE DIAGNOSES  Principal Problem:    Cholecystitis POA: Yes  Active Problems:    Elevated troponin POA: Yes    Dysphagia POA: Yes    CKD (chronic kidney disease) stage 3, GFR 30-59 ml/min ([facility]) POA: Yes    Type 2 diabetes mellitus with kidney complication, with long-term current use of insulin ([facility]) POA: Yes    Diabetic foot ulcer ([facility]) POA: Yes    PVD (peripheral vascular disease) ([facility]) POA: Yes    HTN (hypertension) POA: Yes    Thrombocytopenia ([facility]) POA: Yes    Constipation POA: Yes    Bilateral lower extremity edema POA: Yes    Class 3 severe obesity due to excess calories in adult ([facility]) POA: Yes  Resolved Problems:    * No resolved hospital problems. *      FOLLOW UP  No future appointments.  No follow-up provider specified.    MEDICATIONS ON DISCHARGE     Medication List      START taking these medications      Instructions   amLODIPine 10 MG  Tabs  Start taking on:  8/27/2019  Commonly known as:  NORVASC   Take 1 Tab by mouth every day.  Dose:  10 mg        CHANGE how you take these medications      Instructions   insulin glargine 100 UNIT/ML Soln  Start taking on:  8/27/2019  What changed:    · how much to take  · when to take this  Commonly known as:  LANTUS   Inject 35 Units as instructed every morning.  Dose:  35 Units     insulin regular 100 Unit/mL Soln  What changed:  how much to take  Commonly known as:  HUMULIN R   Inject 2-10 Units as instructed 4 Times a Day,Before Meals and at Bedtime.  Dose:  2-10 Units     oxycodone 15 MG immediate release tablet  What changed:    · medication strength  · how much to take  Commonly known as:  OXY-IR   Take 1 Tab by mouth every four hours as needed for up to 3 days.  Dose:  15 mg        CONTINUE taking these medications      Instructions   acetaminophen 500 MG Tabs  Commonly known as:  TYLENOL   Take 2 Tabs by mouth every 6 hours as needed for Mild Pain.  Dose:  1,000 mg     bisacodyl 10 MG Supp  Commonly known as:  DULCOLAX   Insert 1 Suppository in rectum 1 time daily as needed (if magnesium hydroxide ineffective after 24 hours).  Dose:  10 mg     Cholecalciferol 2000 UNIT Tabs   Take 1 Tab by mouth every day.  Dose:  2,000 Units     finasteride 5 MG Tabs  Commonly known as:  PROSCAR   Take 1 Tab by mouth every day.  Dose:  5 mg     gabapentin 400 MG Caps  Commonly known as:  NEURONTIN   Take 1 Cap by mouth 3 times a day.  Dose:  400 mg     magnesium hydroxide 400 MG/5ML Susp  Commonly known as:  MILK OF MAGNESIA   Take 30 mL by mouth 1 time daily as needed (if polyethylene glycol ineffective after 24 hours).  Dose:  30 mL     metoprolol SR 25 MG Tb24  Commonly known as:  TOPROL XL   Take 1 Tab by mouth 2 Times a Day.  Dose:  25 mg     ondansetron 4 MG Tbdp  Commonly known as:  ZOFRAN ODT   Take 1 Tab by mouth every four hours as needed for Nausea (give PO if IV route is unavailable.).  Dose:  4 mg      polyethylene glycol/lytes Pack  Commonly known as:  MIRALAX   Take 1 Packet by mouth 1 time daily as needed (if sennosides and docusate ineffective after 24 hours).  Dose:  17 g     senna-docusate 8.6-50 MG Tabs  Commonly known as:  PERICOLACE or SENOKOT S   Take 2 Tabs by mouth 2 Times a Day.  Dose:  2 Tab        STOP taking these medications    glucose 4 g chewable tablet     linezolid 600 MG Tabs  Commonly known as:  ZYVOX            Allergies  Allergies   Allergen Reactions   • Bloodless        DIET  Orders Placed This Encounter   Procedures   • Diet Order Diabetic, Cardiac     Standing Status:   Standing     Number of Occurrences:   1     Order Specific Question:   Diet:     Answer:   Diabetic [3]     Order Specific Question:   Diet:     Answer:   Cardiac [6]     Order Specific Question:   Texture/Fiber modifications:     Answer:   Dysphagia 2(Pureed/Chopped)specify fluid consistency(question 6) [2]     Order Specific Question:   Consistency/Fluid modifications:     Answer:   Nectar Thick [2]       ACTIVITY  As tolerated.  Weight bearing as tolerated    LINES, DRAINS, AND WOUNDS  This is an automated list. Peripheral IVs will be removed prior to discharge.       Wound 08/19/19 Venous Ulcer Leg Right almost circumfrential  (Active)   Wound Image     8/19/2019  8:30 PM   Site Assessment NOEMÍ 8/26/2019  9:00 AM   Ella-wound Assessment Dry 8/25/2019  3:00 PM   Margins Attached edges 8/25/2019  3:00 PM   Tunneling 0 cm 8/25/2019  3:00 PM   Undermining 0 cm 8/25/2019  3:00 PM   Closure None 8/25/2019  3:00 PM   Drainage Amount Large 8/25/2019  3:00 PM   Drainage Description Serosanguineous;Yellow;Tan 8/25/2019  3:00 PM   Non-staged Wound Description Full thickness 8/25/2019  3:00 PM   Treatments Cleansed;Site care 8/25/2019  3:00 PM   Cleansing Approved Wound Cleanser 8/25/2019  3:00 PM   Periwound Protectant Not Applicable 8/25/2019  3:00 PM   Dressing Options Raeann Ambrocio 8/25/2019  9:58 PM   Dressing  Cleansing/Solutions Not Applicable 8/25/2019  3:00 PM   Dressing Changed Changed 8/25/2019  3:00 PM   Dressing Status Clean;Dry;Intact 8/26/2019  9:00 AM   Dressing Change Frequency Every 48 hrs 8/26/2019  9:00 AM   NEXT Dressing Change  08/27/19 8/25/2019  9:58 PM   NEXT Weekly Photo (Inpatient Only) 08/28/19 8/25/2019  9:58 PM   WOUND NURSE ONLY - Odor None 8/25/2019  3:00 PM   WOUND NURSE ONLY - Pulses DP;PT;2+ 8/25/2019  3:00 PM   WOUND NURSE ONLY - Exposed Structures None 8/25/2019  3:00 PM   WOUND NURSE ONLY - Tissue Type and Percentage 100% red 8/25/2019  3:00 PM   WOUND NURSE ONLY - Time Spent with Patient (mins) 60 8/25/2019  3:00 PM       Wound 08/21/19 Abdomen;Other (Comment) Under Pannus (Active)   Site Assessment Dry;Intact;Red;Excoriated 8/26/2019  9:00 AM   Ella-wound Assessment Clean;Dry;Intact 8/26/2019  9:00 AM   Drainage Amount None 8/26/2019  9:00 AM   Treatments Cleansed 8/25/2019  9:58 PM   Dressing Options Other (Comments) 8/22/2019  8:30 AM   Dressing Changed New 8/24/2019  8:30 PM   Dressing Status Clean;Dry;Intact 8/26/2019  9:00 AM   Dressing Change Frequency As Needed 8/26/2019  9:00 AM       Moisture Associated Skin Damage Sacrum (Active)   Ella-wound Assessment Pink;Fragile;Excoriated 8/26/2019  9:00 AM   Cleansing Normal Saline Irrigation 8/25/2019  9:58 PM   Periwound Protectant Barrier Paste 8/26/2019  9:00 AM       Moisture Associated Skin Damage Buttock;Sacrum (Active)   Ella-wound Assessment Pink;Excoriated;Fragile 8/26/2019  9:00 AM   Cleansing Approved Wound Cleanser 8/25/2019  9:58 PM   Periwound Protectant Barrier Paste 8/26/2019  9:00 AM                  MENTAL STATUS ON TRANSFER  Level of Consciousness: Alert  Orientation : Oriented x 4  Speech: Speech Clear    CONSULTATIONS  Surgery - [facility]    PROCEDURES  Laparoscopic cholecystectomy  8/20/2019    LABORATORY  Lab Results   Component Value Date    SODIUM 138 08/26/2019    POTASSIUM 3.7 08/26/2019    CHLORIDE 104 08/26/2019     CO2 27 08/26/2019    GLUCOSE 154 (H) 08/26/2019    BUN 14 08/26/2019    CREATININE 1.17 08/26/2019    CREATININE 1.1 10/07/2005        Lab Results   Component Value Date    WBC 4.9 08/26/2019    HEMOGLOBIN 12.5 (L) 08/26/2019    HEMATOCRIT 37.9 (L) 08/26/2019    PLATELETCT 26 (LL) 08/26/2019        Total time of the discharge process exceeds 40 minutes.

## 2019-08-26 NOTE — CARE PLAN
Problem: Communication  Goal: The ability to communicate needs accurately and effectively will improve  Outcome: PROGRESSING AS EXPECTED  Note:   Call light within reach. Educated pt to use call light as needed. Reorient and re-educate as needed. Continue to monitor.      Problem: Safety  Goal: Will remain free from injury  Outcome: PROGRESSING AS EXPECTED  Note:   Provide assistance with repositioning, turning and ROM. Educate pt about risk for injury and reinforce as needed. Continue to monitor.    Goal: Will remain free from falls  Outcome: PROGRESSING AS EXPECTED  Note:   Treaded socks and bed/strip alarm on. Side rails up x 2. Call light within reach. Pt educated to call for assistance. Reinforce as needed. Continue to monitor.

## 2019-08-26 NOTE — DISCHARGE PLANNING
Agency/Facility Name: [facility]  Spoke To: Marilyn  Outcome: Transport is set up for 8/26 at 1300.

## 2019-08-26 NOTE — PROGRESS NOTES
[facility] Medicine Daily Progress Note    Date of Service  8/26/2019    Chief Complaint  74 y.o. male admitted 8/18/2019 with Right upper quadrant abdominal pain    Hospital Course  Admitted with right upper quadrant abdominal pain, work-up showed cholecystitis, underwent laparoscopic cholecystectomy.  Recent admission for diabetic foot ulcers, peripheral vascular disease.    Interval Problem Update  Cholecystitis - pain controlled  Diabetic foot ulcer - patient was supposed to discharge to SNF, then noted to have bleeding through his right leg dressing, wound care came to reassess patient  Diabetes - BS high 100s  HTN - sbp 140-160    Consultants/Specialty  Surgery    Code Status  Full    Disposition  SNF    Review of Systems  Review of Systems   Constitutional: Positive for malaise/fatigue. Negative for chills, diaphoresis and fever.   HENT: Negative for hearing loss and sore throat.    Eyes: Negative for blurred vision.   Respiratory: Negative for cough, shortness of breath and wheezing.    Cardiovascular: Positive for leg swelling. Negative for chest pain and palpitations.   Gastrointestinal: Positive for abdominal pain. Negative for constipation, diarrhea, heartburn, nausea and vomiting.   Genitourinary: Negative for dysuria and flank pain.   Musculoskeletal: Negative for back pain and neck pain.   Skin: Negative for rash.   Neurological: Negative for dizziness and headaches.   Psychiatric/Behavioral: The patient is not nervous/anxious.         Physical Exam  Temp:  [36.3 °C (97.3 °F)-36.6 °C (97.9 °F)] 36.6 °C (97.9 °F)  Pulse:  [60-61] 61  Resp:  [16-18] 16  BP: (148-163)/(79-90) 153/79  SpO2:  [92 %-96 %] 92 %    Physical Exam   Constitutional: He is oriented to person, place, and time. He appears well-developed.   HENT:   Head: Normocephalic and atraumatic.   Eyes: Pupils are equal, round, and reactive to light. Conjunctivae are normal.   Neck: No tracheal deviation present. No thyromegaly present.    Cardiovascular: Normal rate and regular rhythm.   Pulmonary/Chest: Effort normal and breath sounds normal.   Abdominal: Soft. Bowel sounds are normal. He exhibits no distension. There is tenderness. There is no rebound and no guarding.   Musculoskeletal: He exhibits edema.   Right leg dressing  Ulcers of multiple toes   Lymphadenopathy:     He has no cervical adenopathy.   Neurological: He is alert and oriented to person, place, and time.   Skin: There is erythema.   Stasis dermatitis changes both legs right greater than left   Nursing note and vitals reviewed.      Fluids    Intake/Output Summary (Last 24 hours) at 8/26/2019 1639  Last data filed at 8/26/2019 0549  Gross per 24 hour   Intake 260 ml   Output 900 ml   Net -640 ml       Laboratory  Recent Labs     08/24/19  1232 08/25/19  0236 08/26/19  0201   WBC 6.0 4.8 4.9   RBC 3.87* 3.85* 4.06*   HEMOGLOBIN 11.7* 11.9* 12.5*   HEMATOCRIT 36.4* 35.9* 37.9*   MCV 94.1 93.2 93.3   MCH 30.2 30.9 30.8   MCHC 32.1* 33.1* 33.0*   RDW 49.1 48.4 49.6   PLATELETCT 52* 37* 26*   MPV 10.1 10.1 10.1     Recent Labs     08/24/19  0915 08/25/19  0236 08/26/19  0201   SODIUM 139 138 138   POTASSIUM 4.2 3.9 3.7   CHLORIDE 107 103 104   CO2 27 31 27   GLUCOSE 169* 183* 154*   BUN 15 14 14   CREATININE 1.09 1.25 1.17   CALCIUM 8.2* 8.1* 8.3*                   Imaging  NM-HEPATOBILIARY SCAN   Final Result      Findings consistent with acute cholecystitis.      US-RUQ   Final Result      1.  Cholelithiasis and gallbladder sludge. Normal gallbladder wall thickness. No definitive evidence of cholecystitis. If there is high clinical concern, consider HIDA scan.   2.  Hepatic steatosis.      CT-ABDOMEN-PELVIS WITH   Final Result      1.  Cholelithiasis with mild pericholecystic fat stranding. This is concerning for early cholecystitis. Correlate with symptoms and consider HIDA scan can be done for further evaluation.   2.  Small bilateral pleural effusions and associated atelectasis.    3.  Nonobstructing left nephrolithiasis.           Assessment/Plan  * Cholecystitis- (present on admission)  Assessment & Plan  Laparoscopic cholecystectomy 8/20/2019  Finished antibiotic course (Zosyn then Rocephin+Flagyl)  pain control  Incentive spirometry  Bowel protocol    Diabetic foot ulcer ([facility])- (present on admission)  Assessment & Plan  Wound care, pain control  Finished course of Zyvox  Check hgb    Bilateral lower extremity edema- (present on admission)  Assessment & Plan  IV lasix x 1 given    Constipation- (present on admission)  Assessment & Plan  Bowel protocol    Thrombocytopenia ([facility])- (present on admission)  Assessment & Plan  Follow cbc    HTN (hypertension)- (present on admission)  Assessment & Plan  Toprol XL, increase Norvasc to 10 mg daily  IV Hydralazine prn with parameters    PVD (peripheral vascular disease) ([facility])- (present on admission)  Assessment & Plan  Right lower extremity angiogram. Angioplasty and 7mm x 6cm stent right SFA 8/17/2019    Type 2 diabetes mellitus with kidney complication, with long-term current use of insulin ([facility])- (present on admission)  Assessment & Plan  SSI, increase Lantus to 35 units      CKD (chronic kidney disease) stage 3, GFR 30-59 ml/min ([facility])- (present on admission)  Assessment & Plan  Monitor BMP    Dysphagia- (present on admission)  Assessment & Plan  Dysphagia 2, NTL  SLP to follow    Elevated troponin- (present on admission)  Assessment & Plan  Likely type II NSTEMI    Class 3 severe obesity due to excess calories in adult ([facility])- (present on admission)  Assessment & Plan  Body mass index is 40.75 kg/m².         VTE prophylaxis: SCD

## 2019-08-26 NOTE — DISCHARGE PLANNING
Agency/Facility Name: [facility]  Spoke To: Marilyn  Outcome: Requested transport.  Will call back with transport time.

## 2019-08-26 NOTE — WOUND TEAM
Received call from RN that wound to RLE is bleeding through dressing after patient tried to get out of bed to wheelchair.  Previous dressing removed by RN, area cleansed with NS and wash cloths.  Appears to have hypergranular tissue mixed with coagulated blood.  Still dripping blood in various locations, covered with surgifoam.  Covered with several layers of adaptic, covered with ABD x 5, roll gauze x 2 and coban x 2.  Dr. Lewis in to see.  Transfer to [facility] cancelled.  New photo taken.  Will return tomorrow to re-assess and attempt to remove coagulated blood from wound bed.

## 2019-08-27 PROBLEM — D62 ACUTE BLOOD LOSS ANEMIA: Status: ACTIVE | Noted: 2019-08-27

## 2019-08-27 LAB
ABO GROUP BLD: NORMAL
ALBUMIN SERPL BCP-MCNC: 2.1 G/DL (ref 3.2–4.9)
ALBUMIN/GLOB SERPL: 0.8 G/DL
ALP SERPL-CCNC: 105 U/L (ref 30–99)
ALT SERPL-CCNC: 17 U/L (ref 2–50)
ANION GAP SERPL CALC-SCNC: 6 MMOL/L (ref 0–11.9)
AST SERPL-CCNC: 19 U/L (ref 12–45)
BARCODED ABORH UBTYP: 5100
BARCODED PRD CODE UBPRD: NORMAL
BARCODED UNIT NUM UBUNT: NORMAL
BASOPHILS # BLD AUTO: 0.5 % (ref 0–1.8)
BASOPHILS # BLD: 0.02 K/UL (ref 0–0.12)
BILIRUB SERPL-MCNC: 0.5 MG/DL (ref 0.1–1.5)
BUN SERPL-MCNC: 15 MG/DL (ref 8–22)
CALCIUM SERPL-MCNC: 7.7 MG/DL (ref 8.5–10.5)
CHLORIDE SERPL-SCNC: 106 MMOL/L (ref 96–112)
CO2 SERPL-SCNC: 27 MMOL/L (ref 20–33)
COMPONENT P 8504P: NORMAL
CREAT SERPL-MCNC: 1.23 MG/DL (ref 0.5–1.4)
EOSINOPHIL # BLD AUTO: 0.2 K/UL (ref 0–0.51)
EOSINOPHIL NFR BLD: 4.6 % (ref 0–6.9)
ERYTHROCYTE [DISTWIDTH] IN BLOOD BY AUTOMATED COUNT: 49.4 FL (ref 35.9–50)
ERYTHROCYTE [DISTWIDTH] IN BLOOD BY AUTOMATED COUNT: 49.6 FL (ref 35.9–50)
GLOBULIN SER CALC-MCNC: 2.7 G/DL (ref 1.9–3.5)
GLUCOSE BLD-MCNC: 148 MG/DL (ref 65–99)
GLUCOSE BLD-MCNC: 151 MG/DL (ref 65–99)
GLUCOSE BLD-MCNC: 297 MG/DL (ref 65–99)
GLUCOSE SERPL-MCNC: 153 MG/DL (ref 65–99)
HCT VFR BLD AUTO: 34.4 % (ref 42–52)
HCT VFR BLD AUTO: 36.8 % (ref 42–52)
HGB BLD-MCNC: 11.2 G/DL (ref 14–18)
HGB BLD-MCNC: 11.9 G/DL (ref 14–18)
IMM GRANULOCYTES # BLD AUTO: 0.09 K/UL (ref 0–0.11)
IMM GRANULOCYTES NFR BLD AUTO: 2.1 % (ref 0–0.9)
LYMPHOCYTES # BLD AUTO: 0.79 K/UL (ref 1–4.8)
LYMPHOCYTES NFR BLD: 18 % (ref 22–41)
MCH RBC QN AUTO: 30.2 PG (ref 27–33)
MCH RBC QN AUTO: 30.3 PG (ref 27–33)
MCHC RBC AUTO-ENTMCNC: 32.3 G/DL (ref 33.7–35.3)
MCHC RBC AUTO-ENTMCNC: 32.6 G/DL (ref 33.7–35.3)
MCV RBC AUTO: 92.7 FL (ref 81.4–97.8)
MCV RBC AUTO: 93.6 FL (ref 81.4–97.8)
MONOCYTES # BLD AUTO: 0.41 K/UL (ref 0–0.85)
MONOCYTES NFR BLD AUTO: 9.3 % (ref 0–13.4)
NEUTROPHILS # BLD AUTO: 2.88 K/UL (ref 1.82–7.42)
NEUTROPHILS NFR BLD: 65.5 % (ref 44–72)
NRBC # BLD AUTO: 0 K/UL
NRBC BLD-RTO: 0 /100 WBC
PLATELET # BLD AUTO: 18 K/UL (ref 164–446)
PLATELET # BLD AUTO: 37 K/UL (ref 164–446)
PMV BLD AUTO: 11.5 FL (ref 9–12.9)
PMV BLD AUTO: 11.9 FL (ref 9–12.9)
POTASSIUM SERPL-SCNC: 3.7 MMOL/L (ref 3.6–5.5)
PRODUCT TYPE UPROD: NORMAL
PROT SERPL-MCNC: 4.8 G/DL (ref 6–8.2)
RBC # BLD AUTO: 3.71 M/UL (ref 4.7–6.1)
RBC # BLD AUTO: 3.93 M/UL (ref 4.7–6.1)
RH BLD: NORMAL
SODIUM SERPL-SCNC: 139 MMOL/L (ref 135–145)
UNIT STATUS USTAT: NORMAL
WBC # BLD AUTO: 4.4 K/UL (ref 4.8–10.8)
WBC # BLD AUTO: 5.7 K/UL (ref 4.8–10.8)

## 2019-08-27 PROCEDURE — 51798 US URINE CAPACITY MEASURE: CPT

## 2019-08-27 PROCEDURE — A9270 NON-COVERED ITEM OR SERVICE: HCPCS | Performed by: INTERNAL MEDICINE

## 2019-08-27 PROCEDURE — 36430 TRANSFUSION BLD/BLD COMPNT: CPT

## 2019-08-27 PROCEDURE — 86900 BLOOD TYPING SEROLOGIC ABO: CPT

## 2019-08-27 PROCEDURE — 36415 COLL VENOUS BLD VENIPUNCTURE: CPT

## 2019-08-27 PROCEDURE — 770001 HCHG ROOM/CARE - MED/SURG/GYN PRIV*

## 2019-08-27 PROCEDURE — 700111 HCHG RX REV CODE 636 W/ 250 OVERRIDE (IP): Performed by: INTERNAL MEDICINE

## 2019-08-27 PROCEDURE — 700102 HCHG RX REV CODE 250 W/ 637 OVERRIDE(OP): Performed by: FAMILY MEDICINE

## 2019-08-27 PROCEDURE — 80053 COMPREHEN METABOLIC PANEL: CPT

## 2019-08-27 PROCEDURE — 82962 GLUCOSE BLOOD TEST: CPT | Mod: 91

## 2019-08-27 PROCEDURE — 86901 BLOOD TYPING SEROLOGIC RH(D): CPT

## 2019-08-27 PROCEDURE — A9270 NON-COVERED ITEM OR SERVICE: HCPCS | Performed by: FAMILY MEDICINE

## 2019-08-27 PROCEDURE — 700102 HCHG RX REV CODE 250 W/ 637 OVERRIDE(OP): Performed by: INTERNAL MEDICINE

## 2019-08-27 PROCEDURE — 85027 COMPLETE CBC AUTOMATED: CPT

## 2019-08-27 PROCEDURE — P9034 PLATELETS, PHERESIS: HCPCS

## 2019-08-27 PROCEDURE — 6A550Z2 PHERESIS OF PLATELETS, SINGLE: ICD-10-PCS | Performed by: INTERNAL MEDICINE

## 2019-08-27 PROCEDURE — 85025 COMPLETE CBC W/AUTO DIFF WBC: CPT

## 2019-08-27 PROCEDURE — 99233 SBSQ HOSP IP/OBS HIGH 50: CPT | Performed by: INTERNAL MEDICINE

## 2019-08-27 RX ORDER — OMEPRAZOLE 20 MG/1
20 CAPSULE, DELAYED RELEASE ORAL 2 TIMES DAILY
Status: DISCONTINUED | OUTPATIENT
Start: 2019-08-27 | End: 2019-08-27

## 2019-08-27 RX ADMIN — MICONAZOLE NITRATE: 20 CREAM TOPICAL at 06:00

## 2019-08-27 RX ADMIN — MICONAZOLE NITRATE: 20 CREAM TOPICAL at 18:02

## 2019-08-27 RX ADMIN — SENNOSIDES, DOCUSATE SODIUM 2 TABLET: 50; 8.6 TABLET, FILM COATED ORAL at 05:58

## 2019-08-27 RX ADMIN — METOPROLOL SUCCINATE 25 MG: 25 TABLET, EXTENDED RELEASE ORAL at 18:02

## 2019-08-27 RX ADMIN — SENNOSIDES, DOCUSATE SODIUM 2 TABLET: 50; 8.6 TABLET, FILM COATED ORAL at 18:02

## 2019-08-27 RX ADMIN — PREDNISONE 120 MG: 50 TABLET ORAL at 10:09

## 2019-08-27 RX ADMIN — GABAPENTIN 400 MG: 400 CAPSULE ORAL at 13:42

## 2019-08-27 RX ADMIN — ONDANSETRON 4 MG: 4 TABLET, ORALLY DISINTEGRATING ORAL at 16:53

## 2019-08-27 RX ADMIN — FINASTERIDE 5 MG: 5 TABLET, FILM COATED ORAL at 05:58

## 2019-08-27 RX ADMIN — AMLODIPINE BESYLATE 10 MG: 10 TABLET ORAL at 05:58

## 2019-08-27 RX ADMIN — GABAPENTIN 400 MG: 400 CAPSULE ORAL at 05:57

## 2019-08-27 RX ADMIN — OXYCODONE HYDROCHLORIDE 10 MG: 10 TABLET ORAL at 10:09

## 2019-08-27 RX ADMIN — METOPROLOL SUCCINATE 25 MG: 25 TABLET, EXTENDED RELEASE ORAL at 05:57

## 2019-08-27 RX ADMIN — INSULIN GLARGINE 35 UNITS: 100 INJECTION, SOLUTION SUBCUTANEOUS at 05:58

## 2019-08-27 RX ADMIN — OMEPRAZOLE 20 MG: 20 CAPSULE, DELAYED RELEASE ORAL at 09:22

## 2019-08-27 RX ADMIN — MORPHINE SULFATE 4 MG: 4 INJECTION INTRAVENOUS at 10:45

## 2019-08-27 RX ADMIN — GABAPENTIN 400 MG: 400 CAPSULE ORAL at 18:02

## 2019-08-27 ASSESSMENT — ENCOUNTER SYMPTOMS
SHORTNESS OF BREATH: 0
BRUISES/BLEEDS EASILY: 1
DIZZINESS: 0
COUGH: 0
CHILLS: 0
DIARRHEA: 0
ABDOMINAL PAIN: 0
HEADACHES: 0
NAUSEA: 0
PALPITATIONS: 0
FEVER: 0
HEARTBURN: 0
MYALGIAS: 0

## 2019-08-27 NOTE — WOUND TEAM
"[facility]  Inpatient Services  Wound and Skin Care Progress     Admission Date: 8/18/2019     Consult Date: 08/20   [facility], [facility], [facility]: Reviewed    Unit where seen by Wound Team: [id number]     WOUND CONSULT RELATED TO:  RLE     SUBJECTIVE:  \"It's been so much better these last couple tiimes\"      Self Report / Pain Level:  Pre-medicated with IV morphine and oral oxy        OBJECTIVE: in to re-assess RLE wound after episode of bleeding yesterday.  In with Dr. Beaulieu. No drainage seen through dressing at this time, removed layers one at a time to assess for bleeding but found to be none at this time.  Continued with dressing change.      WOUND TYPE, LOCATION, CHARACTERISTICS (Pressure Injuries: location, stage, POA or date identified)      Wound 08/19/19 Venous Ulcer Leg posterior Right  (Active)   Wound Image        Site Assessment Red    Ella-wound Assessment Intact    Margins Attached edges    Tunneling 0 cm    Undermining 0 cm    Closure None    Drainage Amount Moderate    Drainage Description Serosanguineous    Non-staged Wound Description Full thickness    Treatments Cleansed;Site care    Cleansing Approved Wound Cleanser    Periwound Protectant Not Applicable    Dressing Options Nonadherent Contact Layer;Absorbent Abdominal Pad;Compression Wrap Two Layer    Dressing Cleansing/Solutions Not Applicable    Dressing Changed New    Dressing Status Clean;Dry;Intact    Dressing Change Frequency Every 48 hrs    NEXT Dressing Change  08/29/19    NEXT Weekly Photo (Inpatient Only) 09/02/19    WOUND NURSE ONLY - Odor None    WOUND NURSE ONLY - Pulses Right;1+;DP    WOUND NURSE ONLY - Exposed Structures None    WOUND NURSE ONLY - Tissue Type and Percentage 100% red clotted blood    WOUND NURSE ONLY - Time Spent with Patient (mins) 90          Vascular:    08/17/19    PROCEDURES PERFORMED:  1.  Right lower extremity angiogram.  2.  Angioplasty and 7 mm x 6 cm right superficial femoral artery stent.  3.  " Ultrasound-guided left femoral access.    Lab Values:    Lab Results   Component Value Date/Time    WBC 4.4 (L) 08/27/2019 01:25 AM    RBC 3.71 (L) 08/27/2019 01:25 AM    HEMOGLOBIN 11.2 (L) 08/27/2019 01:25 AM    HEMATOCRIT 34.4 (L) 08/27/2019 01:25 AM        Lab Results   Component Value Date/Time    HBA1C 8.0 (H) 08/11/2019 04:59 AM        Culture:   Not indicated     INTERVENTIONS BY WOUND TEAM:  Dressing removed, gently removed remaining surgifoam.  cleansed gently with wound cleanser.  Covered with triple layer of adaptic, 5 ABD pads and 2 layer wrap.  Floated heels and turned patient to right side.          Interdisciplinary consultation: Patient, Bedside RN    EVALUATION: patient recently admitted, DC'd 08/17 to SNF and re-admitted 08/18.  Had angio and stent done with Dr. Rios 08/17 with what appears to be improvement of wounds.    Had episode of bleeding yesterday when getting up to wheel chair.  Found to have abnormally low platelet count at 18 for unknown reason.  Dr. Beaulieu consulted hematology, started patient on high dose steroid and giving platelets as patient is bloodless but agreed to have platelets.        Factors affecting wound healing: venous insufficiency, PVD, DM, CKD    Goals: Steady decrease in wound area and depth weekly.    NURSING PLAN OF CARE ORDERS (X):    Dressing changes: See Dressing Care orders: X  Skin care: See Skin Care orders: X  Rectal tube care: See Rectal Tube Care orders:   Other orders:     WOUND TEAM PLAN OF CARE (X):   NPWT change 3 x week:        Dressing changes by wound team:       Follow up as needed:     X wound team to follow up   Other (explain):     Anticipated discharge plans (X): will need ongoing wound care upon DC  SNF:         X - pt will require ongoing wound care for right leg upon discharge  Home Care:           Outpatient Wound Center:            Self Care:            Other:

## 2019-08-27 NOTE — PROGRESS NOTES
Lab called with critical result of PLT at 18. Critical lab result read back to Lab.   Dr. Duran notified of critical lab result at 0204.  Critical lab result read back by Dr. Duran.

## 2019-08-27 NOTE — THERAPY
Attempted OT tx session. Pt's SNF transferred canceled yesterday 2' LB bleeding. Pt currently receiving platelets. Will attempt again as appropriate and able    Donna Chatterjee OTR/L  Pager: [phone]

## 2019-08-27 NOTE — ASSESSMENT & PLAN NOTE
Due to bleeding from his leg overnight  hgb stable at 11  No further bleeding  plt low but stable 35K s/p plt transfusion

## 2019-08-27 NOTE — CONSULTS
DATE OF SERVICE:  08/27/2019    REQUESTING PHYSICIAN:  Poonam Beaulieu MD    REASON FOR CONSULTATION:  Severe thrombocytopenia.    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  He does have a past   medical history of obesity, atrial fibrillation, pacemaker placement,   hypertension, diabetes mellitus, and COPD.  He presented on 08/18/2019 with   abdominal pain and hypoglycemia.  The patient was admitted to the hospital on   08/05/2019 after being found to be down for about 3 days, he was treated for   MARIZOL and rhabdomyolysis with IV fluids.  He was also noted to have a nonhealing   ulcer of his right lower extremity, vascular surgery, Dr. Rios perform an   angiogram and stent placement of the SFA.  His right lower extremity wound   culture grew out MRSA and he was started on Zyvox for 2 weeks.  During the   hospitalization, he had a hypoglycemic event, which resolved after he was fed.    He was discharged to a skilled nursing facility on 08/17/2019, he was   readmitted a few days later with abdominal pain and he was found to have   cholecystitis.    We are consulted for the evaluation of thrombocytopenia.  He was found to have   normal platelet count on 08/10/2019 at 153.  Since then, the platelet count   has been dropping slowly.  When he was readmitted to the hospital, the   platelet count was in the range of 100.  Since 08/23/2019, his platelet count   has been dropping to 75 and currently is at 18; otherwise, he does have a   normal WBC and hemoglobin is pretty stable at 11.2.  The patient has been in   the hospital and since then, he did receive Zyvox, ceftriaxone, and he only   got heparin on 08/17.  He does have diabetic foot ulcer that is bleeding.  The    patient is going for platelet transfusion.  Empirically, he was started on   prednisone for concerns of ITP.  He does have a low HIT score of 2, which is a   very low probability.  There is no fever, chills, he has been off antibiotics   at the present time, and we were consulted for further recommendations.    REVIEW OF SYSTEMS:  As stated above.    PAST MEDICAL HISTORY:  Atrial fibrillation, basal cell carcinoma, chronic   obstructive pulmonary disease, diabetes, hypertension, renal disorder, and   diabetes mellitus.    PAST SURGICAL HISTORY:  Pacemaker insertion and knee replacement.    FAMILY HISTORY:  None pertinent.    SOCIAL HISTORY:  Quit smoking 44 years ago.  He does not drink or use alcohol.    MEDICATIONS:  Per medication reconciliation note.    PHYSICAL EXAMINATION:  VITAL SIGNS:  Blood pressure is 149/80, temperature 36.5, pulse 60,   respiratory rate 17, and 92% on room air.  GENERAL:  The patient is obese, he does have lower extremity swelling on the   right side due to diabetic foot ulcer and recent vascular intervention.  LUNGS:  Shows crackles on both bases.  ABDOMEN:  Soft and nontender, recent surgical wound appreciated.    ASSESSMENT AND PLAN:  He is a 74-year-old gentleman with long hospital stay.    Over here, he has been treated with diabetic foot ulcer and cholecystitis.  He   did receive antibiotics like Zyvox and ceftriaxone, also the patient has been   found septic recently.  We were consulted for the evaluation of worsening   thrombocytopenia during this hospital stay.    Thrombocytopenia, likely related to a drug-induced thrombocytopenia, prolonged   hospital stay, sepsis, his platelet count has been dropping from normal   levels a few weeks ago to 18.  For now, I will recommend to stop prednisone, I   will recommend  platelet transfusion and recheck platelet count at 1 hour   and 3 hours post-platelet transfusion to assess the rate of destruction.     HIT unlikely given low HIT T  score of 2, which is a low risk or low level of   concern.  We will keep following the patient.  I do think it is okay to hold   on the prednisone for now given this is most likely drug induced.  We will   keep following the patient every day.  Recommendations passed to Dr. Beaulieu.       ____________________________________     MD FELISHA Acosta III / BRADEN    DD:  08/27/2019 15:57:53  DT:  08/27/2019 16:41:38    D#:  [id number]  Job#:  [id number]

## 2019-08-27 NOTE — PROGRESS NOTES
Hospital Medicine Daily Progress Note    Date of Service  8/27/2019    Chief Complaint  74 y.o. male admitted 8/18/2019 with abdominal pain    Hospital Course    74 y.o. male with a past medical history of obesity, atrial fibrillation, status pacemaker placement, hypertension, insulin-dependent diabetes mellitus, COPD who presented 8/18/2019 with abdominal pain and hypoglycemia.  Patient was admitted to the hospital on 8/5/2019 after being found down for about 3 days.  He was treated for MARIZOL and rhabdomyolysis with IV fluids.  He was also noted to have a nonhealing ulcer of his right lower extremity, vascular surgery Dr. Rios performed an angiogram and stent placement to the SFA.  His right lower extremity wound culture grew out MRSA and he was started on oral Zyvox for 2 weeks.  During that hospitalization he had a hypoglycemic event which resolved after he was fed.  He was discharged to [facility] nursing [facility] on 8/17/2019.  He presented back to the hospital with abdominal pain and was found to have cholecystitis. He had laparoscopic cholecystectomy on 8/20 with Dr. Solomon. On 8/23 his platelet levels dropped under 20 and he had bleeding from his leg wound.      Interval Problem Update  The patient has platelets under 20 and was bleeding through bandages over his leg ulcer overnight    Consultants/Specialty  Surgery Dr. Solomon    Code Status  full    Disposition  snf    Review of Systems  Review of Systems   Constitutional: Negative for chills, fever and malaise/fatigue.   HENT: Negative for congestion.    Respiratory: Negative for cough and shortness of breath.    Cardiovascular: Negative for chest pain, palpitations and leg swelling.   Gastrointestinal: Negative for abdominal pain, diarrhea, heartburn and nausea.   Genitourinary: Negative for dysuria, hematuria and urgency.   Musculoskeletal: Negative for myalgias.   Skin: Negative for itching and rash.   Neurological: Negative for dizziness and  headaches.   Endo/Heme/Allergies: Bruises/bleeds easily.        Physical Exam  Temp:  [36.1 °C (97 °F)-36.6 °C (97.9 °F)] 36.3 °C (97.4 °F)  Pulse:  [60-61] 60  Resp:  [16-18] 16  BP: (140-155)/(75-86) 155/75  SpO2:  [90 %-96 %] 91 %    Physical Exam   Constitutional: No distress.   HENT:   Mouth/Throat: Oropharynx is clear and moist.   Eyes: Conjunctivae and EOM are normal. No scleral icterus.   Neck: Neck supple.   Cardiovascular: Normal rate, regular rhythm and intact distal pulses.   No murmur heard.  Pulmonary/Chest: Effort normal and breath sounds normal. No respiratory distress. He has no rales.   Abdominal: Soft. Bowel sounds are normal.   Musculoskeletal: He exhibits edema.   Leg with dressing in place   Neurological: He is alert. Coordination normal.   Skin: Skin is warm and dry. He is not diaphoretic.   Psychiatric: His behavior is normal. Thought content normal.   Nursing note and vitals reviewed.      Fluids    Intake/Output Summary (Last 24 hours) at 8/27/2019 0728  Last data filed at 8/26/2019 2105  Gross per 24 hour   Intake 120 ml   Output 500 ml   Net -380 ml       Laboratory  Recent Labs     08/25/19  0236 08/26/19  0201 08/26/19  1651 08/27/19  0125   WBC 4.8 4.9  --  4.4*   RBC 3.85* 4.06*  --  3.71*   HEMOGLOBIN 11.9* 12.5* 11.9* 11.2*   HEMATOCRIT 35.9* 37.9*  --  34.4*   MCV 93.2 93.3  --  92.7   MCH 30.9 30.8  --  30.2   MCHC 33.1* 33.0*  --  32.6*   RDW 48.4 49.6  --  49.4   PLATELETCT 37* 26*  --  18*   MPV 10.1 10.1  --  11.9     Recent Labs     08/25/19  0236 08/26/19  0201 08/27/19  0125   SODIUM 138 138 139   POTASSIUM 3.9 3.7 3.7   CHLORIDE 103 104 106   CO2 31 27 27   GLUCOSE 183* 154* 153*   BUN 14 14 15   CREATININE 1.25 1.17 1.23   CALCIUM 8.1* 8.3* 7.7*                   Imaging  NM-HEPATOBILIARY SCAN   Final Result      Findings consistent with acute cholecystitis.      US-RUQ   Final Result      1.  Cholelithiasis and gallbladder sludge. Normal gallbladder wall thickness. No  definitive evidence of cholecystitis. If there is high clinical concern, consider HIDA scan.   2.  Hepatic steatosis.      CT-ABDOMEN-PELVIS WITH   Final Result      1.  Cholelithiasis with mild pericholecystic fat stranding. This is concerning for early cholecystitis. Correlate with symptoms and consider HIDA scan can be done for further evaluation.   2.  Small bilateral pleural effusions and associated atelectasis.   3.  Nonobstructing left nephrolithiasis.           Assessment/Plan  * Cholecystitis- (present on admission)  Assessment & Plan  Laparoscopic cholecystectomy 8/20/2019  Finished antibiotic course (Zosyn then Rocephin+Flagyl)  pain controlled    Diabetic foot ulcer ([facility])- (present on admission)  Assessment & Plan  Wound care, pain control  Finished course of Zyvox  Oozing blood overnight, continue wound care, follow labs  Ok to discharge once platelets and hemoglobin have stabilized and wound no longer bleeding    Bilateral lower extremity edema- (present on admission)  Assessment & Plan  Lasix given, chronic edema    Constipation- (present on admission)  Assessment & Plan  Bowel protocol    Thrombocytopenia ([facility])- (present on admission)  Assessment & Plan  Worsening with oozing from leg wound, will transfuse platelets today, repeat labs, unclear etiology for the acute drop, will follow lab trend, may need heme consult     HTN (hypertension)- (present on admission)  Assessment & Plan  Toprol XL, and norvasc      PVD (peripheral vascular disease) ([facility])- (present on admission)  Assessment & Plan  Right lower extremity angiogram. Angioplasty and 7mm x 6cm stent right SFA 8/17/2019    Type 2 diabetes mellitus with kidney complication, with long-term current use of insulin ([facility])- (present on admission)  Assessment & Plan  SSI and lantus      CKD (chronic kidney disease) stage 3, GFR 30-59 ml/min ([facility])- (present on admission)  Assessment & Plan  Creatinine is at his baseline    Dysphagia- (present on  admission)  Assessment & Plan  Dysphagia 2, NTL  Speech therapy to continue    Elevated troponin- (present on admission)  Assessment & Plan   type II NSTEMI, continue metoprolol, no aspirin due to bleed and low platelets    Acute blood loss anemia  Assessment & Plan  Due to bleeding from his leg overnight  Will monitor labs, adaptic applied to the leg  Will give platelets today due to  Continued bleeding and platelets less than 20 of unclear etiology    Class 3 severe obesity due to excess calories in adult ([facility])- (present on admission)  Assessment & Plan  Body mass index is 40.75 kg/m².           VTE prophylaxis: none due to bleed

## 2019-08-28 PROBLEM — N18.30 CKD (CHRONIC KIDNEY DISEASE) STAGE 3, GFR 30-59 ML/MIN: Status: RESOLVED | Noted: 2019-08-12 | Resolved: 2019-08-28

## 2019-08-28 LAB
ALBUMIN SERPL BCP-MCNC: 2.4 G/DL (ref 3.2–4.9)
ALBUMIN/GLOB SERPL: 0.8 G/DL
ALP SERPL-CCNC: 117 U/L (ref 30–99)
ALT SERPL-CCNC: 18 U/L (ref 2–50)
ANION GAP SERPL CALC-SCNC: 6 MMOL/L (ref 0–11.9)
AST SERPL-CCNC: 19 U/L (ref 12–45)
BASOPHILS # BLD AUTO: 0.2 % (ref 0–1.8)
BASOPHILS # BLD: 0.01 K/UL (ref 0–0.12)
BILIRUB SERPL-MCNC: 0.6 MG/DL (ref 0.1–1.5)
BUN SERPL-MCNC: 20 MG/DL (ref 8–22)
CALCIUM SERPL-MCNC: 8.4 MG/DL (ref 8.5–10.5)
CHLORIDE SERPL-SCNC: 102 MMOL/L (ref 96–112)
CO2 SERPL-SCNC: 26 MMOL/L (ref 20–33)
CREAT SERPL-MCNC: 1.48 MG/DL (ref 0.5–1.4)
EOSINOPHIL # BLD AUTO: 0 K/UL (ref 0–0.51)
EOSINOPHIL NFR BLD: 0 % (ref 0–6.9)
ERYTHROCYTE [DISTWIDTH] IN BLOOD BY AUTOMATED COUNT: 47.5 FL (ref 35.9–50)
GLOBULIN SER CALC-MCNC: 3 G/DL (ref 1.9–3.5)
GLUCOSE BLD-MCNC: 150 MG/DL (ref 65–99)
GLUCOSE BLD-MCNC: 162 MG/DL (ref 65–99)
GLUCOSE BLD-MCNC: 212 MG/DL (ref 65–99)
GLUCOSE BLD-MCNC: 237 MG/DL (ref 65–99)
GLUCOSE BLD-MCNC: 342 MG/DL (ref 65–99)
GLUCOSE SERPL-MCNC: 307 MG/DL (ref 65–99)
HCT VFR BLD AUTO: 32.9 % (ref 42–52)
HGB BLD-MCNC: 11.1 G/DL (ref 14–18)
IMM GRANULOCYTES # BLD AUTO: 0.1 K/UL (ref 0–0.11)
IMM GRANULOCYTES NFR BLD AUTO: 2 % (ref 0–0.9)
LYMPHOCYTES # BLD AUTO: 0.35 K/UL (ref 1–4.8)
LYMPHOCYTES NFR BLD: 6.9 % (ref 22–41)
MCH RBC QN AUTO: 30.5 PG (ref 27–33)
MCHC RBC AUTO-ENTMCNC: 33.7 G/DL (ref 33.7–35.3)
MCV RBC AUTO: 90.4 FL (ref 81.4–97.8)
MONOCYTES # BLD AUTO: 0.1 K/UL (ref 0–0.85)
MONOCYTES NFR BLD AUTO: 2 % (ref 0–13.4)
NEUTROPHILS # BLD AUTO: 4.52 K/UL (ref 1.82–7.42)
NEUTROPHILS NFR BLD: 88.9 % (ref 44–72)
NRBC # BLD AUTO: 0 K/UL
NRBC BLD-RTO: 0 /100 WBC
PLATELET # BLD AUTO: 35 K/UL (ref 164–446)
PMV BLD AUTO: 11 FL (ref 9–12.9)
POTASSIUM SERPL-SCNC: 4.5 MMOL/L (ref 3.6–5.5)
PROT SERPL-MCNC: 5.4 G/DL (ref 6–8.2)
RBC # BLD AUTO: 3.64 M/UL (ref 4.7–6.1)
SODIUM SERPL-SCNC: 134 MMOL/L (ref 135–145)
WBC # BLD AUTO: 5.1 K/UL (ref 4.8–10.8)

## 2019-08-28 PROCEDURE — 82962 GLUCOSE BLOOD TEST: CPT | Mod: 91

## 2019-08-28 PROCEDURE — A9270 NON-COVERED ITEM OR SERVICE: HCPCS | Performed by: FAMILY MEDICINE

## 2019-08-28 PROCEDURE — A9270 NON-COVERED ITEM OR SERVICE: HCPCS | Performed by: INTERNAL MEDICINE

## 2019-08-28 PROCEDURE — 97530 THERAPEUTIC ACTIVITIES: CPT

## 2019-08-28 PROCEDURE — 770001 HCHG ROOM/CARE - MED/SURG/GYN PRIV*

## 2019-08-28 PROCEDURE — 99233 SBSQ HOSP IP/OBS HIGH 50: CPT | Performed by: HOSPITALIST

## 2019-08-28 PROCEDURE — 36415 COLL VENOUS BLD VENIPUNCTURE: CPT

## 2019-08-28 PROCEDURE — 97535 SELF CARE MNGMENT TRAINING: CPT

## 2019-08-28 PROCEDURE — 700102 HCHG RX REV CODE 250 W/ 637 OVERRIDE(OP): Performed by: FAMILY MEDICINE

## 2019-08-28 PROCEDURE — 80053 COMPREHEN METABOLIC PANEL: CPT

## 2019-08-28 PROCEDURE — 700102 HCHG RX REV CODE 250 W/ 637 OVERRIDE(OP): Performed by: INTERNAL MEDICINE

## 2019-08-28 PROCEDURE — 85025 COMPLETE CBC W/AUTO DIFF WBC: CPT

## 2019-08-28 PROCEDURE — 92526 ORAL FUNCTION THERAPY: CPT

## 2019-08-28 RX ADMIN — SENNOSIDES, DOCUSATE SODIUM 2 TABLET: 50; 8.6 TABLET, FILM COATED ORAL at 18:07

## 2019-08-28 RX ADMIN — INSULIN GLARGINE 35 UNITS: 100 INJECTION, SOLUTION SUBCUTANEOUS at 05:40

## 2019-08-28 RX ADMIN — AMLODIPINE BESYLATE 10 MG: 10 TABLET ORAL at 05:40

## 2019-08-28 RX ADMIN — METOPROLOL SUCCINATE 25 MG: 25 TABLET, EXTENDED RELEASE ORAL at 18:07

## 2019-08-28 RX ADMIN — GABAPENTIN 400 MG: 400 CAPSULE ORAL at 13:07

## 2019-08-28 RX ADMIN — GABAPENTIN 400 MG: 400 CAPSULE ORAL at 05:40

## 2019-08-28 RX ADMIN — FINASTERIDE 5 MG: 5 TABLET, FILM COATED ORAL at 05:40

## 2019-08-28 RX ADMIN — GABAPENTIN 400 MG: 400 CAPSULE ORAL at 18:07

## 2019-08-28 RX ADMIN — METOPROLOL SUCCINATE 25 MG: 25 TABLET, EXTENDED RELEASE ORAL at 05:39

## 2019-08-28 RX ADMIN — SENNOSIDES, DOCUSATE SODIUM 2 TABLET: 50; 8.6 TABLET, FILM COATED ORAL at 05:40

## 2019-08-28 ASSESSMENT — ENCOUNTER SYMPTOMS
PHOTOPHOBIA: 0
ABDOMINAL PAIN: 0
NECK PAIN: 0
CHILLS: 0
MYALGIAS: 0
FEVER: 0
COUGH: 0
VOMITING: 0
HEMOPTYSIS: 0
BRUISES/BLEEDS EASILY: 1
NAUSEA: 0
DIARRHEA: 0
TINGLING: 0
PALPITATIONS: 0
HEARTBURN: 0
HEADACHES: 0
BLURRED VISION: 0
SHORTNESS OF BREATH: 0
DIZZINESS: 0

## 2019-08-28 ASSESSMENT — COGNITIVE AND FUNCTIONAL STATUS - GENERAL
SUGGESTED CMS G CODE MODIFIER DAILY ACTIVITY: CK
DRESSING REGULAR LOWER BODY CLOTHING: A LOT
PERSONAL GROOMING: A LITTLE
TOILETING: A LOT
DAILY ACTIVITIY SCORE: 16
HELP NEEDED FOR BATHING: A LOT
DRESSING REGULAR UPPER BODY CLOTHING: A LITTLE

## 2019-08-28 NOTE — CARE PLAN
Problem: Safety  Goal: Will remain free from falls  Outcome: PROGRESSING AS EXPECTED     Problem: Infection  Goal: Will remain free from infection  Outcome: PROGRESSING AS EXPECTED   Pt showing no s/s of infection.

## 2019-08-28 NOTE — CARE PLAN
Problem: Communication  Goal: The ability to communicate needs accurately and effectively will improve  Outcome: PROGRESSING AS EXPECTED     Problem: Safety  Goal: Will remain free from injury  Outcome: PROGRESSING AS EXPECTED  Goal: Will remain free from falls  Outcome: PROGRESSING AS EXPECTED     Problem: Infection  Goal: Will remain free from infection  Outcome: PROGRESSING AS EXPECTED     Problem: Knowledge Deficit  Goal: Knowledge of disease process/condition, treatment plan, diagnostic tests, and medications will improve  Outcome: PROGRESSING AS EXPECTED     Problem: Fluid Volume:  Goal: Will maintain balanced intake and output  Outcome: PROGRESSING AS EXPECTED     Problem: Skin Integrity  Goal: Risk for impaired skin integrity will decrease  Outcome: PROGRESSING SLOWER THAN EXPECTED

## 2019-08-28 NOTE — PROGRESS NOTES
Lab called with critical result of PLT at 35. Critical lab result read back to Lab.   Dr. Samson notified of critical lab result at 0250.  Critical lab result read back by Dr. Samson.

## 2019-08-28 NOTE — PROGRESS NOTES
0830:Orders for transfusion received, pt is part of bloodless program and is declining platelet transfusion until further information from MD LISA notified of delay in tranfusion.     1500: Pt acceptable to receiving only platelets at this time, MD notified and platelets given

## 2019-08-28 NOTE — PROGRESS NOTES
Hospital Medicine Daily Progress Note    Date of Service  8/28/2019    Chief Complaint  74 y.o. male admitted 8/18/2019 with abdominal pain    Hospital Course    74 y.o. male with a past medical history of obesity, atrial fibrillation, status pacemaker placement, hypertension, insulin-dependent diabetes mellitus, COPD who presented 8/18/2019 with abdominal pain and hypoglycemia.  Patient was admitted to the hospital on 8/5/2019 after being found down for about 3 days.  He was treated for MARIZOL and rhabdomyolysis with IV fluids.  He was also noted to have a nonhealing ulcer of his right lower extremity, vascular surgery Dr. Rios performed an angiogram and stent placement to the SFA.  His right lower extremity wound culture grew out MRSA and he was started on oral Zyvox for 2 weeks.  During that hospitalization he had a hypoglycemic event which resolved after he was fed.  He was discharged to [facility] on 8/17/2019.  He presented back to the hospital with abdominal pain and was found to have cholecystitis. He had laparoscopic cholecystectomy on 8/20 with Dr. Solomon. On 8/23 his platelet levels dropped under 20 and he had bleeding from his leg wound.      Interval Problem Update  The patient has platelets under 20 and was bleeding through bandages over his leg ulcer overnight      8/28: seen and examine this morning, doing ok, working with speech therapy,   Vitals stable  Labs reviewed, plts improved to 35k this morning, no further bleeding noted  Na 164  MARIZOL now, with cr 1.48  BS elevated 342  Consultants/Specialty  Surgery Dr. Solomon  Hemo/oncology    Code Status  full    Disposition  snf when medically cleared    Review of Systems  Review of Systems   Constitutional: Positive for malaise/fatigue. Negative for chills and fever.   HENT: Negative for congestion.    Eyes: Negative for blurred vision and photophobia.   Respiratory: Negative for cough and shortness of breath.    Cardiovascular:  Negative for chest pain, palpitations and leg swelling.   Gastrointestinal: Negative for abdominal pain, diarrhea, heartburn and nausea.   Genitourinary: Negative for dysuria, hematuria and urgency.   Musculoskeletal: Negative for myalgias.   Skin: Negative for itching and rash.   Neurological: Negative for dizziness and headaches.   Endo/Heme/Allergies: Bruises/bleeds easily.        Physical Exam  Temp:  [36.4 °C (97.6 °F)-37.1 °C (98.7 °F)] 36.6 °C (97.8 °F)  Pulse:  [60-73] 60  Resp:  [16-18] 18  BP: (135-149)/(74-84) 142/75  SpO2:  [90 %-94 %] 90 %    Physical Exam   Constitutional: He is oriented to person, place, and time. No distress.   HENT:   Mouth/Throat: Oropharynx is clear and moist.   Eyes: Conjunctivae and EOM are normal. No scleral icterus.   Neck: Neck supple. No JVD present. No thyromegaly present.   Cardiovascular: Normal rate, regular rhythm and intact distal pulses.   No murmur heard.  Pulmonary/Chest: Effort normal and breath sounds normal. No respiratory distress. He has no rales.   Abdominal: Soft. Bowel sounds are normal. He exhibits no distension. There is no tenderness.   Musculoskeletal: He exhibits edema.   Leg with dressing in place   Lymphadenopathy:     He has no cervical adenopathy.   Neurological: He is alert and oriented to person, place, and time. No cranial nerve deficit. Coordination normal.   Skin: Skin is warm and dry. He is not diaphoretic.   Psychiatric: He has a normal mood and affect. His behavior is normal. Thought content normal.   Nursing note and vitals reviewed.      Fluids    Intake/Output Summary (Last 24 hours) at 8/28/2019 1120  Last data filed at 8/28/2019 0500  Gross per 24 hour   Intake 616 ml   Output 1400 ml   Net -784 ml       Laboratory  Recent Labs     08/27/19  0125 08/27/19  1753 08/28/19  0227   WBC 4.4* 5.7 5.1   RBC 3.71* 3.93* 3.64*   HEMOGLOBIN 11.2* 11.9* 11.1*   HEMATOCRIT 34.4* 36.8* 32.9*   MCV 92.7 93.6 90.4   MCH 30.2 30.3 30.5   MCHC 32.6*  32.3* 33.7   RDW 49.4 49.6 47.5   PLATELETCT 18* 37* 35*   MPV 11.9 11.5 11.0     Recent Labs     08/26/19  0201 08/27/19  0125 08/28/19  0227   SODIUM 138 139 134*   POTASSIUM 3.7 3.7 4.5   CHLORIDE 104 106 102   CO2 27 27 26   GLUCOSE 154* 153* 307*   BUN 14 15 20   CREATININE 1.17 1.23 1.48*   CALCIUM 8.3* 7.7* 8.4*                   Imaging  NM-HEPATOBILIARY SCAN   Final Result      Findings consistent with acute cholecystitis.      US-RUQ   Final Result      1.  Cholelithiasis and gallbladder sludge. Normal gallbladder wall thickness. No definitive evidence of cholecystitis. If there is high clinical concern, consider HIDA scan.   2.  Hepatic steatosis.      CT-ABDOMEN-PELVIS WITH   Final Result      1.  Cholelithiasis with mild pericholecystic fat stranding. This is concerning for early cholecystitis. Correlate with symptoms and consider HIDA scan can be done for further evaluation.   2.  Small bilateral pleural effusions and associated atelectasis.   3.  Nonobstructing left nephrolithiasis.           Assessment/Plan  * Cholecystitis- (present on admission)  Assessment & Plan  Laparoscopic cholecystectomy 8/20/2019  Finished antibiotic course (Zosyn then Rocephin+Flagyl)  pain controlled    Diabetic foot ulcer (HCC)- (present on admission)  Assessment & Plan  Wound care, pain control  Finished course of Zyvox  Counseled on the importance of good glycemic control to allow for proper wound healing  Labs stable, plt still low  Ok to discharge once platelets and hemoglobin have stabilized and wound no longer bleeding- to SNF    MARIZOL (acute kidney injury) (HCC)- (present on admission)  Assessment & Plan  New on CKD 3  Cr is at 1.48  Likely due to lasix use which I will stop and monitor renal function  Avoid nephrotoxic agents  If worsening will obtain renal us      Bilateral lower extremity edema- (present on admission)  Assessment & Plan  Resolving  Dc lasix given marizol now    Constipation- (present on  admission)  Assessment & Plan  Bowel protocol    Thrombocytopenia (HCC)- (present on admission)  Assessment & Plan  S/p transfusion with improvement in plt count, now 35k  Heme/onco on board, appreciate recs  Bleeding stable  Monitor closely      HTN (hypertension)- (present on admission)  Assessment & Plan  Toprol XL, and norvasc      PVD (peripheral vascular disease) ([facility])- (present on admission)  Assessment & Plan  Right lower extremity angiogram. Angioplasty and 7mm x 6cm stent right SFA 8/17/2019    Type 2 diabetes mellitus with kidney complication, with long-term current use of insulin ([facility])- (present on admission)  Assessment & Plan  Uncontrolled with hyperglycemia  BS are elevated to 342 today  Will adjust long acting lantus and SSI as needed        Dysphagia- (present on admission)  Assessment & Plan  Dysphagia 2, NTL  Speech therapy to continue    Elevated troponin- (present on admission)  Assessment & Plan   type II NSTEMI, continue metoprolol, no aspirin due to bleed and low platelets    Acute blood loss anemia  Assessment & Plan  Due to bleeding from his leg overnight  hgb stable at 11  No further bleeding  plt low but stable 35K s/p plt transfusion    Class 3 severe obesity due to excess calories in adult ([facility])- (present on admission)  Assessment & Plan  Body mass index is 40.75 kg/m².         VTE prophylaxis: none due to recent bleed

## 2019-08-28 NOTE — PROGRESS NOTES
Oncology/Hematology Progress Note               Author: Gómezlyubov Kaba III Date & Time created: 8/28/2019  2:09 PM   Severe thrombocytopenia  Interval History:  No new complaints, after platelet transfusion yesterday the platelet count remained stable at 35,000.  Otherwise no leukocytosis, anemia is mild likely of chronic disease.    Review of Systems:  Review of Systems   Constitutional: Positive for malaise/fatigue. Negative for chills and fever.   HENT: Negative.    Respiratory: Negative for cough and hemoptysis.    Cardiovascular: Negative for chest pain and palpitations.   Gastrointestinal: Negative for heartburn, nausea and vomiting.   Musculoskeletal: Negative for myalgias and neck pain.   Skin: Positive for rash.   Neurological: Negative for dizziness, tingling and headaches.       Physical Exam:  Physical Exam   Constitutional: He is oriented to person, place, and time. He appears well-developed and well-nourished.   HENT:   Head: Normocephalic and atraumatic.   Eyes: Pupils are equal, round, and reactive to light. Conjunctivae are normal.   Neck: Normal range of motion. Neck supple.   Abdominal: Soft.   Neurological: He is alert and oriented to person, place, and time.       Labs:          Recent Labs     08/26/19 0201 08/27/19 0125 08/28/19 0227   SODIUM 138 139 134*   POTASSIUM 3.7 3.7 4.5   CHLORIDE 104 106 102   CO2 27 27 26   BUN 14 15 20   CREATININE 1.17 1.23 1.48*   CALCIUM 8.3* 7.7* 8.4*     Recent Labs     08/26/19 0201 08/27/19 0125 08/28/19 0227   ALTSGPT 21 17 18   ASTSGOT 25 19 19   ALKPHOSPHAT 131* 105* 117*   TBILIRUBIN 0.6 0.5 0.6   GLUCOSE 154* 153* 307*     Recent Labs     08/27/19 0125 08/27/19 1753 08/28/19 0227   RBC 3.71* 3.93* 3.64*   HEMOGLOBIN 11.2* 11.9* 11.1*   HEMATOCRIT 34.4* 36.8* 32.9*   PLATELETCT 18* 37* 35*     Recent Labs     08/26/19 0201 08/27/19 0125 08/27/19 1753 08/28/19 0227   WBC 4.9 4.4* 5.7 5.1   NEUTSPOLYS 67.10 65.50  --  88.90*   LYMPHOCYTES  16.60* 18.00*  --  6.90*   MONOCYTES 9.40 9.30  --  2.00   EOSINOPHILS 4.70 4.60  --  0.00   BASOPHILS 0.40 0.50  --  0.20   ASTSGOT 25 19  --  19   ALTSGPT 21 17  --  18   ALKPHOSPHAT 131* 105*  --  117*   TBILIRUBIN 0.6 0.5  --  0.6     Recent Labs     19  0201 19  0125 19  0227   SODIUM 138 139 134*   POTASSIUM 3.7 3.7 4.5   CHLORIDE 104 106 102   CO2 27 27 26   GLUCOSE 154* 153* 307*   BUN 14 15 20   CREATININE 1.17 1.23 1.48*   CALCIUM 8.3* 7.7* 8.4*     Hemodynamics:  Temp (24hrs), Av.6 °C (97.9 °F), Min:36.4 °C (97.5 °F), Max:37.1 °C (98.7 °F)  Temperature: 36.4 °C (97.5 °F)  Pulse  Av.4  Min: 60  Max: 73   Blood Pressure : 133/74     Respiratory:    Respiration: 18, Pulse Oximetry: 96 %     Work Of Breathing / Effort: Mild  RUL Breath Sounds: Clear, RML Breath Sounds: Clear, RLL Breath Sounds: Diminished, ZAMZAM Breath Sounds: Clear, LLL Breath Sounds: Diminished  Fluids:    Intake/Output Summary (Last 24 hours) at 2019 1409  Last data filed at 2019 0500  Gross per 24 hour   Intake 256 ml   Output 1400 ml   Net -1144 ml     Weight: (!) 134.6 kg (296 lb 11.8 oz)  GI/Nutrition:  Orders Placed This Encounter   Procedures   • Diet Order Diabetic, Cardiac     Standing Status:   Standing     Number of Occurrences:   1     Order Specific Question:   Diet:     Answer:   Diabetic [3]     Order Specific Question:   Diet:     Answer:   Cardiac [6]     Order Specific Question:   Texture/Fiber modifications:     Answer:   Dysphagia 2(Pureed/Chopped)specify fluid consistency(question 6) [2]     Order Specific Question:   Consistency/Fluid modifications:     Answer:   Nectar Thick [2]     Medical Decision Making, by Problem:  Active Hospital Problems    Diagnosis   • *Cholecystitis [K81.9]   • Diabetic foot ulcer (HCC) [E11.621, L97.509]   • MARIZOL (acute kidney injury) (HCC) [N17.9]   • Constipation [K59.00]   • Bilateral lower extremity edema [R60.0]   • PVD (peripheral vascular disease)  ([facility]) [I73.9]   • HTN (hypertension) [I10]   • Thrombocytopenia ([facility]) [D69.6]   • Type 2 diabetes mellitus with kidney complication, with long-term current use of insulin (HCC) [E11.29, Z79.4]   • Dysphagia [R13.10]   • Elevated troponin [R74.8]   • Class 3 severe obesity due to excess calories in adult ([facility]) [E66.01]   • Acute blood loss anemia [D62]       Plan:  1.  Severe thrombocytopenia, multifactorial to recent antibiotic use, sepsis.  -Low HIT score    Plan  -Overall clinical picture not consistent with immune thrombocytopenia, he did receive 1 unit of platelets yesterday with good response, platelet remained stable at 35,000.  -Keep monitoring closely.  Transfuse if platelet count below 20,000 or bleeding.  -No need for steroids    2.  Cholecystitis, he did finish a course of Zosyn then Rocephin and Flagyl  3.  Bilateral lower extremity edema on Lasix  4.  Fear peripheral vascular disease  5.  Diabetic foot ulcer, wound care on board,    Moderate complexity    Please note that this dictation was created using voice recognition software.  I have made every reasonable attempt to correct obvious error, but I expected that there are errors of grammar and possibly context  that I did not discover before finalizing the note    Quality-Core Measures

## 2019-08-28 NOTE — ASSESSMENT & PLAN NOTE
New on CKD 3  Cr is at 1.48  Likely due to lasix use which I will stop and monitor renal function  Avoid nephrotoxic agents  If worsening will obtain renal us

## 2019-08-28 NOTE — THERAPY
"Occupational Therapy Treatment completed with focus on ADLs, ADL transfers and patient education.  Functional Status:  Pt seen for OT tx today, pleasant, agreeable and receptive of education during session. Pt performed bed mobility with mod raising hob and bed rail, F balance sitting eob, LB dressing max a, sba for UB ADLs for line management, STS eob x2 trials with min to mod a of 2 using FWW, limited wb through LLE d/t pain, attempted sidestepping but d/t pain and body habitus pt was unable to utilize modifications today. Pt continues to be limited by LE's pain, strength and endurance during ADLs and mobility. Will continue to benefit from acute OT services.   Plan of Care: Will benefit from Occupational Therapy 3 times per week  Discharge Recommendations:  Equipment Will Continue to Assess for Equipment Needs. Post-acute therapy Recommend post-acute placement for additional occupational therapy services prior to discharge home. Patient can tolerate post-acute therapies at a 5x/week frequency.       See \"Rehab Therapy-Acute\" Patient Summary Report for complete documentation.   "

## 2019-08-28 NOTE — THERAPY
"Speech Language Therapy dysphagia treatment completed.   Functional Status:  Pt seen on this date for dysphagia therapy. Pt A&Ox4 and agreeable to therapy. Per RN, pt requesting re-evaluation of thin liquids. Pt consumed NTL via cup sip and straw sip were no overt s/sx of aspiration. Cough x1 noted with bite of soft solids when pt chewed quickly and pt began to talk, however, with pacing strategy and limiting distractions, no further overt s/sx of aspiration noted with further trials. Throat clear x1 following ~8oz of thin liquids via cup sip, however, no overt s/sx of aspiration and vocal quality was clear following all sips. Pt nervous about drinking thin liquids although no concerns found during this treatment session, however, encouraged pt to contact nursing if he would prefer NTL or was experiencing coughing/choking events; pt verbalized understanding. At this time, recommend dysphagia II/thin liquid diet cup sips ONLY, no straws. Dysphagia education provided to pt and he verbalized good understanding. SLP to follow.    Recommendations: dysphagia II/thin liquids, cup sips ONLY, no straws   Plan of Care: Will benefit from Speech Therapy 3 times per week  Post-Acute Therapy: Recommend outpatient or home health transitional care services for continued speech therapy services      See \"Rehab Therapy-Acute\" Patient Summary Report for complete documentation.     "

## 2019-08-28 NOTE — PROGRESS NOTES
Lab called with critical result of plts at 37. Critical lab result read back to lab.   Dr. Beaulieu notified of critical lab result at 1835.  Critical lab result read back by Dr. Beaulieu.

## 2019-08-29 VITALS
HEART RATE: 60 BPM | OXYGEN SATURATION: 94 % | RESPIRATION RATE: 18 BRPM | WEIGHT: 298.5 LBS | DIASTOLIC BLOOD PRESSURE: 68 MMHG | HEIGHT: 70 IN | TEMPERATURE: 98.1 F | SYSTOLIC BLOOD PRESSURE: 138 MMHG | BODY MASS INDEX: 42.73 KG/M2

## 2019-08-29 PROBLEM — R13.10 DYSPHAGIA: Status: RESOLVED | Noted: 2019-08-10 | Resolved: 2019-08-29

## 2019-08-29 PROBLEM — N17.9 AKI (ACUTE KIDNEY INJURY) (HCC): Status: RESOLVED | Noted: 2019-08-05 | Resolved: 2019-08-29

## 2019-08-29 PROBLEM — K81.9 CHOLECYSTITIS: Status: RESOLVED | Noted: 2019-08-19 | Resolved: 2019-08-29

## 2019-08-29 PROBLEM — R79.89 ELEVATED TROPONIN: Status: RESOLVED | Noted: 2019-08-05 | Resolved: 2019-08-29

## 2019-08-29 PROBLEM — K59.00 CONSTIPATION: Status: RESOLVED | Noted: 2019-08-24 | Resolved: 2019-08-29

## 2019-08-29 PROBLEM — R60.0 BILATERAL LOWER EXTREMITY EDEMA: Status: RESOLVED | Noted: 2019-08-24 | Resolved: 2019-08-29

## 2019-08-29 LAB
ALBUMIN SERPL BCP-MCNC: 2.3 G/DL (ref 3.2–4.9)
ALBUMIN/GLOB SERPL: 0.9 G/DL
ALP SERPL-CCNC: 95 U/L (ref 30–99)
ALT SERPL-CCNC: 15 U/L (ref 2–50)
ANION GAP SERPL CALC-SCNC: 8 MMOL/L (ref 0–11.9)
AST SERPL-CCNC: 28 U/L (ref 12–45)
BASOPHILS # BLD AUTO: 0.2 % (ref 0–1.8)
BASOPHILS # BLD: 0.01 K/UL (ref 0–0.12)
BILIRUB SERPL-MCNC: 0.6 MG/DL (ref 0.1–1.5)
BUN SERPL-MCNC: 24 MG/DL (ref 8–22)
CALCIUM SERPL-MCNC: 7.9 MG/DL (ref 8.5–10.5)
CHLORIDE SERPL-SCNC: 104 MMOL/L (ref 96–112)
CO2 SERPL-SCNC: 23 MMOL/L (ref 20–33)
CREAT SERPL-MCNC: 1.46 MG/DL (ref 0.5–1.4)
EOSINOPHIL # BLD AUTO: 0.19 K/UL (ref 0–0.51)
EOSINOPHIL NFR BLD: 2.9 % (ref 0–6.9)
ERYTHROCYTE [DISTWIDTH] IN BLOOD BY AUTOMATED COUNT: 50.1 FL (ref 35.9–50)
GLOBULIN SER CALC-MCNC: 2.7 G/DL (ref 1.9–3.5)
GLUCOSE BLD-MCNC: 153 MG/DL (ref 65–99)
GLUCOSE BLD-MCNC: 167 MG/DL (ref 65–99)
GLUCOSE BLD-MCNC: 192 MG/DL (ref 65–99)
GLUCOSE SERPL-MCNC: 185 MG/DL (ref 65–99)
HCT VFR BLD AUTO: 32.6 % (ref 42–52)
HGB BLD-MCNC: 11 G/DL (ref 14–18)
IMM GRANULOCYTES # BLD AUTO: 0.1 K/UL (ref 0–0.11)
IMM GRANULOCYTES NFR BLD AUTO: 1.5 % (ref 0–0.9)
LYMPHOCYTES # BLD AUTO: 1.03 K/UL (ref 1–4.8)
LYMPHOCYTES NFR BLD: 15.5 % (ref 22–41)
MCH RBC QN AUTO: 30.9 PG (ref 27–33)
MCHC RBC AUTO-ENTMCNC: 33.7 G/DL (ref 33.7–35.3)
MCV RBC AUTO: 91.6 FL (ref 81.4–97.8)
MONOCYTES # BLD AUTO: 0.61 K/UL (ref 0–0.85)
MONOCYTES NFR BLD AUTO: 9.2 % (ref 0–13.4)
NEUTROPHILS # BLD AUTO: 4.71 K/UL (ref 1.82–7.42)
NEUTROPHILS NFR BLD: 70.7 % (ref 44–72)
NRBC # BLD AUTO: 0 K/UL
NRBC BLD-RTO: 0 /100 WBC
PLATELET # BLD AUTO: 54 K/UL (ref 164–446)
PMV BLD AUTO: 11.7 FL (ref 9–12.9)
POTASSIUM SERPL-SCNC: 4.2 MMOL/L (ref 3.6–5.5)
PROT SERPL-MCNC: 5 G/DL (ref 6–8.2)
RBC # BLD AUTO: 3.56 M/UL (ref 4.7–6.1)
SODIUM SERPL-SCNC: 135 MMOL/L (ref 135–145)
WBC # BLD AUTO: 6.7 K/UL (ref 4.8–10.8)

## 2019-08-29 PROCEDURE — A9270 NON-COVERED ITEM OR SERVICE: HCPCS | Performed by: FAMILY MEDICINE

## 2019-08-29 PROCEDURE — 700111 HCHG RX REV CODE 636 W/ 250 OVERRIDE (IP): Performed by: INTERNAL MEDICINE

## 2019-08-29 PROCEDURE — 700102 HCHG RX REV CODE 250 W/ 637 OVERRIDE(OP): Performed by: FAMILY MEDICINE

## 2019-08-29 PROCEDURE — 82962 GLUCOSE BLOOD TEST: CPT

## 2019-08-29 PROCEDURE — A9270 NON-COVERED ITEM OR SERVICE: HCPCS | Performed by: INTERNAL MEDICINE

## 2019-08-29 PROCEDURE — 36415 COLL VENOUS BLD VENIPUNCTURE: CPT

## 2019-08-29 PROCEDURE — 700102 HCHG RX REV CODE 250 W/ 637 OVERRIDE(OP): Performed by: INTERNAL MEDICINE

## 2019-08-29 PROCEDURE — 99239 HOSP IP/OBS DSCHRG MGMT >30: CPT | Performed by: HOSPITALIST

## 2019-08-29 PROCEDURE — 80053 COMPREHEN METABOLIC PANEL: CPT

## 2019-08-29 PROCEDURE — 29581 APPL MULTLAYER CMPRN SYS LEG: CPT

## 2019-08-29 PROCEDURE — 85025 COMPLETE CBC W/AUTO DIFF WBC: CPT

## 2019-08-29 RX ADMIN — METOPROLOL SUCCINATE 25 MG: 25 TABLET, EXTENDED RELEASE ORAL at 05:30

## 2019-08-29 RX ADMIN — SENNOSIDES, DOCUSATE SODIUM 2 TABLET: 50; 8.6 TABLET, FILM COATED ORAL at 05:31

## 2019-08-29 RX ADMIN — MORPHINE SULFATE 4 MG: 4 INJECTION INTRAVENOUS at 12:49

## 2019-08-29 RX ADMIN — GABAPENTIN 400 MG: 400 CAPSULE ORAL at 05:31

## 2019-08-29 RX ADMIN — INSULIN GLARGINE 35 UNITS: 100 INJECTION, SOLUTION SUBCUTANEOUS at 05:40

## 2019-08-29 RX ADMIN — GABAPENTIN 400 MG: 400 CAPSULE ORAL at 11:30

## 2019-08-29 RX ADMIN — FINASTERIDE 5 MG: 5 TABLET, FILM COATED ORAL at 05:31

## 2019-08-29 RX ADMIN — OXYCODONE HYDROCHLORIDE 10 MG: 10 TABLET ORAL at 12:49

## 2019-08-29 RX ADMIN — ONDANSETRON 4 MG: 2 INJECTION INTRAMUSCULAR; INTRAVENOUS at 16:20

## 2019-08-29 RX ADMIN — AMLODIPINE BESYLATE 10 MG: 10 TABLET ORAL at 05:31

## 2019-08-29 ASSESSMENT — ENCOUNTER SYMPTOMS
VOMITING: 0
DIZZINESS: 0
PALPITATIONS: 0
HEARTBURN: 0
NECK PAIN: 0
FEVER: 0
CHILLS: 0
TINGLING: 0
HEADACHES: 0
MYALGIAS: 0
COUGH: 0
HEMOPTYSIS: 0
NAUSEA: 0

## 2019-08-29 NOTE — DISCHARGE PLANNING
Agency/Facility Name: [facility]  Spoke To: Marilyn  Outcome: Returned call regarding DC Summary.  DC Summary was received.

## 2019-08-29 NOTE — DISCHARGE SUMMARY
Discharge Summary    CHIEF COMPLAINT ON ADMISSION    Chief Complaint   Patient presents with   • Pain   • Abdominal Pain       Reason for Admission  Wound Check     CODE STATUS  Full Code    HPI & HOSPITAL COURSE        74 y.o. male with a past medical history of obesity, atrial fibrillation, status pacemaker placement, hypertension, insulin-dependent diabetes mellitus, COPD who presented 8/18/2019 with abdominal pain and hypoglycemia.  Patient was admitted to the hospital on 8/5/2019 after being found down for about 3 days.  He was treated for MARIZOL and rhabdomyolysis with IV fluids.  He was also noted to have a nonhealing ulcer of his right lower extremity, vascular surgery Dr. Rios performed an angiogram and stent placement to the SFA.  His right lower extremity wound culture grew out MRSA and he was started on oral Zyvox for 2 weeks.  During that hospitalization he had a hypoglycemic event which resolved after he was fed.  He was discharged to [facility] on 8/17/2019.  He presented back to the hospital with abdominal pain and was found to have cholecystitis. He had laparoscopic cholecystectomy on 8/20 with Dr. Solomon. On 8/23 his platelet levels dropped under 20 and he had bleeding from his leg wound. His platelet drop was likely 2/2 abx.  His antibiotics were stopped and hematology oncology worries consulted.  Patient received transfusion of platelets with improvement of his count.  He did initially have bleeding from his ulcer however that stopped as his platelets continue to rise.  For his diabetic foot ulcer wound care was consulted and will continue outpatient as well he was encouraged on glycemic control for proper wound healing.  His renal function worsened with Lasix use which was discontinued and his creatinine remained stable.      * Cholecystitis- (present on admission)  Assessment & Plan  Laparoscopic cholecystectomy 8/20/2019  Finished antibiotic course (Zosyn then  Rocephin+Flagyl)  pain controlled     Diabetic foot ulcer (HCC)- (present on admission)  Assessment & Plan  Wound care, pain control  Finished course of Zyvox  Counseled on the importance of good glycemic control to allow for proper wound healing  Labs stable, plt still low  Ok to discharge once platelets and hemoglobin have stabilized and wound no longer bleeding- to SNF     MARIZOL (acute kidney injury) ([facility])- (present on admission)  Assessment & Plan  New on CKD 3  Cr is at 1.48  Likely due to lasix use which I will stop and monitor renal function  Avoid nephrotoxic agents  If worsening will obtain renal us        Bilateral lower extremity edema- (present on admission)  Assessment & Plan  Resolving  Dc lasix given marizol now     Constipation- (present on admission)  Assessment & Plan  Bowel protocol     Thrombocytopenia (HCC)- (present on admission)  Assessment & Plan  S/p transfusion with improvement in plt count, now 35k  Heme/onco on board, appreciate recs  Bleeding stable  Monitor closely        HTN (hypertension)- (present on admission)  Assessment & Plan  Toprol XL, and norvasc        PVD (peripheral vascular disease) ([facility])- (present on admission)  Assessment & Plan  Right lower extremity angiogram. Angioplasty and 7mm x 6cm stent right SFA 8/17/2019     Type 2 diabetes mellitus with kidney complication, with long-term current use of insulin ([facility])- (present on admission)  Assessment & Plan  Uncontrolled with hyperglycemia  BS are elevated to 342 today  Will adjust long acting lantus and SSI as needed           Dysphagia- (present on admission)  Assessment & Plan  Dysphagia 2, NTL  Speech therapy to continue     Elevated troponin- (present on admission)  Assessment & Plan   type II NSTEMI, continue metoprolol, no aspirin due to bleed and low platelets     Acute blood loss anemia  Assessment & Plan  Due to bleeding from his leg overnight  hgb stable at 11  No further bleeding  plt low but stable 52k s/p plt  transfusion     Class 3 severe obesity due to excess calories in adult ([facility])- (present on admission)  Assessment & Plan  Body mass index is 40.75 kg/m².  Therefore, he is discharged in fair and stable condition to skilled nursing facility.      At this time patient is medically stable to be discharged and has been seen by wound care here.  He will continue wound therapy, PT, OT at skilled nursing facility.  Medication adjustments will be made according to his labs and skilled nursing facility.  Patient understood and agreed with above plan and will be discharged in stable medical conditions today  The patient met 2-midnight criteria for an inpatient stay at the time of discharge.      FOLLOW UP ITEMS POST DISCHARGE  pcp  surgery      DISCHARGE DIAGNOSES  Principal Problem (Resolved):    Cholecystitis POA: Yes  Active Problems:    Diabetic foot ulcer ([facility]) POA: Yes    Type 2 diabetes mellitus with kidney complication, with long-term current use of insulin ([facility]) POA: Yes    PVD (peripheral vascular disease) ([facility]) POA: Yes    HTN (hypertension) POA: Yes    Thrombocytopenia ([facility]) POA: Yes    Acute blood loss anemia POA: Unknown    Class 3 severe obesity due to excess calories in adult ([facility]) POA: Yes  Resolved Problems:    MARIZOL (acute kidney injury) ([facility]) POA: Yes    Elevated troponin POA: Yes    Dysphagia POA: Yes    CKD (chronic kidney disease) stage 3, GFR 30-59 ml/min ([facility]) POA: Yes    Constipation POA: Yes    Bilateral lower extremity edema POA: Yes      FOLLOW UP  No future appointments.  No follow-up provider specified.    MEDICATIONS ON DISCHARGE     Medication List      START taking these medications      Instructions   amLODIPine 10 MG Tabs  Commonly known as:  NORVASC   Take 1 Tab by mouth every day.  Dose:  10 mg        CHANGE how you take these medications      Instructions   insulin glargine 100 UNIT/ML Soln  What changed:    · how much to take  · when to take this  Commonly known as:  LANTUS   Inject 35  Units as instructed every morning.  Dose:  35 Units     insulin regular 100 Unit/mL Soln  What changed:  how much to take  Commonly known as:  HUMULIN R   Inject 2-10 Units as instructed 4 Times a Day,Before Meals and at Bedtime.  Dose:  2-10 Units     oxycodone 15 MG immediate release tablet  What changed:    · medication strength  · how much to take  Commonly known as:  OXY-IR   Take 1 Tab by mouth every four hours as needed for up to 3 days.  Dose:  15 mg        CONTINUE taking these medications      Instructions   acetaminophen 500 MG Tabs  Commonly known as:  TYLENOL   Take 2 Tabs by mouth every 6 hours as needed for Mild Pain.  Dose:  1,000 mg     bisacodyl 10 MG Supp  Commonly known as:  DULCOLAX   Insert 1 Suppository in rectum 1 time daily as needed (if magnesium hydroxide ineffective after 24 hours).  Dose:  10 mg     Cholecalciferol 2000 UNIT Tabs   Take 1 Tab by mouth every day.  Dose:  2,000 Units     finasteride 5 MG Tabs  Commonly known as:  PROSCAR   Take 1 Tab by mouth every day.  Dose:  5 mg     gabapentin 400 MG Caps  Commonly known as:  NEURONTIN   Take 1 Cap by mouth 3 times a day.  Dose:  400 mg     magnesium hydroxide 400 MG/5ML Susp  Commonly known as:  MILK OF MAGNESIA   Take 30 mL by mouth 1 time daily as needed (if polyethylene glycol ineffective after 24 hours).  Dose:  30 mL     metoprolol SR 25 MG Tb24  Commonly known as:  TOPROL XL   Take 1 Tab by mouth 2 Times a Day.  Dose:  25 mg     ondansetron 4 MG Tbdp  Commonly known as:  ZOFRAN ODT   Take 1 Tab by mouth every four hours as needed for Nausea (give PO if IV route is unavailable.).  Dose:  4 mg     polyethylene glycol/lytes Pack  Commonly known as:  MIRALAX   Take 1 Packet by mouth 1 time daily as needed (if sennosides and docusate ineffective after 24 hours).  Dose:  17 g     senna-docusate 8.6-50 MG Tabs  Commonly known as:  PERICOLACE or SENOKOT S   Take 2 Tabs by mouth 2 Times a Day.  Dose:  2 Tab        STOP taking these  medications    glucose 4 g chewable tablet     linezolid 600 MG Tabs  Commonly known as:  ZYVOX            Allergies  Allergies   Allergen Reactions   • Bloodless        DIET  Orders Placed This Encounter   Procedures   • Diet Order Diabetic, Cardiac     Standing Status:   Standing     Number of Occurrences:   1     Order Specific Question:   Diet:     Answer:   Diabetic [3]     Order Specific Question:   Diet:     Answer:   Cardiac [6]     Order Specific Question:   Texture/Fiber modifications:     Answer:   Dysphagia 2(Pureed/Chopped)specify fluid consistency(question 6) [2]     Order Specific Question:   Consistency/Fluid modifications:     Answer:   Nectar Thick [2]       ACTIVITY  As tolerated and directed by skilled nursing.  Weight bearing as tolerated    LINES, DRAINS, AND WOUNDS  This is an automated list. Peripheral IVs will be removed prior to discharge.  Peripheral IV 08/27/19 20 G Anterior;Right Upper arm (Active)   Site Assessment Clean;Dry;Intact 8/29/2019 11:00 AM   Dressing Type Transparent 8/29/2019 11:00 AM   Line Status Scrubbed the hub prior to access;Flushed;Saline locked 8/29/2019 11:00 AM   Dressing Status Clean;Dry;Intact 8/29/2019 11:00 AM   Dressing Intervention N/A 8/29/2019 11:00 AM   Infiltration Grading ([facility]) 0 8/29/2019 11:00 AM   Phlebitis Scale ([facility] Only) 0 8/29/2019 11:00 AM       Wound 08/19/19 Venous Ulcer Leg posterior Right  (Active)   Wound Image     8/19/2019  8:30 PM   Site Assessment NOEMÍ 8/29/2019 11:00 AM   Ella-wound Assessment Intact 8/28/2019  8:15 PM   Margins Attached edges 8/28/2019  8:15 PM   Tunneling 0 cm 8/27/2019 11:30 AM   Undermining 0 cm 8/27/2019 11:30 AM   Closure None 8/27/2019 11:30 AM   Drainage Amount Scant 8/28/2019  8:15 PM   Drainage Description Serosanguineous 8/28/2019  8:15 PM   Non-staged Wound Description Full thickness 8/28/2019  8:15 PM   Treatments Cleansed;Site care 8/27/2019 11:30 AM   Cleansing Approved Wound Cleanser 8/27/2019  11:30 AM   Periwound Protectant Not Applicable 8/28/2019  8:15 PM   Dressing Options Nonadherent Contact Layer;Absorbent Abdominal Pad;Compression Wrap Two Layer 8/28/2019  8:15 PM   Dressing Cleansing/Solutions Not Applicable 8/28/2019  8:15 PM   Dressing Changed New 8/27/2019 11:30 AM   Dressing Status Clean;Dry;Intact 8/28/2019  9:00 AM   Dressing Change Frequency Every 48 hrs 8/28/2019  8:15 PM   NEXT Dressing Change  08/29/19 8/28/2019  8:15 PM   NEXT Weekly Photo (Inpatient Only) 09/02/19 8/27/2019 11:30 AM   WOUND NURSE ONLY - Odor None 8/27/2019 11:30 AM   WOUND NURSE ONLY - Pulses Right;1+;DP 8/27/2019 11:30 AM   WOUND NURSE ONLY - Exposed Structures None 8/27/2019 11:30 AM   WOUND NURSE ONLY - Tissue Type and Percentage 100% red clotted blood 8/27/2019 11:30 AM   WOUND NURSE ONLY - Time Spent with Patient (mins) 90 8/27/2019 11:30 AM       Wound 08/21/19 Abdomen;Other (Comment) Under Pannus (Active)   Site Assessment Dry;Intact;Red;Excoriated 8/29/2019 11:00 AM   Ella-wound Assessment Clean;Dry;Intact 8/29/2019 11:00 AM   Drainage Amount None 8/29/2019 11:00 AM   Treatments Site care 8/28/2019  8:15 PM   Dressing Options Other (Comments) 8/28/2019  8:15 PM   Dressing Changed New 8/28/2019  9:00 AM   Dressing Status Clean;Dry;Intact 8/28/2019  8:15 PM   Dressing Change Frequency As Needed 8/28/2019  8:15 PM       Moisture Associated Skin Damage Sacrum (Active)   Drainage Amount Scant 8/28/2019  8:15 PM   Drainage Description Serosanguineous 8/29/2019 11:00 AM   Ella-wound Assessment Pink;Excoriated;Fragile 8/29/2019 11:00 AM   Cleansing Normal Saline Irrigation 8/29/2019 11:00 AM   Periwound Protectant Barrier Paste 8/29/2019 11:00 AM       Moisture Associated Skin Damage Buttock;Sacrum (Active)   Drainage Amount None 8/29/2019 11:00 AM   Ella-wound Assessment Excoriated;Pink;Fragile 8/29/2019 11:00 AM   Cleansing Approved Wound Cleanser 8/29/2019 11:00 AM   Periwound Protectant Barrier Paste 8/29/2019  11:00 AM       Peripheral IV 08/27/19 20 G Anterior;Right Upper arm (Active)   Site Assessment Clean;Dry;Intact 8/29/2019 11:00 AM   Dressing Type Transparent 8/29/2019 11:00 AM   Line Status Scrubbed the hub prior to access;Flushed;Saline locked 8/29/2019 11:00 AM   Dressing Status Clean;Dry;Intact 8/29/2019 11:00 AM   Dressing Intervention N/A 8/29/2019 11:00 AM   Infiltration Grading ([facility]) 0 8/29/2019 11:00 AM   Phlebitis Scale ([facility] Only) 0 8/29/2019 11:00 AM               MENTAL STATUS ON TRANSFER  Level of Consciousness: Alert  Orientation : Oriented x 4  Speech: Speech Clear    CONSULTATIONS  Surgery  Hematology , oncology    PROCEDURES     Preoperative Diagnosis:  - Acute cholecystitis     Postoperative Diagnosis:  - Acute gangrenous perforated cholecystitis     Procedure Performed:  1) Laparoscopic cholecystectomy    LABORATORY  Lab Results   Component Value Date    SODIUM 135 08/29/2019    POTASSIUM 4.2 08/29/2019    CHLORIDE 104 08/29/2019    CO2 23 08/29/2019    GLUCOSE 185 (H) 08/29/2019    BUN 24 (H) 08/29/2019    CREATININE 1.46 (H) 08/29/2019    CREATININE 1.1 10/07/2005        Lab Results   Component Value Date    WBC 6.7 08/29/2019    HEMOGLOBIN 11.0 (L) 08/29/2019    HEMATOCRIT 32.6 (L) 08/29/2019    PLATELETCT 54 (L) 08/29/2019        Total time of the discharge process exceeds 45 minutes.

## 2019-08-29 NOTE — PROGRESS NOTES
Report received from Ro. No overnight events to report. Pt awake in bed at this time w/no complaints of pain. Bed locked in lowest position; call light in reach.

## 2019-08-29 NOTE — DISCHARGE INSTRUCTIONS
Discharge Instructions    Discharged to [facility] home by medical transportation with escort. Discharged via wheelchair, hospital escort: Yes.  Special equipment needed: Wheelchair    Be sure to schedule a follow-up appointment with your primary care doctor or any specialists as instructed.     Discharge Plan:        I understand that a diet low in cholesterol, fat, and sodium is recommended for good health. Unless I have been given specific instructions below for another diet, I accept this instruction as my diet prescription.   Other diet: cardiac, diabetic    Special Instructions: None    · Is patient discharged on Warfarin / Coumadin?   No     Depression / Suicide Risk    As you are discharged from this [facility] facility, it is important to learn how to keep safe from harming yourself.    Recognize the warning signs:  · Abrupt changes in personality, positive or negative- including increase in energy   · Giving away possessions  · Change in eating patterns- significant weight changes-  positive or negative  · Change in sleeping patterns- unable to sleep or sleeping all the time   · Unwillingness or inability to communicate  · Depression  · Unusual sadness, discouragement and loneliness  · Talk of wanting to die  · Neglect of personal appearance   · Rebelliousness- reckless behavior  · Withdrawal from people/activities they love  · Confusion- inability to concentrate     If you or a loved one observes any of these behaviors or has concerns about self-harm, here's what you can do:  · Talk about it- your feelings and reasons for harming yourself  · Remove any means that you might use to hurt yourself (examples: pills, rope, extension cords, firearm)  · Get professional help from the community (Mental Health, Substance Abuse, psychological counseling)  · Do not be alone:Call your Safe Contact- someone whom you trust who will be there for you.  · Call your local CRISIS HOTLINE 346-8148 or 473-145-8985  · Call your  local Children's Mobile Crisis Response Team [place] (182) 851-5375 or www.[employer]  · Call the toll free National Suicide Prevention Hotlines   · National Suicide Prevention Lifeline 629-326-SYUL (3355)  · National Hope Line Network 800-SUICIDE (957-6973)      Laparoscopic Cholecystectomy, Care After  These instructions give you information on caring for yourself after your procedure. Your doctor may also give you more specific instructions. Call your doctor if you have any problems or questions after your procedure.   HOME CARE  · Change your bandages (dressings) as told by your doctor.  · Keep the wound dry and clean. Wash the wound gently with soap and water. Pat the wound dry with a clean towel.  · Do not take baths, swim, or use hot tubs for 2 weeks, or as told by your doctor.  · Only take medicine as told by your doctor.  · Eat a normal diet as told by your doctor.  · Do not lift anything heavier than 10 pounds (4.5 kg) until your doctor says it is okay.  · Do not play contact sports for 1 week, or as told by your doctor.  GET HELP IF:  · Your wound is red, puffy (swollen), or painful.  · You have yellowish-white fluid (pus) coming from the wound.  · You have fluid draining from the wound for more than 1 day.  · You have a bad smell coming from the wound.  · Your wound breaks open.  GET HELP RIGHT AWAY IF:   · You have a rash.  · You have trouble breathing.  · You have chest pain.  · You have a fever.  · You have pain in the shoulders (shoulder strap areas) that is getting worse.  · You feel dizzy or pass out (faint).  · You have severe belly (abdominal) pain.  · You feel sick to your stomach (nauseous) or throw up (vomit) for more than 1 day.     This information is not intended to replace advice given to you by your health care provider. Make sure you discuss any questions you have with your health care provider.     Document Released: 09/26/2009 Document Revised: 10/08/2014 Document Reviewed:  07/30/2014  [employer] Interactive Patient Education ©2016 [employer]    Wound Care, Adult  Taking care of your wound properly can help to prevent pain and infection. It can also help your wound to heal more quickly.  How is this treated?  Wound care  · Follow instructions from your health care provider about how to take care of your wound. Make sure you:  ¨ Wash your hands with soap and water before you change the bandage (dressing). If soap and water are not available, use hand .  ¨ Change your dressing as told by your health care provider.  ¨ Leave stitches (sutures), skin glue, or adhesive strips in place. These skin closures may need to stay in place for 2 weeks or longer. If adhesive strip edges start to loosen and curl up, you may trim the loose edges. Do not remove adhesive strips completely unless your health care provider tells you to do that.  · Check your wound area every day for signs of infection. Check for:  ¨ More redness, swelling, or pain.  ¨ More fluid or blood.  ¨ Warmth.  ¨ Pus or a bad smell.  · Ask your health care provider if you should clean the wound with mild soap and water. Doing this may include:  ¨ Using a clean towel to pat the wound dry after cleaning it. Do not rub or scrub the wound.  ¨ Applying a cream or ointment. Do this only as told by your health care provider.  ¨ Covering the incision with a clean dressing.  · Ask your health care provider when you can leave the wound uncovered.  Medicines   · If you were prescribed an antibiotic medicine, cream, or ointment, take or use the antibiotic as told by your health care provider. Do not stop taking or using the antibiotic even if your condition improves.  · Take over-the-counter and prescription medicines only as told by your health care provider. If you were prescribed pain medicine, take it at least 30 minutes before doing any wound care or as told by your health care provider.  General instructions  · Return to your  normal activities as told by your health care provider. Ask your health care provider what activities are safe.  · Do not scratch or pick at the wound.  · Keep all follow-up visits as told by your health care provider. This is important.  · Eat a diet that includes protein, vitamin A, vitamin C, and other nutrient-rich foods. These help the wound heal:  ¨ Protein-rich foods include meat, dairy, beans, nuts, and other sources.  ¨ Vitamin A-rich foods include carrots and dark green, leafy vegetables.  ¨ Vitamin C-rich foods include citrus, tomatoes, and other fruits and vegetables.  ¨ Nutrient-rich foods have protein, carbohydrates, fat, vitamins, or minerals. Eat a variety of healthy foods including vegetables, fruits, and whole grains.  Contact a health care provider if:  · You received a tetanus shot and you have swelling, severe pain, redness, or bleeding at the injection site.  · Your pain is not controlled with medicine.  · You have more redness, swelling, or pain around the wound.  · You have more fluid or blood coming from the wound.  · Your wound feels warm to the touch.  · You have pus or a bad smell coming from the wound.  · You have a fever or chills.  · You are nauseous or you vomit.  · You are dizzy.  Get help right away if:  · You have a red streak going away from your wound.  · The edges of the wound open up and separate.  · Your wound is bleeding and the bleeding does not stop with gentle pressure.  · You have a rash.  · You faint.  · You have trouble breathing.  This information is not intended to replace advice given to you by your health care provider. Make sure you discuss any questions you have with your health care provider.  Document Released: 09/26/2009 Document Revised: 08/16/2017 Document Reviewed: 07/04/2017  [employer] Interactive Patient Education © 2017 [employer]  Amlodipine tablets  What is this medicine?  AMLODIPINE (am JOSE di monica) is a calcium-channel blocker. It affects the amount  of calcium found in your heart and muscle cells. This relaxes your blood vessels, which can reduce the amount of work the heart has to do. This medicine is used to lower high blood pressure. It is also used to prevent chest pain.  This medicine may be used for other purposes; ask your health care provider or pharmacist if you have questions.  COMMON BRAND NAME(S): Norvasc  What should I tell my health care provider before I take this medicine?  They need to know if you have any of these conditions:  -heart problems like heart failure or aortic stenosis  -liver disease  -an unusual or allergic reaction to amlodipine, other medicines, foods, dyes, or preservatives  -pregnant or trying to get pregnant  -breast-feeding  How should I use this medicine?  Take this medicine by mouth with a glass of water. Follow the directions on the prescription label. Take your medicine at regular intervals. Do not take more medicine than directed.  Talk to your pediatrician regarding the use of this medicine in children. Special care may be needed. This medicine has been used in children as young as 6.  Persons over 65 years old may have a stronger reaction to this medicine and need smaller doses.  Overdosage: If you think you have taken too much of this medicine contact a poison control center or emergency room at once.  NOTE: This medicine is only for you. Do not share this medicine with others.  What if I miss a dose?  If you miss a dose, take it as soon as you can. If it is almost time for your next dose, take only that dose. Do not take double or extra doses.  What may interact with this medicine?  -herbal or dietary supplements  -local or general anesthetics  -medicines for high blood pressure  -medicines for prostate problems  -rifampin  This list may not describe all possible interactions. Give your health care provider a list of all the medicines, herbs, non-prescription drugs, or dietary supplements you use. Also tell them if  you smoke, drink alcohol, or use illegal drugs. Some items may interact with your medicine.  What should I watch for while using this medicine?  Visit your doctor or health care professional for regular check ups. Check your blood pressure and pulse rate regularly. Ask your health care professional what your blood pressure and pulse rate should be, and when you should contact him or her.  This medicine may make you feel confused, dizzy or lightheaded. Do not drive, use machinery, or do anything that needs mental alertness until you know how this medicine affects you. To reduce the risk of dizzy or fainting spells, do not sit or stand up quickly, especially if you are an older patient. Avoid alcoholic drinks; they can make you more dizzy.  Do not suddenly stop taking amlodipine. Ask your doctor or health care professional how you can gradually reduce the dose.  What side effects may I notice from receiving this medicine?  Side effects that you should report to your doctor or health care professional as soon as possible:  -allergic reactions like skin rash, itching or hives, swelling of the face, lips, or tongue  -breathing problems  -changes in vision or hearing  -chest pain  -fast, irregular heartbeat  -swelling of legs or ankles  Side effects that usually do not require medical attention (report to your doctor or health care professional if they continue or are bothersome):  -dry mouth  -facial flushing  -nausea, vomiting  -stomach gas, pain  -tired, weak  -trouble sleeping  This list may not describe all possible side effects. Call your doctor for medical advice about side effects. You may report side effects to FDA at 2-288-FDA-4331.  Where should I keep my medicine?  Keep out of the reach of children.  Store at room temperature between 59 and 86 degrees F (15 and 30 degrees C). Protect from light. Keep container tightly closed. Throw away any unused medicine after the expiration date.  NOTE: This sheet is a  summary. It may not cover all possible information. If you have questions about this medicine, talk to your doctor, pharmacist, or health care provider.  © 2018 Elsevier/Gold Standard (2013-11-15 11:40:58)

## 2019-08-29 NOTE — DISCHARGE PLANNING
Received Transport Form @ 1244  Spoke to Elaine @ [facility]    Transport is scheduled for 8/29 @1600 going to [place], [facility] Cordelia Ma[facility].  EMILY Zhao  [id number]    ARACELIS Nagel notified

## 2019-08-29 NOTE — PROGRESS NOTES
Bedside report received by tyron Mendoza. Patient sitting up in bed watching TV at this time, family present. POC discussed, verbalized understanding. No immediate concerns for patient at this time. All safety measures in place.

## 2019-08-29 NOTE — CARE PLAN
Problem: Safety  Goal: Will remain free from injury  Outcome: PROGRESSING AS EXPECTED  Note:   Bed locked and in lowest position. Bed alarm on. Treaded socks. Call light and belongings within reach. Patient educated to call for assistance. Pt verbalized understanding. Hourly rounding in place.       Problem: Skin Integrity  Goal: Risk for impaired skin integrity will decrease  Outcome: PROGRESSING AS EXPECTED  Note:   Q2hr hour turns preformed, frequent cleansing of skin, skin protectant used. Frequent monitoring of skin integrity.              present

## 2019-08-29 NOTE — DISCHARGE PLANNING
Anticipated Discharge Disposition:   · SNF-[facility]    Action:  · LSW notified by attending in discharge rounds that pt is medically clear to go to SNF today.   · CCA notified and reached out [facility], who reported back bed availability.   · Per BSN pt is unable to sit up in wheelchair, is max assist and will need ambulance transport. Transport forms completed and faxed to [facility] ext [phone].   · CCA notified LSW that transportation would be available at 1600 today via [facility]  · LSW completed COBRA and transfer packet and placed in pt's chart. BSN notified.   · LSW provided pt with 2nd IMM    Barriers to Discharge:   · None    Plan:   · Pt to discharge to [facility] today via [facility] at 1600

## 2019-08-29 NOTE — PROGRESS NOTES
Oncology/Hematology Progress Note               Author: Nazario Kaba III Date & Time created: 8/29/2019  1:16 PM   Severe thrombocytopenia  Interval History:  The platelet count continues to improve currently at 54,000, otherwise no new events or complaints.    Review of Systems:  Review of Systems   Constitutional: Positive for malaise/fatigue. Negative for chills and fever.   HENT: Negative.    Respiratory: Negative for cough and hemoptysis.    Cardiovascular: Positive for leg swelling. Negative for chest pain and palpitations.   Gastrointestinal: Negative for heartburn, nausea and vomiting.   Musculoskeletal: Negative for myalgias and neck pain.   Skin: Positive for rash.   Neurological: Negative for dizziness, tingling and headaches.       Physical Exam:  Physical Exam   Constitutional: He is oriented to person, place, and time. He appears well-developed and well-nourished.   HENT:   Head: Normocephalic and atraumatic.   Eyes: Pupils are equal, round, and reactive to light. Conjunctivae are normal.   Neck: Normal range of motion. Neck supple.   Abdominal: Soft.   Musculoskeletal: He exhibits edema.   Neurological: He is alert and oriented to person, place, and time.       Labs:          Recent Labs     08/27/19 0125 08/28/19 0227 08/29/19 0037   SODIUM 139 134* 135   POTASSIUM 3.7 4.5 4.2   CHLORIDE 106 102 104   CO2 27 26 23   BUN 15 20 24*   CREATININE 1.23 1.48* 1.46*   CALCIUM 7.7* 8.4* 7.9*     Recent Labs     08/27/19 0125 08/28/19 0227 08/29/19 0037   ALTSGPT 17 18 15   ASTSGOT 19 19 28   ALKPHOSPHAT 105* 117* 95   TBILIRUBIN 0.5 0.6 0.6   GLUCOSE 153* 307* 185*     Recent Labs     08/27/19 1753 08/28/19 0227 08/29/19 0037   RBC 3.93* 3.64* 3.56*   HEMOGLOBIN 11.9* 11.1* 11.0*   HEMATOCRIT 36.8* 32.9* 32.6*   PLATELETCT 37* 35* 54*     Recent Labs     08/27/19 0125 08/27/19 1753 08/28/19 0227 08/29/19 0037   WBC 4.4* 5.7 5.1 6.7   NEUTSPOLYS 65.50  --  88.90* 70.70   LYMPHOCYTES 18.00*  --   6.90* 15.50*   MONOCYTES 9.30  --  2.00 9.20   EOSINOPHILS 4.60  --  0.00 2.90   BASOPHILS 0.50  --  0.20 0.20   ASTSGOT 19  --  19 28   ALTSGPT 17  --  18 15   ALKPHOSPHAT 105*  --  117* 95   TBILIRUBIN 0.5  --  0.6 0.6     Recent Labs     19  0125 19  0227 19  0037   SODIUM 139 134* 135   POTASSIUM 3.7 4.5 4.2   CHLORIDE 106 102 104   CO2 27 26 23   GLUCOSE 153* 307* 185*   BUN 15 20 24*   CREATININE 1.23 1.48* 1.46*   CALCIUM 7.7* 8.4* 7.9*     Hemodynamics:  Temp (24hrs), Av.7 °C (98 °F), Min:36.4 °C (97.5 °F), Max:37 °C (98.6 °F)  Temperature: 36.7 °C (98.1 °F)  Pulse  Av.3  Min: 60  Max: 73   Blood Pressure : 138/68     Respiratory:    Respiration: 18, Pulse Oximetry: 94 %     Work Of Breathing / Effort: Mild  RUL Breath Sounds: Clear, RML Breath Sounds: Clear, RLL Breath Sounds: Clear;Diminished, ZAMZAM Breath Sounds: Clear, LLL Breath Sounds: Clear;Diminished  Fluids:    Intake/Output Summary (Last 24 hours) at 2019 1409  Last data filed at 2019 0500  Gross per 24 hour   Intake 256 ml   Output 1400 ml   Net -1144 ml     Weight: (!) 135.4 kg (298 lb 8.1 oz)  GI/Nutrition:  Orders Placed This Encounter   Procedures   • Diet Order Diabetic, Cardiac     Standing Status:   Standing     Number of Occurrences:   1     Order Specific Question:   Diet:     Answer:   Diabetic [3]     Order Specific Question:   Diet:     Answer:   Cardiac [6]     Order Specific Question:   Texture/Fiber modifications:     Answer:   Dysphagia 2(Pureed/Chopped)specify fluid consistency(question 6) [2]     Order Specific Question:   Consistency/Fluid modifications:     Answer:   Nectar Thick [2]     Medical Decision Making, by Problem:  Active Hospital Problems    Diagnosis   • *Cholecystitis [K81.9]   • Diabetic foot ulcer (HCC) [E11.621, L97.509]   • MARIZOL (acute kidney injury) (HCC) [N17.9]   • Constipation [K59.00]   • Bilateral lower extremity edema [R60.0]   • PVD (peripheral vascular disease) (HCC)  [I73.9]   • HTN (hypertension) [I10]   • Thrombocytopenia (HCC) [D69.6]   • Type 2 diabetes mellitus with kidney complication, with long-term current use of insulin (HCC) [E11.29, Z79.4]   • Dysphagia [R13.10]   • Elevated troponin [R74.8]   • Class 3 severe obesity due to excess calories in adult (HCC) [E66.01]   • Acute blood loss anemia [D62]       Plan:  1.  Severe thrombocytopenia, multifactorial to recent antibiotic use, sepsis.  -Low HIT score  -Unlikely autoimmune thrombocytopenia    Plan  -The platelet count now is trending up without transfusions.  Keep following CBC daily.  Avoid antibiotics known to cause thrombocytopenia.  Infection is controlled  -We will sign off, please call back if questions or concerns      2.  Cholecystitis, he did finish a course of Zosyn then Rocephin and Flagyl  3.  Bilateral lower extremity edema on Lasix  4.  Fear peripheral vascular disease  5.  Diabetic foot ulcer, wound care on board,    Moderate complexity    Please note that this dictation was created using voice recognition software.  I have made every reasonable attempt to correct obvious error, but I expected that there are errors of grammar and possibly context  that I did not discover before finalizing the note    Quality-Core Measures

## 2019-08-29 NOTE — PROGRESS NOTES
Pt being discharged to [facility]. This nurse provided report to Niyah at [facility]. Pt educated on new medications, changes to current medications and received instructions. New prescriptions given and pt verbalized understanding of all medications. Follow up appt will be made by [facility] staff. PIV removed. Monitor checked in, monitor room notified. All personal belongings with pt. Pt going to [facility] with [facility] transport. Will monitor until transport arrives.

## 2019-08-29 NOTE — DISCHARGE PLANNING
Agency/Facility Name: [facility]  Spoke To: Natasha  Outcome: Patient accepted and bed available.  Will need to follow up with type of transport, [facility] vs [facility].    ARACELIS Nagel notified

## 2019-08-29 NOTE — WOUND TEAM
"[facility]  Inpatient Services  Wound and Skin Care Progress     Admission Date: 8/18/2019     Consult Date: 08/20   HPI, PMH, SH: Reviewed    Unit where seen by Wound Team: T820/00     WOUND CONSULT RELATED TO:  RLE     SUBJECTIVE:  \"The last dressing was wonderful\"      Self Report / Pain Level:  Pre-medicated with IV morphine and oral oxy        OBJECTIVE: i No drainage seen through dressing at this time, removed layers one at a time.        WOUND TYPE, LOCATION, CHARACTERISTICS (Pressure Injuries: location, stage, POA or date identified)        Wound 08/19/19 Venous Ulcer Leg posterior Right  (Active)   Wound Image     8/19/2019  8:30 PM   Site Assessment [place] 8/29/2019  4:00 PM   Ella-wound Assessment Edema;Yellow-brown 8/29/2019  4:00 PM   Margins Defined edges 8/29/2019  4:00 PM   Tunneling 0 cm 8/29/2019  4:00 PM   Undermining 0 cm 8/29/2019  4:00 PM   Closure Secondary intention 8/29/2019  4:00 PM   Drainage Amount Small 8/29/2019  4:00 PM   Drainage Description Serosanguineous 8/29/2019  4:00 PM   Non-staged Wound Description Full thickness 8/29/2019  4:00 PM   Treatments Site care;Cleansed 8/29/2019  4:00 PM   Cleansing Approved Wound Cleanser 8/29/2019  4:00 PM   Periwound Protectant Not Applicable 8/28/2019  8:15 PM   Dressing Options Nonadherent Contact Layer;Absorbent Abdominal Pad;Compression Wrap Two Layer 8/29/2019  4:00 PM   Dressing Cleansing/Solutions Not Applicable 8/28/2019  8:15 PM   Dressing Changed Changed 8/29/2019  4:00 PM   Dressing Status Clean;Dry;Intact 8/28/2019  9:00 AM   Dressing Change Frequency Every 48 hrs 8/29/2019  4:00 PM   NEXT Dressing Change  08/31/19 8/29/2019  4:00 PM   NEXT Weekly Photo (Inpatient Only) 09/05/19 8/29/2019  4:00 PM   WOUND NURSE ONLY - Odor None 8/29/2019  4:00 PM   WOUND NURSE ONLY - Pulses Right;1+;DP 8/27/2019 11:30 AM   WOUND NURSE ONLY - Exposed Structures None 8/29/2019  4:00 PM   WOUND NURSE ONLY - Tissue Type and Percentage 90% " pink, 10% yellow 8/29/2019  4:00 PM   WOUND NURSE ONLY - Time Spent with Patient (mins) 60 8/29/2019  4:00 PM       Vascular:    08/17/19    PROCEDURES PERFORMED:  1.  Right lower extremity angiogram.  2.  Angioplasty and 7 mm x 6 cm right superficial femoral artery stent.  3.  Ultrasound-guided left femoral access.    Lab Values:    Lab Results   Component Value Date/Time    WBC 6.7 08/29/2019 12:37 AM    RBC 3.56 (L) 08/29/2019 12:37 AM    HEMOGLOBIN 11.0 (L) 08/29/2019 12:37 AM    HEMATOCRIT 32.6 (L) 08/29/2019 12:37 AM        Lab Results   Component Value Date/Time    HBA1C 8.0 (H) 08/11/2019 04:59 AM        Culture:   Not indicated     INTERVENTIONS BY WOUND TEAM:  Dressing removed,   cleansed gently with wound cleanser.  Covered with adaptic, 2 ABD pads and 2 layer wrap.            Interdisciplinary consultation: Patient, Bedside RN    EVALUATION: patient recently admitted, DC'd 08/17 to SNF and re-admitted 08/18.  Had angio and stent done with Dr. Rios 08/17 with what appears to be improvement of wounds.  Wound bed much improved, edges better defined.  Minimal drainage.         Factors affecting wound healing: venous insufficiency, PVD, DM, CKD    Goals: Steady decrease in wound area and depth weekly.    NURSING PLAN OF CARE ORDERS (X):    Dressing changes: See Dressing Care orders: X  Skin care: See Skin Care orders: X  Rectal tube care: See Rectal Tube Care orders:   Other orders:     WOUND TEAM PLAN OF CARE (X):   NPWT change 3 x week:        Dressing changes by wound team:       Follow up as needed:     X wound team to follow up   Other (explain):     Anticipated discharge plans (X): will need ongoing wound care upon DC  SNF:         X - pt will require ongoing wound care for right leg upon discharge  Home Care:           Outpatient Wound Center:            Self Care:            Other:

## 2021-01-15 DIAGNOSIS — Z23 NEED FOR VACCINATION: ICD-10-CM

## 2022-04-14 ENCOUNTER — APPOINTMENT (OUTPATIENT)
Dept: RADIOLOGY | Facility: MEDICAL CENTER | Age: 77
DRG: 637 | End: 2022-04-14
Attending: EMERGENCY MEDICINE
Payer: COMMERCIAL

## 2022-04-14 ENCOUNTER — HOSPITAL ENCOUNTER (INPATIENT)
Facility: MEDICAL CENTER | Age: 77
LOS: 4 days | DRG: 637 | End: 2022-04-18
Attending: EMERGENCY MEDICINE | Admitting: STUDENT IN AN ORGANIZED HEALTH CARE EDUCATION/TRAINING PROGRAM
Payer: COMMERCIAL

## 2022-04-14 DIAGNOSIS — E11.10 DIABETIC KETOACIDOSIS WITHOUT COMA ASSOCIATED WITH TYPE 2 DIABETES MELLITUS (HCC): ICD-10-CM

## 2022-04-14 DIAGNOSIS — N17.9 AKI (ACUTE KIDNEY INJURY) (HCC): ICD-10-CM

## 2022-04-14 DIAGNOSIS — R79.89 ELEVATED TROPONIN: ICD-10-CM

## 2022-04-14 DIAGNOSIS — R41.82 ALTERED MENTAL STATUS, UNSPECIFIED ALTERED MENTAL STATUS TYPE: ICD-10-CM

## 2022-04-14 DIAGNOSIS — L03.115 CELLULITIS OF RIGHT LOWER EXTREMITY: ICD-10-CM

## 2022-04-14 DIAGNOSIS — R73.9 HYPERGLYCEMIA: ICD-10-CM

## 2022-04-14 PROBLEM — N18.9 ACUTE KIDNEY INJURY SUPERIMPOSED ON CHRONIC KIDNEY DISEASE (HCC): Status: ACTIVE | Noted: 2022-04-14

## 2022-04-14 PROBLEM — G93.40 ACUTE ENCEPHALOPATHY: Status: ACTIVE | Noted: 2022-04-14

## 2022-04-14 PROBLEM — Z71.89 ADVANCE CARE PLANNING: Status: ACTIVE | Noted: 2022-04-14

## 2022-04-14 PROBLEM — I13.10 CARDIORENAL SYNDROME: Status: ACTIVE | Noted: 2022-04-14

## 2022-04-14 PROBLEM — J96.01 ACUTE RESPIRATORY FAILURE WITH HYPOXIA (HCC): Status: ACTIVE | Noted: 2022-04-14

## 2022-04-14 PROBLEM — A41.9 SEPSIS (HCC): Status: ACTIVE | Noted: 2022-04-14

## 2022-04-14 LAB
ALBUMIN SERPL BCP-MCNC: 3.2 G/DL (ref 3.2–4.9)
ALBUMIN/GLOB SERPL: 1 G/DL
ALP SERPL-CCNC: 75 U/L (ref 30–99)
ALT SERPL-CCNC: 13 U/L (ref 2–50)
AMMONIA PLAS-SCNC: 12 UMOL/L (ref 11–45)
ANION GAP SERPL CALC-SCNC: 21 MMOL/L (ref 7–16)
APAP SERPL-MCNC: <5 UG/ML (ref 10–30)
APTT PPP: 30.1 SEC (ref 24.7–36)
AST SERPL-CCNC: 19 U/L (ref 12–45)
B-OH-BUTYR SERPL-MCNC: 1.61 MMOL/L (ref 0.02–0.27)
BASE EXCESS BLDV CALC-SCNC: -7 MMOL/L
BASOPHILS # BLD AUTO: 0.4 % (ref 0–1.8)
BASOPHILS # BLD: 0.04 K/UL (ref 0–0.12)
BILIRUB SERPL-MCNC: 0.8 MG/DL (ref 0.1–1.5)
BODY TEMPERATURE: ABNORMAL CENTIGRADE
BUN SERPL-MCNC: 46 MG/DL (ref 8–22)
CALCIUM SERPL-MCNC: 8.8 MG/DL (ref 8.5–10.5)
CHLORIDE SERPL-SCNC: 100 MMOL/L (ref 96–112)
CO2 SERPL-SCNC: 14 MMOL/L (ref 20–33)
CREAT SERPL-MCNC: 3.65 MG/DL (ref 0.5–1.4)
CRP SERPL HS-MCNC: 18.86 MG/DL (ref 0–0.75)
EKG IMPRESSION: NORMAL
EOSINOPHIL # BLD AUTO: 0 K/UL (ref 0–0.51)
EOSINOPHIL NFR BLD: 0 % (ref 0–6.9)
ERYTHROCYTE [DISTWIDTH] IN BLOOD BY AUTOMATED COUNT: 48.6 FL (ref 35.9–50)
ETHANOL BLD-MCNC: <10.1 MG/DL (ref 0–10)
GFR SERPLBLD CREATININE-BSD FMLA CKD-EPI: 16 ML/MIN/1.73 M 2
GLOBULIN SER CALC-MCNC: 3.1 G/DL (ref 1.9–3.5)
GLUCOSE BLD STRIP.AUTO-MCNC: 496 MG/DL (ref 65–99)
GLUCOSE BLD STRIP.AUTO-MCNC: 523 MG/DL (ref 65–99)
GLUCOSE SERPL-MCNC: 573 MG/DL (ref 65–99)
HCO3 BLDV-SCNC: 18 MMOL/L (ref 24–28)
HCT VFR BLD AUTO: 41.9 % (ref 42–52)
HGB BLD-MCNC: 13.2 G/DL (ref 14–18)
IMM GRANULOCYTES # BLD AUTO: 0.04 K/UL (ref 0–0.11)
IMM GRANULOCYTES NFR BLD AUTO: 0.4 % (ref 0–0.9)
INR PPP: 1.54 (ref 0.87–1.13)
LACTATE BLD-SCNC: 4 MMOL/L (ref 0.5–2)
LACTATE BLD-SCNC: 4.9 MMOL/L (ref 0.5–2)
LYMPHOCYTES # BLD AUTO: 0.59 K/UL (ref 1–4.8)
LYMPHOCYTES NFR BLD: 5.3 % (ref 22–41)
MAGNESIUM SERPL-MCNC: 2.1 MG/DL (ref 1.5–2.5)
MCH RBC QN AUTO: 30.1 PG (ref 27–33)
MCHC RBC AUTO-ENTMCNC: 31.5 G/DL (ref 33.7–35.3)
MCV RBC AUTO: 95.7 FL (ref 81.4–97.8)
MONOCYTES # BLD AUTO: 0.76 K/UL (ref 0–0.85)
MONOCYTES NFR BLD AUTO: 6.9 % (ref 0–13.4)
NEUTROPHILS # BLD AUTO: 9.63 K/UL (ref 1.82–7.42)
NEUTROPHILS NFR BLD: 87 % (ref 44–72)
NRBC # BLD AUTO: 0 K/UL
NRBC BLD-RTO: 0 /100 WBC
NT-PROBNP SERPL IA-MCNC: ABNORMAL PG/ML (ref 0–125)
OSMOLALITY SERPL: 331 MOSM/KG H2O (ref 278–298)
PCO2 BLDV: 33.5 MMHG (ref 41–51)
PH BLDV: 7.34 [PH] (ref 7.31–7.45)
PHOSPHATE SERPL-MCNC: 3.8 MG/DL (ref 2.5–4.5)
PHOSPHATE SERPL-MCNC: 3.9 MG/DL (ref 2.5–4.5)
PLATELET # BLD AUTO: 159 K/UL (ref 164–446)
PMV BLD AUTO: 10.4 FL (ref 9–12.9)
PO2 BLDV: 31.2 MMHG (ref 25–40)
POTASSIUM SERPL-SCNC: 4.4 MMOL/L (ref 3.6–5.5)
PROT SERPL-MCNC: 6.3 G/DL (ref 6–8.2)
PROTHROMBIN TIME: 18 SEC (ref 12–14.6)
RBC # BLD AUTO: 4.38 M/UL (ref 4.7–6.1)
SALICYLATES SERPL-MCNC: <1 MG/DL (ref 15–25)
SAO2 % BLDV: 55.5 %
SODIUM SERPL-SCNC: 135 MMOL/L (ref 135–145)
TROPONIN T SERPL-MCNC: 84 NG/L (ref 6–19)
WBC # BLD AUTO: 11.1 K/UL (ref 4.8–10.8)

## 2022-04-14 PROCEDURE — 36415 COLL VENOUS BLD VENIPUNCTURE: CPT

## 2022-04-14 PROCEDURE — 82140 ASSAY OF AMMONIA: CPT

## 2022-04-14 PROCEDURE — 84484 ASSAY OF TROPONIN QUANT: CPT

## 2022-04-14 PROCEDURE — 86140 C-REACTIVE PROTEIN: CPT

## 2022-04-14 PROCEDURE — 96365 THER/PROPH/DIAG IV INF INIT: CPT

## 2022-04-14 PROCEDURE — 85610 PROTHROMBIN TIME: CPT

## 2022-04-14 PROCEDURE — 85652 RBC SED RATE AUTOMATED: CPT

## 2022-04-14 PROCEDURE — 99223 1ST HOSP IP/OBS HIGH 75: CPT | Performed by: STUDENT IN AN ORGANIZED HEALTH CARE EDUCATION/TRAINING PROGRAM

## 2022-04-14 PROCEDURE — 84100 ASSAY OF PHOSPHORUS: CPT

## 2022-04-14 PROCEDURE — 82962 GLUCOSE BLOOD TEST: CPT

## 2022-04-14 PROCEDURE — 99291 CRITICAL CARE FIRST HOUR: CPT | Performed by: STUDENT IN AN ORGANIZED HEALTH CARE EDUCATION/TRAINING PROGRAM

## 2022-04-14 PROCEDURE — 770000 HCHG ROOM/CARE - INTERMEDIATE ICU *

## 2022-04-14 PROCEDURE — 85730 THROMBOPLASTIN TIME PARTIAL: CPT

## 2022-04-14 PROCEDURE — 80143 DRUG ASSAY ACETAMINOPHEN: CPT

## 2022-04-14 PROCEDURE — 82077 ASSAY SPEC XCP UR&BREATH IA: CPT

## 2022-04-14 PROCEDURE — 83735 ASSAY OF MAGNESIUM: CPT

## 2022-04-14 PROCEDURE — 80053 COMPREHEN METABOLIC PANEL: CPT

## 2022-04-14 PROCEDURE — 99285 EMERGENCY DEPT VISIT HI MDM: CPT

## 2022-04-14 PROCEDURE — 80179 DRUG ASSAY SALICYLATE: CPT

## 2022-04-14 PROCEDURE — 83036 HEMOGLOBIN GLYCOSYLATED A1C: CPT

## 2022-04-14 PROCEDURE — 80048 BASIC METABOLIC PNL TOTAL CA: CPT

## 2022-04-14 PROCEDURE — 85025 COMPLETE CBC W/AUTO DIFF WBC: CPT

## 2022-04-14 PROCEDURE — 700105 HCHG RX REV CODE 258: Performed by: EMERGENCY MEDICINE

## 2022-04-14 PROCEDURE — 83880 ASSAY OF NATRIURETIC PEPTIDE: CPT

## 2022-04-14 PROCEDURE — 83605 ASSAY OF LACTIC ACID: CPT | Mod: 91

## 2022-04-14 PROCEDURE — 96372 THER/PROPH/DIAG INJ SC/IM: CPT

## 2022-04-14 PROCEDURE — 96375 TX/PRO/DX INJ NEW DRUG ADDON: CPT

## 2022-04-14 PROCEDURE — 82010 KETONE BODYS QUAN: CPT

## 2022-04-14 PROCEDURE — 87040 BLOOD CULTURE FOR BACTERIA: CPT

## 2022-04-14 PROCEDURE — 80061 LIPID PANEL: CPT

## 2022-04-14 PROCEDURE — 82803 BLOOD GASES ANY COMBINATION: CPT

## 2022-04-14 PROCEDURE — 83930 ASSAY OF BLOOD OSMOLALITY: CPT

## 2022-04-14 PROCEDURE — 700102 HCHG RX REV CODE 250 W/ 637 OVERRIDE(OP): Performed by: STUDENT IN AN ORGANIZED HEALTH CARE EDUCATION/TRAINING PROGRAM

## 2022-04-14 PROCEDURE — 700111 HCHG RX REV CODE 636 W/ 250 OVERRIDE (IP): Performed by: EMERGENCY MEDICINE

## 2022-04-14 PROCEDURE — 700102 HCHG RX REV CODE 250 W/ 637 OVERRIDE(OP): Performed by: EMERGENCY MEDICINE

## 2022-04-14 PROCEDURE — 70450 CT HEAD/BRAIN W/O DYE: CPT | Mod: MG

## 2022-04-14 PROCEDURE — 84145 PROCALCITONIN (PCT): CPT

## 2022-04-14 PROCEDURE — 93005 ELECTROCARDIOGRAM TRACING: CPT | Performed by: EMERGENCY MEDICINE

## 2022-04-14 PROCEDURE — 71045 X-RAY EXAM CHEST 1 VIEW: CPT

## 2022-04-14 RX ORDER — DEXTROSE MONOHYDRATE 25 G/50ML
25 INJECTION, SOLUTION INTRAVENOUS
Status: DISCONTINUED | OUTPATIENT
Start: 2022-04-14 | End: 2022-04-14

## 2022-04-14 RX ORDER — GABAPENTIN 400 MG/1
400 CAPSULE ORAL 3 TIMES DAILY
Status: DISCONTINUED | OUTPATIENT
Start: 2022-04-15 | End: 2022-04-14

## 2022-04-14 RX ORDER — HYDROMORPHONE HYDROCHLORIDE 1 MG/ML
0.25 INJECTION, SOLUTION INTRAMUSCULAR; INTRAVENOUS; SUBCUTANEOUS
Status: DISCONTINUED | OUTPATIENT
Start: 2022-04-14 | End: 2022-04-18 | Stop reason: HOSPADM

## 2022-04-14 RX ORDER — POLYETHYLENE GLYCOL 3350 17 G/17G
1 POWDER, FOR SOLUTION ORAL
Status: DISCONTINUED | OUTPATIENT
Start: 2022-04-14 | End: 2022-04-18 | Stop reason: HOSPADM

## 2022-04-14 RX ORDER — SODIUM CHLORIDE 450 MG/100ML
INJECTION, SOLUTION INTRAVENOUS CONTINUOUS
Status: DISCONTINUED | OUTPATIENT
Start: 2022-04-14 | End: 2022-04-14

## 2022-04-14 RX ORDER — ACETAMINOPHEN 325 MG/1
650 TABLET ORAL EVERY 6 HOURS PRN
Status: DISCONTINUED | OUTPATIENT
Start: 2022-04-14 | End: 2022-04-18 | Stop reason: HOSPADM

## 2022-04-14 RX ORDER — FINASTERIDE 5 MG/1
5 TABLET, FILM COATED ORAL DAILY
Status: DISCONTINUED | OUTPATIENT
Start: 2022-04-15 | End: 2022-04-18 | Stop reason: HOSPADM

## 2022-04-14 RX ORDER — METOPROLOL SUCCINATE 25 MG/1
25 TABLET, EXTENDED RELEASE ORAL 2 TIMES DAILY
Status: DISCONTINUED | OUTPATIENT
Start: 2022-04-14 | End: 2022-04-15

## 2022-04-14 RX ORDER — GUAIFENESIN/DEXTROMETHORPHAN 100-10MG/5
10 SYRUP ORAL EVERY 6 HOURS PRN
Status: DISCONTINUED | OUTPATIENT
Start: 2022-04-14 | End: 2022-04-18 | Stop reason: HOSPADM

## 2022-04-14 RX ORDER — AMOXICILLIN 250 MG
2 CAPSULE ORAL 2 TIMES DAILY
Status: DISCONTINUED | OUTPATIENT
Start: 2022-04-14 | End: 2022-04-18 | Stop reason: HOSPADM

## 2022-04-14 RX ORDER — AMLODIPINE BESYLATE 10 MG/1
10 TABLET ORAL DAILY
Status: DISCONTINUED | OUTPATIENT
Start: 2022-04-15 | End: 2022-04-15

## 2022-04-14 RX ORDER — CEFAZOLIN SODIUM 2 G/100ML
2000 INJECTION, SOLUTION INTRAVENOUS ONCE
Status: COMPLETED | OUTPATIENT
Start: 2022-04-14 | End: 2022-04-14

## 2022-04-14 RX ORDER — SODIUM CHLORIDE 9 MG/ML
1000 INJECTION, SOLUTION INTRAVENOUS ONCE
Status: COMPLETED | OUTPATIENT
Start: 2022-04-14 | End: 2022-04-14

## 2022-04-14 RX ORDER — FUROSEMIDE 10 MG/ML
60 INJECTION INTRAMUSCULAR; INTRAVENOUS ONCE
Status: COMPLETED | OUTPATIENT
Start: 2022-04-15 | End: 2022-04-15

## 2022-04-14 RX ORDER — IPRATROPIUM BROMIDE AND ALBUTEROL SULFATE 2.5; .5 MG/3ML; MG/3ML
3 SOLUTION RESPIRATORY (INHALATION)
Status: DISCONTINUED | OUTPATIENT
Start: 2022-04-15 | End: 2022-04-18

## 2022-04-14 RX ORDER — IPRATROPIUM BROMIDE AND ALBUTEROL SULFATE 2.5; .5 MG/3ML; MG/3ML
3 SOLUTION RESPIRATORY (INHALATION)
Status: DISCONTINUED | OUTPATIENT
Start: 2022-04-14 | End: 2022-04-18

## 2022-04-14 RX ORDER — OXYCODONE HYDROCHLORIDE 5 MG/1
5 TABLET ORAL
Status: DISCONTINUED | OUTPATIENT
Start: 2022-04-14 | End: 2022-04-18 | Stop reason: HOSPADM

## 2022-04-14 RX ORDER — OXYCODONE HYDROCHLORIDE 5 MG/1
2.5 TABLET ORAL
Status: DISCONTINUED | OUTPATIENT
Start: 2022-04-14 | End: 2022-04-18 | Stop reason: HOSPADM

## 2022-04-14 RX ORDER — HEPARIN SODIUM 5000 [USP'U]/ML
5000 INJECTION, SOLUTION INTRAVENOUS; SUBCUTANEOUS EVERY 8 HOURS
Status: DISCONTINUED | OUTPATIENT
Start: 2022-04-14 | End: 2022-04-15

## 2022-04-14 RX ORDER — LABETALOL HYDROCHLORIDE 5 MG/ML
10 INJECTION, SOLUTION INTRAVENOUS EVERY 4 HOURS PRN
Status: DISCONTINUED | OUTPATIENT
Start: 2022-04-14 | End: 2022-04-18 | Stop reason: HOSPADM

## 2022-04-14 RX ORDER — DEXTROSE MONOHYDRATE 25 G/50ML
25 INJECTION, SOLUTION INTRAVENOUS
Status: DISCONTINUED | OUTPATIENT
Start: 2022-04-14 | End: 2022-04-15

## 2022-04-14 RX ORDER — BISACODYL 10 MG
10 SUPPOSITORY, RECTAL RECTAL
Status: DISCONTINUED | OUTPATIENT
Start: 2022-04-14 | End: 2022-04-18 | Stop reason: HOSPADM

## 2022-04-14 RX ADMIN — SODIUM CHLORIDE 1000 ML: 9 INJECTION, SOLUTION INTRAVENOUS at 21:17

## 2022-04-14 RX ADMIN — CEFAZOLIN SODIUM 2000 MG: 2 INJECTION, SOLUTION INTRAVENOUS at 21:50

## 2022-04-14 RX ADMIN — SODIUM CHLORIDE 1000 ML: 9 INJECTION, SOLUTION INTRAVENOUS at 22:16

## 2022-04-14 RX ADMIN — INSULIN GLARGINE 15 UNITS: 100 INJECTION, SOLUTION SUBCUTANEOUS at 23:38

## 2022-04-14 RX ADMIN — INSULIN HUMAN 5 UNITS: 100 INJECTION, SOLUTION PARENTERAL at 22:55

## 2022-04-15 ENCOUNTER — APPOINTMENT (OUTPATIENT)
Dept: CARDIOLOGY | Facility: MEDICAL CENTER | Age: 77
DRG: 637 | End: 2022-04-15
Attending: STUDENT IN AN ORGANIZED HEALTH CARE EDUCATION/TRAINING PROGRAM
Payer: COMMERCIAL

## 2022-04-15 ENCOUNTER — APPOINTMENT (OUTPATIENT)
Dept: RADIOLOGY | Facility: MEDICAL CENTER | Age: 77
DRG: 637 | End: 2022-04-15
Attending: HOSPITALIST
Payer: COMMERCIAL

## 2022-04-15 LAB
AMPHET UR QL SCN: NEGATIVE
ANION GAP SERPL CALC-SCNC: 12 MMOL/L (ref 7–16)
ANION GAP SERPL CALC-SCNC: 14 MMOL/L (ref 7–16)
ANION GAP SERPL CALC-SCNC: 15 MMOL/L (ref 7–16)
ANION GAP SERPL CALC-SCNC: 15 MMOL/L (ref 7–16)
ANION GAP SERPL CALC-SCNC: 19 MMOL/L (ref 7–16)
APPEARANCE UR: ABNORMAL
BACTERIA #/AREA URNS HPF: NEGATIVE /HPF
BARBITURATES UR QL SCN: NEGATIVE
BASOPHILS # BLD AUTO: 0.2 % (ref 0–1.8)
BASOPHILS # BLD: 0.02 K/UL (ref 0–0.12)
BENZODIAZ UR QL SCN: NEGATIVE
BILIRUB UR QL STRIP.AUTO: NEGATIVE
BUN SERPL-MCNC: 44 MG/DL (ref 8–22)
BUN SERPL-MCNC: 47 MG/DL (ref 8–22)
BUN SERPL-MCNC: 47 MG/DL (ref 8–22)
BUN SERPL-MCNC: 52 MG/DL (ref 8–22)
BUN SERPL-MCNC: 53 MG/DL (ref 8–22)
BZE UR QL SCN: NEGATIVE
CALCIUM SERPL-MCNC: 8 MG/DL (ref 8.5–10.5)
CALCIUM SERPL-MCNC: 8.2 MG/DL (ref 8.5–10.5)
CALCIUM SERPL-MCNC: 8.3 MG/DL (ref 8.5–10.5)
CALCIUM SERPL-MCNC: 8.4 MG/DL (ref 8.5–10.5)
CALCIUM SERPL-MCNC: 8.5 MG/DL (ref 8.5–10.5)
CANNABINOIDS UR QL SCN: POSITIVE
CHLORIDE SERPL-SCNC: 102 MMOL/L (ref 96–112)
CHLORIDE SERPL-SCNC: 104 MMOL/L (ref 96–112)
CHLORIDE SERPL-SCNC: 105 MMOL/L (ref 96–112)
CHLORIDE SERPL-SCNC: 106 MMOL/L (ref 96–112)
CHLORIDE SERPL-SCNC: 107 MMOL/L (ref 96–112)
CHOLEST SERPL-MCNC: 89 MG/DL (ref 100–199)
CO2 SERPL-SCNC: 15 MMOL/L (ref 20–33)
CO2 SERPL-SCNC: 18 MMOL/L (ref 20–33)
CO2 SERPL-SCNC: 20 MMOL/L (ref 20–33)
COLOR UR: YELLOW
CREAT SERPL-MCNC: 3.68 MG/DL (ref 0.5–1.4)
CREAT SERPL-MCNC: 3.73 MG/DL (ref 0.5–1.4)
CREAT SERPL-MCNC: 3.76 MG/DL (ref 0.5–1.4)
CREAT SERPL-MCNC: 3.83 MG/DL (ref 0.5–1.4)
CREAT SERPL-MCNC: 3.84 MG/DL (ref 0.5–1.4)
CREAT UR-MCNC: 132.83 MG/DL
EOSINOPHIL # BLD AUTO: 0 K/UL (ref 0–0.51)
EOSINOPHIL NFR BLD: 0 % (ref 0–6.9)
EPI CELLS #/AREA URNS HPF: ABNORMAL /HPF
ERYTHROCYTE [DISTWIDTH] IN BLOOD BY AUTOMATED COUNT: 47.4 FL (ref 35.9–50)
ERYTHROCYTE [SEDIMENTATION RATE] IN BLOOD BY WESTERGREN METHOD: 63 MM/HOUR (ref 0–20)
EST. AVERAGE GLUCOSE BLD GHB EST-MCNC: 183 MG/DL
ETHANOL BLD-MCNC: <10.1 MG/DL (ref 0–10)
GFR SERPLBLD CREATININE-BSD FMLA CKD-EPI: 16 ML/MIN/1.73 M 2
GLUCOSE BLD STRIP.AUTO-MCNC: 220 MG/DL (ref 65–99)
GLUCOSE BLD STRIP.AUTO-MCNC: 243 MG/DL (ref 65–99)
GLUCOSE BLD STRIP.AUTO-MCNC: 304 MG/DL (ref 65–99)
GLUCOSE BLD STRIP.AUTO-MCNC: 306 MG/DL (ref 65–99)
GLUCOSE BLD STRIP.AUTO-MCNC: 340 MG/DL (ref 65–99)
GLUCOSE BLD STRIP.AUTO-MCNC: 410 MG/DL (ref 65–99)
GLUCOSE BLD STRIP.AUTO-MCNC: 444 MG/DL (ref 65–99)
GLUCOSE SERPL-MCNC: 275 MG/DL (ref 65–99)
GLUCOSE SERPL-MCNC: 352 MG/DL (ref 65–99)
GLUCOSE SERPL-MCNC: 373 MG/DL (ref 65–99)
GLUCOSE SERPL-MCNC: 502 MG/DL (ref 65–99)
GLUCOSE SERPL-MCNC: 589 MG/DL (ref 65–99)
GLUCOSE UR STRIP.AUTO-MCNC: >=1000 MG/DL
GRAN CASTS #/AREA URNS LPF: ABNORMAL /LPF
HBA1C MFR BLD: 8 % (ref 4–5.6)
HCT VFR BLD AUTO: 37.3 % (ref 42–52)
HDLC SERPL-MCNC: 20 MG/DL
HGB BLD-MCNC: 12 G/DL (ref 14–18)
IMM GRANULOCYTES # BLD AUTO: 0.02 K/UL (ref 0–0.11)
IMM GRANULOCYTES NFR BLD AUTO: 0.2 % (ref 0–0.9)
KETONES UR STRIP.AUTO-MCNC: ABNORMAL MG/DL
LACTATE BLD-SCNC: 2.5 MMOL/L (ref 0.5–2)
LDLC SERPL CALC-MCNC: 48 MG/DL
LEUKOCYTE ESTERASE UR QL STRIP.AUTO: NEGATIVE
LV EJECT FRACT  99904: 50
LV EJECT FRACT MOD 2C 99903: 37.06
LV EJECT FRACT MOD 4C 99902: 40.25
LV EJECT FRACT MOD BP 99901: 38.36
LYMPHOCYTES # BLD AUTO: 0.67 K/UL (ref 1–4.8)
LYMPHOCYTES NFR BLD: 7.3 % (ref 22–41)
MAGNESIUM SERPL-MCNC: 2.2 MG/DL (ref 1.5–2.5)
MCH RBC QN AUTO: 30.2 PG (ref 27–33)
MCHC RBC AUTO-ENTMCNC: 32.2 G/DL (ref 33.7–35.3)
MCV RBC AUTO: 94 FL (ref 81.4–97.8)
METHADONE UR QL SCN: NEGATIVE
MICRO URNS: ABNORMAL
MONOCYTES # BLD AUTO: 0.75 K/UL (ref 0–0.85)
MONOCYTES NFR BLD AUTO: 8.2 % (ref 0–13.4)
NEUTROPHILS # BLD AUTO: 7.66 K/UL (ref 1.82–7.42)
NEUTROPHILS NFR BLD: 84.1 % (ref 44–72)
NITRITE UR QL STRIP.AUTO: NEGATIVE
NRBC # BLD AUTO: 0 K/UL
NRBC BLD-RTO: 0 /100 WBC
OPIATES UR QL SCN: NEGATIVE
OXYCODONE UR QL SCN: NEGATIVE
PCP UR QL SCN: NEGATIVE
PH UR STRIP.AUTO: 5.5 [PH] (ref 5–8)
PLATELET # BLD AUTO: 147 K/UL (ref 164–446)
PMV BLD AUTO: 10.1 FL (ref 9–12.9)
POTASSIUM SERPL-SCNC: 3.9 MMOL/L (ref 3.6–5.5)
POTASSIUM SERPL-SCNC: 3.9 MMOL/L (ref 3.6–5.5)
POTASSIUM SERPL-SCNC: 4 MMOL/L (ref 3.6–5.5)
POTASSIUM SERPL-SCNC: 4.1 MMOL/L (ref 3.6–5.5)
POTASSIUM SERPL-SCNC: 4.3 MMOL/L (ref 3.6–5.5)
PROCALCITONIN SERPL-MCNC: 4.33 NG/ML
PROPOXYPH UR QL SCN: NEGATIVE
PROT UR QL STRIP: >=1000 MG/DL
PROT UR-MCNC: >600 MG/DL (ref 0–15)
RBC # BLD AUTO: 3.97 M/UL (ref 4.7–6.1)
RBC # URNS HPF: ABNORMAL /HPF
RBC UR QL AUTO: ABNORMAL
SODIUM SERPL-SCNC: 136 MMOL/L (ref 135–145)
SODIUM SERPL-SCNC: 139 MMOL/L (ref 135–145)
SODIUM UR-SCNC: <20 MMOL/L
SP GR UR STRIP.AUTO: 1.03
TRIGL SERPL-MCNC: 105 MG/DL (ref 0–149)
UROBILINOGEN UR STRIP.AUTO-MCNC: 0.2 MG/DL
WBC # BLD AUTO: 9.1 K/UL (ref 4.8–10.8)
WBC #/AREA URNS HPF: ABNORMAL /HPF

## 2022-04-15 PROCEDURE — 700101 HCHG RX REV CODE 250: Performed by: STUDENT IN AN ORGANIZED HEALTH CARE EDUCATION/TRAINING PROGRAM

## 2022-04-15 PROCEDURE — 307059 PAD,EAR PROTECTOR: Performed by: STUDENT IN AN ORGANIZED HEALTH CARE EDUCATION/TRAINING PROGRAM

## 2022-04-15 PROCEDURE — 85025 COMPLETE CBC W/AUTO DIFF WBC: CPT

## 2022-04-15 PROCEDURE — 80048 BASIC METABOLIC PNL TOTAL CA: CPT | Mod: 91

## 2022-04-15 PROCEDURE — 82962 GLUCOSE BLOOD TEST: CPT

## 2022-04-15 PROCEDURE — 82570 ASSAY OF URINE CREATININE: CPT

## 2022-04-15 PROCEDURE — 700105 HCHG RX REV CODE 258: Performed by: STUDENT IN AN ORGANIZED HEALTH CARE EDUCATION/TRAINING PROGRAM

## 2022-04-15 PROCEDURE — 700111 HCHG RX REV CODE 636 W/ 250 OVERRIDE (IP): Performed by: STUDENT IN AN ORGANIZED HEALTH CARE EDUCATION/TRAINING PROGRAM

## 2022-04-15 PROCEDURE — 84156 ASSAY OF PROTEIN URINE: CPT

## 2022-04-15 PROCEDURE — 94640 AIRWAY INHALATION TREATMENT: CPT

## 2022-04-15 PROCEDURE — A9270 NON-COVERED ITEM OR SERVICE: HCPCS | Performed by: STUDENT IN AN ORGANIZED HEALTH CARE EDUCATION/TRAINING PROGRAM

## 2022-04-15 PROCEDURE — A9270 NON-COVERED ITEM OR SERVICE: HCPCS | Performed by: HOSPITALIST

## 2022-04-15 PROCEDURE — 80307 DRUG TEST PRSMV CHEM ANLYZR: CPT

## 2022-04-15 PROCEDURE — 99233 SBSQ HOSP IP/OBS HIGH 50: CPT | Performed by: HOSPITALIST

## 2022-04-15 PROCEDURE — 82077 ASSAY SPEC XCP UR&BREATH IA: CPT

## 2022-04-15 PROCEDURE — 93306 TTE W/DOPPLER COMPLETE: CPT | Mod: 26 | Performed by: INTERNAL MEDICINE

## 2022-04-15 PROCEDURE — 83605 ASSAY OF LACTIC ACID: CPT

## 2022-04-15 PROCEDURE — 76775 US EXAM ABDO BACK WALL LIM: CPT

## 2022-04-15 PROCEDURE — 770000 HCHG ROOM/CARE - INTERMEDIATE ICU *

## 2022-04-15 PROCEDURE — 700102 HCHG RX REV CODE 250 W/ 637 OVERRIDE(OP): Performed by: STUDENT IN AN ORGANIZED HEALTH CARE EDUCATION/TRAINING PROGRAM

## 2022-04-15 PROCEDURE — 93306 TTE W/DOPPLER COMPLETE: CPT

## 2022-04-15 PROCEDURE — 700102 HCHG RX REV CODE 250 W/ 637 OVERRIDE(OP): Performed by: HOSPITALIST

## 2022-04-15 PROCEDURE — 94760 N-INVAS EAR/PLS OXIMETRY 1: CPT

## 2022-04-15 PROCEDURE — 84300 ASSAY OF URINE SODIUM: CPT

## 2022-04-15 PROCEDURE — 700117 HCHG RX CONTRAST REV CODE 255: Performed by: STUDENT IN AN ORGANIZED HEALTH CARE EDUCATION/TRAINING PROGRAM

## 2022-04-15 PROCEDURE — 81001 URINALYSIS AUTO W/SCOPE: CPT

## 2022-04-15 PROCEDURE — 83735 ASSAY OF MAGNESIUM: CPT

## 2022-04-15 RX ORDER — INSULIN GLARGINE 100 [IU]/ML
45 INJECTION, SOLUTION SUBCUTANEOUS 2 TIMES DAILY
COMMUNITY

## 2022-04-15 RX ORDER — ATORVASTATIN CALCIUM 20 MG/1
20 TABLET, FILM COATED ORAL DAILY
COMMUNITY

## 2022-04-15 RX ORDER — FUROSEMIDE 10 MG/ML
100 INJECTION INTRAMUSCULAR; INTRAVENOUS ONCE
Status: COMPLETED | OUTPATIENT
Start: 2022-04-15 | End: 2022-04-15

## 2022-04-15 RX ORDER — LANOLIN ALCOHOL/MO/W.PET/CERES
1000 CREAM (GRAM) TOPICAL DAILY
Status: ON HOLD | COMMUNITY
End: 2022-04-18

## 2022-04-15 RX ORDER — LISINOPRIL 10 MG/1
10 TABLET ORAL DAILY
COMMUNITY

## 2022-04-15 RX ORDER — TAMSULOSIN HYDROCHLORIDE 0.4 MG/1
0.4 CAPSULE ORAL DAILY
COMMUNITY

## 2022-04-15 RX ORDER — FUROSEMIDE 40 MG/1
40 TABLET ORAL 2 TIMES DAILY
COMMUNITY

## 2022-04-15 RX ORDER — SEMAGLUTIDE 1.34 MG/ML
1.5 INJECTION, SOLUTION SUBCUTANEOUS
COMMUNITY

## 2022-04-15 RX ORDER — FLUOROURACIL 50 MG/G
1 CREAM TOPICAL 2 TIMES DAILY
COMMUNITY

## 2022-04-15 RX ORDER — DEXTROSE MONOHYDRATE 25 G/50ML
25 INJECTION, SOLUTION INTRAVENOUS
Status: DISCONTINUED | OUTPATIENT
Start: 2022-04-15 | End: 2022-04-15

## 2022-04-15 RX ORDER — GABAPENTIN 100 MG/1
100-200 CAPSULE ORAL 3 TIMES DAILY
COMMUNITY

## 2022-04-15 RX ORDER — DEXTROSE MONOHYDRATE 25 G/50ML
25 INJECTION, SOLUTION INTRAVENOUS
Status: DISCONTINUED | OUTPATIENT
Start: 2022-04-15 | End: 2022-04-18 | Stop reason: HOSPADM

## 2022-04-15 RX ORDER — ALLOPURINOL 100 MG/1
100 TABLET ORAL DAILY
COMMUNITY

## 2022-04-15 RX ORDER — AMMONIUM LACTATE 12 G/100G
LOTION TOPICAL 2 TIMES DAILY
Status: DISCONTINUED | OUTPATIENT
Start: 2022-04-15 | End: 2022-04-18 | Stop reason: HOSPADM

## 2022-04-15 RX ORDER — ASCORBIC ACID 500 MG
1000 TABLET ORAL DAILY
Status: ON HOLD | COMMUNITY
End: 2022-04-18

## 2022-04-15 RX ORDER — VITAMIN B COMPLEX
3000 TABLET ORAL DAILY
COMMUNITY

## 2022-04-15 RX ORDER — DOXYCYCLINE 100 MG/1
100 TABLET ORAL EVERY 12 HOURS
Status: DISCONTINUED | OUTPATIENT
Start: 2022-04-15 | End: 2022-04-16

## 2022-04-15 RX ADMIN — RIVAROXABAN 10 MG: 10 TABLET, FILM COATED ORAL at 18:00

## 2022-04-15 RX ADMIN — DOXYCYCLINE 100 MG: 100 TABLET, FILM COATED ORAL at 18:00

## 2022-04-15 RX ADMIN — FINASTERIDE 5 MG: 5 TABLET, FILM COATED ORAL at 06:30

## 2022-04-15 RX ADMIN — IPRATROPIUM BROMIDE AND ALBUTEROL SULFATE 3 ML: .5; 2.5 SOLUTION RESPIRATORY (INHALATION) at 07:24

## 2022-04-15 RX ADMIN — HUMAN ALBUMIN MICROSPHERES AND PERFLUTREN 3 ML: 10; .22 INJECTION, SOLUTION INTRAVENOUS at 16:00

## 2022-04-15 RX ADMIN — WATER 1 G: 100 INJECTION, SOLUTION INTRAVENOUS at 08:53

## 2022-04-15 RX ADMIN — HEPARIN SODIUM 5000 UNITS: 5000 INJECTION, SOLUTION INTRAVENOUS; SUBCUTANEOUS at 01:27

## 2022-04-15 RX ADMIN — Medication 1 APPLICATION: at 17:20

## 2022-04-15 RX ADMIN — THIAMINE HYDROCHLORIDE 500 MG: 100 INJECTION, SOLUTION INTRAMUSCULAR; INTRAVENOUS at 01:37

## 2022-04-15 RX ADMIN — FUROSEMIDE 60 MG: 10 INJECTION, SOLUTION INTRAMUSCULAR; INTRAVENOUS at 01:27

## 2022-04-15 RX ADMIN — DOXYCYCLINE 100 MG: 100 TABLET, FILM COATED ORAL at 10:30

## 2022-04-15 RX ADMIN — CHLOROTHIAZIDE SODIUM 500 MG: 500 INJECTION, POWDER, LYOPHILIZED, FOR SOLUTION INTRAVENOUS at 08:52

## 2022-04-15 RX ADMIN — METOPROLOL SUCCINATE 25 MG: 25 TABLET, EXTENDED RELEASE ORAL at 01:27

## 2022-04-15 RX ADMIN — HEPARIN SODIUM 5000 UNITS: 5000 INJECTION, SOLUTION INTRAVENOUS; SUBCUTANEOUS at 06:13

## 2022-04-15 RX ADMIN — FUROSEMIDE 100 MG: 10 INJECTION, SOLUTION INTRAMUSCULAR; INTRAVENOUS at 06:16

## 2022-04-15 ASSESSMENT — PAIN DESCRIPTION - PAIN TYPE
TYPE: ACUTE PAIN
TYPE: ACUTE PAIN;CHRONIC PAIN
TYPE: ACUTE PAIN
TYPE: ACUTE PAIN;CHRONIC PAIN
TYPE: ACUTE PAIN;CHRONIC PAIN
TYPE: ACUTE PAIN
TYPE: ACUTE PAIN;CHRONIC PAIN
TYPE: ACUTE PAIN

## 2022-04-15 ASSESSMENT — FIBROSIS 4 INDEX
FIB4 SCORE: 5.48
FIB4 SCORE: 2.52

## 2022-04-15 NOTE — ED NOTES
1st set of blood culture obtained by this RN and phlebotomist at bedside for 2nd set of blood culture.    EKG completed by ER Tech as per order.

## 2022-04-15 NOTE — PROGRESS NOTES
Pharmacy Medication Reconciliation      ~Medication reconciliation updated and complete per patient home pharmacy  ~Allergies have been verified with patient home pharmacy  ~No oral ABX within the last 30 days  ~Patient home pharmacy:[facility]

## 2022-04-15 NOTE — ED TRIAGE NOTES
"Jamar Valdez  77 y.o. male  Chief Complaint   Patient presents with   • ALOC     Pt came to the ER via [facility] from home c/o alter mental status. Per EMS, pt was talking to family on the phone and all he can say is \"yeah\". Last known well is unknown. Pt is currently AOX1; oriented only to self. BS en route at 550 per EMS. Noted wound on right foot with dressing on. Pt has hx of DM Type 1, skin CA, pacemaker, COPD, and kidney disease as per EMS. No unilateral weakness, slurred speech, facial droop observed upon arrival.       Pt BIB EMS for above complaint.    Pt educated on triage process. Pt encouraged to alert staff for any changes. This RN masked and in appropriate PPE during encounter.     Vitals:    04/14/22 2023   BP: 158/74   Pulse: 80   Resp: 20   Temp: 35.8 °C (96.5 °F)   SpO2: 95%     "

## 2022-04-15 NOTE — ASSESSMENT & PLAN NOTE
BMI 40.75  Healthy diet and incorporate healthy green vegetables into diet   Exercise at least 30-40 min 4-5X a week   Counseling provided  provided

## 2022-04-15 NOTE — PROGRESS NOTES
HOSPITAL MEDICINE PROGRESS NOTE     Attending: Dr. Garibay    Resident: Bharat Wren PGY 3    PATIENT: Jamar Valdez; [id number]; 1945    ID: 77 y.o. male admitted for     Hospital Course:  Mr. Jamar Valdez is 77 years old male past medical history of atrial fibrillation, Hx of pacemaker placement, MRSA, Obesity, Insulin dependent type 2 DM, COPD, Diabetic foot ulcer, Chronic venous statis dermatitis, thrombocytopenia, HTN, PVD s/p stent to the right SFA on 8/17/2019   who was brought in to the ED by EMS on 4/14/2022 for high BG of 550, altered mental status , hypoxia , and right lower extremities cellulitis. Patient was put on 4 L of oxygen.      In ED patient Chest X ray showed pulmonary edema and cardiomegaly. Lactic acid 4.9, Leukocytosis neutrophils predominant and mild anemia/mild thrombocytopenia, beta hydroxybutyrate mildly elevated 1.61. CMP showed AG 21, CO2 14, hyperglycemia of 573, MARIZOL on CKD w/ BUN 46, and Cr. 3.65. Presumed to have cardiorenal syndrome with bedside Echo EF 30%. Trop 84 without ischemic changes on EKG. He was medicated with lasix, insulin, and ancef in ED. Hospitalist and intenesivist consulted for admission.      While assessing patient in ER by the team, Patient had urinated only small amount of urine in ER, bladder scan showed 200cc, in MARIZOL on CKD which was initially worsened after IV fluids and improved after lasix however he didn't responded significantly  to lasix 60, Patient was overloaded on exam and US, BNP > 35367, He was given additional dose of lasix 100mg and diuril 500. Patient was admitted to the [facility] for further management.       Of note: Patient is a poor historian and reports he takes insulin on regular basis but doesn't remember the specific doses. He reports non-compliance with diabetic diet. He also takes ozempic on Monday. He is alert and oriented.  Discussed the code status with him. He mentioned he wants to be DNR/DNI. Changed the code status.     4/15:  No acute overnight event. Patient was using bathroom this morning when saw him this morning. he is on 3-4 L of oxygen saturating > 90%. Reports he is not on oxygen at home. His blood glucose is trending down most recent . Cr. 3.73>>3.68>>3.83. Echo pending. He is on glargine 25 units in evening and High dose sliding scale insulin.        OBJECTIVE:     Vitals:    04/15/22 0900 04/15/22 1000 04/15/22 1200 04/15/22 1300   BP: 124/69  119/70 117/75   Pulse: 70 70 (!) 240 70   Resp: (!) 22 (!) 22 18 (!) 22   Temp:  36.1 °C (97 °F) 36.2 °C (97.2 °F)    TempSrc:  Temporal Temporal    SpO2: 92% 90% 97%    Weight:       Height:           Intake/Output Summary (Last 24 hours) at 4/15/2022 0808  Last data filed at 4/15/2022 0700  Gross per 24 hour   Intake 2100 ml   Output 725 ml   Net 1375 ml     ROS:   Constitutional: in no apparent distress, Alert and oriented X 4.   Heart: No chest pain, No palpitation  Lungs: complaining of SOB. No wheezing  Abdomen: Denies any abdominal pain, nausea or vomiting, significant abdominal obesity  Extremities: complaining of right lower extremity pain, rash, +ve for weakness.  Bilateral nonpitting edema  Neuro: Denies dizziness.  Nonfocal exam        PE:  General: No acute distress, resting comfortably in bed. Alert and Oriented X 4  HEENT: NC/AT. PERRLA. EOMI. MMM  Cardiovascular: RRR with no M/R/G.  Respiratory: No wheezing rhonchi, mild bilateral lower lungs crackles  Abdomen: soft, NT/ND, no masses, +BS   EXT: 1+ pedal edema, chronic venous statis dermatitis, right lower extremity wound on the foot. Erythema and induration noticed.   Neuro: non focal with no numbness, tingling or changes in sensation  Skin, lower extremity with venous stasis changes, significant dermatitis, dry, scaling    LABS:  Recent Labs     04/14/22  2045 04/15/22  0205   WBC 11.1* 9.1   RBC 4.38* 3.97*   HEMOGLOBIN 13.2* 12.0*   HEMATOCRIT 41.9* 37.3*   MCV 95.7 94.0   MCH 30.1 30.2   RDW 48.6 47.4    PLATELETCT 159* 147*   MPV 10.4 10.1   NEUTSPOLYS 87.00* 84.10*   LYMPHOCYTES 5.30* 7.30*   MONOCYTES 6.90 8.20   EOSINOPHILS 0.00 0.00   BASOPHILS 0.40 0.20     Recent Labs     04/14/22 2045 04/14/22  2307 04/15/22  0250 04/15/22  0625 04/15/22  1100   SODIUM 135 136 139 139 139   POTASSIUM 4.4 4.3 4.0 4.1 3.9   CHLORIDE 100 102 106 107 104   CO2 14* 15* 18* 20 20   BUN 46* 44* 47* 47* 52*   CREATININE 3.65* 3.76* 3.73* 3.68* 3.83*   CALCIUM 8.8 8.3* 8.2* 8.0* 8.5   MAGNESIUM 2.1  --   --   --  2.2   PHOSPHORUS 3.8 3.9  --   --   --    ALBUMIN 3.2  --   --   --   --      Estimated GFR/CRCL = Estimated Creatinine Clearance: 23.4 mL/min (A) (by C-G formula based on SCr of 3.83 mg/dL (H)).  Recent Labs     04/15/22  0250 04/15/22  0625 04/15/22  1100   GLUCOSE 502* 373* 352*     Recent Labs     04/14/22 2045 04/14/22 2112   ASTSGOT 19  --    ALTSGPT 13  --    TBILIRUBIN 0.8  --    ALKPHOSPHAT 75  --    GLOBULIN 3.1  --    INR 1.54*  --    AMMONIA  --  12             Recent Labs     04/14/22 2045   INR 1.54*   APTT 30.1       MICROBIOLOGY:    IMAGING:   CT-HEAD W/O   Final Result         1.  No acute intracranial abnormality is identified, there are nonspecific white matter changes, commonly associated with small vessel ischemic disease.  Associated mild cerebral atrophy is noted.   2.  Multiple calcified extra-axial subcentimeter masses measuring up to 7.8 mm along the right frontal skull and right falx, appearance suggests angiomatous.   3.  Atherosclerosis.         DX-CHEST-PORTABLE (1 VIEW)   Final Result         1.  Pulmonary edema and/or infiltrates.   2.  Cardiomegaly      EC-ECHOCARDIOGRAM COMPLETE W/O CONT    (Results Pending)         MEDS:  Current Facility-Administered Medications   Medication Last Admin   • insulin GLARGINE (Lantus,Semglee) injection     • rivaroxaban (XARELTO) tablet 10 mg     • doxycycline monohydrate (ADOXA) tablet 100 mg 100 mg at 04/15/22 1030   • finasteride (PROSCAR) tablet 5  mg 5 mg at 04/15/22 0630   • senna-docusate (PERICOLACE or SENOKOT S) 8.6-50 MG per tablet 2 Tablet      And   • polyethylene glycol/lytes (MIRALAX) PACKET 1 Packet      And   • magnesium hydroxide (MILK OF MAGNESIA) suspension 30 mL      And   • bisacodyl (DULCOLAX) suppository 10 mg     • acetaminophen (Tylenol) tablet 650 mg     • Pharmacy Consult Request ...Pain Management Review 1 Each     • oxyCODONE immediate-release (ROXICODONE) tablet 2.5 mg      Or   • oxyCODONE immediate-release (ROXICODONE) tablet 5 mg      Or   • HYDROmorphone (Dilaudid) injection 0.25 mg     • labetalol (NORMODYNE/TRANDATE) injection 10 mg     • guaiFENesin dextromethorphan (ROBITUSSIN DM) 100-10 MG/5ML syrup 10 mL     • thiamine (B-1) 500 mg in dextrose 5% 100 mL IVPB 500 mg at 04/15/22 0137   • insulin regular (HumuLIN R,NovoLIN R) injection 10 Units at 04/15/22 1110    And   • dextrose 50% (D50W) injection 25 g     • ipratropium-albuterol (DUONEB) nebulizer solution     • ipratropium-albuterol (DUONEB) nebulizer solution 3 mL at 04/15/22 0724       ASSESSMENT/PLAN:  Acute encephalopathy  Assessment & Plan  CT head as seen above  No acute hyponatremia   Normal ammonia level   UDS positive for cannabinoids   Neg EtOH screen     Likely metabolic in setting of elevated glucose, AG, and mildly elevated lactic acid   Resolved.   Continue monitoring       Cellulitis of right lower extremity  Assessment & Plan  S/p IV ancef in ED    Switched to Doxycycline   Bcx results pending   Wound care consulted      Advance care planning  Assessment & Plan  Encephalopathy resolved. Patient is alert and Oriented X4. He would like to be DNR/DNI. Changed code status to DNR/DNI    Sepsis (HCC)  Assessment & Plan  This is Sepsis Present on admission  SIRS criteria identified on my evaluation include: Tachypnea, with respirations greater than 20 per minute heart rate 90 at bedside on monitor  Source is presumed to be soft tissue skin infection  Sepsis  protocol initiated  Fluid resuscitation ordered per protocol  IV antibiotics as appropriate for source of sepsis  Reassessment: I have reassessed the patient's hemodynamic status.  Blood culture obtained x2, urinalysis ordered and pending.  IV antibiotics Ancef given and we will continue onward. Ancef switched to PO Doxycycline.           Acute respiratory failure with hypoxia (HCC)  Assessment & Plan  Chest x-ray reveals pulmonary edema and cardiomegaly likely new onset heart failure exacerbation   BNP > 95678 though MARIZOL      Echocardiogram ordered and pending  One-time dose Lasix 60mg IV lasix in ED w/ no significant response,   s/p 100mg lasix and Diuril 500mg   Continue supplemental O2 to maintain SPO2 greater than 88%      Cardiorenal syndrome  Assessment & Plan  BNP > 90431 though MARIZOL  S/p attempted diuresis  Will keep monitoring, trend renal function,  Keep euvolemic if possible  Hold medication as it affects his renal function currently and further evaluate  Consider nephrology and cardiology eval if the patient does not further improve with correction of his metabolic derangement        Acute kidney injury superimposed on chronic kidney disease (HCC)  Assessment & Plan  Multifactorial (sepsis vs dehydration vs cardiorenal)   One-time dose of Lasix given for hypervolemia with oxygen requirement  Urine sodium < 20  Urine protein > 600  FENA showed pre-renal azotemia   Echo ordered   Strict I and O  Monitor labs, avoid nephrotoxins, trend    Thrombocytopenia (HCC)  Assessment & Plan  Mild at 159  No indication for platelet transfusion at this time  Repeat CBC in a.m.      HTN (hypertension)  Assessment & Plan  Continue amlodipine 10 mg p.o. daily  As needed antihypertensives ordered    Consider stopping amlodipine due to pedal edema   Consider start ACE/ARB once MARIZOL resolves     Type 2 diabetes mellitus with kidney complication, with long-term current use of insulin (HCC)  Assessment & Plan  On insulin but  doesn't remember how much he is taking. Reports compliance.   Reported non-compliance with diet    Significant proteinuria with urine protein > 600  A1c 8      High dose SSI  Diabetic educator order placed  Continue Glargine 25 units at evening   Consider start on ACE/ARB once MARIZOL improves     Morbid obesity with BMI of 40.0-44.9, adult ([facility])  Assessment & Plan  BMI 40.75  Healthy diet and incorporate healthy green vegetables into diet   Exercise at least 30-40 min 4-5X a week   Counseling provided  provided       COPD (chronic obstructive pulmonary disease) ([facility])  Assessment & Plan  Continue supplemental O2 as needed  No wheezing appreciated on physical examination and we will hold off on steroids at this time  Not in acute exacerbation  Chest x-ray as seen above    * Diabetic ketoacidosis without coma associated with type 2 diabetes mellitus ([facility])  Assessment & Plan  A1c is 8  beta hydroxybutyrate mildly elevated 1.61   Mild DKA with compensated pH on ABG.  Metabolic acidosis likely secondary to lactic acidosis and dehydration  Insulin 5 units IV given in ER x1  Cautious with fluid resuscitation as patient likely in cardiorenal syndrome and with pulmonary edema without home O2 requirement and now on 4-liter O2 requirement  Continue Insulin glargine 25 U Q  High dose sliding scale   Diabetic diet       Plan  Continue to correct the patient's glycemic derangement  Await additional imaging and laboratory data, echocardiogram  Strict ins and outs and monitor volume status  Consider nephrology and cardiology evaluation if the patient does not further improve  Appropriate DNR/DNI status at this time  The patient would likely be a poor candidate for RRT  Wound care, skin care  See orders  Medically complex high risk patient    DVT prophylaxis: Xarelto PPx  CODE STATUS: DNR/DNI    I have performed a physical exam and reviewed and updated ROS and Plan today . In review of yesterday's note , there are no changes except as  documented above.       Please note that this dictation was created using voice recognition software. I have made every reasonable attempt to correct obvious errors, but I expect that there are errors of grammar and possibly context that I did not discover before finalizing the note.

## 2022-04-15 NOTE — ASSESSMENT & PLAN NOTE
A1c is 8  beta hydroxybutyrate mildly elevated 1.61   Mild DKA with compensated pH on ABG.  Metabolic acidosis likely secondary to lactic acidosis and dehydration  Insulin 5 units IV given in ER x1  Cautious with fluid resuscitation as patient likely in cardiorenal syndrome and with pulmonary edema without home O2 requirement and now on 4-liter O2 requirement  Continue Insulin glargine 25 U Q  High dose sliding scale   Diabetic diet

## 2022-04-15 NOTE — ASSESSMENT & PLAN NOTE
Resolved. Patient is on room air since 4/16    Chest x-ray reveals pulmonary edema and cardiomegaly likely new onset heart failure exacerbation   BNP > 42470 though MARIZOL      Echocardiogram w/ EF of 50%  One-time dose Lasix 60mg IV lasix in ED w/ no significant response,   s/p 100mg lasix and Diuril 500mg       On room air

## 2022-04-15 NOTE — ASSESSMENT & PLAN NOTE
This is Sepsis Present on admission  SIRS criteria identified on my evaluation include: Tachypnea, with respirations greater than 20 per minute heart rate 90 at bedside on monitor  Source is presumed to be soft tissue skin infection  Sepsis protocol initiated  Fluid resuscitation ordered per protocol  IV antibiotics as appropriate for source of sepsis  Reassessment: I have reassessed the patient's hemodynamic status.  Blood culture obtained x2, urinalysis ordered and pending.  IV antibiotics Ancef given and we will continue onward. Ancef switched to PO Doxycycline. Blood culture w/ [facility]PC likely contamination. Will continue Doxy.

## 2022-04-15 NOTE — ED PROVIDER NOTES
"ED Provider Note    CHIEF COMPLAINT  Chief Complaint   Patient presents with   • ALOC     Pt came to the ER via [facility] from home c/o alter mental status. Per EMS, pt was talking to family on the phone and all he can say is \"yeah\". Last known well is unknown. Pt is currently AOX1; oriented only to self. BS en route at 550 per EMS. Noted wound on right foot with dressing on. Pt has hx of DM Type 1, skin CA, pacemaker, COPD, and kidney disease as per EMS. No unilateral weakness, slurred speech, facial droop observed upon arrival.       HPI  Jamar Valdez is a 77 y.o. male who presents to the emergency department with altered mental status. Past medical history as documented below. Per EMS patient's family had contacted him by phone and he then could not say anything more than his name or yes. EMS was then called and they found the patient in the same clinical condition. No history from the patient given his current clinical presentation.    REVIEW OF SYSTEMS  See HPI for further details. All other systems are negative.     PAST MEDICAL HISTORY   has a past medical history of Atrial fibrillation (HCC), Basal cell carcinoma, Cancer (HCC), Chronic obstructive pulmonary disease (HCC), Diabetes (HCC), Hypertension, and Renal disorder.    SOCIAL HISTORY  Social History     Tobacco Use   • Smoking status: Former Smoker     Types: Cigarettes     Quit date: 1975     Years since quittin.3   • Smokeless tobacco: Never Used   Substance and Sexual Activity   • Alcohol use: Never   • Drug use: Never   • Sexual activity: Not on file       SURGICAL HISTORY   has a past surgical history that includes pacemaker insertion; knee replacement, total; and mamadou by laparoscopy (N/A, 2019).    CURRENT MEDICATIONS  Home Medications     Reviewed by Idania Macdonald R.N. (Registered Nurse) on 22 at 2025  Med List Status: Partial   Medication Last Dose Status   acetaminophen (TYLENOL) 500 MG Tab  Active   amLODIPine (NORVASC) 10 " MG Tab  Active   bisacodyl (DULCOLAX) 10 MG Suppos  Active   finasteride (PROSCAR) 5 MG Tab  Active   gabapentin (NEURONTIN) 400 MG Cap  Active   insulin glargine (LANTUS) 100 UNIT/ML Solution  Active   insulin regular (HUMULIN R) 100 Unit/mL Solution  Active   magnesium hydroxide (MILK OF MAGNESIA) 400 MG/5ML Suspension  Active   metoprolol SR (TOPROL XL) 25 MG TABLET SR 24 HR  Active   ondansetron (ZOFRAN ODT) 4 MG TABLET DISPERSIBLE  Active   polyethylene glycol/lytes (MIRALAX) Pack  Active   senna-docusate (PERICOLACE OR SENOKOT S) 8.6-50 MG Tab  Active   vitamin D 2000 UNIT Tab  Active                ALLERGIES  Allergies   Allergen Reactions   • Bloodless        PHYSICAL EXAM  VITAL SIGNS: /75   Pulse 70   Temp 35.8 °C (96.5 °F) (Temporal)   Resp 20   Ht 1.829 m (6')   Wt (!) 135 kg (298 lb)   SpO2 97%   BMI 40.42 kg/m²  @JATINDER[[id number]::@   Pulse ox interpretation: I interpret this pulse ox as normal.  Constitutional: Alert in no apparent distress.  HENT: No signs of trauma, Bilateral external ears normal, Nose normal.   Eyes: Pupils are equal and reactive  Neck: Normal range of motion, No tenderness, Supple  Cardiovascular: Regular rate and rhythm, no murmurs.   Thorax & Lungs: Normal breath sounds, No respiratory distress, No wheezing, No chest tenderness.   Abdomen: Bowel sounds normal, Soft, No tenderness  Skin: Warm, Dry. Chronic wound to the dorsum of the right foot.  Extremities: Intact distal pulses, mild bilateral pre-tibial edema  Musculoskeletal: Good range of motion in all major joints. No tenderness to palpation or major deformities noted.   Neurologic: awake, alert, pleasant but oriented only to person.      DIAGNOSTIC STUDIES / PROCEDURES      LABS  Results for orders placed or performed during the hospital encounter of 04/14/22   CBC WITH DIFFERENTIAL   Result Value Ref Range    WBC 11.1 (H) 4.8 - 10.8 K/uL    RBC 4.38 (L) 4.70 - 6.10 M/uL    Hemoglobin 13.2 (L) 14.0 - 18.0 g/dL     Hematocrit 41.9 (L) 42.0 - 52.0 %    MCV 95.7 81.4 - 97.8 fL    MCH 30.1 27.0 - 33.0 pg    MCHC 31.5 (L) 33.7 - 35.3 g/dL    RDW 48.6 35.9 - 50.0 fL    Platelet Count 159 (L) 164 - 446 K/uL    MPV 10.4 9.0 - 12.9 fL    Neutrophils-Polys 87.00 (H) 44.00 - 72.00 %    Lymphocytes 5.30 (L) 22.00 - 41.00 %    Monocytes 6.90 0.00 - 13.40 %    Eosinophils 0.00 0.00 - 6.90 %    Basophils 0.40 0.00 - 1.80 %    Immature Granulocytes 0.40 0.00 - 0.90 %    Nucleated RBC 0.00 /100 WBC    Neutrophils (Absolute) 9.63 (H) 1.82 - 7.42 K/uL    Lymphs (Absolute) 0.59 (L) 1.00 - 4.80 K/uL    Monos (Absolute) 0.76 0.00 - 0.85 K/uL    Eos (Absolute) 0.00 0.00 - 0.51 K/uL    Baso (Absolute) 0.04 0.00 - 0.12 K/uL    Immature Granulocytes (abs) 0.04 0.00 - 0.11 K/uL    NRBC (Absolute) 0.00 K/uL   COMP METABOLIC PANEL   Result Value Ref Range    Sodium 135 135 - 145 mmol/L    Potassium 4.4 3.6 - 5.5 mmol/L    Chloride 100 96 - 112 mmol/L    Co2 14 (L) 20 - 33 mmol/L    Anion Gap 21.0 (H) 7.0 - 16.0    Glucose 573 (HH) 65 - 99 mg/dL    Bun 46 (H) 8 - 22 mg/dL    Creatinine 3.65 (H) 0.50 - 1.40 mg/dL    Calcium 8.8 8.5 - 10.5 mg/dL    AST(SGOT) 19 12 - 45 U/L    ALT(SGPT) 13 2 - 50 U/L    Alkaline Phosphatase 75 30 - 99 U/L    Total Bilirubin 0.8 0.1 - 1.5 mg/dL    Albumin 3.2 3.2 - 4.9 g/dL    Total Protein 6.3 6.0 - 8.2 g/dL    Globulin 3.1 1.9 - 3.5 g/dL    A-G Ratio 1.0 g/dL   LACTIC ACID   Result Value Ref Range    Lactic Acid 4.9 (HH) 0.5 - 2.0 mmol/L   Salicylate   Result Value Ref Range    Salicylates, Quant. <1.0 (L) 15.0 - 25.0 mg/dL   ACETAMINOPHEN   Result Value Ref Range    Acetaminophen -Tylenol <5.0 (L) 10.0 - 30.0 ug/mL   AMMONIA   Result Value Ref Range    Ammonia 12 11 - 45 umol/L   VENOUS BLOOD GAS   Result Value Ref Range    Venous Bg Ph 7.34 7.31 - 7.45    Venous Bg Pco2 33.5 (L) 41.0 - 51.0 mmHg    Venous Bg Po2 31.2 25.0 - 40.0 mmHg    Venous Bg O2 Saturation 55.5 %    Venous Bg Hco3 18 (L) 24 - 28 mmol/L    Venous Bg  Base Excess -7 mmol/L    Body Temp NA Centigrade   MAGNESIUM   Result Value Ref Range    Magnesium 2.1 1.5 - 2.5 mg/dL   PHOSPHORUS   Result Value Ref Range    Phosphorus 3.8 2.5 - 4.5 mg/dL   OSMOLALITY SERUM   Result Value Ref Range    Osmolality Serum 331 (H) 278 - 298 mOsm/kg H2O   BETA-HYDROXYBUTYRIC ACID   Result Value Ref Range    beta-Hydroxybutyric Acid 1.61 (H) 0.02 - 0.27 mmol/L   DIAGNOSTIC ALCOHOL   Result Value Ref Range    Diagnostic Alcohol <10.1 0.0 - 10.0 mg/dL   Prothrombin Time   Result Value Ref Range    PT 18.0 (H) 12.0 - 14.6 sec    INR 1.54 (H) 0.87 - 1.13   APTT   Result Value Ref Range    APTT 30.1 24.7 - 36.0 sec   TROPONIN   Result Value Ref Range    Troponin T 84 (H) 6 - 19 ng/L   ESTIMATED GFR   Result Value Ref Range    GFR (CKD-EPI) 16 (A) >60 mL/min/1.73 m 2   LACTIC ACID   Result Value Ref Range    Lactic Acid 4.0 (HH) 0.5 - 2.0 mmol/L   proBrain Natriuretic Peptide, NT   Result Value Ref Range    NT-proBNP 78381 (H) 0 - 125 pg/mL   PROCALCITONIN   Result Value Ref Range    Procalcitonin 4.33 (H) <0.25 ng/mL   PHOSPHORUS   Result Value Ref Range    Phosphorus 3.9 2.5 - 4.5 mg/dL   CRP QUANTITIVE (NON-CARDIAC)   Result Value Ref Range    Stat C-Reactive Protein 18.86 (H) 0.00 - 0.75 mg/dL   EKG (NOW)   Result Value Ref Range    Report       [facility] Emergency Dept.    Test Date:  2022  Pt Name:    RONNIE DAVID               Department: ER  MRN:        [id number]                      Room:        16  Gender:     Male                         Technician:  :        [id number]                   Requested By:SOTERO MCCULLOUGH  Order #:    [id number]                    Martin MD:    Measurements  Intervals                                Axis  Rate:       70                           P:  RI:                                      QRS:        -77  QRSD:       178                          T:          93  QT:         520  QTc:        562    Interpretive  Statements  PACEMAKER SPIKES OR ARTIFACTS  A-FLUTTER/FIBRILLATION W/ COMPLETE AV BLOCK  NONSPECIFIC IVCD WITH LAD  LVH WITH SECONDARY REPOLARIZATION ABNORMALITY  Compared to ECG 08/18/2019 22:25:08  AV block, complete (third-degree) now present  Junctional rhythm no longer present  Accelerated junctional rhythm no longer present     POCT glucose device results   Result Value Ref Range    POC Glucose, Blood 523 (HH) 65 - 99 mg/dL   POCT glucose device results   Result Value Ref Range    POC Glucose, Blood 496 (HH) 65 - 99 mg/dL         RADIOLOGY  CT-HEAD W/O   Final Result         1.  No acute intracranial abnormality is identified, there are nonspecific white matter changes, commonly associated with small vessel ischemic disease.  Associated mild cerebral atrophy is noted.   2.  Multiple calcified extra-axial subcentimeter masses measuring up to 7.8 mm along the right frontal skull and right falx, appearance suggests angiomatous.   3.  Atherosclerosis.         DX-CHEST-PORTABLE (1 VIEW)   Final Result         1.  Pulmonary edema and/or infiltrates.   2.  Cardiomegaly      EC-ECHOCARDIOGRAM COMPLETE W/O CONT    (Results Pending)         COURSE & MEDICAL DECISION MAKING  Pertinent Labs & Imaging studies reviewed. (See chart for details)    77-year-old male presented to the emergency room with altered mental status. History as above. Patient with hyperglycemia and likely mild DKA. He does have evidence of acute on chronic kidney injury as well as a mildly elevated troponin. He clinically does appear slightly volume overloaded. He has been fluid hydrated secondary to his hyperglycemia but with complete lab evaluation back at this point will switch to insulin. I discussed the case with the hospitalist as well as the intensivist and the patient will be placed in intermediate care unit.    FINAL IMPRESSION  1. Altered mental status, unspecified altered mental status type    2. Hyperglycemia    3. MARIZOL (acute kidney injury)  ([facility])    4. Elevated troponin            Electronically signed by: Phillip Keller M.D., 4/14/2022 8:38 PM

## 2022-04-15 NOTE — ED NOTES
Bedside handoff report given to Vidal FISHER.    Pt transferred to Room RYA232/00 with Unit RN in stable condition

## 2022-04-15 NOTE — ED NOTES
Lab called with critical result of lactic at 2101. Critical lab result read back to Lab.   Dr. Keller notified of critical lab result at 2101.  Critical lab result read back by Dr. Keller.

## 2022-04-15 NOTE — ASSESSMENT & PLAN NOTE
S/p IV ancef in ED    Switched to Doxycycline   Bcx w/ GPC likely contamination   Wound care consulted

## 2022-04-15 NOTE — DIETARY
NUTRITION SERVICES: BMI - Pt with BMI >40 (=Body mass index is 40.75 kg/m².), Class III obesity. Weight loss counseling not appropriate in acute care setting. RECOMMEND - If appropriate at DC please refer to outpatient services for weight management.

## 2022-04-15 NOTE — ASSESSMENT & PLAN NOTE
On insulin but doesn't remember how much he is taking.   Reports compliance.   Reported non-compliance with diet    Significant proteinuria with urine protein > 600  A1c 8      High dose SSI  Diabetic educator order placed  Continue Glargine 25 units at evening   Consider start on ACE/ARB once MARIZOL improves

## 2022-04-15 NOTE — PROGRESS NOTES
Overnight ICU follow-up note     Follow-up after  admission to ensure patient continuing to improve and not requiring ICU admission.  Discussed all of this with [facility] hospitalist, who was extremely busy with other very ill patients, agreed with plan and I put orders in to help assist as I could.   Anion gap had been improving, however glucose had risen from 420-> 500  -Give additional dose of 10 glargine (initially had given smaller dose as patient was altered and may not be eating, and unclear how much patient had been taking all medications, however home dose is 36 so patient likely will need higher  - change daily glargine to 25, may need further increase.    Lactate had decreased to 2.5.   Patient had urinated small amount once in ER, with only 200 cc seen on bladder scan.  Patient with significant acute on chronic kidney injury, which initially worsened after fluids were given, and had slightly improved after initial Lasix.  However patient did not respond significantly to 60 of Lasix.  Again discussed with hospitalist, patient extremely fluid overloaded on exam and ultrasound (see note prior) and though may have some underlying infection do not believe this is the cause of patient's hypoxemia, or MARIZOL.  BNP was 15,000  -Lasix 100 given, with Diuril 500 in order to hopefully start diuresing pt, this is a large dose however with significant kidney injury and GFR of 16 needs higher dose for diuretics to work.   -Will need to be monitored closely for urine output, and improvement  -We will need to follow-up echo.     Total critical care time evaluating patient for improvement or need for possible reupthgthrthathdtheth:th th1th9th minutes    JET Barajas MD  Critical Care Medicine

## 2022-04-15 NOTE — H&P
"Hospital Medicine History & Physical Note    Date of Service  4/14/2022    Primary Care Physician  Pcp Pt States None    Consultants  Intensivist Khalida Barajas M.D.    Code Status  Full Code    Chief Complaint  Chief Complaint   Patient presents with   • ALOC     Pt came to the ER via [facility] from home c/o alter mental status. Per EMS, pt was talking to family on the phone and all he can say is \"yeah\". Last known well is unknown. Pt is currently AOX1; oriented only to self. BS en route at 550 per EMS. Noted wound on right foot with dressing on. Pt has hx of DM Type 1, skin CA, pacemaker, COPD, and kidney disease as per EMS. No unilateral weakness, slurred speech, facial droop observed upon arrival.       History of Presenting Illness  Jamar Valdez is a 77 y.o. male who presented 4/14/2022 brought in by EMS after family called patient and unable to communicate properly.  Blood glucose was noted to be 550 and en route by EMS.  Patient noted to have history of COPD but is not on oxygen at home.  He was noted to have 4 L nasal cannula and desats to 88% per primary nurse in ED.  ERP had evaluated patient and may have infectious source driving mild DKA with right lower extremity chronic wound on foot.  He states he is compliant with his home medication regiment however conversation with patient minimal and either poor historian or still altered as he is only alert and oriented to 1 (self) and unable to tell me the year, where he is at or reason for visit.  Covid vaccination status unknown and difficult to obtain from patient.  Attempted to contact family and no answer and will try throughout night for CODE STATUS which we will presume to be full code at this time.  Patient does not have echocardiogram on file and initially with stable hemodynamics and compensated VBG patient disposition thought to be okay for telemetry with subcutaneous insulin and fluids however with possible new onset heart failure with O2 " requirement and hypervolemia, patient would fare better off in IMCU.  ROS unobtainable secondary to encephalopathy.    Vital signs at time of presentation were as follows: 96.5, 80, 20, 158/74, 95% 4 L nasal cannula.    Chest x-ray performed and reveals pulmonary edema and cardiomegaly.  CT head without contrast reveals mild cerebral atrophy, multiple calcified extra-axial subcentimeter masses measuring up to 7.8 mm along the right frontal skull and right falx appearance suggests angiomatous.    Lactic acid initially 4.9, leukocytosis neutrophilic predominant and mild anemia/mild thrombocytopenia appreciated on CBC otherwise unremarkable.  VBG reveals the followin.34/33.5/31.2/55.5% on 4 L nasal cannula.    Beta hydroxybutyric acid mildly elevated 1.61 and CMP reveals anion gap metabolic acidosis with anion gap 21, CO2 14, hyperglycemia with glucose 573 and MARIZOL on CKD with BUN 46 and creatinine 3.65.  Patient presumed to be in cardiorenal syndrome with new onset heart failure, echocardiogram performed at bedside and estimated to have EF of around 30% by intensivist.  Troponin T elevated at 84 without ischemic changes on EKG and likely secondary to MARIZOL and CKD.  Blood culture x2 ordered and pending.  Ancef given for right lower extremity wound.    ERP administered Ancef, fluids and IV insulin 5 units x 1.  Hospitalist consulted for admission.  Intensivist consulted and agrees for IMCU admission.    Patient presumed to be full code and will attempt to contact family again.  He will be optimized in ED and subsequently transferred to IMCU for further optimization of medical management.    I discussed the plan of care with patient.    Review of Systems  Review of Systems   Unable to perform ROS: Mental status change       Past Medical History   has a past medical history of Atrial fibrillation (HCC), Basal cell carcinoma, Cancer (HCC), Chronic obstructive pulmonary disease (HCC), Diabetes (HCC), Hypertension, and  Renal disorder.    Surgical History   has a past surgical history that includes pacemaker insertion; knee replacement, total; and mamadou by laparoscopy (N/A, 8/20/2019).     Family History  family history is not on file.  Patient is altered and unable to answer appropriately.  Unable to get in touch with family.  Family history reviewed with patient. There is no family history that is pertinent to the chief complaint.     Social History   reports that he quit smoking about 47 years ago. His smoking use included cigarettes. He has never used smokeless tobacco. He reports that he does not drink alcohol and does not use drugs.    Allergies  Allergies   Allergen Reactions   • Bloodless        Medications  Prior to Admission Medications   Prescriptions Last Dose Informant Patient Reported? Taking?   acetaminophen (TYLENOL) 500 MG Tab   No No   Sig: Take 2 Tabs by mouth every 6 hours as needed for Mild Pain.   amLODIPine (NORVASC) 10 MG Tab   No No   Sig: Take 1 Tab by mouth every day.   bisacodyl (DULCOLAX) 10 MG Suppos   No No   Sig: Insert 1 Suppository in rectum 1 time daily as needed (if magnesium hydroxide ineffective after 24 hours).   finasteride (PROSCAR) 5 MG Tab   No No   Sig: Take 1 Tab by mouth every day.   gabapentin (NEURONTIN) 400 MG Cap   No No   Sig: Take 1 Cap by mouth 3 times a day.   insulin glargine (LANTUS) 100 UNIT/ML Solution   No No   Sig: Inject 35 Units as instructed every morning.   insulin regular (HUMULIN R) 100 Unit/mL Solution   No No   Sig: Inject 2-10 Units as instructed 4 Times a Day,Before Meals and at Bedtime.   magnesium hydroxide (MILK OF MAGNESIA) 400 MG/5ML Suspension   No No   Sig: Take 30 mL by mouth 1 time daily as needed (if polyethylene glycol ineffective after 24 hours).   metoprolol SR (TOPROL XL) 25 MG TABLET SR 24 HR   No No   Sig: Take 1 Tab by mouth 2 Times a Day.   ondansetron (ZOFRAN ODT) 4 MG TABLET DISPERSIBLE   No No   Sig: Take 1 Tab by mouth every four hours as  needed for Nausea (give PO if IV route is unavailable.).   polyethylene glycol/lytes (MIRALAX) Pack   No No   Sig: Take 1 Packet by mouth 1 time daily as needed (if sennosides and docusate ineffective after 24 hours).   senna-docusate (PERICOLACE OR SENOKOT S) 8.6-50 MG Tab   No No   Sig: Take 2 Tabs by mouth 2 Times a Day.   vitamin D 2000 UNIT Tab   No No   Sig: Take 1 Tab by mouth every day.      Facility-Administered Medications: None       Physical Exam  Temp:  [35.8 °C (96.5 °F)] 35.8 °C (96.5 °F)  Pulse:  [80] 80  Resp:  [20] 20  BP: (158)/(74) 158/74  SpO2:  [95 %] 95 %  Blood Pressure : 158/74   Temperature: 35.8 °C (96.5 °F)   Pulse: 80   Respiration: 20   Pulse Oximetry: 95 %       Physical Exam  Vitals reviewed.   Constitutional:       Appearance: Normal appearance. He is normal weight. He is not toxic-appearing or diaphoretic.      Comments: Appears age as stated, obese, pleasant cooperative, confused and alert and oriented x1 (self)   HENT:      Head: Normocephalic and atraumatic.      Mouth/Throat:      Mouth: Mucous membranes are dry.      Pharynx: Oropharynx is clear. No oropharyngeal exudate or posterior oropharyngeal erythema.      Comments: Edentulous  Eyes:      General: No scleral icterus.     Extraocular Movements: Extraocular movements intact.      Conjunctiva/sclera: Conjunctivae normal.      Pupils: Pupils are equal, round, and reactive to light.   Neck:      Vascular: No carotid bruit.   Cardiovascular:      Rate and Rhythm: Normal rate and regular rhythm.      Pulses: Normal pulses.      Heart sounds: Murmur heard.     No friction rub. No gallop.   Pulmonary:      Effort: Pulmonary effort is normal.      Breath sounds: Rales (Right lung field) present. No wheezing or rhonchi.      Comments: Mild digital clubbing lower extremity  Abdominal:      General: Bowel sounds are normal. There is distension.      Palpations: Abdomen is soft. There is no mass.      Tenderness: There is no abdominal  tenderness. There is no guarding or rebound.      Hernia: No hernia is present.   Musculoskeletal:         General: Normal range of motion.      Cervical back: Normal range of motion and neck supple. No muscular tenderness.      Right lower leg: Edema present.      Left lower leg: Edema present.   Lymphadenopathy:      Cervical: No cervical adenopathy.   Skin:     General: Skin is warm and dry.      Capillary Refill: Capillary refill takes less than 2 seconds.      Coloration: Skin is not pale.      Findings: Lesion (Right foot) present. No erythema or rash.   Neurological:      Mental Status: He is disoriented.      Cranial Nerves: No cranial nerve deficit.      Sensory: No sensory deficit.      Motor: No weakness.      Comments: Moves all 4 extremities, alert and oriented to self otherwise disoriented to time, place and reason for visit.  No tremulous activity, cranial nerves II through XII intact, no acute focal deficits appreciated.   Psychiatric:      Comments: Unable to evaluate secondary to acute encephalopathy         Laboratory:  Recent Labs     04/14/22 2045   WBC 11.1*   RBC 4.38*   HEMOGLOBIN 13.2*   HEMATOCRIT 41.9*   MCV 95.7   MCH 30.1   MCHC 31.5*   RDW 48.6   PLATELETCT 159*   MPV 10.4     Recent Labs     04/14/22 2045   SODIUM 135   POTASSIUM 4.4   CHLORIDE 100   CO2 14*   GLUCOSE 573*   BUN 46*   CREATININE 3.65*   CALCIUM 8.8     Recent Labs     04/14/22 2045   ALTSGPT 13   ASTSGOT 19   ALKPHOSPHAT 75   TBILIRUBIN 0.8   GLUCOSE 573*     Recent Labs     04/14/22 2045   APTT 30.1   INR 1.54*     No results for input(s): NTPROBNP in the last 72 hours.      Recent Labs     04/14/22 2045   TROPONINT 84*     I reviewed and interpreted the above twelve-lead EKG and do not appreciate any ischemic changes, widened QRS complex appreciated with pacer spikes and prolonged QRS, poor R wave progression in precordial leads.    Imaging:  CT-HEAD W/O   Final Result         1.  No acute intracranial  abnormality is identified, there are nonspecific white matter changes, commonly associated with small vessel ischemic disease.  Associated mild cerebral atrophy is noted.   2.  Multiple calcified extra-axial subcentimeter masses measuring up to 7.8 mm along the right frontal skull and right falx, appearance suggests angiomatous.   3.  Atherosclerosis.         DX-CHEST-PORTABLE (1 VIEW)   Final Result         1.  Pulmonary edema and/or infiltrates.   2.  Cardiomegaly        I reviewed and interpreted the above chest x-ray and do appreciate cardiomegaly with pulmonary edema as seen above.        Assessment/Plan:  I anticipate this patient will require at least two midnights for appropriate medical management, necessitating inpatient admission.    * Acute kidney injury superimposed on chronic kidney disease (HCC)- (present on admission)  Assessment & Plan  Likely multifactorial inclusive of presumptive diagnosis of sepsis likely secondary to right lower extremity cellulitis, dehydration with elevated BUN/creatinine compared with baseline and likely cardiorenal syndrome with likely new onset heart failure.  Avoid nephrotoxic agents  Nephrology consulted  One-time dose of Lasix given for hypervolemia with oxygen requirement  Chest x-ray consistent with cardiomegaly and heart failure exacerbation and with MARIZOL likely cardiorenal syndrome  Urinalysis ordered  Urine electrolytes ordered  Microalbumin to creatinine ratio    Sepsis (HCC)- (present on admission)  Assessment & Plan  This is Sepsis Present on admission  SIRS criteria identified on my evaluation include: Tachypnea, with respirations greater than 20 per minute heart rate 90 at bedside on monitor  Source is presumed to be soft tissue skin infection  Sepsis protocol initiated  Fluid resuscitation ordered per protocol  IV antibiotics as appropriate for source of sepsis  Reassessment: I have reassessed the patient's hemodynamic status.  Blood culture obtained x2,  urinalysis ordered and pending.  IV antibiotics Ancef given and we will continue onward.  CBC in a.m.  Procalcitonin ordered and pending.          Diabetic ketoacidosis without coma associated with type 2 diabetes mellitus (HCC)- (present on admission)  Assessment & Plan  Mild DKA with compensated pH on ABG.  Metabolic acidosis likely secondary to lactic acidosis and dehydration  Insulin 5 units IV given in ER x1  High dose sliding scale insulin  Cautious with fluid resuscitation as patient likely in cardiorenal syndrome and with pulmonary edema without home O2 requirement and now on 4-liter O2 requirement  Intensivist consulted and recommends IMCU admission  Likely driven by infectious etiology with neutrophilic predominant leukocytosis and right lower extremity cellulitis  No indication for bicarbonate infusion at this time      Acute encephalopathy- (present on admission)  Assessment & Plan  CT head as seen above  Ammonia ordered and pending  No acute hyponatremia  Likely secondary to metabolic acidosis/possible sepsis  Urine drug screen ordered and pending  EtOH level ordered and pending      Cellulitis of right lower extremity- (present on admission)  Assessment & Plan  IV antibiotics initiated in ED and we will continue onward  Procalcitonin ordered and pending  CBC in a.m.  Wound care consulted      Acute respiratory failure with hypoxia (HCC)- (present on admission)  Assessment & Plan  Chest x-ray reveals pulmonary edema and cardiomegaly likely new onset heart failure exacerbation  Echocardiogram ordered and pending  One-time dose Lasix 60 mg IV x1 given in ER  Continue supplemental O2 to maintain SPO2 greater than 88%      Cardiorenal syndrome- (present on admission)  Assessment & Plan  Echocardiogram ordered and pending  Nephrologist consult order placed and recommend daytime hospitalist to consult with nephrology in a.m./possible cardiology  One-time dose Lasix 60 mg IV x1  Avoid nephrotoxic agents  BNP  ordered and greater than 15,000    Thrombocytopenia (HCC)- (present on admission)  Assessment & Plan  Mild at 159  No indication for platelet transfusion at this time  Repeat CBC in a.m.      HTN (hypertension)- (present on admission)  Assessment & Plan  Continue amlodipine 10 mg p.o. daily  As needed antihypertensives ordered  N.p.o. currently and when resumption of diet, cardiac diabetic diet with 2 g sodium restriction and 1500 cc fluid restriction recommended    Type 2 diabetes mellitus with kidney complication, with long-term current use of insulin (HCC)- (present on admission)  Assessment & Plan  A1c ordered and pending  Microalbumin to creatinine ratio ordered and pending  Urinalysis ordered and pending  In mild DKA with compensation for ABG  High SSI  Diabetic educator order placed  5 units IV insulin given in ED for mild DKA    COPD (chronic obstructive pulmonary disease) (HCC)- (present on admission)  Assessment & Plan  Continue supplemental O2 as needed  No wheezing appreciated on physical examination and we will hold off on steroids at this time  Not in acute exacerbation  Chest x-ray as seen above    Advance care planning- (present on admission)  Assessment & Plan  Greater than 20 minutes spent discussing diagnostic work-up/disposition/prognosis and treatment plan  CODE STATUS unable to be obtained secondary to acute encephalopathy    Morbid obesity with BMI of 40.0-44.9, adult ([facility])- (present on admission)  Assessment & Plan  Strong recommendation for follow-up outpatient PCP for lifestyle modification including diet and exercise regiment of at least 30 min of brisk cardiovascular exercise 3-5 times weekly.  Lipid panel ordered and pending  Hemoglobin A1c ordered and pending        VTE prophylaxis: heparin ppx     I have spent greater than 50 minutes of critical care time evaluating and treating patient excluding procedures    Electronically signed:  Jamar Dubon DO

## 2022-04-15 NOTE — ASSESSMENT & PLAN NOTE
On room air   No wheezing appreciated on physical examination and we will hold off on steroids at this time  Not in acute exacerbation  RT protocol, Duoneb as needed

## 2022-04-15 NOTE — CONSULTS
Consults     Called by ERP for consult on patient placement with concern for possible DKA.     Patient is a 77-year-old gentleman who presented with altered mental status oriented to self only.  History of type 1 diabetes COPD skin cancer pacemaker and kidney disease no focal neurologic deficits.    When saw patient he is pleasant and alert though ANO x1, patient states he has been taking his insulin and medication as prescribed however is confused and often answers just yes, unclear if he took his most recent insulins.     In ED patient was found to have normal vital signs, labs notable for creatinine of 3.65 (baseline 1.2-1.4), with a lactate of 4.9, bicarb of 14 and anion gap of 21, glucose of 573, beta hydroxy of 1.61.   Patient received 2 L of fluid and IV insulin, and blood gas showed 7.34/33.5/18 (already shown improvement in bicarb from this likely from lactate clearance??)    Though patient does have evidence of very mild DKA, most likely etiology of significant gap is lactic acidosis and uremia.     Saw patient at bedside with [facility] hospitalist, with patient's possible sepsis from foot infection, but also signs of edema on chest x-ray, concern for fluid overload instead of hypovolemia from sepsis.     Physical exam  Patient ANO x1, no acute distress sitting up comfortably  Mild rhonchi's on right lower lobe, requiring 4 L of oxygen saturating 93%, orthopnea noted when I had him lay down  Heart rate paced in the 60s  large belly with likely ascites,  lower extremity edema pitting to thighs and hypoxemia/orthopnea,     Bedside ultrasound was performed showing significant decrease in Left systolic function ? Septal wall abnormality, RH with good function and IVC plump w/o resp variation. B lines seen in all lung fields though > on right. And ascites noted during abdominal exam.   A/P  DKA  Overall patient is very mild DKA and should be able to control this with subcu insulin protocol.  Much of this anion gap  acidosis is likely due to significant lactic acidosis, and uremia from acute on chronic renal failure.  -Ordered 15 units of Lantus now (about half of baseline, as unclear if patient will be able to eat with mental status, and increased creatinine needs to decreased clearance of insulin)  -Sliding scale every 4 hours, every 2 hour glucose checks with correction to call if glucose increasing  -Every 4 BMP    Lactic acidosis  Patient was not hypotensive since admit, unclear etiology of significant lactic acidosis, likely multifactorial with sepsis from lower extremity wound, though possibly also with some poor perfusion from the chair cardiogenic source.  Patient also appears disheveled unclear his nutritional status at this point, as patient did not have severe hypoxemia or hypotension upon arrival possible thiamine deficiency could contribute to lactic acidosis buildup.  No signs of liver dysfunction at this time.   -Monitor lactate every 6 4 hours with BMP until cleared  -Added 500 thiamine for 3 days  -Would not recommend aggressive fluid resuscitation at this time, indeed patient appears grossly fluid overloaded, would recommend diuresis, and possibly afterload reduction at this time with apparent low systolic function on bedside echo  -Formal echo ordered    Acute on chronic renal failure  Initially thought secondary to hypovolemia from septic shock, however with clinical evaluation and bedside ultrasound feel more likely at this time secondary to cardiorenal process.   We will monitor electrolytes and creatinine with serial BMPs  Again recommend gentle diuresis      COPD  Noted in prior hospitalizations, unclear if PFTs.  Patient did have decreased breath sounds and difficulty taking deep breaths upon questioning, ABG without CO2 retention.  So do not feel need to treat for exacerbation at this time.     With hypoxia (likely from fluid overload) feel trial with DuoNebs to see if can improve airflow and  oxygenation is appropriate.   Every 6 DuoNebs, if does not seem to improve patient's respiratory status can hold and change to as needed.      Several discussions with IMC hospitalist, ED physician and myself.  Patient without ICU needs at this time, vital signs stable, will admit to IMC for closer monitoring please feel free to consult ICU if patient status is worsening, worsening respiratory status or becomes hemodynamically unstable.    Total critical care time: 40 minutes  JET Barajas MD  Critical Care Medicine

## 2022-04-15 NOTE — RESPIRATORY CARE
COPD EDUCATION by COPD CLINICAL EDUCATOR  4/15/2022 at 2:56 PM by Beatris Elias, RRT     Patient interviewed by COPD education team. Patient denies previous Pulmonary testing. He states he was told he had COPD but could not state who.  He is a non-smoker having quit >40 yrs.  Provided educational materials with discussion. HE marrero not take any respiratory medications and has not been on Oxygen prior to this admission.    COPD Assessment  COPD Clinical Specialists ONLY  COPD Education Initiated: Yes--Short Intervention (met with patient. No PFT to confirm COPD diagnosis. slow to answer questions.  quit >40 yrs no medications provided information on lung disease)  [facility] is [facility]    PFT Results    No results found for: PFT

## 2022-04-15 NOTE — PROGRESS NOTES
Lab called with critical result of  at 0342. Critical lab result read back to lab.   notified of critical lab result at 0356.  Critical lab result read back by .

## 2022-04-15 NOTE — ASSESSMENT & PLAN NOTE
Continue amlodipine 10 mg p.o. daily  As needed antihypertensives ordered    Consider stopping amlodipine due to pedal edema   Consider start ACE/ARB once MARIZOL resolves

## 2022-04-15 NOTE — ASSESSMENT & PLAN NOTE
CT head as seen above  No acute hyponatremia   Normal ammonia level   UDS positive for cannabinoids   Neg EtOH screen     Likely metabolic in setting of elevated glucose, AG, and mildly elevated lactic acid       Resolved

## 2022-04-15 NOTE — PROGRESS NOTES
"Pt oriented to place, situation and self this morning, states he came in because his \"blood sugar was too high from eating a [employer] ice cream bar\".  Pt states he is feeling better this morning.  Asking for food and water, explained we are waiting for the physician's orders for advancing diet.  Pt out of bed to bathroom with one person assist, slow and steady gait with hand held support.    "

## 2022-04-15 NOTE — ASSESSMENT & PLAN NOTE
Multifactorial (sepsis vs dehydration vs cardiorenal)   One-time dose of Lasix given for hypervolemia with oxygen requirement  Urine sodium < 20  Urine protein > 600  FENA showed pre-renal azotemia   Echo w/ EF of 50%      Apparently, [facility] and had discussion w/ nephrology for potential dialysis, so kidney issue seems to be chronic for him.   Will [facility] for review  Will restart home lasix   Strict I and O

## 2022-04-15 NOTE — ASSESSMENT & PLAN NOTE
BNP > 12484 though MARIZOL on CKD    Echo with Ef of 50% with w/ left ventricular hypertrophy, dilated right ventricle and mild MR.     Home lasix, metoprolol, atorvastatin, eliquis resumed

## 2022-04-15 NOTE — ED NOTES
Pt ambulated to the restroom and back to room with standby assist. Noted steady and stable gait.     Urine specimen collected and sent to lab as per order.

## 2022-04-15 NOTE — ASSESSMENT & PLAN NOTE
Encephalopathy resolved. Patient is alert and Oriented X4. He would like to be DNR/DNI. Changed code status to DNR/DNI

## 2022-04-15 NOTE — PROGRESS NOTES
4 Eyes Skin Assessment Completed by PHILLIP Drake and PHILLIP Stark.    Head WDL scalp: flaky, scabbing  Ears Redness and Blanching scabbing  Nose WDL  Mouth WDL dry, flaky  Neck WDL  Breast/Chest WDL  Shoulder Blades WDL  Spine Bruising x1 mid back  (R) Arm/Elbow/Hand Blanching and Scab  (L) Arm/Elbow/Hand Blanching and Scab  Abdomen Redness, Blanching and Rash excoriation  Groin Redness, Blanching and Swelling  Scrotum: Redness, blanching, edematous   Coccyx: Redness, blanching, excoriated: barrier cream applied, mepilex applied  Buttocks: WDL  (R) Leg Redness, Blanching, Scab, Swelling and Edema flaky  (L) Leg Redness, Blanching, Scab, Swelling and Edema flaky  (R) Heel/Foot/Toe Redness, Discoloration, Swelling and Edema wound to anterior foot. Wound to heel, non-blanching.  (L) Heel/Foot/Toe Redness, Boggy, Swelling and Edema           Devices In Places ECG, Blood Pressure Cuff, Pulse Ox and Nasal Cannula      Interventions In Place NC W/Ear Foams, Heel Mepilex, Sacral Mepilex, Heel Float Boots, Waffle Overlay, Pillows, Q2 Turns, Low Air Loss Mattress, Barrier Cream and Heels Loaded W/Pillows    Possible Skin Injury Yes    Pictures Uploaded Into Epic Yes  Wound Consult Placed Yes  RN Wound Prevention Protocol Ordered Yes

## 2022-04-16 ENCOUNTER — APPOINTMENT (OUTPATIENT)
Dept: RADIOLOGY | Facility: MEDICAL CENTER | Age: 77
DRG: 637 | End: 2022-04-16
Attending: HOSPITALIST
Payer: COMMERCIAL

## 2022-04-16 LAB
ALBUMIN SERPL BCP-MCNC: 2.8 G/DL (ref 3.2–4.9)
ALBUMIN/GLOB SERPL: 0.9 G/DL
ALP SERPL-CCNC: 62 U/L (ref 30–99)
ALT SERPL-CCNC: 24 U/L (ref 2–50)
ANION GAP SERPL CALC-SCNC: 11 MMOL/L (ref 7–16)
AST SERPL-CCNC: 47 U/L (ref 12–45)
BASOPHILS # BLD AUTO: 0.6 % (ref 0–1.8)
BASOPHILS # BLD: 0.04 K/UL (ref 0–0.12)
BILIRUB SERPL-MCNC: 0.4 MG/DL (ref 0.1–1.5)
BUN SERPL-MCNC: 51 MG/DL (ref 8–22)
CALCIUM SERPL-MCNC: 8.3 MG/DL (ref 8.5–10.5)
CHLORIDE SERPL-SCNC: 105 MMOL/L (ref 96–112)
CO2 SERPL-SCNC: 21 MMOL/L (ref 20–33)
CREAT SERPL-MCNC: 3.57 MG/DL (ref 0.5–1.4)
EOSINOPHIL # BLD AUTO: 0.11 K/UL (ref 0–0.51)
EOSINOPHIL NFR BLD: 1.7 % (ref 0–6.9)
ERYTHROCYTE [DISTWIDTH] IN BLOOD BY AUTOMATED COUNT: 47.4 FL (ref 35.9–50)
GFR SERPLBLD CREATININE-BSD FMLA CKD-EPI: 17 ML/MIN/1.73 M 2
GLOBULIN SER CALC-MCNC: 3.1 G/DL (ref 1.9–3.5)
GLUCOSE BLD STRIP.AUTO-MCNC: 193 MG/DL (ref 65–99)
GLUCOSE BLD STRIP.AUTO-MCNC: 239 MG/DL (ref 65–99)
GLUCOSE BLD STRIP.AUTO-MCNC: 284 MG/DL (ref 65–99)
GLUCOSE BLD STRIP.AUTO-MCNC: 286 MG/DL (ref 65–99)
GLUCOSE SERPL-MCNC: 163 MG/DL (ref 65–99)
HCT VFR BLD AUTO: 36.8 % (ref 42–52)
HGB BLD-MCNC: 12 G/DL (ref 14–18)
IMM GRANULOCYTES # BLD AUTO: 0.02 K/UL (ref 0–0.11)
IMM GRANULOCYTES NFR BLD AUTO: 0.3 % (ref 0–0.9)
LYMPHOCYTES # BLD AUTO: 1.08 K/UL (ref 1–4.8)
LYMPHOCYTES NFR BLD: 17 % (ref 22–41)
MAGNESIUM SERPL-MCNC: 2.3 MG/DL (ref 1.5–2.5)
MCH RBC QN AUTO: 30.5 PG (ref 27–33)
MCHC RBC AUTO-ENTMCNC: 32.6 G/DL (ref 33.7–35.3)
MCV RBC AUTO: 93.4 FL (ref 81.4–97.8)
MONOCYTES # BLD AUTO: 0.71 K/UL (ref 0–0.85)
MONOCYTES NFR BLD AUTO: 11.1 % (ref 0–13.4)
NEUTROPHILS # BLD AUTO: 4.41 K/UL (ref 1.82–7.42)
NEUTROPHILS NFR BLD: 69.3 % (ref 44–72)
NRBC # BLD AUTO: 0 K/UL
NRBC BLD-RTO: 0 /100 WBC
NT-PROBNP SERPL IA-MCNC: 7940 PG/ML (ref 0–125)
PHOSPHATE SERPL-MCNC: 3.1 MG/DL (ref 2.5–4.5)
PLATELET # BLD AUTO: 133 K/UL (ref 164–446)
PMV BLD AUTO: 10.2 FL (ref 9–12.9)
POTASSIUM SERPL-SCNC: 3.6 MMOL/L (ref 3.6–5.5)
PROT SERPL-MCNC: 5.9 G/DL (ref 6–8.2)
RBC # BLD AUTO: 3.94 M/UL (ref 4.7–6.1)
SODIUM SERPL-SCNC: 137 MMOL/L (ref 135–145)
WBC # BLD AUTO: 6.4 K/UL (ref 4.8–10.8)

## 2022-04-16 PROCEDURE — 700102 HCHG RX REV CODE 250 W/ 637 OVERRIDE(OP): Performed by: STUDENT IN AN ORGANIZED HEALTH CARE EDUCATION/TRAINING PROGRAM

## 2022-04-16 PROCEDURE — 83880 ASSAY OF NATRIURETIC PEPTIDE: CPT

## 2022-04-16 PROCEDURE — 99233 SBSQ HOSP IP/OBS HIGH 50: CPT | Performed by: HOSPITALIST

## 2022-04-16 PROCEDURE — 700105 HCHG RX REV CODE 258: Performed by: STUDENT IN AN ORGANIZED HEALTH CARE EDUCATION/TRAINING PROGRAM

## 2022-04-16 PROCEDURE — 770020 HCHG ROOM/CARE - TELE (206)

## 2022-04-16 PROCEDURE — A9270 NON-COVERED ITEM OR SERVICE: HCPCS | Performed by: HOSPITALIST

## 2022-04-16 PROCEDURE — 85025 COMPLETE CBC W/AUTO DIFF WBC: CPT

## 2022-04-16 PROCEDURE — A9270 NON-COVERED ITEM OR SERVICE: HCPCS | Performed by: STUDENT IN AN ORGANIZED HEALTH CARE EDUCATION/TRAINING PROGRAM

## 2022-04-16 PROCEDURE — 80053 COMPREHEN METABOLIC PANEL: CPT

## 2022-04-16 PROCEDURE — 83735 ASSAY OF MAGNESIUM: CPT

## 2022-04-16 PROCEDURE — 84100 ASSAY OF PHOSPHORUS: CPT

## 2022-04-16 PROCEDURE — 700102 HCHG RX REV CODE 250 W/ 637 OVERRIDE(OP): Performed by: HOSPITALIST

## 2022-04-16 PROCEDURE — 700111 HCHG RX REV CODE 636 W/ 250 OVERRIDE (IP): Performed by: STUDENT IN AN ORGANIZED HEALTH CARE EDUCATION/TRAINING PROGRAM

## 2022-04-16 PROCEDURE — 74018 RADEX ABDOMEN 1 VIEW: CPT

## 2022-04-16 PROCEDURE — 97602 WOUND(S) CARE NON-SELECTIVE: CPT

## 2022-04-16 PROCEDURE — 82962 GLUCOSE BLOOD TEST: CPT | Mod: 91

## 2022-04-16 RX ORDER — FUROSEMIDE 40 MG/1
40 TABLET ORAL 2 TIMES DAILY
Status: DISCONTINUED | OUTPATIENT
Start: 2022-04-16 | End: 2022-04-18 | Stop reason: HOSPADM

## 2022-04-16 RX ORDER — ALLOPURINOL 100 MG/1
100 TABLET ORAL DAILY
Status: DISCONTINUED | OUTPATIENT
Start: 2022-04-16 | End: 2022-04-18 | Stop reason: HOSPADM

## 2022-04-16 RX ORDER — NYSTATIN 100000 [USP'U]/G
POWDER TOPICAL 2 TIMES DAILY
Status: DISCONTINUED | OUTPATIENT
Start: 2022-04-16 | End: 2022-04-18 | Stop reason: HOSPADM

## 2022-04-16 RX ORDER — TAMSULOSIN HYDROCHLORIDE 0.4 MG/1
0.4 CAPSULE ORAL DAILY
Status: DISCONTINUED | OUTPATIENT
Start: 2022-04-16 | End: 2022-04-18 | Stop reason: HOSPADM

## 2022-04-16 RX ORDER — DOXYCYCLINE 100 MG/1
100 TABLET ORAL EVERY 12 HOURS
Status: DISCONTINUED | OUTPATIENT
Start: 2022-04-16 | End: 2022-04-18 | Stop reason: HOSPADM

## 2022-04-16 RX ORDER — ATORVASTATIN CALCIUM 20 MG/1
20 TABLET, FILM COATED ORAL EVERY EVENING
Status: DISCONTINUED | OUTPATIENT
Start: 2022-04-16 | End: 2022-04-18 | Stop reason: HOSPADM

## 2022-04-16 RX ADMIN — Medication: at 17:31

## 2022-04-16 RX ADMIN — DOXYCYCLINE 100 MG: 100 TABLET, FILM COATED ORAL at 17:25

## 2022-04-16 RX ADMIN — APIXABAN 5 MG: 5 TABLET, FILM COATED ORAL at 17:25

## 2022-04-16 RX ADMIN — Medication: at 04:33

## 2022-04-16 RX ADMIN — FUROSEMIDE 40 MG: 40 TABLET ORAL at 17:26

## 2022-04-16 RX ADMIN — TAMSULOSIN HYDROCHLORIDE 0.4 MG: 0.4 CAPSULE ORAL at 11:10

## 2022-04-16 RX ADMIN — METOPROLOL TARTRATE 12.5 MG: 25 TABLET, FILM COATED ORAL at 11:13

## 2022-04-16 RX ADMIN — ATORVASTATIN CALCIUM 20 MG: 20 TABLET, FILM COATED ORAL at 17:26

## 2022-04-16 RX ADMIN — FINASTERIDE 5 MG: 5 TABLET, FILM COATED ORAL at 04:32

## 2022-04-16 RX ADMIN — FUROSEMIDE 40 MG: 40 TABLET ORAL at 11:13

## 2022-04-16 RX ADMIN — THIAMINE HYDROCHLORIDE 500 MG: 100 INJECTION, SOLUTION INTRAMUSCULAR; INTRAVENOUS at 04:33

## 2022-04-16 RX ADMIN — DOXYCYCLINE 100 MG: 100 TABLET, FILM COATED ORAL at 04:33

## 2022-04-16 RX ADMIN — SENNOSIDES AND DOCUSATE SODIUM 2 TABLET: 50; 8.6 TABLET ORAL at 17:25

## 2022-04-16 RX ADMIN — APIXABAN 5 MG: 5 TABLET, FILM COATED ORAL at 11:13

## 2022-04-16 RX ADMIN — METOPROLOL TARTRATE 12.5 MG: 25 TABLET, FILM COATED ORAL at 17:30

## 2022-04-16 RX ADMIN — ALLOPURINOL 100 MG: 100 TABLET ORAL at 11:10

## 2022-04-16 ASSESSMENT — LIFESTYLE VARIABLES
AVERAGE NUMBER OF DAYS PER WEEK YOU HAVE A DRINK CONTAINING ALCOHOL: 0
EVER HAD A DRINK FIRST THING IN THE MORNING TO STEADY YOUR NERVES TO GET RID OF A HANGOVER: NO
ALCOHOL_USE: NO
HAVE YOU EVER FELT YOU SHOULD CUT DOWN ON YOUR DRINKING: NO
TOTAL SCORE: 0
DOES PATIENT WANT TO STOP DRINKING: NO
CONSUMPTION TOTAL: NEGATIVE
TOTAL SCORE: 0
EVER FELT BAD OR GUILTY ABOUT YOUR DRINKING: NO
TOTAL SCORE: 0
HAVE PEOPLE ANNOYED YOU BY CRITICIZING YOUR DRINKING: NO
ON A TYPICAL DAY WHEN YOU DRINK ALCOHOL HOW MANY DRINKS DO YOU HAVE: 0
HOW MANY TIMES IN THE PAST YEAR HAVE YOU HAD 5 OR MORE DRINKS IN A DAY: 0

## 2022-04-16 ASSESSMENT — PATIENT HEALTH QUESTIONNAIRE - PHQ9
2. FEELING DOWN, DEPRESSED, IRRITABLE, OR HOPELESS: NOT AT ALL
SUM OF ALL RESPONSES TO PHQ9 QUESTIONS 1 AND 2: 0
1. LITTLE INTEREST OR PLEASURE IN DOING THINGS: NOT AT ALL

## 2022-04-16 ASSESSMENT — PAIN DESCRIPTION - PAIN TYPE
TYPE: ACUTE PAIN
TYPE: ACUTE PAIN

## 2022-04-16 NOTE — CARE PLAN
The patient is Watcher - Medium risk of patient condition declining or worsening    Shift Goals  Clinical Goals: Improve mentation, stabilize blood sugars  Patient Goals: Eat, drink, control blood sugars  Family Goals: N/A    Progress made toward(s) clinical / shift goals:    Problem: Knowledge Deficit - Standard  Goal: Patient and family/care givers will demonstrate understanding of plan of care, disease process/condition, diagnostic tests and medications  Outcome: Progressing     Problem: Hemodynamics  Goal: Patient's hemodynamics, fluid balance and neurologic status will be stable or improve  Outcome: Progressing       Patient is not progressing towards the following goals:

## 2022-04-16 NOTE — PROGRESS NOTES
Diabetes education: Met with pt this afternoon. If mentation is a concern, please ask pt go stick his finger and give his insulin with nursing.  Plan: [facility] and [facility] . Pt needs to be reminded to rotate his injections (handout given). Pt [facility] and would benefit in following up with one after discharge.  Pt remains on regular insulin per scale every 4 hours and blood sugars are consistently over 200. Pt may benefit in increasing the Glargine.  At visit pt struggled to get his words out, and then was slow with response. Not sure if this is baseline.

## 2022-04-16 NOTE — CONSULTS
Diabetes education: Pt has a hx of diabetes, on insulin  ( glargine 45 units BID and Novolog 30 units tid meals, and Ozempic every 7 days, per med rec) at home.Pt gets all his med[facility] and [facility].   Pt was admitted with blood sugar of 502, and Hg a1c of 8.0%. Pt is currently on Glargine 25 units pm with regular insulin per sliding scale coverage every 4 hours. Blood sugars are 306 (10 units), 340 ( 10 units), and 243 ( 4 units).  Met with pt this afternoon, who said he tests his blood sugars ac meals, give his insulin to his abdomen, (discussed other sites and need to rotate as well as insulin storage and shelf life), and his Ozempic to his leg..   Pt struggled with word finding, not sure if this is baseline.  Plan: Pt [facility] all [facility] and has all . Pt needs to be reminded to rotate his injections (handout given). Pt has [facility] and would benefit in following up with one after discharge.  Pt remains on regular insulin per scale every 4 hours and blood sugars are consistently over 200. Pt may benefit in increasing the Glargine.  At visit pt struggled to get his words out, and then was slow with response. Not sure if this is baseline.

## 2022-04-16 NOTE — CARE PLAN
Problem: Bronchoconstriction  Goal: Improve in air movement and diminished wheezing  Description: Target End Date:  2 to 3 days    1.  Implement inhaled treatments  2.  Evaluate and manage medication effects  Outcome: Progressing   Duo Q6

## 2022-04-16 NOTE — CARE PLAN
The patient is Stable - Low risk of patient condition declining or worsening    Shift Goals  Clinical Goals: Improve mentation, stabilize blood sugars  Patient Goals: Eat, drink, control blood sugars  Family Goals: N/A    Progress made toward(s) clinical / shift goals:  Pt with improved mentation today, does have slow response to questions and deliberate movement.    Patient is not progressing towards the following goals: Blood sugar remains elevated, > 200, po intake is poor due to poor dentition.  Diet texture changed to help with po intake.      Problem: Physical Regulation  Goal: Diagnostic test results will improve  Outcome: Not Met:  Renal function tests remain elevated, physicians aware, further testing ordered.      Problem: Urinary - Renal Perfusion  Goal: Ability to achieve and maintain adequate renal perfusion and functioning will improve  Outcome: Progressing:  Webster in place for strict I & O, urinary retention.  Output 875 ml this shift.      Problem: Respiratory  Goal: Patient will achieve/maintain optimum respiratory ventilation and gas exchange  Outcome: Progressing  Pt has oxygen on and off today, maintaining 02 saturations > 90% on room air when monitor has good waveform.

## 2022-04-16 NOTE — DISCHARGE PLANNING
Anticipated Discharge Disposition: pending    Action: PC from PHILLIP Gaxiola requesting for MELANIE as medical team would like to get [facility] the [facility]. [facility] to [facility] and spoke with Brice who provided this CM with the necessary information for MELANIE to be filled out.   Tubed the form to station [phone] for the pt to sign.   I will fax it when receive it back.     Barriers to Discharge: pending medical stability    Plan: cont to f/u for DC needs.     1400 - Rec'd signed MELANIE back and [facility]ed it to [facility] at [phone].     1633 - [facility] records have been faxed to this CM and scanned to the chart.

## 2022-04-16 NOTE — PROGRESS NOTES
Called the [facility] at [phone] and spoke to Brice regarding patient records. He said that he could send me records if I sent the release of information form to him at [phone]. I called ,  Amarilys, who said she could follow up on this for me shortly.

## 2022-04-16 NOTE — PROGRESS NOTES
HOSPITAL MEDICINE PROGRESS NOTE       PATIENT: Jamar Valdez; [id number]; 1945    ID: 77 y.o. male admitted for Mild DKA, Acute on CKD, and right lower extremities cellulitis.     Hospital Course:  Mr. Jamar Valdez is 77 years old male past medical history of atrial fibrillation, Hx of pacemaker placement, MRSA, Obesity, Insulin dependent type 2 DM, COPD, Diabetic foot ulcer, Chronic venous statis dermatitis, thrombocytopenia, HTN, PVD s/p stent to the right SFA on 8/17/2019   who was brought in to the ED by EMS on 4/14/2022 for high BG of 550, altered mental status , hypoxia , and right lower extremities cellulitis. Patient was put on 4 L of oxygen.      In ED patient Chest X ray showed pulmonary edema and cardiomegaly. Lactic acid 4.9, Leukocytosis neutrophils predominant and mild anemia/mild thrombocytopenia, beta hydroxybutyrate mildly elevated 1.61. CMP showed AG 21, CO2 14, hyperglycemia of 573, MARIZOL on CKD w/ BUN 46, and Cr. 3.65. Presumed to have cardiorenal syndrome with bedside Echo EF 30%. Trop 84 without ischemic changes on EKG. He was medicated with lasix, insulin, and ancef in ED. Hospitalist and intenesivist consulted for admission.      While assessing patient in ER by the team, Patient had urinated only small amount of urine in ER, bladder scan showed 200cc, in MARIZOL on CKD which was initially worsened after IV fluids and improved after lasix however he didn't responded significantly  to lasix 60, Patient was overloaded on exam and US, BNP > 95363, He was given additional dose of lasix 100mg and diuril 500. Patient was admitted to the [facility] for further management.       Of note: Patient is a poor historian and reports he takes insulin on regular basis but doesn't remember the specific doses. He reports non-compliance with diabetic diet. He also takes ozempic on Monday. He is alert and oriented.  Discussed the code status with him. He mentioned he wants to be DNR/DNI. Changed the code status.      4/15: No acute overnight event. Patient was using bathroom this morning when saw him this morning. he is on 3-4 L of oxygen saturating > 90%. Reports he is not on oxygen at home. His blood glucose is trending down most recent . Cr. 3.73>>3.68>>3.83. Echo pending. He is on glargine 25 units in evening and High dose sliding scale insulin.      4/16: No acute overnight event. Patient is on room air >90%. Echo EF of 50%. Blood glucose normalizing. [facility] and had [facility] about [facility], will request medical records. Blood culture w/ GPC likely contamination. On doxycycline for lower extremity cellulitis.  Patient denies any sob or chest pain. Will put transfer order for tele. Doesn't requires [facility] level care.  Complains of lower extremity swelling, scrotal swelling and left upper abdominal bubbling sensation      OBJECTIVE:     Vitals:    04/16/22 0600 04/16/22 0630 04/16/22 0700 04/16/22 0900   BP: 124/69  135/70 122/69   Pulse: 70 63 70 70   Resp: 17 17 20 (!) 6   Temp:       TempSrc:       SpO2: 94% 93% 90% 95%   Weight:       Height:           Intake/Output Summary (Last 24 hours) at 4/15/2022 0808  Last data filed at 4/15/2022 0700  Gross per 24 hour   Intake 2100 ml   Output 725 ml   Net 1375 ml     ROS:   Constitutional: in no apparent distress, Alert and oriented X 4.   Heart: No chest pain, No palpitation  Lungs: complaining of SOB. No wheezing  Abdomen: Denies any abdominal pain, nausea or vomiting, significant abdominal obesity  Extremities: complaining of right lower extremity pain, rash, +ve for weakness.  Bilateral nonpitting edema  Neuro: Denies dizziness.  Nonfocal exam        PE:  General: No acute distress, resting comfortably in bed. Alert and Oriented X 4  HEENT: NC/AT. PERRLA. EOMI. MMM  Cardiovascular: RRR with no M/R/G.  Respiratory: No wheezing rhonchi, mild bilateral lower lungs crackles  Abdomen: soft, protuberant, NT/ND, no masses, +BS   EXT: 3+ pedal edema, chronic  venous statis dermatitis, right lower extremity wound on the foot. Erythema and induration noticed.   Neuro: non focal with no numbness, tingling or changes in sensation  Skin, lower extremity with venous stasis changes, significant dermatitis, dry, scaling  Psychiatric: Normal mood  LABS:  Recent Labs     04/14/22  2045 04/15/22  0205 04/16/22  0146   WBC 11.1* 9.1 6.4   RBC 4.38* 3.97* 3.94*   HEMOGLOBIN 13.2* 12.0* 12.0*   HEMATOCRIT 41.9* 37.3* 36.8*   MCV 95.7 94.0 93.4   MCH 30.1 30.2 30.5   RDW 48.6 47.4 47.4   PLATELETCT 159* 147* 133*   MPV 10.4 10.1 10.2   NEUTSPOLYS 87.00* 84.10* 69.30   LYMPHOCYTES 5.30* 7.30* 17.00*   MONOCYTES 6.90 8.20 11.10   EOSINOPHILS 0.00 0.00 1.70   BASOPHILS 0.40 0.20 0.60     Recent Labs     04/14/22 2045 04/14/22  2307 04/15/22  0250 04/15/22  1100 04/15/22  1715 04/16/22  0146   SODIUM 135 136   < > 139 139 137   POTASSIUM 4.4 4.3   < > 3.9 3.9 3.6   CHLORIDE 100 102   < > 104 105 105   CO2 14* 15*   < > 20 20 21   BUN 46* 44*   < > 52* 53* 51*   CREATININE 3.65* 3.76*   < > 3.83* 3.84* 3.57*   CALCIUM 8.8 8.3*   < > 8.5 8.4* 8.3*   MAGNESIUM 2.1  --   --  2.2  --  2.3   PHOSPHORUS 3.8 3.9  --   --   --  3.1   ALBUMIN 3.2  --   --   --   --  2.8*    < > = values in this interval not displayed.     Estimated GFR/CRCL = Estimated Creatinine Clearance: 24.8 mL/min (A) (by C-G formula based on SCr of 3.57 mg/dL (H)).  Recent Labs     04/15/22  1100 04/15/22  1715 04/16/22  0146   GLUCOSE 352* 275* 163*     Recent Labs     04/14/22 2045 04/14/22 2112 04/16/22  0146   ASTSGOT 19  --  47*   ALTSGPT 13  --  24   TBILIRUBIN 0.8  --  0.4   ALKPHOSPHAT 75  --  62   GLOBULIN 3.1  --  3.1   INR 1.54*  --   --    AMMONIA  --  12  --              Recent Labs     04/14/22 2045   INR 1.54*   APTT 30.1       MICROBIOLOGY:    IMAGING:   US-RENAL   Final Result      1.  Unremarkable kidneys.   2.  No hydronephrosis.   3.  Bladder is not well evaluated.      EC-ECHOCARDIOGRAM COMPLETE W/  CONT   Final Result      CT-HEAD W/O   Final Result         1.  No acute intracranial abnormality is identified, there are nonspecific white matter changes, commonly associated with small vessel ischemic disease.  Associated mild cerebral atrophy is noted.   2.  Multiple calcified extra-axial subcentimeter masses measuring up to 7.8 mm along the right frontal skull and right falx, appearance suggests angiomatous.   3.  Atherosclerosis.         DX-CHEST-PORTABLE (1 VIEW)   Final Result         1.  Pulmonary edema and/or infiltrates.   2.  Cardiomegaly            MEDS:  Current Facility-Administered Medications   Medication Last Admin   • doxycycline monohydrate (ADOXA) tablet 100 mg     • allopurinol (ZYLOPRIM) tablet 100 mg 100 mg at 04/16/22 1110   • atorvastatin (LIPITOR) tablet 20 mg     • furosemide (LASIX) tablet 40 mg 40 mg at 04/16/22 1113   • metoprolol tartrate (LOPRESSOR) tablet 12.5 mg 12.5 mg at 04/16/22 1113   • tamsulosin (FLOMAX) capsule 0.4 mg 0.4 mg at 04/16/22 1110   • apixaban (ELIQUIS) tablet 5 mg 5 mg at 04/16/22 1113   • insulin GLARGINE (Lantus,Semglee) injection 25 Units at 04/15/22 1718   • ammonium lactate (LAC-HYDRIN) 12 % lotion Given at 04/16/22 0433   • insulin regular (HumuLIN R,NovoLIN R) injection 3 Units at 04/16/22 0553    And   • dextrose 50% (D50W) injection 25 g     • finasteride (PROSCAR) tablet 5 mg 5 mg at 04/16/22 0432   • senna-docusate (PERICOLACE or SENOKOT S) 8.6-50 MG per tablet 2 Tablet      And   • polyethylene glycol/lytes (MIRALAX) PACKET 1 Packet      And   • magnesium hydroxide (MILK OF MAGNESIA) suspension 30 mL      And   • bisacodyl (DULCOLAX) suppository 10 mg     • acetaminophen (Tylenol) tablet 650 mg     • Pharmacy Consult Request ...Pain Management Review 1 Each     • oxyCODONE immediate-release (ROXICODONE) tablet 2.5 mg      Or   • oxyCODONE immediate-release (ROXICODONE) tablet 5 mg      Or   • HYDROmorphone (Dilaudid) injection 0.25 mg     • labetalol  (NORMODYNE/TRANDATE) injection 10 mg     • guaiFENesin dextromethorphan (ROBITUSSIN DM) 100-10 MG/5ML syrup 10 mL     • thiamine (B-1) 500 mg in dextrose 5% 100 mL IVPB 500 mg at 04/16/22 0433   • ipratropium-albuterol (DUONEB) nebulizer solution     • ipratropium-albuterol (DUONEB) nebulizer solution 3 mL at 04/15/22 0724       ASSESSMENT/PLAN:  * Diabetic ketoacidosis without coma associated with type 2 diabetes mellitus (HCC)  Assessment & Plan  A1c is 8  beta hydroxybutyrate mildly elevated 1.61   Mild DKA with compensated pH on ABG.  Metabolic acidosis likely secondary to lactic acidosis and dehydration  Insulin 5 units IV given in ER x1  Cautious with fluid resuscitation as patient likely in cardiorenal syndrome and with pulmonary edema without home O2 requirement and now on 4-liter O2 requirement  Continue Insulin glargine 25 U Q  High dose sliding scale   Diabetic diet       Acute encephalopathy  Assessment & Plan  CT head as seen above  No acute hyponatremia   Normal ammonia level   UDS positive for cannabinoids   Neg EtOH screen     Likely metabolic in setting of elevated glucose, AG, and mildly elevated lactic acid       Resolved       Cellulitis of right lower extremity  Assessment & Plan  S/p IV ancef in ED    Switched to Doxycycline   Bcx w/ GPC likely contamination   Wound care consulted      Sepsis ([facility])  Assessment & Plan  This is Sepsis Present on admission  SIRS criteria identified on my evaluation include: Tachypnea, with respirations greater than 20 per minute heart rate 90 at bedside on monitor  Source is presumed to be soft tissue skin infection  Sepsis protocol initiated  Fluid resuscitation ordered per protocol  IV antibiotics as appropriate for source of sepsis  Reassessment: I have reassessed the patient's hemodynamic status.  Blood culture obtained x2, urinalysis ordered and pending.  IV antibiotics Ancef given and we will continue onward. Ancef switched to PO Doxycycline. Blood culture  w/ GPC likely contamination. Will continue Doxy.            Acute respiratory failure with hypoxia (HCC)  Assessment & Plan  Resolved. Patient is on room air since 4/16    Chest x-ray reveals pulmonary edema and cardiomegaly likely new onset heart failure exacerbation   BNP > 66036 though MARIZOL      Echocardiogram w/ EF of 50%  One-time dose Lasix 60mg IV lasix in ED w/ no significant response,   s/p 100mg lasix and Diuril 500mg       On room air       Cardiorenal syndrome  Assessment & Plan  BNP > 08338 though MARIZOL on CKD    Echo with Ef of 50% with w/ left ventricular hypertrophy, dilated right ventricle and mild MR.     Home lasix, metoprolol, atorvastatin, eliquis resumed           Acute kidney injury superimposed on chronic kidney disease (HCC)  Assessment & Plan  Multifactorial (sepsis vs dehydration vs cardiorenal)   One-time dose of Lasix given for hypervolemia with oxygen requirement  Urine sodium < 20  Urine protein > 600  FENA showed pre-renal azotemia   Echo w/ EF of 50%      Apparently, [facility] and had discussion w/ nephrology for potential dialysis, so kidney issue seems to be chronic for him.   Will [facility] records [facility] for review  Will restart home lasix   Strict I and O     Thrombocytopenia (HCC)  Assessment & Plan  Mild at 159  No indication for platelet transfusion at this time  Repeat CBC in a.m.      HTN (hypertension)  Assessment & Plan  Continue amlodipine 10 mg p.o. daily  As needed antihypertensives ordered    Consider stopping amlodipine due to pedal edema   Consider start ACE/ARB once MARIZOL resolves     Type 2 diabetes mellitus with kidney complication, with long-term current use of insulin (HCC)  Assessment & Plan  On insulin but doesn't remember how much he is taking.   Reports compliance.   Reported non-compliance with diet    Significant proteinuria with urine protein > 600  A1c 8      High dose SSI  Diabetic educator order placed  Continue Glargine 25 units at  evening   Consider start on ACE/ARB once MARIZOL improves     Morbid obesity with BMI of 40.0-44.9, adult ([facility])  Assessment & Plan  BMI 40.75  Healthy diet and incorporate healthy green vegetables into diet   Exercise at least 30-40 min 4-5X a week   Counseling provided  provided       COPD (chronic obstructive pulmonary disease) ([facility])  Assessment & Plan  On room air   No wheezing appreciated on physical examination and we will hold off on steroids at this time  Not in acute exacerbation  RT protocol, Duoneb as needed     Advance care planning  Assessment & Plan  Encephalopathy resolved. Patient is alert and Oriented X4. He would like to be DNR/DNI. Changed code status to DNR/DNI    Plan  Resume home medication, including diuretics for now  [facility] the [facility] to assess his recent laboratory work and clinical notes, the patient was reporting that he was in touch with nephrology and dialysis had been discussed  Currently making urine and potassium is adequate so no urgent need for RRT  Insulin regimen has worked so far and his glycemic control is improved  We will check a KUB to evaluate for complaints of left upper quadrant abnormal sensation  See orders  Medically complex high risk patient      DVT prophylaxis: Eliquis   CODE STATUS: DNR/DNI    I have performed a physical exam and reviewed and updated ROS and Plan today . In review of yesterday's note , there are no changes except as documented above.       Please note that this dictation was created using voice recognition software. I have made every reasonable attempt to correct obvious errors, but I expect that there are errors of grammar and possibly context that I did not discover before finalizing the note.

## 2022-04-17 ENCOUNTER — APPOINTMENT (OUTPATIENT)
Dept: RADIOLOGY | Facility: MEDICAL CENTER | Age: 77
DRG: 637 | End: 2022-04-17
Attending: HOSPITALIST
Payer: COMMERCIAL

## 2022-04-17 LAB
ALBUMIN SERPL BCP-MCNC: 2.7 G/DL (ref 3.2–4.9)
ALBUMIN/GLOB SERPL: 0.8 G/DL
ALP SERPL-CCNC: 77 U/L (ref 30–99)
ALT SERPL-CCNC: 58 U/L (ref 2–50)
ANION GAP SERPL CALC-SCNC: 11 MMOL/L (ref 7–16)
AST SERPL-CCNC: 95 U/L (ref 12–45)
BACTERIA BLD CULT: ABNORMAL
BACTERIA BLD CULT: ABNORMAL
BASOPHILS # BLD AUTO: 0.5 % (ref 0–1.8)
BASOPHILS # BLD: 0.02 K/UL (ref 0–0.12)
BILIRUB SERPL-MCNC: 0.5 MG/DL (ref 0.1–1.5)
BUN SERPL-MCNC: 51 MG/DL (ref 8–22)
CALCIUM SERPL-MCNC: 8.5 MG/DL (ref 8.5–10.5)
CHLORIDE SERPL-SCNC: 106 MMOL/L (ref 96–112)
CO2 SERPL-SCNC: 22 MMOL/L (ref 20–33)
CREAT SERPL-MCNC: 2.87 MG/DL (ref 0.5–1.4)
EOSINOPHIL # BLD AUTO: 0.16 K/UL (ref 0–0.51)
EOSINOPHIL NFR BLD: 3.7 % (ref 0–6.9)
ERYTHROCYTE [DISTWIDTH] IN BLOOD BY AUTOMATED COUNT: 45.6 FL (ref 35.9–50)
GFR SERPLBLD CREATININE-BSD FMLA CKD-EPI: 22 ML/MIN/1.73 M 2
GLOBULIN SER CALC-MCNC: 3.2 G/DL (ref 1.9–3.5)
GLUCOSE BLD STRIP.AUTO-MCNC: 224 MG/DL (ref 65–99)
GLUCOSE BLD STRIP.AUTO-MCNC: 248 MG/DL (ref 65–99)
GLUCOSE BLD STRIP.AUTO-MCNC: 286 MG/DL (ref 65–99)
GLUCOSE BLD STRIP.AUTO-MCNC: 334 MG/DL (ref 65–99)
GLUCOSE SERPL-MCNC: 245 MG/DL (ref 65–99)
HCT VFR BLD AUTO: 37.8 % (ref 42–52)
HGB BLD-MCNC: 12.4 G/DL (ref 14–18)
IMM GRANULOCYTES # BLD AUTO: 0.02 K/UL (ref 0–0.11)
IMM GRANULOCYTES NFR BLD AUTO: 0.5 % (ref 0–0.9)
LYMPHOCYTES # BLD AUTO: 1.02 K/UL (ref 1–4.8)
LYMPHOCYTES NFR BLD: 23.4 % (ref 22–41)
MCH RBC QN AUTO: 30.3 PG (ref 27–33)
MCHC RBC AUTO-ENTMCNC: 32.8 G/DL (ref 33.7–35.3)
MCV RBC AUTO: 92.4 FL (ref 81.4–97.8)
MONOCYTES # BLD AUTO: 0.5 K/UL (ref 0–0.85)
MONOCYTES NFR BLD AUTO: 11.5 % (ref 0–13.4)
NEUTROPHILS # BLD AUTO: 2.63 K/UL (ref 1.82–7.42)
NEUTROPHILS NFR BLD: 60.4 % (ref 44–72)
NRBC # BLD AUTO: 0 K/UL
NRBC BLD-RTO: 0 /100 WBC
NT-PROBNP SERPL IA-MCNC: 2713 PG/ML (ref 0–125)
PLATELET # BLD AUTO: 129 K/UL (ref 164–446)
PMV BLD AUTO: 10.4 FL (ref 9–12.9)
POTASSIUM SERPL-SCNC: 3.7 MMOL/L (ref 3.6–5.5)
PROT SERPL-MCNC: 5.9 G/DL (ref 6–8.2)
RBC # BLD AUTO: 4.09 M/UL (ref 4.7–6.1)
SIGNIFICANT IND 70042: ABNORMAL
SITE SITE: ABNORMAL
SODIUM SERPL-SCNC: 139 MMOL/L (ref 135–145)
SOURCE SOURCE: ABNORMAL
WBC # BLD AUTO: 4.4 K/UL (ref 4.8–10.8)

## 2022-04-17 PROCEDURE — 85025 COMPLETE CBC W/AUTO DIFF WBC: CPT

## 2022-04-17 PROCEDURE — 700111 HCHG RX REV CODE 636 W/ 250 OVERRIDE (IP): Performed by: STUDENT IN AN ORGANIZED HEALTH CARE EDUCATION/TRAINING PROGRAM

## 2022-04-17 PROCEDURE — 700102 HCHG RX REV CODE 250 W/ 637 OVERRIDE(OP): Performed by: HOSPITALIST

## 2022-04-17 PROCEDURE — 82962 GLUCOSE BLOOD TEST: CPT

## 2022-04-17 PROCEDURE — 700102 HCHG RX REV CODE 250 W/ 637 OVERRIDE(OP): Performed by: STUDENT IN AN ORGANIZED HEALTH CARE EDUCATION/TRAINING PROGRAM

## 2022-04-17 PROCEDURE — A9270 NON-COVERED ITEM OR SERVICE: HCPCS | Performed by: STUDENT IN AN ORGANIZED HEALTH CARE EDUCATION/TRAINING PROGRAM

## 2022-04-17 PROCEDURE — 93922 UPR/L XTREMITY ART 2 LEVELS: CPT | Mod: 26 | Performed by: INTERNAL MEDICINE

## 2022-04-17 PROCEDURE — 94760 N-INVAS EAR/PLS OXIMETRY 1: CPT

## 2022-04-17 PROCEDURE — 770020 HCHG ROOM/CARE - TELE (206)

## 2022-04-17 PROCEDURE — A9270 NON-COVERED ITEM OR SERVICE: HCPCS | Performed by: HOSPITALIST

## 2022-04-17 PROCEDURE — 700101 HCHG RX REV CODE 250: Performed by: STUDENT IN AN ORGANIZED HEALTH CARE EDUCATION/TRAINING PROGRAM

## 2022-04-17 PROCEDURE — 80053 COMPREHEN METABOLIC PANEL: CPT

## 2022-04-17 PROCEDURE — 94640 AIRWAY INHALATION TREATMENT: CPT

## 2022-04-17 PROCEDURE — 700105 HCHG RX REV CODE 258: Performed by: STUDENT IN AN ORGANIZED HEALTH CARE EDUCATION/TRAINING PROGRAM

## 2022-04-17 PROCEDURE — 99232 SBSQ HOSP IP/OBS MODERATE 35: CPT | Performed by: INTERNAL MEDICINE

## 2022-04-17 PROCEDURE — 83880 ASSAY OF NATRIURETIC PEPTIDE: CPT

## 2022-04-17 PROCEDURE — 93922 UPR/L XTREMITY ART 2 LEVELS: CPT

## 2022-04-17 RX ADMIN — METOPROLOL TARTRATE 12.5 MG: 25 TABLET, FILM COATED ORAL at 06:00

## 2022-04-17 RX ADMIN — FUROSEMIDE 40 MG: 40 TABLET ORAL at 06:00

## 2022-04-17 RX ADMIN — SENNOSIDES AND DOCUSATE SODIUM 2 TABLET: 50; 8.6 TABLET ORAL at 06:00

## 2022-04-17 RX ADMIN — THIAMINE HYDROCHLORIDE 500 MG: 100 INJECTION, SOLUTION INTRAMUSCULAR; INTRAVENOUS at 06:01

## 2022-04-17 RX ADMIN — Medication 1 APPLICATION: at 06:06

## 2022-04-17 RX ADMIN — DOXYCYCLINE 100 MG: 100 TABLET, FILM COATED ORAL at 06:00

## 2022-04-17 RX ADMIN — FUROSEMIDE 40 MG: 40 TABLET ORAL at 17:45

## 2022-04-17 RX ADMIN — Medication: at 17:45

## 2022-04-17 RX ADMIN — TAMSULOSIN HYDROCHLORIDE 0.4 MG: 0.4 CAPSULE ORAL at 06:03

## 2022-04-17 RX ADMIN — NYSTATIN 1 APPLICATION: 100000 POWDER TOPICAL at 06:35

## 2022-04-17 RX ADMIN — ATORVASTATIN CALCIUM 20 MG: 20 TABLET, FILM COATED ORAL at 17:45

## 2022-04-17 RX ADMIN — APIXABAN 5 MG: 5 TABLET, FILM COATED ORAL at 06:00

## 2022-04-17 RX ADMIN — DOXYCYCLINE 100 MG: 100 TABLET, FILM COATED ORAL at 17:45

## 2022-04-17 RX ADMIN — NYSTATIN: 100000 POWDER TOPICAL at 17:45

## 2022-04-17 RX ADMIN — METOPROLOL TARTRATE 12.5 MG: 25 TABLET, FILM COATED ORAL at 17:46

## 2022-04-17 RX ADMIN — IPRATROPIUM BROMIDE AND ALBUTEROL SULFATE 3 ML: .5; 2.5 SOLUTION RESPIRATORY (INHALATION) at 23:56

## 2022-04-17 RX ADMIN — APIXABAN 5 MG: 5 TABLET, FILM COATED ORAL at 17:45

## 2022-04-17 RX ADMIN — IPRATROPIUM BROMIDE AND ALBUTEROL SULFATE 3 ML: .5; 2.5 SOLUTION RESPIRATORY (INHALATION) at 15:36

## 2022-04-17 RX ADMIN — FINASTERIDE 5 MG: 5 TABLET, FILM COATED ORAL at 06:02

## 2022-04-17 RX ADMIN — ALLOPURINOL 100 MG: 100 TABLET ORAL at 06:03

## 2022-04-17 ASSESSMENT — ENCOUNTER SYMPTOMS
VOMITING: 0
LOSS OF CONSCIOUSNESS: 0
CHILLS: 0
NAUSEA: 0
BRUISES/BLEEDS EASILY: 0
SEIZURES: 0
SHORTNESS OF BREATH: 0
DIARRHEA: 0
SORE THROAT: 0
PALPITATIONS: 0
COUGH: 0
FEVER: 0
DOUBLE VISION: 0
ABDOMINAL PAIN: 0
BLURRED VISION: 0
HALLUCINATIONS: 0

## 2022-04-17 ASSESSMENT — PAIN DESCRIPTION - PAIN TYPE
TYPE: ACUTE PAIN
TYPE: ACUTE PAIN

## 2022-04-17 ASSESSMENT — FIBROSIS 4 INDEX: FIB4 SCORE: 7.35

## 2022-04-17 ASSESSMENT — LIFESTYLE VARIABLES: SUBSTANCE_ABUSE: 0

## 2022-04-17 NOTE — WOUND TEAM
"[facility]  Inpatient Services  Initial Wound and Skin Care Evaluation    Admission Date: 2022     Last order of IP CONSULT TO WOUND CARE was found on 4/15/2022 from Hospital Encounter on 2022     HPI, PMH, SH: Reviewed    Past Surgical History:   Procedure Laterality Date   • LYNDA BY LAPAROSCOPY N/A 2019    Procedure: CHOLECYSTECTOMY, LAPAROSCOPIC;  Surgeon: Sonu Solomon M.D.;  Location: SURGERY [facility];  Service: Vascular   • KNEE REPLACEMENT, TOTAL     • PACEMAKER INSERTION       Social History     Tobacco Use   • Smoking status: Former Smoker     Types: Cigarettes     Quit date:      Years since quittin.3   • Smokeless tobacco: Never Used   Substance Use Topics   • Alcohol use: Never     Chief Complaint   Patient presents with   • ALOC     Pt came to the ER via [facility] from home c/o alter mental status. Per EMS, pt was talking to family on the phone and all he can say is \"yeah\". Last known well is unknown. Pt is currently AOX1; oriented only to self. BS en route at 550 per EMS. Noted wound on right foot with dressing on. Pt has hx of DM Type 1, skin CA, pacemaker, COPD, and kidney disease as per EMS. No unilateral weakness, slurred speech, facial droop observed upon arrival.     Diagnosis: MARIZOL (acute kidney injury) (HCC) [N17.9]    Unit where seen by Wound Team: [id number]     WOUND CONSULT/FOLLOW UP RELATED TO:  Back, sacrum, pannus, R foot, & R heel     WOUND HISTORY:  77 y.o. male admitted for DKA, Acute on chronic CKD, and RLE cellulitis. PMH of pacemaker, A-fib, DM, COPD, & chronic venous stasis dermatitis. Patient stated back \"wound\" is a birthmark. RLE foot wounds have been present for at least three weeks, stated ~3wks ago [facility] in which [facility] said wounds.    WOUND ASSESSMENT/LDA  Wound 04/15/22 Full Thickness Wound Foot Dorsal Right (Active)   Wound Image    22 1700   Site Assessment " Yellow;Non-healing;Painful    Periwound Assessment Edema;Cold;Painful    Margins Attached edges    Closure Secondary intention    Drainage Amount Scant    Drainage Description Serosanguineous    Treatments Cleansed;Site care    Wound Cleansing Approved Wound Cleanser    Dressing Options Viscopaste;Dry Roll Gauze    Dressing Changed New    Dressing Change/Treatment Frequency Daily, and As Needed    NEXT Dressing Change/Treatment Date 04/16/22    NEXT Weekly Photo (Inpatient Only) 04/23/22    Non-staged Wound Description Full thickness    Shape Irregular    Wound Odor None    Pulses 1+;Right;DP;PT    Exposed Structures NOEMÍ    WOUND NURSE ONLY - Time Spent with Patient (mins) 30      Moisture Associated Skin Damage 04/16/22 Groin;Panus;Sacrum (Active)   Wound Image     04/16/22 1700   NEXT Weekly Photo (Inpatient Only) 04/23/22    Drainage Amount None    Periwound Assessment Blanchable erythema    IAD Cleansing Soap and Water    Periwound Protectant Antifungal Therapy    IAD Containment Device Indwelling Catheter      Vascular:    RUBIO:   No results found.    Lab Values:    Lab Results   Component Value Date/Time    WBC 6.4 04/16/2022 01:46 AM    RBC 3.94 (L) 04/16/2022 01:46 AM    HEMOGLOBIN 12.0 (L) 04/16/2022 01:46 AM    HEMATOCRIT 36.8 (L) 04/16/2022 01:46 AM    CREACTPROT 18.86 (H) 04/14/2022 11:07 PM    SEDRATEWES 63 (H) 04/14/2022 08:45 PM    HBA1C 8.0 (H) 04/14/2022 08:45 PM        Culture Results show:  No results found for this or any previous visit (from the past 720 hour(s)).    Pain Level/Medicated:  Immense tenderness upon palpation of right dorsal foot wounds.       INTERVENTIONS BY WOUND TEAM:  Chart and images reviewed. Discussed with bedside RN. All areas of concern (based on picture review, LDA review and discussion with bedside RN) have been thoroughly assessed. Documentation of areas based on significant findings. This RN in to assess patient. Performed standard wound care which includes appropriate  "positioning, dressing removal and non-selective debridement. Pictures and measurements obtained weekly if/when required.    RIGHT DORSAL FOOT  Preparation for Dressing removal: NA  Non-selectively Debrided with:  Cleanser and gauze.  Sharp debridement: NA  Ella wound: Cleansed with cleanser  Primary Dressing: Viscopaste  Secondary (Outer) Dressing: Dry roll gauze    BILATERAL GROIN: Nystatin powder ordered, interdry at bedside  SACRUM: Mepilex    Interdisciplinary consultation: Patient, Bedside RN (Khalida), Dr. Garibay    EVALUATION / RATIONALE FOR TREATMENT:  Most Recent Date:  4/16/22: Back \"wound\" is a birthmark. Yeast-like appearance to bilateral groin with MASD; nystatin ordered. Adherent & painful yellow tissue over right dorsal foot wounds. ABIs ordered & viscopaste ordered to be applied over foot wounds to soothe area while gently debriding nonviable tissue. Recommend vascular consult, MD updated.     Goals: Steady decrease in wound area and depth weekly.    WOUND TEAM PLAN OF CARE ([X] for frequency of wound follow up,):   Nursing to follow orders written for wound care. Contact wound team if area fails to progress, deteriorates or with any questions/concerns  Dressing changes by wound team:                   Follow up 3 times weekly:                NPWT change 3 times weekly:     Follow up 1-2 times weekly:    X Right dorsal foot  Follow up Bi-Monthly:                   Follow up as needed:   X Bilateral groin, sacrum  Other (explain):     NURSING PLAN OF CARE ORDERS (X):  Dressing changes: See Dressing Care orders: X  Skin care: See Skin Care orders: X  RN Prevention Protocol:   Rectal tube care: See Rectal Tube Care orders:   Other orders:    RSKIN:   CURRENTLY IN PLACE (X), APPLIED THIS VISIT (A), ORDERED (O):   Q shift Deejay:  X  Q shift pressure point assessments:  X    Surface/Positioning   Pressure redistribution mattress            Low Airloss        X  Bariatric foam      Bariatric ANTONIO   "   Waffle cushion        Waffle Overlay          Reposition q 2 hours      TAPs Turning system     Z Minor Pillow     Offloading/Redistribution   Sacral Mepilex (Silicone dressing)   A  Heel Mepilex (Silicone dressing)         Heel float boots (Prevalon boot)             Float Heels off Bed with Pillows           Respiratory NA - on RA  Silicone O2 tubing         Gray Foam Ear protectors     Cannula fixation Device (Tender )          High flow offloading Clip    Elastic head band offloading device      Anchorfast                                                         Trach with Optifoam split foam             Containment/Moisture Prevention     Rectal tube or BMS    Purwick/Condom Cath        Webster Catheter  X  Barrier wipes           Barrier paste     O  Antifungal tx    O  Interdry    At bedside    Mobilization       Up to chair        Ambulate    X  PT/OT      Nutrition       Dietician        Diabetes Education      PO  X   TF     TPN     NPO   # days     Anticipated discharge plans: TBD - pending medical clearance  LTACH:        SNF/Rehab:                  Home Health Care:           Outpatient Wound Center:          Recommend for outpatient wound F/U  Self/Family Care:        Other:                  Vac Discharge Needs:   Not Applicable Pt not on a wound vac:     X  Regular Vac while inpatient, alternative dressing at DC:        Regular Vac in use and continued at DC:            Reg. Vac w/ Skin Sub/Biologic in use. Will need to be changed 2x wkly:      Veraflo Vac while inpatient, ok to transition to Regular Vac on Discharge:           Veraflo Vac while inpatient, will need to remain on Veraflo Vac upon discharge:

## 2022-04-17 NOTE — PROGRESS NOTES
Christiano, CCT transporting patient to T8 room 813, Patient is awake, alert and oriented with no distress noted. Room air when awake1-2 L when sleeping. VSS.

## 2022-04-17 NOTE — CARE PLAN
Problem: Knowledge Deficit - Standard  Goal: Patient and family/care givers will demonstrate understanding of plan of care, disease process/condition, diagnostic tests and medications  Outcome: Progressing     Problem: Hemodynamics  Goal: Patient's hemodynamics, fluid balance and neurologic status will be stable or improve  Outcome: Progressing     Problem: Fluid Volume  Goal: Fluid volume balance will be maintained  Outcome: Progressing     Problem: Urinary - Renal Perfusion  Goal: Ability to achieve and maintain adequate renal perfusion and functioning will improve  Outcome: Progressing   The patient is Stable - Low risk of patient condition declining or worsening    Shift Goals  Clinical Goals: improved lab work, stable VSS, comfort  Patient Goals: comfort, less swelling  Family Goals: NOEMÍ    Progress made toward(s) clinical / shift goals:  Patient has been downgraded to tele status. VSS. No distress. Up in chair today. Monitoring labs.     Patient is not progressing towards the following goals:  Blood sugars remain high, treating per protocol

## 2022-04-17 NOTE — CARE PLAN
The patient is Watcher - Medium risk of patient condition declining or worsening    Shift Goals  Clinical Goals: improved lab work, stable VSS, comfort  Patient Goals: comfort, less swelling  Family Goals: NOEMÍ    Progress made toward(s) clinical / shift goals:    Problem: Knowledge Deficit - Standard  Goal: Patient and family/care givers will demonstrate understanding of plan of care, disease process/condition, diagnostic tests and medications  Outcome: Progressing     Problem: Urinary - Renal Perfusion  Goal: Ability to achieve and maintain adequate renal perfusion and functioning will improve  Outcome: Progressing     Problem: Respiratory  Goal: Patient will achieve/maintain optimum respiratory ventilation and gas exchange  Outcome: Progressing     Problem: Fall Risk  Goal: Patient will remain free from falls  Outcome: Progressing       Patient is not progressing towards the following goals:

## 2022-04-17 NOTE — PROGRESS NOTES
"Pt up to chair at shift start, states he \"prefers to sit up a little while longer\". Assisted pt to and from restroom. Only complaint at this time is feeling \"full\" in his abdomen. Updated pt regarding recent Ab XR. No further questions at this time. Education provided regarding falls, safety, wound care and Diabetes.     Pt demonstrated proper use of call light and state he will call if he needs anything. He affirms he received a shower earlier however he did perform oral care independently once I set up the supplies.     No acute events overnight.    Per monitor tech, pt paced at 70.   "

## 2022-04-17 NOTE — PROGRESS NOTES
Hospital Medicine Daily Progress Note    Date of Service  4/17/2022    Chief Complaint  Jamar Valdez is a 77 y.o. male admitted 4/14/2022 with DKA.    Hospital Course  Mr. Jamar Valdez is 77 years old male past medical history of atrial fibrillation, Hx of pacemaker placement, MRSA, Obesity, Insulin dependent type 2 DM, COPD, Diabetic foot ulcer, Chronic venous statis dermatitis, thrombocytopenia, HTN, PVD s/p stent to the right SFA on 8/17/2019   who was brought in to the ED by EMS on 4/14/2022 for high BG of 550, altered mental status , hypoxia , and right lower extremities cellulitis. Patient was put on 4 L of oxygen.      In ED patient Chest X ray showed pulmonary edema and cardiomegaly. Lactic acid 4.9, Leukocytosis neutrophils predominant and mild anemia/mild thrombocytopenia, beta hydroxybutyrate mildly elevated 1.61. CMP showed AG 21, CO2 14, hyperglycemia of 573, MARIZOL on CKD w/ BUN 46, and Cr. 3.65. Presumed to have cardiorenal syndrome with bedside Echo EF 30%. Trop 84 without ischemic changes on EKG. He was medicated with lasix, insulin, and ancef in ED. Hospitalist and intenesivist consulted for admission.      While assessing patient in ER by the team, Patient had urinated only small amount of urine in ER, bladder scan showed 200cc, in MARIZOL on CKD which was initially worsened after IV fluids and improved after lasix however he didn't responded significantly  to lasix 60, Patient was overloaded on exam and US, BNP > 23876, He was given additional dose of lasix 100mg and diuril 500. Patient was admitted to the IM for further management.      Interval Problem Update  Patient was seen and examined at bedside.  No acute events overnight. Patient is resting comfortably in bed and in no acute distress.     Wound care  Diabetes education   Await PT eval  Increase lantus due to elevated fasting glucose      Code Status  DNAR/DNI    Disposition  Patient is not medically cleared for discharge.   Anticipate  discharge to to home with close outpatient follow-up.  I have placed the appropriate orders for post-discharge needs.    Review of Systems  Review of Systems   Constitutional: Negative for chills and fever.   HENT: Negative for congestion and sore throat.    Eyes: Negative for blurred vision and double vision.   Respiratory: Negative for cough and shortness of breath.    Cardiovascular: Negative for chest pain and palpitations.   Gastrointestinal: Negative for abdominal pain, diarrhea, nausea and vomiting.   Genitourinary: Negative for frequency.   Skin: Negative for rash.   Neurological: Negative for seizures and loss of consciousness.   Endo/Heme/Allergies: Does not bruise/bleed easily.   Psychiatric/Behavioral: Negative for hallucinations and substance abuse.        Physical Exam  Temp:  [36.4 °C (97.5 °F)-36.6 °C (97.8 °F)] 36.4 °C (97.5 °F)  Pulse:  [64-72] 65  Resp:  [13-19] 18  BP: ()/(63-79) 132/73  SpO2:  [84 %-97 %] 90 %    Physical Exam  Vitals and nursing note reviewed.   Constitutional:       General: He is not in acute distress.     Appearance: He is obese. He is not toxic-appearing.   HENT:      Head: Normocephalic.      Right Ear: External ear normal.      Left Ear: External ear normal.      Nose: Nose normal. No congestion.      Mouth/Throat:      Mouth: Mucous membranes are moist.      Pharynx: No oropharyngeal exudate.   Eyes:      General: No scleral icterus.     Extraocular Movements: Extraocular movements intact.      Pupils: Pupils are equal, round, and reactive to light.   Cardiovascular:      Rate and Rhythm: Normal rate and regular rhythm.      Heart sounds: No murmur heard.  Pulmonary:      Breath sounds: No wheezing.   Abdominal:      Palpations: Abdomen is soft.      Tenderness: There is no abdominal tenderness. There is no guarding or rebound.   Musculoskeletal:         General: No swelling or deformity.   Skin:     Capillary Refill: Capillary refill takes less than 2 seconds.       Coloration: Skin is not jaundiced.      Findings: No bruising.   Neurological:      General: No focal deficit present.      Mental Status: He is alert and oriented to person, place, and time. Mental status is at baseline.   Psychiatric:         Mood and Affect: Mood normal.         Behavior: Behavior normal.                 Fluids    Intake/Output Summary (Last 24 hours) at 4/17/2022 0938  Last data filed at 4/17/2022 0000  Gross per 24 hour   Intake 1090 ml   Output 1650 ml   Net -560 ml       Laboratory  Recent Labs     04/15/22  0205 04/16/22  0146 04/17/22  0625   WBC 9.1 6.4 4.4*   RBC 3.97* 3.94* 4.09*   HEMOGLOBIN 12.0* 12.0* 12.4*   HEMATOCRIT 37.3* 36.8* 37.8*   MCV 94.0 93.4 92.4   MCH 30.2 30.5 30.3   MCHC 32.2* 32.6* 32.8*   RDW 47.4 47.4 45.6   PLATELETCT 147* 133* 129*   MPV 10.1 10.2 10.4     Recent Labs     04/15/22  1715 04/16/22  0146 04/17/22  0625   SODIUM 139 137 139   POTASSIUM 3.9 3.6 3.7   CHLORIDE 105 105 106   CO2 20 21 22   GLUCOSE 275* 163* 245*   BUN 53* 51* 51*   CREATININE 3.84* 3.57* 2.87*   CALCIUM 8.4* 8.3* 8.5     Recent Labs     04/14/22  2045   APTT 30.1   INR 1.54*         Recent Labs     04/14/22  2307   TRIGLYCERIDE 105   HDL 20*   LDL 48       Imaging  US-RUBIO SINGLE LEVEL BILAT         SM-IVMUDAY-3 VIEW   Final Result      Mild bowel distention with a nonobstructive bowel gas pattern      US-RENAL   Final Result      1.  Unremarkable kidneys.   2.  No hydronephrosis.   3.  Bladder is not well evaluated.      EC-ECHOCARDIOGRAM COMPLETE W/ CONT   Final Result      CT-HEAD W/O   Final Result         1.  No acute intracranial abnormality is identified, there are nonspecific white matter changes, commonly associated with small vessel ischemic disease.  Associated mild cerebral atrophy is noted.   2.  Multiple calcified extra-axial subcentimeter masses measuring up to 7.8 mm along the right frontal skull and right falx, appearance suggests angiomatous.   3.  Atherosclerosis.          DX-CHEST-PORTABLE (1 VIEW)   Final Result         1.  Pulmonary edema and/or infiltrates.   2.  Cardiomegaly           Assessment/Plan  * Diabetic ketoacidosis without coma associated with type 2 diabetes mellitus (HCC)- (present on admission)  Assessment & Plan  A1c is 8  beta hydroxybutyrate mildly elevated 1.61   Mild DKA with compensated pH on ABG.  Metabolic acidosis likely secondary to lactic acidosis and dehydration  Insulin 5 units IV given in ER x1  Cautious with fluid resuscitation as patient likely in cardiorenal syndrome and with pulmonary edema without home O2 requirement and now on 4-liter O2 requirement  Continue Insulin glargine 25 U Q  High dose sliding scale   Diabetic diet       Acute encephalopathy- (present on admission)  Assessment & Plan  CT head as seen above  No acute hyponatremia   Normal ammonia level   UDS positive for cannabinoids   Neg EtOH screen     Likely metabolic in setting of elevated glucose, AG, and mildly elevated lactic acid       Resolved       Cellulitis of right lower extremity- (present on admission)  Assessment & Plan  S/p IV ancef in ED    Switched to Doxycycline   Bcx w/ GPC likely contamination   Wound care consulted      Advance care planning- (present on admission)  Assessment & Plan  Encephalopathy resolved. Patient is alert and Oriented X4. He would like to be DNR/DNI. Changed code status to DNR/DNI    Sepsis ([facility])- (present on admission)  Assessment & Plan  This is Sepsis Present on admission  SIRS criteria identified on my evaluation include: Tachypnea, with respirations greater than 20 per minute heart rate 90 at bedside on monitor  Source is presumed to be soft tissue skin infection  Sepsis protocol initiated  Fluid resuscitation ordered per protocol  IV antibiotics as appropriate for source of sepsis  Reassessment: I have reassessed the patient's hemodynamic status.  Blood culture obtained x2, urinalysis ordered and pending.  IV antibiotics Ancef given and  we will continue onward. Ancef switched to PO Doxycycline. Blood culture w/ GPC likely contamination. Will continue Doxy.            Acute respiratory failure with hypoxia (HCC)- (present on admission)  Assessment & Plan  Resolved. Patient is on room air since 4/16    Chest x-ray reveals pulmonary edema and cardiomegaly likely new onset heart failure exacerbation   BNP > 49759 though MARIZOL      Echocardiogram w/ EF of 50%  One-time dose Lasix 60mg IV lasix in ED w/ no significant response,   s/p 100mg lasix and Diuril 500mg       On room air       Cardiorenal syndrome- (present on admission)  Assessment & Plan  BNP > 32682 though MARIZOL on CKD    Echo with Ef of 50% with w/ left ventricular hypertrophy, dilated right ventricle and mild MR.     Home lasix, metoprolol, atorvastatin, eliquis resumed           Acute kidney injury superimposed on chronic kidney disease (HCC)- (present on admission)  Assessment & Plan  Multifactorial (sepsis vs dehydration vs cardiorenal)   One-time dose of Lasix given for hypervolemia with oxygen requirement  Urine sodium < 20  Urine protein > 600  FENA showed pre-renal azotemia   Echo w/ EF of 50%      Apparently, [facility] and had discussion w/ [facility] for potential dialysis, so kidney issue seems to be chronic for him.   Will [facility] for review  Will restart home lasix   Strict I and O     Thrombocytopenia (HCC)- (present on admission)  Assessment & Plan  Mild at 159  No indication for platelet transfusion at this time  Repeat CBC in a.m.      HTN (hypertension)- (present on admission)  Assessment & Plan  Continue amlodipine 10 mg p.o. daily  As needed antihypertensives ordered    Consider stopping amlodipine due to pedal edema   Consider start ACE/ARB once MARIZOL resolves     Type 2 diabetes mellitus with kidney complication, with long-term current use of insulin (HCC)- (present on admission)  Assessment & Plan  On insulin but doesn't remember how much  he is taking.   Reports compliance.   Reported non-compliance with diet    Significant proteinuria with urine protein > 600  A1c 8      High dose SSI  Diabetic educator order placed  Continue Glargine 25 units at evening   Consider start on ACE/ARB once MARIZOL improves     Morbid obesity with BMI of 40.0-44.9, adult ([facility])- (present on admission)  Assessment & Plan  BMI 40.75  Healthy diet and incorporate healthy green vegetables into diet   Exercise at least 30-40 min 4-5X a week   Counseling provided  provided       COPD (chronic obstructive pulmonary disease) ([facility])- (present on admission)  Assessment & Plan  On room air   No wheezing appreciated on physical examination and we will hold off on steroids at this time  Not in acute exacerbation  RT protocol, Duoneb as needed        VTE prophylaxis: SCDs/TEDs and therapeutic anticoagulation with eliquis    I have performed a physical exam and reviewed and updated ROS and Plan today (4/17/2022). In review of yesterday's note (4/16/2022), there are no changes except as documented above.

## 2022-04-17 NOTE — HOSPITAL COURSE
Mr. Jamar Valdez is 77 years old male past medical history of atrial fibrillation, Hx of pacemaker placement, MRSA, Obesity, Insulin dependent type 2 DM, COPD, Diabetic foot ulcer, Chronic venous statis dermatitis, thrombocytopenia, HTN, PVD s/p stent to the right SFA on 8/17/2019   who was brought in to the ED by EMS on 4/14/2022 for high BG of 550, altered mental status , hypoxia , and right lower extremities cellulitis. Patient was put on 4 L of oxygen.      In ED patient Chest X ray showed pulmonary edema and cardiomegaly. Lactic acid 4.9, Leukocytosis neutrophils predominant and mild anemia/mild thrombocytopenia, beta hydroxybutyrate mildly elevated 1.61. CMP showed AG 21, CO2 14, hyperglycemia of 573, MARIZOL on CKD w/ BUN 46, and Cr. 3.65. Presumed to have cardiorenal syndrome with bedside Echo EF 30%. Trop 84 without ischemic changes on EKG. He was medicated with lasix, insulin, and ancef in ED. Hospitalist and intenesivist consulted for admission.      While assessing patient in ER by the team, Patient had urinated only small amount of urine in ER, bladder scan showed 200cc, in MARIZOL on CKD which was initially worsened after IV fluids and improved after lasix however he didn't responded significantly  to lasix 60, Patient was overloaded on exam and US, BNP > 29136, He was given additional dose of lasix 100mg and diuril 500. Patient was admitted to the IMCU for further management.

## 2022-04-18 VITALS
SYSTOLIC BLOOD PRESSURE: 121 MMHG | HEART RATE: 70 BPM | DIASTOLIC BLOOD PRESSURE: 67 MMHG | TEMPERATURE: 97.9 F | RESPIRATION RATE: 18 BRPM | BODY MASS INDEX: 39.89 KG/M2 | HEIGHT: 72 IN | OXYGEN SATURATION: 94 % | WEIGHT: 294.53 LBS

## 2022-04-18 LAB
BACTERIA BLD CULT: ABNORMAL
BACTERIA BLD CULT: ABNORMAL
GLUCOSE BLD STRIP.AUTO-MCNC: 279 MG/DL (ref 65–99)
GLUCOSE BLD STRIP.AUTO-MCNC: 302 MG/DL (ref 65–99)
SIGNIFICANT IND 70042: ABNORMAL
SITE SITE: ABNORMAL
SOURCE SOURCE: ABNORMAL

## 2022-04-18 PROCEDURE — 94640 AIRWAY INHALATION TREATMENT: CPT

## 2022-04-18 PROCEDURE — 97161 PT EVAL LOW COMPLEX 20 MIN: CPT

## 2022-04-18 PROCEDURE — 99239 HOSP IP/OBS DSCHRG MGMT >30: CPT | Performed by: INTERNAL MEDICINE

## 2022-04-18 PROCEDURE — A9270 NON-COVERED ITEM OR SERVICE: HCPCS | Performed by: HOSPITALIST

## 2022-04-18 PROCEDURE — A9270 NON-COVERED ITEM OR SERVICE: HCPCS | Performed by: STUDENT IN AN ORGANIZED HEALTH CARE EDUCATION/TRAINING PROGRAM

## 2022-04-18 PROCEDURE — 700102 HCHG RX REV CODE 250 W/ 637 OVERRIDE(OP): Performed by: STUDENT IN AN ORGANIZED HEALTH CARE EDUCATION/TRAINING PROGRAM

## 2022-04-18 PROCEDURE — 97535 SELF CARE MNGMENT TRAINING: CPT

## 2022-04-18 PROCEDURE — 700101 HCHG RX REV CODE 250: Performed by: STUDENT IN AN ORGANIZED HEALTH CARE EDUCATION/TRAINING PROGRAM

## 2022-04-18 PROCEDURE — 700102 HCHG RX REV CODE 250 W/ 637 OVERRIDE(OP): Performed by: INTERNAL MEDICINE

## 2022-04-18 PROCEDURE — 700102 HCHG RX REV CODE 250 W/ 637 OVERRIDE(OP): Performed by: HOSPITALIST

## 2022-04-18 PROCEDURE — A9270 NON-COVERED ITEM OR SERVICE: HCPCS | Performed by: INTERNAL MEDICINE

## 2022-04-18 PROCEDURE — 82962 GLUCOSE BLOOD TEST: CPT | Mod: 91

## 2022-04-18 RX ORDER — FLUTICASONE PROPIONATE 50 MCG
2 SPRAY, SUSPENSION (ML) NASAL DAILY
Status: DISCONTINUED | OUTPATIENT
Start: 2022-04-18 | End: 2022-04-18 | Stop reason: HOSPADM

## 2022-04-18 RX ORDER — DOXYCYCLINE 100 MG/1
100 TABLET ORAL EVERY 12 HOURS
Qty: 10 TABLET | Refills: 0 | Status: SHIPPED | OUTPATIENT
Start: 2022-04-18 | End: 2022-04-18

## 2022-04-18 RX ORDER — LORATADINE 10 MG/1
10 TABLET ORAL DAILY
Status: DISCONTINUED | OUTPATIENT
Start: 2022-04-18 | End: 2022-04-18 | Stop reason: HOSPADM

## 2022-04-18 RX ORDER — IPRATROPIUM BROMIDE AND ALBUTEROL SULFATE 2.5; .5 MG/3ML; MG/3ML
3 SOLUTION RESPIRATORY (INHALATION)
Status: DISCONTINUED | OUTPATIENT
Start: 2022-04-18 | End: 2022-04-18 | Stop reason: HOSPADM

## 2022-04-18 RX ORDER — DOXYCYCLINE 100 MG/1
100 TABLET ORAL EVERY 12 HOURS
Qty: 10 TABLET | Refills: 0 | Status: SHIPPED | OUTPATIENT
Start: 2022-04-18 | End: 2022-04-23

## 2022-04-18 RX ADMIN — DOXYCYCLINE 100 MG: 100 TABLET, FILM COATED ORAL at 06:27

## 2022-04-18 RX ADMIN — NYSTATIN: 100000 POWDER TOPICAL at 06:26

## 2022-04-18 RX ADMIN — METOPROLOL TARTRATE 12.5 MG: 25 TABLET, FILM COATED ORAL at 06:27

## 2022-04-18 RX ADMIN — Medication: at 06:26

## 2022-04-18 RX ADMIN — FUROSEMIDE 40 MG: 40 TABLET ORAL at 06:27

## 2022-04-18 RX ADMIN — APIXABAN 5 MG: 5 TABLET, FILM COATED ORAL at 06:27

## 2022-04-18 RX ADMIN — TAMSULOSIN HYDROCHLORIDE 0.4 MG: 0.4 CAPSULE ORAL at 06:27

## 2022-04-18 RX ADMIN — IPRATROPIUM BROMIDE AND ALBUTEROL SULFATE 3 ML: .5; 2.5 SOLUTION RESPIRATORY (INHALATION) at 05:53

## 2022-04-18 RX ADMIN — LORATADINE 10 MG: 10 TABLET ORAL at 08:42

## 2022-04-18 RX ADMIN — FLUTICASONE PROPIONATE 100 MCG: 50 SPRAY, METERED NASAL at 08:43

## 2022-04-18 RX ADMIN — ALLOPURINOL 100 MG: 100 TABLET ORAL at 06:27

## 2022-04-18 RX ADMIN — FINASTERIDE 5 MG: 5 TABLET, FILM COATED ORAL at 06:26

## 2022-04-18 ASSESSMENT — COGNITIVE AND FUNCTIONAL STATUS - GENERAL
WALKING IN HOSPITAL ROOM: A LITTLE
CLIMB 3 TO 5 STEPS WITH RAILING: A LOT
HELP NEEDED FOR BATHING: A LITTLE
STANDING UP FROM CHAIR USING ARMS: A LITTLE
MOVING FROM LYING ON BACK TO SITTING ON SIDE OF FLAT BED: A LITTLE
SUGGESTED CMS G CODE MODIFIER DAILY ACTIVITY: CJ
DAILY ACTIVITIY SCORE: 22
MOVING TO AND FROM BED TO CHAIR: A LITTLE
CLIMB 3 TO 5 STEPS WITH RAILING: A LITTLE
MOVING FROM LYING ON BACK TO SITTING ON SIDE OF FLAT BED: A LITTLE
MOBILITY SCORE: 18
TURNING FROM BACK TO SIDE WHILE IN FLAT BAD: A LITTLE
SUGGESTED CMS G CODE MODIFIER MOBILITY: CK
WALKING IN HOSPITAL ROOM: A LITTLE
STANDING UP FROM CHAIR USING ARMS: A LITTLE
MOBILITY SCORE: 17
MOVING TO AND FROM BED TO CHAIR: A LITTLE
DRESSING REGULAR LOWER BODY CLOTHING: A LITTLE
SUGGESTED CMS G CODE MODIFIER MOBILITY: CK
TURNING FROM BACK TO SIDE WHILE IN FLAT BAD: A LITTLE

## 2022-04-18 ASSESSMENT — GAIT ASSESSMENTS
GAIT LEVEL OF ASSIST: SUPERVISED
DEVIATION: BRADYKINETIC
ASSISTIVE DEVICE: FRONT WHEEL WALKER
DISTANCE (FEET): 200

## 2022-04-18 ASSESSMENT — PAIN DESCRIPTION - PAIN TYPE: TYPE: ACUTE PAIN

## 2022-04-18 NOTE — PROGRESS NOTES
Assumed care of patient. Report received from PHILLIP Louis. Patient A&Ox 4. Call light within reach, bed locked and in lowest position. Bed alarm on, instructed patient to call for assistance. Patient rating pain 0/10. Questions answered, no needs at this time.

## 2022-04-18 NOTE — DISCHARGE INSTRUCTIONS
Type 2 Diabetes Mellitus, Self Care, Adult  When you have type 2 diabetes (type 2 diabetes mellitus), you must make sure your blood sugar (glucose) stays in a healthy range. You can do this with:  · Nutrition.  · Exercise.  · Lifestyle changes.  · Medicines or insulin, if needed.  · Support from your doctors and others.  How to stay aware of blood sugar    · Check your blood sugar level every day, as often as told.  · Have your A1c (hemoglobin A1c) level checked two or more times a year. Have it checked more often if your doctor tells you to.  Your doctor will set personal treatment goals for you. Generally, you should have these blood sugar levels:  · Before meals (preprandial):  mg/dL (4.4-7.2 mmol/L).  · After meals (postprandial): below 180 mg/dL (10 mmol/L).  · A1c level: less than 7%.  How to manage high and low blood sugar  Signs of high blood sugar  High blood sugar is called hyperglycemia. Know the signs of high blood sugar. Signs may include:  · Feeling:  ? Thirsty.  ? Hungry.  ? Very tired.  · Needing to pee (urinate) more than usual.  · Blurry vision.  Signs of low blood sugar  Low blood sugar is called hypoglycemia. This is when blood sugar is at or below 70 mg/dL (3.9 mmol/L). Signs may include:  · Feeling:  ? Hungry.  ? Worried or nervous (anxious).  ? Sweaty and clammy.  ? Confused.  ? Dizzy.  ? Sleepy.  ? Sick to your stomach (nauseous).  · Having:  ? A fast heartbeat.  ? A headache.  ? A change in your vision.  ? Jerky movements that you cannot control (seizure).  ? Tingling or no feeling (numbness) around your mouth, lips, or tongue.  · Having trouble with:  ? Moving (coordination).  ? Sleeping.  ? Passing out (fainting).  ? Getting upset easily (irritability).  Treating low blood sugar  To treat low blood sugar, eat or drink something sugary right away. If you can think clearly and swallow safely, follow the 15:15 rule:  · Take 15 grams of a fast-acting carb (carbohydrate). Talk with your  doctor about how much you should take.  · Some fast-acting carbs are:  ? Sugar tablets (glucose pills). Take 3-4 pills.  ? 6-8 pieces of hard candy.  ? 4-6 oz (120-150 mL) of fruit juice.  ? 4-6 oz (120-150 mL) of regular (not diet) soda.  ? 1 Tbsp (15 mL) honey or sugar.  · Check your blood sugar 15 minutes after you take the carb.  · If your blood sugar is still at or below 70 mg/dL (3.9 mmol/L), take 15 grams of a carb again.  · If your blood sugar does not go above 70 mg/dL (3.9 mmol/L) after 3 tries, get help right away.  · After your blood sugar goes back to normal, eat a meal or a snack within 1 hour.  Treating very low blood sugar  If your blood sugar is at or below 54 mg/dL (3 mmol/L), you have very low blood sugar (severe hypoglycemia). This is an emergency. Do not wait to see if the symptoms will go away. Get medical help right away. Call your local emergency services (911 in the [place]  If you have very low blood sugar and you cannot eat or drink, you may need a glucagon shot (injection). A family member or friend should learn how to check your blood sugar and how to give you a glucagon shot. Ask your doctor if you need to have a glucagon shot kit at home.  Follow these instructions at home:  Medicine  · Take insulin and diabetes medicines as told.  · If your doctor says you should take more or less insulin and medicines, do this exactly as told.  · Do not run out of insulin or medicines.  Having diabetes can raise your risk for other long-term conditions. These include heart disease and kidney disease. Your doctor may prescribe medicines to help you not have these problems.  Food    · Make healthy food choices. These include:  ? Chicken, fish, egg whites, and beans.  ? Oats, whole wheat, bulgur, brown rice, quinoa, and millet.  ? Fresh fruits and vegetables.  ? Low-fat dairy products.  ? Nuts, avocado, olive oil, and canola oil.  · Meet with a  (dietitian). He or she can help you make an  eating plan that is right for you.  · Follow instructions from your doctor about what you cannot eat or drink.  · Drink enough fluid to keep your pee (urine) pale yellow.  · Keep track of carbs that you eat. Do this by reading food labels and learning food serving sizes.  · Follow your sick day plan when you cannot eat or drink normally. Make this plan with your doctor so it is ready to use.  Activity  · Exercise 3 or more times a week.  · Do not go more than 2 days without exercising.  · Talk with your doctor before you start a new exercise. Your doctor may need to tell you to change:  ? How much insulin or medicines you take.  ? How much food you eat.  Lifestyle  · Do not use any tobacco products. These include cigarettes, chewing tobacco, and e-cigarettes. If you need help quitting, ask your doctor.  · Ask your doctor how much alcohol is safe for you.  · Learn to deal with stress. If you need help with this, ask your doctor.  Body care    · Stay up to date with your shots (immunizations).  · Have your eyes and feet checked by a doctor as often as told.  · Check your skin and feet every day. Check for cuts, bruises, redness, blisters, or sores.  · Brush your teeth and gums two times a day. Floss one or more times a day.  · Go to the dentist one or more times every 6 months.  · Stay at a healthy weight.  General instructions  · Take over-the-counter and prescription medicines only as told by your doctor.  · Share your diabetes care plan with:  ? Your work or school.  ? People you live with.  · Carry a card or wear jewelry that says you have diabetes.  · Keep all follow-up visits as told by your doctor. This is important.  Questions to ask your doctor  · Do I need to meet with a diabetes educator?  · Where can I find a support group for people with diabetes?  Where to find more information  To learn more about diabetes, visit:  · American Diabetes Association: www.diabetes.org  · American Association of Diabetes  "Educators: www.diabeteseducator.org  Summary  · When you have type 2 diabetes, you must make sure your blood sugar (glucose) stays in a healthy range.  · Check your blood sugar every day, as often as told.  · Having diabetes can raise your risk for other conditions. Your doctor may prescribe medicines to help you not have these problems.  · Keep all follow-up visits as told by your doctor. This is important.  This information is not intended to replace advice given to you by your health care provider. Make sure you discuss any questions you have with your health care provider.  Document Released: 04/10/2017 Document Revised: 06/10/2019 Document Reviewed: 01/20/2017  [employer] Patient Education © 2020 [employer]        Carbohydrate Counting for Diabetes Mellitus, Adult    Carbohydrate counting is a method of keeping track of how many carbohydrates you eat. Eating carbohydrates naturally increases the amount of sugar (glucose) in the blood. Counting how many carbohydrates you eat helps keep your blood glucose within normal limits, which helps you manage your diabetes (diabetes mellitus).  It is important to know how many carbohydrates you can safely have in each meal. This is different for every person. A diet and nutrition specialist (registered dietitian) can help you make a meal plan and calculate how many carbohydrates you should have at each meal and snack.  Carbohydrates are found in the following foods:  · Grains, such as breads and cereals.  · Dried beans and soy products.  · Starchy vegetables, such as potatoes, peas, and corn.  · Fruit and fruit juices.  · Milk and yogurt.  · Sweets and snack foods, such as cake, cookies, candy, chips, and soft drinks.  How do I count carbohydrates?  There are two ways to count carbohydrates in food. You can use either of the methods or a combination of both.  Reading \"Nutrition Facts\" on packaged food  The \"Nutrition Facts\" list is included on the labels of almost all " "packaged foods and beverages in the [place] It includes:  · The serving size.  · Information about nutrients in each serving, including the grams (g) of carbohydrate per serving.  To use the “Nutrition Facts\":  · Decide how many servings you will have.  · Multiply the number of servings by the number of carbohydrates per serving.  · The resulting number is the total amount of carbohydrates that you will be having.  Learning standard serving sizes of other foods  When you eat carbohydrate foods that are not packaged or do not include \"Nutrition Facts\" on the label, you need to measure the servings in order to count the amount of carbohydrates:  · Measure the foods that you will eat with a food scale or measuring cup, if needed.  · Decide how many standard-size servings you will eat.  · Multiply the number of servings by 15. Most carbohydrate-rich foods have about 15 g of carbohydrates per serving.  ? For example, if you eat 8 oz (170 g) of strawberries, you will have eaten 2 servings and 30 g of carbohydrates (2 servings x 15 g = 30 g).  · For foods that have more than one food mixed, such as soups and casseroles, you must count the carbohydrates in each food that is included.  The following list contains standard serving sizes of common carbohydrate-rich foods. Each of these servings has about 15 g of carbohydrates:  · ½ hamburger bun or ½ English muffin.  · ½ oz (15 mL) syrup.  · ½ oz (14 g) jelly.  · 1 slice of bread.  · 1 six-inch tortilla.  · 3 oz (85 g) cooked rice or pasta.  · 4 oz (113 g) cooked dried beans.  · 4 oz (113 g) starchy vegetable, such as peas, corn, or potatoes.  · 4 oz (113 g) hot cereal.  · 4 oz (113 g) mashed potatoes or ¼ of a large baked potato.  · 4 oz (113 g) canned or frozen fruit.  · 4 oz (120 mL) fruit juice.  · 4-6 crackers.  · 6 chicken nuggets.  · 6 oz (170 g) unsweetened dry cereal.  · 6 oz (170 g) plain fat-free yogurt or yogurt sweetened with artificial sweeteners.  · 8 oz (240 mL) " milk.  · 8 oz (170 g) fresh fruit or one small piece of fruit.  · 24 oz (680 g) popped popcorn.  Example of carbohydrate counting  Sample meal  · 3 oz (85 g) chicken breast.  · 6 oz (170 g) brown rice.  · 4 oz (113 g) corn.  · 8 oz (240 mL) milk.  · 8 oz (170 g) strawberries with sugar-free whipped topping.  Carbohydrate calculation  1. Identify the foods that contain carbohydrates:  ? Rice.  ? Corn.  ? Milk.  ? Strawberries.  2. Calculate how many servings you have of each food:  ? 2 servings rice.  ? 1 serving corn.  ? 1 serving milk.  ? 1 serving strawberries.  3. Multiply each number of servings by 15 g:  ? 2 servings rice x 15 g = 30 g.  ? 1 serving corn x 15 g = 15 g.  ? 1 serving milk x 15 g = 15 g.  ? 1 serving strawberries x 15 g = 15 g.  4. Add together all of the amounts to find the total grams of carbohydrates eaten:  ? 30 g + 15 g + 15 g + 15 g = 75 g of carbohydrates total.  Summary  · Carbohydrate counting is a method of keeping track of how many carbohydrates you eat.  · Eating carbohydrates naturally increases the amount of sugar (glucose) in the blood.  · Counting how many carbohydrates you eat helps keep your blood glucose within normal limits, which helps you manage your diabetes.  · A diet and nutrition specialist (registered dietitian) can help you make a meal plan and calculate how many carbohydrates you should have at each meal and snack.  This information is not intended to replace advice given to you by your health care provider. Make sure you discuss any questions you have with your health care provider.  Document Released: 12/18/2006 Document Revised: 07/12/2018 Document Reviewed: 05/31/2017  [employer] Patient Education © 2020 [employer]      Discharge Instructions    Discharged to home by car with friend. Discharged via walking, hospital escort: Yes.  Special equipment needed: Not Applicable    Be sure to schedule a follow-up appointment with your primary care doctor or any specialists  as instructed.     Discharge Plan:        I understand that a diet low in cholesterol, fat, and sodium is recommended for good health. Unless I have been given specific instructions below for another diet, I accept this instruction as my diet prescription.   Other diet: Diabetic     Special Instructions: None    · Is patient discharged on Warfarin / Coumadin?   No     Depression / Suicide Risk    As you are discharged from this [facility] facility, it is important to learn how to keep safe from harming yourself.    Recognize the warning signs:  · Abrupt changes in personality, positive or negative- including increase in energy   · Giving away possessions  · Change in eating patterns- significant weight changes-  positive or negative  · Change in sleeping patterns- unable to sleep or sleeping all the time   · Unwillingness or inability to communicate  · Depression  · Unusual sadness, discouragement and loneliness  · Talk of wanting to die  · Neglect of personal appearance   · Rebelliousness- reckless behavior  · Withdrawal from people/activities they love  · Confusion- inability to concentrate     If you or a loved one observes any of these behaviors or has concerns about self-harm, here's what you can do:  · Talk about it- your feelings and reasons for harming yourself  · Remove any means that you might use to hurt yourself (examples: pills, rope, extension cords, firearm)  · Get professional help from the community (Mental Health, Substance Abuse, psychological counseling)  · Do not be alone:Call your Safe Contact- someone whom you trust who will be there for you.  · Call your local CRISIS HOTLINE 481-8210 or 734-445-0478  · Call your local Children's Mobile Crisis Response Team [place] (604) 944-4223 or www.[employer]  · Call the toll free National Suicide Prevention Hotlines   · National Suicide Prevention Lifeline 083-271-FFJQ (9835)  · National Hope Line Network 800-SUICIDE  (142-8502)      Chronic Obstructive Pulmonary Disease  Chronic obstructive pulmonary disease (COPD) is a long-term (chronic) lung problem. When you have COPD, it is hard for air to get in and out of your lungs. Usually the condition gets worse over time, and your lungs will never return to normal. There are things you can do to keep yourself as healthy as possible.  · Your doctor may treat your condition with:  ? Medicines.  ? Oxygen.  ? Lung surgery.  · Your doctor may also recommend:  ? Rehabilitation. This includes steps to make your body work better. It may involve a team of specialists.  ? Quitting smoking, if you smoke.  ? Exercise and changes to your diet.  ? Comfort measures (palliative care).  Follow these instructions at home:  Medicines  · Take over-the-counter and prescription medicines only as told by your doctor.  · Talk to your doctor before taking any cough or allergy medicines. You may need to avoid medicines that cause your lungs to be dry.  Lifestyle  · If you smoke, stop. Smoking makes the problem worse. If you need help quitting, ask your doctor.  · Avoid being around things that make your breathing worse. This may include smoke, chemicals, and fumes.  · Stay active, but remember to rest as well.  · Learn and use tips on how to relax.  · Make sure you get enough sleep. Most adults need at least 7 hours of sleep every night.  · Eat healthy foods. Eat smaller meals more often. Rest before meals.  Controlled breathing  Learn and use tips on how to control your breathing as told by your doctor. Try:  · Breathing in (inhaling) through your nose for 1 second. Then, pucker your lips and breath out (exhale) through your lips for 2 seconds.  · Putting one hand on your belly (abdomen). Breathe in slowly through your nose for 1 second. Your hand on your belly should move out. Pucker your lips and breathe out slowly through your lips. Your hand on your belly should move in as you breathe out.    Controlled  coughing  Learn and use controlled coughing to clear mucus from your lungs. Follow these steps:  1. Lean your head a little forward.  2. Breathe in deeply.  3. Try to hold your breath for 3 seconds.  4. Keep your mouth slightly open while coughing 2 times.  5. Spit any mucus out into a tissue.  6. Rest and do the steps again 1 or 2 times as needed.  General instructions  · Make sure you get all the shots (vaccines) that your doctor recommends. Ask your doctor about a flu shot and a pneumonia shot.  · Use oxygen therapy and pulmonary rehabilitation if told by your doctor. If you need home oxygen therapy, ask your doctor if you should buy a tool to measure your oxygen level (oximeter).  · Make a COPD action plan with your doctor. This helps you to know what to do if you feel worse than usual.  · Manage any other conditions you have as told by your doctor.  · Avoid going outside when it is very hot, cold, or humid.  · Avoid people who have a sickness you can catch (contagious).  · Keep all follow-up visits as told by your doctor. This is important.  Contact a doctor if:  · You cough up more mucus than usual.  · There is a change in the color or thickness of the mucus.  · It is harder to breathe than usual.  · Your breathing is faster than usual.  · You have trouble sleeping.  · You need to use your medicines more often than usual.  · You have trouble doing your normal activities such as getting dressed or walking around the house.  Get help right away if:  · You have shortness of breath while resting.  · You have shortness of breath that stops you from:  ? Being able to talk.  ? Doing normal activities.  · Your chest hurts for longer than 5 minutes.  · Your skin color is more blue than usual.  · Your pulse oximeter shows that you have low oxygen for longer than 5 minutes.  · You have a fever.  · You feel too tired to breathe normally.  Summary  · Chronic obstructive pulmonary disease (COPD) is a long-term lung  problem.  · The way your lungs work will never return to normal. Usually the condition gets worse over time. There are things you can do to keep yourself as healthy as possible.  · Take over-the-counter and prescription medicines only as told by your doctor.  · If you smoke, stop. Smoking makes the problem worse.  This information is not intended to replace advice given to you by your health care provider. Make sure you discuss any questions you have with your health care provider.  Document Released: 06/05/2009 Document Revised: 11/30/2018 Document Reviewed: 01/22/2018  [employer] Patient Education © 2020 [employer]        Cellulitis, Adult    Cellulitis is a skin infection. The infected area is often warm, red, swollen, and sore. It occurs most often in the arms and lower legs. It is very important to get treated for this condition.  What are the causes?  This condition is caused by bacteria. The bacteria enter through a break in the skin, such as a cut, burn, insect bite, open sore, or crack.  What increases the risk?  This condition is more likely to occur in people who:  · Have a weak body defense system (immune system).  · Have open cuts, burns, bites, or scrapes on the skin.  · Are older than 60 years of age.  · Have a blood sugar problem (diabetes).  · Have a long-lasting (chronic) liver disease (cirrhosis) or kidney disease.  · Are very overweight (obese).  · Have a skin problem, such as:  ? Itchy rash (eczema).  ? Slow movement of blood in the veins (venous stasis).  ? Fluid buildup below the skin (edema).  · Have been treated with high-energy rays (radiation).  · Use IV drugs.  What are the signs or symptoms?  Symptoms of this condition include:  · Skin that is:  ? Red.  ? Streaking.  ? Spotting.  ? Swollen.  ? Sore or painful when you touch it.  ? Warm.  · A fever.  · Chills.  · Blisters.  How is this diagnosed?  This condition is diagnosed based on:  · Medical history.  · Physical exam.  · Blood  tests.  · Imaging tests.  How is this treated?  Treatment for this condition may include:  · Medicines to treat infections or allergies.  · Home care, such as:  ? Rest.  ? Placing cold or warm cloths (compresses) on the skin.  · Hospital care, if the condition is very bad.  Follow these instructions at home:  Medicines  · Take over-the-counter and prescription medicines only as told by your doctor.  · If you were prescribed an antibiotic medicine, take it as told by your doctor. Do not stop taking it even if you start to feel better.  General instructions    · Drink enough fluid to keep your pee (urine) pale yellow.  · Do not touch or rub the infected area.  · Raise (elevate) the infected area above the level of your heart while you are sitting or lying down.  · Place cold or warm cloths on the area as told by your doctor.  · Keep all follow-up visits as told by your doctor. This is important.  Contact a doctor if:  · You have a fever.  · You do not start to get better after 1-2 days of treatment.  · Your bone or joint under the infected area starts to hurt after the skin has healed.  · Your infection comes back. This can happen in the same area or another area.  · You have a swollen bump in the area.  · You have new symptoms.  · You feel ill and have muscle aches and pains.  Get help right away if:  · Your symptoms get worse.  · You feel very sleepy.  · You throw up (vomit) or have watery poop (diarrhea) for a long time.  · You see red streaks coming from the area.  · Your red area gets larger.  · Your red area turns dark in color.  These symptoms may represent a serious problem that is an emergency. Do not wait to see if the symptoms will go away. Get medical help right away. Call your local emergency services (911 in the [place] Do not drive yourself to the hospital.  Summary  · Cellulitis is a skin infection. The area is often warm, red, swollen, and sore.  · This condition is treated with medicines, rest, and  cold and warm cloths.  · Take all medicines only as told by your doctor.  · Tell your doctor if symptoms do not start to get better after 1-2 days of treatment.  This information is not intended to replace advice given to you by your health care provider. Make sure you discuss any questions you have with your health care provider.  Document Released: 06/05/2009 Document Revised: 05/09/2019 Document Reviewed: 05/09/2019  [employer] Patient Education © 2020 [employer]

## 2022-04-18 NOTE — DISCHARGE PLANNING
Anticipated Discharge Disposition: home with [facility].     Action: patient says he was previously on service with [facility] and would like to do so again. Choice form signed and given to PAL Raines. [facility] Explained [facility] and [facility] rights to appeal DC to which he expressed a desire to discharge. IMM signed. Messaged Magdalena Kim about diabetic education. Patient says he has arranged a ride to be picked up at ER entrance.     Barriers to Discharge: C, diabetic teaching.     Plan: follow up for C acceptance, diabetic teaching.

## 2022-04-18 NOTE — DISCHARGE PLANNING
Received Choice form at 0835  Ag[facility]Fac[facility] Name: [facility]  Referral sent per Choice form @ 0905     1035-  Agency/Facility Name: [facility]  Spoke To: Jihan  Outcome: Referral is still in review at this time. DPA to follow up later this afternoon.     1340-  Agency/Facility Name: [facility]  Spoke To: Magdalena   Outcome: Referral is still in review at this time.

## 2022-04-18 NOTE — PROGRESS NOTES
Assumed care of patient at bedside report from DAY RN. Updated on POC. Patient currently A & O x 4; on 0 L O2; up with two assist; wihtout complaints of acute pain. Call light within reach. Whiteboard updated. Fall precautions in place. Bed locked and in lowest position. All questions answered. No other needs indicated at this time.

## 2022-04-18 NOTE — PROGRESS NOTES
Pt being dc'd to home with no needs. Pt discharge medications sent to preferred pharmacy. On DC papework it states that a paper script was provided. S/w Nikki RN. Dr[facility] Kilpatrick [facility]. Dc instructions discussed with patient in discharge lounge. All questions answered.  Patient  agreeable to dc plan.  Bedside RN to confirm that patient has all belongings at dc and that all home care needs have been arranged.

## 2022-04-18 NOTE — DOCUMENTATION QUERY
[facility]                                                                       Query Response Note      PATIENT:               RONNIE DAVID  ACCT #:                  [id number]  MRN:                     [id number]  :                      1945  ADMIT DATE:       2022 8:10 PM  DISCH DATE:          RESPONDING  PROVIDER #:        [id number]           QUERY TEXT:    Heart Failure Exacerbation is documented in the H&P Progress Notes. After further study, can a specificity of type or clarification be provided?     NOTE:  If an appropriate response is not listed below, please respond with a new note.    The patient's Clinical Indicators include:  NT-proBNP: 15,470, Troponin T: 91842, BUN/Cr: 46/3.65, and GFR: 16 on admission. U/S Cardiac ECHO on  noted mildly reduced LVSF and RVSF, grade I diastolic dysfunction, and an LVEF of 50%. CXR on admission noted pulmonary edema and cardiomegaly.     Treatments include: Diuril 500mg IVPB, Lasix 100mg IV, Lasix 60mg IV, Lasix 40mg PO BID, and U/S Cardiac ECHO.    Risk factors include: dx Cardiorenal Syndrome, dx MARIZOL, dx Sepsis 2/2 RLE Cellulitis, dx Acute Respiratory Failure w/Hypoxia, hx HTN + CKD + CHF, and Advanced Age.    Thank you,  Jorge A Mcgraw RN, BSN  Clinical   Connect via SwiftKey  Options provided:   -- Acute on Chronic Systolic Heart Failure   -- Acute on Chronic Diastolic Heart Failure   -- Acute on Chronic Systolic and Diastolic Heart Failure   -- Other explanation, Please specify   -- Unable to determine      Query created by: Jorge A Mcgraw on 2022 8:19 AM    RESPONSE TEXT:    Diastolic dysfunction          Electronically signed by:  SALLY FELICINAO MD 2022 2:48 PM

## 2022-04-18 NOTE — CARE PLAN
The patient is Stable - Low risk of patient condition declining or worsening    Shift Goals  Clinical Goals: pain management  Patient Goals: get up to chair  Family Goals: N/A    Progress made toward(s) clinical / shift goals:    Problem: Knowledge Deficit - Standard  Goal: Patient and family/care givers will demonstrate understanding of plan of care, disease process/condition, diagnostic tests and medications  Outcome: Progressing     Problem: Fluid Volume  Goal: Fluid volume balance will be maintained  Outcome: Progressing     Problem: Urinary - Renal Perfusion  Goal: Ability to achieve and maintain adequate renal perfusion and functioning will improve  Outcome: Progressing     Problem: Fall Risk  Goal: Patient will remain free from falls  Outcome: Progressing     Problem: Skin Integrity  Goal: Skin integrity is maintained or improved  Outcome: Progressing          Patient is not progressing towards the following goals:

## 2022-04-18 NOTE — FACE TO FACE
Face to Face Supporting Documentation - Home Health    The encounter with this patient was in whole or in part the primary reason for home health admission.    Date of encounter:   Patient:                    MRN:                       YOB: 2021  Jamar Valdez  [id number]  1945     Home health to see patient for:  Skilled Nursing care for assessment, interventions & education, Physical Therapy evaluation and treatment and Occupational therapy evaluation and treatment    Skilled need for:  Recent Deterioration of Health Status due to DKA    Skilled nursing interventions to include:  Comment:      Homebound status evidenced by:  Needs the assistance of another person in order to leave the home. Leaving home requires a considerable and taxing effort. There is a normal inability to leave the home.    Community Physician to provide follow up care: Pcp Pt States None     Optional Interventions? No      I certify the face to face encounter for this home health care referral meets the CMS requirements and the encounter/clinical assessment with the patient was, in whole, or in part, for the medical condition(s) listed above, which is the primary reason for home health care. Based on my clinical findings: the service(s) are medically necessary, support the need for home health care, and the homebound criteria are met.  I certify that this patient has had a face to face encounter by myself.  Gustavo Kilpatrick D.O. - NPI: [id number]

## 2022-04-18 NOTE — THERAPY
"Physical Therapy   Initial Evaluation     Patient Name: Jamar Valdez  Age:  77 y.o., Sex:  male  Medical Record #: [id number]  Today's Date: 4/18/2022     Precautions  Precautions: Fall Risk    Assessment  Patient is 77 y.o. male admitted with AMS and DKA. PMHx of Afib, PM, COPD, DM, HTN, obesity, PVD, and prior cardiac stent 2019. Pt appears to be at baseline functional mobility requiring SPV to ambulate in halls w/ FWW and negotiate 3 steps with no LOB. Pt lives alone in 1SH and reports able to care for self. Recommend home with HH, pt agreeable. Pt with no further acute IP PT needs, will d/c PT.     Plan    Recommend Physical Therapy for Evaluation only     DC Equipment Recommendations: None (has 4WW)  Discharge Recommendations: Recommend home health for continued physical therapy services       Subjective    \"Oh I don't do stairs. Just the two to get in my house.\"      Objective     04/18/22 1217   Total Time Spent   Total Time Spent (Mins) 18   Charge Group   PT Evaluation PT Evaluation Low   PT Self Care / Home Evaluation  1   Initial Contact Note    Initial Contact Note Order Received and Verified, Evaluation Only - Patient Does Not Require Further Acute Physical Therapy at this Time.  However, May Benefit from Post Acute Therapy for Higher Level Functional Deficits.   Precautions   Precautions Fall Risk   Vitals   O2 Delivery Device None - Room Air   Pain 0 - 10 Group   Therapist Pain Assessment Post Activity Pain Same as Prior to Activity;Nurse Notified;0   Prior Living Situation   Prior Services Home-Independent   Housing / Facility 1 Story House  (pre-fabricated)   Steps Into Home 2   Rail Right Rail (Steps into Home)   Bathroom Set up Bathtub / Shower Combination;Grab Bars   Equipment Owned 4-Wheel Walker;Single Point Cane   Lives with - Patient's Self Care Capacity Alone and Able to Care For Self   Comments Drives. Reports had tiff HH before, agreeable to HH again   Prior Level of Functional Mobility "   Bed Mobility Independent   Transfer Status Independent   Ambulation Independent   Distance Ambulation (Feet)   (limited community)   Assistive Devices Used None   Stairs Independent   History of Falls   History of Falls Yes   Date of Last Fall   (3 years ago, down for 2 days)   Cognition    Cognition / Consciousness WDL   Level of Consciousness Alert   Comments Pleasant and cooperative   Active ROM Lower Body    Active ROM Lower Body  WDL   Strength Lower Body   Lower Body Strength  WDL   Strength Upper Body   Upper Body Strength  WDL   Coordination Upper Body   Coordination WDL   Coordination Lower Body    Coordination Lower Body  WDL   Balance Assessment   Sitting Balance (Static) Good   Sitting Balance (Dynamic) Fair +   Standing Balance (Static) Fair   Standing Balance (Dynamic) Fair   Weight Shift Sitting Good   Weight Shift Standing Fair   Comments w/ FWW   Gait Analysis   Gait Level Of Assist Supervised   Assistive Device Front Wheel Walker   Distance (Feet) 200   # of Times Distance was Traveled 1   Deviation Bradykinetic   # of Stairs Climbed 3   Level of Assist with Stairs Supervised   Weight Bearing Status no restrictions   Bed Mobility    Comments not tested, up in chair pre/post. Denies any difficulty with bed mobility. Reports adjustable bed frame at home   Functional Mobility   Sit to Stand Supervised   Bed, Chair, Wheelchair Transfer Supervised   Transfer Method Stand Step   Mobility w/ FWW   How much difficulty does the patient currently have...   Turning over in bed (including adjusting bedclothes, sheets and blankets)? 3   Sitting down on and standing up from a chair with arms (e.g., wheelchair, bedside commode, etc.) 3   Moving from lying on back to sitting on the side of the bed? 3   How much help from another person does the patient currently need...   Moving to and from a bed to a chair (including a wheelchair)? 3   Need to walk in a hospital room? 3   Climbing 3-5 steps with a railing? 3    6 clicks Mobility Score 18   Activity Tolerance   Comments no s/s fatigue. Baseline activity tolerance   Education Group   Education Provided Role of Physical Therapist;Gait Training;Stair Training;Use of Assistive Device   Role of Physical Therapist Patient Response Patient;Acceptance;Explanation;Action Demonstration   Gait Training Patient Response Patient;Acceptance;Explanation;Action Demonstration   Stair Training Patient Response Patient;Acceptance;Explanation;Action Demonstration   Use of Assistive Device Patient Response Patient;Acceptance;Explanation;Action Demonstration   Problem List    Problems Decreased Activity Tolerance  (appears to be at baseline)   Anticipated Discharge Equipment and Recommendations   DC Equipment Recommendations None  (has 4WW)   Discharge Recommendations Recommend home health for continued physical therapy services   Interdisciplinary Plan of Care Collaboration   IDT Collaboration with  Nursing   Patient Position at End of Therapy Seated;Call Light within Reach;Tray Table within Reach;Phone within Reach  (received with no chair alarm, RN aware)   Collaboration Comments RN updated   Session Information   Date / Session Number  4/18: Eval only. D/c PT

## 2022-04-18 NOTE — CARE PLAN
The patient is Stable - Low risk of patient condition declining or worsening    Shift Goals  Clinical Goals: Monitor blood sugar, skin integrity, rest  Patient Goals: get up to chair  Family Goals: N/A    Progress made toward(s) clinical / shift goals:  Blood sugars monitored and insulin given as needed, skin breakdown prevention completed per orders and protocol, pt rested during shift    Patient is not progressing towards the following goals:N/A    Problem: Knowledge Deficit - Standard  Goal: Patient and family/care givers will demonstrate understanding of plan of care, disease process/condition, diagnostic tests and medications  Outcome: Progressing  Note: Pt educated regarding plan of care and medications. All questions answered.        Problem: Fall Risk  Goal: Patient will remain free from falls  Outcome: Progressing  Note: Patient is at moderate risk for falling. Patient educated on use of call light and encouraged to call before getting up. Patient verbalized understanding. All fall precautions in place.            04-Feb-2017 12:46

## 2022-04-18 NOTE — DISCHARGE SUMMARY
"Discharge Summary    CHIEF COMPLAINT ON ADMISSION  Chief Complaint   Patient presents with   • ALOC     Pt came to the ER via [facility] from home c/o alter mental status. Per EMS, pt was talking to family on the phone and all he can say is \"yeah\". Last known well is unknown. Pt is currently AOX1; oriented only to self. BS en route at 550 per EMS. Noted wound on right foot with dressing on. Pt has hx of DM Type 1, skin CA, pacemaker, COPD, and kidney disease as per EMS. No unilateral weakness, slurred speech, facial droop observed upon arrival.       Reason for Admission  EMS     Admission Date  4/14/2022    CODE STATUS  DNAR/DNI    HPI & HOSPITAL COURSE  Mr. Jamar Valdez is 77 years old male past medical history of atrial fibrillation, Hx of pacemaker placement, MRSA, Obesity, Insulin dependent type 2 DM, COPD, Diabetic foot ulcer, Chronic venous statis dermatitis, thrombocytopenia, HTN, PVD s/p stent to the right SFA on 8/17/2019   who was brought in to the ED by EMS on 4/14/2022 for high BG of 550, altered mental status , hypoxia , and right lower extremities cellulitis. Patient was put on 4 L of oxygen.      In ED patient Chest X ray showed pulmonary edema and cardiomegaly. Lactic acid 4.9, Leukocytosis neutrophils predominant and mild anemia/mild thrombocytopenia, beta hydroxybutyrate mildly elevated 1.61. CMP showed AG 21, CO2 14, hyperglycemia of 573, MARIZOL on CKD w/ BUN 46, and Cr. 3.65. Presumed to have cardiorenal syndrome with bedside Echo EF 30%. Trop 84 without ischemic changes on EKG. He was medicated with lasix, insulin, and ancef in ED. Hospitalist and intenesivist consulted for admission.      While assessing patient in ER by the team, Patient had urinated only small amount of urine in ER, bladder scan showed 200cc, in MARIZOL on CKD which was initially worsened after IV fluids and improved after lasix however he didn't responded significantly  to lasix 60, Patient was overloaded on exam and US, BNP > 99919, " He was given additional dose of lasix 100mg and diuril 500. Patient was admitted to the [facility] for further management.    DKA improved and patient was transferred to general medical floor where he remained stable. Patient was determined satisfactory for discharge with appropriate follow up.       Therefore, he is discharged in fair and stable condition to home with organized home healthcare and close outpatient follow-up.    The patient met 2-midnight criteria for an inpatient stay at the time of discharge.    Discharge Date  04/18/2022    FOLLOW UP ITEMS POST DISCHARGE  Please follow up with PCP in 3-5 days for post hospitalization follow up and medication reconciliation.     DISCHARGE DIAGNOSES  Principal Problem:    Diabetic ketoacidosis without coma associated with type 2 diabetes mellitus (HCC) POA: Yes  Active Problems:    COPD (chronic obstructive pulmonary disease) (HCC) POA: Yes    Morbid obesity with BMI of 40.0-44.9, adult (HCC) POA: Yes    Type 2 diabetes mellitus with kidney complication, with long-term current use of insulin (HCC) POA: Yes    HTN (hypertension) POA: Yes    Thrombocytopenia (HCC) POA: Yes    Acute kidney injury superimposed on chronic kidney disease (HCC) POA: Yes    Cardiorenal syndrome POA: Yes    Acute respiratory failure with hypoxia (HCC) POA: Yes    Sepsis (HCC) POA: Yes    Advance care planning POA: Yes    Cellulitis of right lower extremity POA: Yes    Acute encephalopathy POA: Yes  Resolved Problems:    * No resolved hospital problems. *      FOLLOW UP  No future appointments.      Schedule an appointment as soon as possible for a visit in 1 week  As needed    Magdiel Medina D.O.  [address]  [facility] [address]  [phone]    In 1 week      [facility]  [address]  [facility] [facility] [address]  [phone]  Schedule an appointment as soon as possible for a visit  As needed, for follow up      MEDICATIONS ON DISCHARGE     Medication List      START taking  these medications      Instructions   doxycycline monohydrate 100 MG tablet  Commonly known as: ADOXA   Take 1 Tablet by mouth every 12 hours for 5 days.  Dose: 100 mg        CONTINUE taking these medications      Instructions   allopurinol 100 MG Tabs  Commonly known as: ZYLOPRIM   Take 100 mg by mouth every day.  Dose: 100 mg     amLODIPine 10 MG Tabs  Commonly known as: NORVASC   Take 1 Tab by mouth every day.  Dose: 10 mg     apixaban 5mg Tabs  Commonly known as: ELIQUIS   Take 5 mg by mouth 2 times a day.  Dose: 5 mg     atorvastatin 20 MG Tabs  Commonly known as: LIPITOR   Take 20 mg by mouth every day.  Dose: 20 mg     finasteride 5 MG Tabs  Commonly known as: PROSCAR   Take 1 Tab by mouth every day.  Dose: 5 mg     fluorouracil 5 % cream  Commonly known as: EFUDEX   Apply 1 Application topically 2 times a day.  Dose: 1 Application     furosemide 40 MG Tabs  Commonly known as: LASIX   Take 40 mg by mouth 2 times a day.  Dose: 40 mg     gabapentin 100 MG Caps  Commonly known as: NEURONTIN   Take 100-200 mg by mouth 3 times a day. 100 mg in the morning, at noon and 200 mg at bedtime  Dose: 100-200 mg     insulin aspart 100 UNIT/ML Soln  Commonly known as: NovoLOG   Inject 30 Units under the skin 3 times a day before meals.  Dose: 30 Units     insulin glargine 100 UNIT/ML Soln  Commonly known as: Lantus   Inject 45 Units under the skin 2 times a day.  Dose: 45 Units     lisinopril 10 MG Tabs  Commonly known as: PRINIVIL   Take 10 mg by mouth every day.  Dose: 10 mg     metoprolol tartrate 25 MG Tabs  Commonly known as: LOPRESSOR   Take 12.5 mg by mouth 2 times a day.  Dose: 12.5 mg     NON SPECIFIED   Take 1 Tablet by mouth in the morning, at noon, and at bedtime. Fish Oil-DHA-EPA  Dose: 1 Tablet     Ozempic (0.25 or 0.5 MG/DOSE) 2 MG/1.5ML Sopn  Generic drug: Semaglutide(0.25 or 0.5MG/DOS)   Inject 1.5 mL under the skin every 7 days.  Dose: 1.5 mL     tamsulosin 0.4 MG capsule  Commonly known as: FLOMAX   Take  0.4 mg by mouth every day.  Dose: 0.4 mg     vitamin D3 1000 Unit (25 mcg) Tabs  Commonly known as: cholecalciferol   Take 3,000 Units by mouth every day.  Dose: 3,000 Units        STOP taking these medications    ascorbic acid 500 MG Tabs  Commonly known as: ascorbic acid     vitamin B-12 1000 MCG Tabs            Allergies  Allergies   Allergen Reactions   • Bloodless Unspecified     Bloodless         DIET  Orders Placed This Encounter   Procedures   • Diet Order Diet: Level 6 - Soft and Bite Sized (Pt does not have teeth); Liquid level: Level 0 - Thin; Second Modifier: (optional): Consistent CHO (Diabetic)     Standing Status:   Standing     Number of Occurrences:   1     Order Specific Question:   Diet:     Answer:   Level 6 - Soft and Bite Sized [23]     Comments:   Pt does not have teeth     Order Specific Question:   Liquid level     Answer:   Level 0 - Thin     Order Specific Question:   Second Modifier: (optional)     Answer:   Consistent CHO (Diabetic) [4]       ACTIVITY  As tolerated.  Weight bearing as tolerated    CONSULTATIONS  Critical care    PROCEDURES  N/A    LABORATORY  Lab Results   Component Value Date    SODIUM 139 04/17/2022    POTASSIUM 3.7 04/17/2022    CHLORIDE 106 04/17/2022    CO2 22 04/17/2022    GLUCOSE 245 (H) 04/17/2022    BUN 51 (H) 04/17/2022    CREATININE 2.87 (H) 04/17/2022    CREATININE 1.1 10/07/2005        Lab Results   Component Value Date    WBC 4.4 (L) 04/17/2022    HEMOGLOBIN 12.4 (L) 04/17/2022    HEMATOCRIT 37.8 (L) 04/17/2022    PLATELETCT 129 (L) 04/17/2022        Total time of the discharge process exceeds 39 minutes.

## 2022-05-03 NOTE — DOCUMENTATION QUERY
"                                                                         [facility]                                                                       Query Response Note      PATIENT:               RONNIE DAVID  ACCT #:                  [id number]  MRN:                     [id number]  :                      1945  ADMIT DATE:       2022 8:10 PM  DISCH DATE:        2022 3:33 PM  RESPONDING  PROVIDER #:        [id number]           QUERY TEXT:    Pt has documented Sepsis noted as ?rule out? or similar terminology such as suspected, probable, etc.  Please clarify status of this condition:    NOTE:  If an appropriate response is not listed below, please respond with a new note.    [email or web address]       The patient's Clinical Indicators include:  Sepsis mentioned in H&P as presumptive/possible.  (temp 96.5, pulse 80, resp 20, WBC 11.1, lactic acid 4.9)  Consult  with possible Sepsis.    Sepsis documented on some of progress notes with inconsistent documentation.   Sepsis was listed as an \"active\" final diagnosis per Discharge Summary.    Clinical indicators: dehydration, elevated creatinine,   Sirs criteria per documentation: tachypnea, respirations greater than 20 per minute, heart rate 90, WBC 11.1,   BP: 158/74, 02 requirement, acidosis, troponin elevated @ 84    Treatment/monitoring:IV antibiotics, labs and cultures monitored, fluid resuscitation, 02   Risks:  Sepsis, DKA, heart failure, respiratory failure, encephalopathy, HTN, acute rental failure, cellulitis  Options provided:   -- Sepsis is ruled in, present on admission   -- Sepsis is ruled in, developed after admission   -- Sepsis is ruled out   -- Unable to determine      Query created by: Lin Avitia on 2022 4:29 AM    RESPONSE TEXT:    Sepsis is ruled in, present on admission          Electronically signed by:  SALLY FELICIANO MD 5/3/2022 7:48 AM              "

## 2023-04-15 NOTE — DIETARY
Nutrition Services - Poor po and/or weight loss reported on admission. Malnutrition Screening Tool score <2. No other risk for malnutrition, or other nutrition concerns,  identified on screening. Nutrition assessment not indicated at this time. RD will monitor per department guidelines.      syncope

## 2023-10-02 NOTE — CARE PLAN
Problem: Safety  Goal: Will remain free from injury  Outcome: PROGRESSING AS EXPECTED  Patient on a high risk for fall; Bed alarm utilized and other safety measures in place     Problem: Skin Integrity  Goal: Risk for impaired skin integrity will decrease  Outcome: PROGRESSING AS EXPECTED  Waffle overlay in use. Patient repositioned Q2H     Problem: Respiratory:  Goal: Respiratory status will improve  Outcome: PROGRESSING AS EXPECTED  Patient on Oxygen therapy via NC at 4l/min.       No

## (undated) DEVICE — BAG RETRIEVAL 10ML (10EA/BX)

## (undated) DEVICE — LACTATED RINGERS INJ 1000 ML - (14EA/CA 60CA/PF)

## (undated) DEVICE — KIT ANESTHESIA W/CIRCUIT & 3/LT BAG W/FILTER (20EA/CA)

## (undated) DEVICE — CLIP MED LG INTNL HRZN TI ESCP - (20/BX)

## (undated) DEVICE — TUBING CLEARLINK DUO-VENT - C-FLO (48EA/CA)

## (undated) DEVICE — ELECTRODE 850 FOAM ADHESIVE - HYDROGEL RADIOTRNSPRNT (50/PK)

## (undated) DEVICE — CANNULA W/SEAL 5X100 Z-THRE - ADED KII (12/BX)

## (undated) DEVICE — PACK LAP CHOLE OR - (2EA/CA)

## (undated) DEVICE — DETERGENT RENUZYME PLUS 10 OZ PACKET (50/BX)

## (undated) DEVICE — TUBE CONNECT SUCTION CLEAR 120 X 1/4" (50EA/CA)"

## (undated) DEVICE — SUTURE 2-0 ETHILON FS - (36/BX) 18 INCH

## (undated) DEVICE — CHLORAPREP 26 ML APPLICATOR - ORANGE TINT(25/CA)

## (undated) DEVICE — DERMABOND ADVANCED - (12EA/BX)

## (undated) DEVICE — SODIUM CHL IRRIGATION 0.9% 1000ML (12EA/CA)

## (undated) DEVICE — NEEDLE INSFL 120MM 14GA VRRS - (20/BX)

## (undated) DEVICE — SPONGE GAUZESTER 4 X 4 4PLY - (128PK/CA)

## (undated) DEVICE — KIT EVACUATER 3 SPRING PVC LF 1/8 DRAIN SIZE (10EA/CA)"

## (undated) DEVICE — SET SUCTION/IRRIGATION WITH DISPOSABLE TIP (6/CA )PART #0250-070-520 IS A SUB

## (undated) DEVICE — DRAPESURG STERI-DRAPE LONG - (10/BX 4BX/CA)

## (undated) DEVICE — ENDO RETRACT 10MM INSTRUMENT - (6EA/CA)

## (undated) DEVICE — WATER IRRIG. STER. 1500 ML - (9/CA)

## (undated) DEVICE — HEAD HOLDER JUNIOR/ADULT

## (undated) DEVICE — GLOVE BIOGEL SZ 7 SURGICAL PF LTX - (50PR/BX 4BX/CA)

## (undated) DEVICE — Device

## (undated) DEVICE — SET LEADWIRE 5 LEAD BEDSIDE DISPOSABLE ECG (1SET OF 5/EA)

## (undated) DEVICE — GLOVE BIOGEL PI INDICATOR SZ 7.0 SURGICAL PF LF - (50/BX 4BX/CA)

## (undated) DEVICE — SUTURE GENERAL

## (undated) DEVICE — SUCTION INSTRUMENT YANKAUER BULBOUS TIP W/O VENT (50EA/CA)

## (undated) DEVICE — CANNULA W/SEAL11X100ZTHREAD - (12/BX)

## (undated) DEVICE — NEPTUNE 4 PORT MANIFOLD - (20/PK)

## (undated) DEVICE — MASK ANESTHESIA ADULT  - (100/CA)

## (undated) DEVICE — SET EXTENSION WITH 2 PORTS (48EA/CA) ***PART #2C8610 IS A SUBSTITUTE*****

## (undated) DEVICE — DRESSING TRANSPARENT FILM TEGADERM 4 X 4.75" (50EA/BX)"

## (undated) DEVICE — CANISTER SUCTION 3000ML MECHANICAL FILTER AUTO SHUTOFF MEDI-VAC NONSTERILE LF DISP  (40EA/CA)

## (undated) DEVICE — KIT ROOM DECONTAMINATION

## (undated) DEVICE — SYRINGE SAFETY TB 1 ML 27 GA X 1/2 IN (100/BX 4BX/CA)

## (undated) DEVICE — SENSOR SPO2 NEO LNCS ADHESIVE (20/BX) SEE USER NOTES

## (undated) DEVICE — TROCAR 5X100 NON BLADED Z-TH - READ KII (6/BX)

## (undated) DEVICE — TROCAR Z THREAD 11 X 100 - BLADED (6/BX)

## (undated) DEVICE — SUTURE 4-0 VICRYL PLUSFS-1 - 27 INCH (36/BX)

## (undated) DEVICE — PROTECTOR ULNA NERVE - (36PR/CA)

## (undated) DEVICE — SUTURE 0 VICRYL PLUS UR-6 - 27 INCH (36/BX)

## (undated) DEVICE — ELECTRODE DUAL RETURN W/ CORD - (50/PK)